# Patient Record
Sex: FEMALE | Race: BLACK OR AFRICAN AMERICAN | Employment: UNEMPLOYED | URBAN - METROPOLITAN AREA
[De-identification: names, ages, dates, MRNs, and addresses within clinical notes are randomized per-mention and may not be internally consistent; named-entity substitution may affect disease eponyms.]

---

## 2017-11-18 ENCOUNTER — APPOINTMENT (OUTPATIENT)
Dept: GENERAL RADIOLOGY | Age: 67
DRG: 064 | End: 2017-11-18
Payer: MEDICARE

## 2017-11-18 ENCOUNTER — HOSPITAL ENCOUNTER (INPATIENT)
Age: 67
LOS: 26 days | Discharge: OTHER INSTITUTION | DRG: 064 | End: 2017-12-14
Attending: EMERGENCY MEDICINE | Admitting: INTERNAL MEDICINE
Payer: MEDICARE

## 2017-11-18 ENCOUNTER — APPOINTMENT (OUTPATIENT)
Dept: CT IMAGING | Age: 67
DRG: 064 | End: 2017-11-18
Payer: MEDICARE

## 2017-11-18 DIAGNOSIS — G93.41 ACUTE METABOLIC ENCEPHALOPATHY: ICD-10-CM

## 2017-11-18 DIAGNOSIS — N17.9 ACUTE RENAL FAILURE, UNSPECIFIED ACUTE RENAL FAILURE TYPE (HCC): Primary | ICD-10-CM

## 2017-11-18 DIAGNOSIS — J96.00 ACUTE RESPIRATORY FAILURE, UNSPECIFIED WHETHER WITH HYPOXIA OR HYPERCAPNIA (HCC): ICD-10-CM

## 2017-11-18 PROBLEM — R41.82 ALTERED MENTAL STATUS: Status: ACTIVE | Noted: 2017-11-18

## 2017-11-18 PROBLEM — G93.40 ENCEPHALOPATHY: Status: ACTIVE | Noted: 2017-11-18

## 2017-11-18 PROBLEM — E87.5 HYPERKALEMIA: Status: ACTIVE | Noted: 2017-11-18

## 2017-11-18 PROBLEM — M62.82 RHABDOMYOLYSIS: Status: ACTIVE | Noted: 2017-11-18

## 2017-11-18 LAB
-: NORMAL
ABSOLUTE EOS #: 0 K/UL (ref 0–0.4)
ABSOLUTE IMMATURE GRANULOCYTE: 0.49 K/UL (ref 0–0.3)
ABSOLUTE LYMPH #: 1.48 K/UL (ref 1–4.8)
ABSOLUTE MONO #: 0.74 K/UL (ref 0.1–0.8)
ACETAMINOPHEN LEVEL: <10 UG/ML (ref 10–30)
ALBUMIN SERPL-MCNC: 2.8 G/DL (ref 3.5–5.2)
ALBUMIN/GLOBULIN RATIO: 0.5 (ref 1–2.5)
ALLEN TEST: ABNORMAL
ALLEN TEST: ABNORMAL
ALP BLD-CCNC: 158 U/L (ref 35–104)
ALT SERPL-CCNC: 28 U/L (ref 5–33)
AMORPHOUS: NORMAL
AMPHETAMINE SCREEN URINE: NEGATIVE
ANION GAP SERPL CALCULATED.3IONS-SCNC: 21 MMOL/L (ref 9–17)
ANION GAP SERPL CALCULATED.3IONS-SCNC: 23 MMOL/L (ref 9–17)
ANION GAP SERPL CALCULATED.3IONS-SCNC: 28 MMOL/L (ref 9–17)
ANION GAP: 12 MMOL/L (ref 7–16)
AST SERPL-CCNC: 21 U/L
BACTERIA: NORMAL
BARBITURATE SCREEN URINE: NEGATIVE
BASOPHILS # BLD: 0 %
BASOPHILS ABSOLUTE: 0 K/UL (ref 0–0.2)
BENZODIAZEPINE SCREEN, URINE: NEGATIVE
BILIRUB SERPL-MCNC: 0.5 MG/DL (ref 0.3–1.2)
BILIRUBIN DIRECT: 0.24 MG/DL
BILIRUBIN URINE: NEGATIVE
BILIRUBIN, INDIRECT: 0.26 MG/DL (ref 0–1)
BUN BLDV-MCNC: 138 MG/DL (ref 8–23)
BUN BLDV-MCNC: 148 MG/DL (ref 8–23)
BUN BLDV-MCNC: 158 MG/DL (ref 8–23)
BUN BLDV-MCNC: 158 MG/DL (ref 8–23)
BUN/CREAT BLD: ABNORMAL (ref 9–20)
BUPRENORPHINE URINE: NORMAL
CALCIUM SERPL-MCNC: 10 MG/DL (ref 8.6–10.4)
CALCIUM SERPL-MCNC: 8.2 MG/DL (ref 8.6–10.4)
CALCIUM SERPL-MCNC: 8.7 MG/DL (ref 8.6–10.4)
CANNABINOID SCREEN URINE: NEGATIVE
CASTS UA: NORMAL /LPF (ref 0–2)
CHLORIDE BLD-SCNC: 103 MMOL/L (ref 98–107)
CHLORIDE BLD-SCNC: 115 MMOL/L (ref 98–107)
CHLORIDE BLD-SCNC: 115 MMOL/L (ref 98–107)
CHOLESTEROL/HDL RATIO: 13.2
CHOLESTEROL: 119 MG/DL
CO2: 13 MMOL/L (ref 20–31)
CO2: 16 MMOL/L (ref 20–31)
CO2: 16 MMOL/L (ref 20–31)
COCAINE METABOLITE, URINE: NEGATIVE
COLOR: YELLOW
COMMENT UA: ABNORMAL
CREAT SERPL-MCNC: 2.77 MG/DL (ref 0.5–0.9)
CREAT SERPL-MCNC: 3.18 MG/DL (ref 0.5–0.9)
CREAT SERPL-MCNC: 3.59 MG/DL (ref 0.5–0.9)
CREAT SERPL-MCNC: 3.63 MG/DL (ref 0.5–0.9)
CRYSTALS, UA: NORMAL /HPF
DIFFERENTIAL TYPE: ABNORMAL
EKG ATRIAL RATE: 116 BPM
EKG P AXIS: 68 DEGREES
EKG P-R INTERVAL: 166 MS
EKG Q-T INTERVAL: 342 MS
EKG QRS DURATION: 94 MS
EKG QTC CALCULATION (BAZETT): 475 MS
EKG R AXIS: 64 DEGREES
EKG T AXIS: 57 DEGREES
EKG VENTRICULAR RATE: 116 BPM
EOSINOPHILS RELATIVE PERCENT: 0 %
EPITHELIAL CELLS UA: NORMAL /HPF (ref 0–5)
ETHANOL PERCENT: <0.01 %
ETHANOL: <10 MG/DL
FIO2: 30
FIO2: ABNORMAL
GFR AFRICAN AMERICAN: 15 ML/MIN
GFR AFRICAN AMERICAN: 15 ML/MIN
GFR AFRICAN AMERICAN: 18 ML/MIN
GFR AFRICAN AMERICAN: 21 ML/MIN
GFR NON-AFRICAN AMERICAN: 12 ML/MIN
GFR NON-AFRICAN AMERICAN: 13 ML/MIN
GFR NON-AFRICAN AMERICAN: 14 ML/MIN
GFR NON-AFRICAN AMERICAN: 17 ML/MIN
GFR SERPL CREATININE-BSD FRML MDRD: ABNORMAL ML/MIN/{1.73_M2}
GLOBULIN: ABNORMAL G/DL (ref 1.5–3.8)
GLUCOSE BLD-MCNC: 228 MG/DL (ref 65–105)
GLUCOSE BLD-MCNC: 245 MG/DL (ref 70–99)
GLUCOSE BLD-MCNC: 271 MG/DL (ref 70–99)
GLUCOSE BLD-MCNC: 320 MG/DL (ref 74–100)
GLUCOSE BLD-MCNC: 326 MG/DL (ref 70–99)
GLUCOSE URINE: NEGATIVE
HCO3 VENOUS: 20 MMOL/L (ref 22–29)
HCT VFR BLD CALC: 54.9 % (ref 36.3–47.1)
HDLC SERPL-MCNC: 9 MG/DL
HEMOGLOBIN: 17.3 G/DL (ref 11.9–15.1)
IMMATURE GRANULOCYTES: 2 %
KETONES, URINE: NEGATIVE
LACTIC ACID, WHOLE BLOOD: 3.8 MMOL/L (ref 0.7–2.1)
LACTIC ACID: ABNORMAL MMOL/L
LDL CHOLESTEROL: 69 MG/DL (ref 0–130)
LEUKOCYTE ESTERASE, URINE: NEGATIVE
LYMPHOCYTES # BLD: 6 %
MCH RBC QN AUTO: 29 PG (ref 25.2–33.5)
MCHC RBC AUTO-ENTMCNC: 31.5 G/DL (ref 28.4–34.8)
MCV RBC AUTO: 92.1 FL (ref 82.6–102.9)
MDMA URINE: NORMAL
METHADONE SCREEN, URINE: NEGATIVE
METHAMPHETAMINE, URINE: NORMAL
MODE: ABNORMAL
MODE: ABNORMAL
MONOCYTES # BLD: 3 %
MORPHOLOGY: ABNORMAL
MUCUS: NORMAL
MYOGLOBIN: 1317 NG/ML (ref 25–58)
NEGATIVE BASE EXCESS, ART: 10 (ref 0–2)
NEGATIVE BASE EXCESS, VEN: 7 (ref 0–2)
NITRITE, URINE: NEGATIVE
O2 DEVICE/FLOW/%: ABNORMAL
O2 DEVICE/FLOW/%: ABNORMAL
O2 SAT, VEN: 82 % (ref 60–85)
OPIATES, URINE: NEGATIVE
OTHER OBSERVATIONS UA: NORMAL
OXYCODONE SCREEN URINE: NEGATIVE
PATIENT TEMP: 32.5
PATIENT TEMP: ABNORMAL
PCO2, VEN: 42.8 MM HG (ref 41–51)
PDW BLD-RTO: 14.5 % (ref 11.8–14.4)
PH UA: 5.5 (ref 5–8)
PH VENOUS: 7.28 (ref 7.32–7.43)
PHENCYCLIDINE, URINE: NEGATIVE
PLATELET # BLD: 351 K/UL (ref 138–453)
PLATELET ESTIMATE: ABNORMAL
PMV BLD AUTO: 12.5 FL (ref 8.1–13.5)
PO2, VEN: 52.3 MM HG (ref 30–50)
POC CHLORIDE: 120 MMOL/L (ref 98–107)
POC HCO3: 16.7 MMOL/L (ref 21–28)
POC HEMATOCRIT: 59 % (ref 36–46)
POC HEMOGLOBIN: 19.9 G/DL (ref 12–16)
POC IONIZED CALCIUM: 1.17 MMOL/L (ref 1.15–1.33)
POC LACTIC ACID: 6.3 MMOL/L (ref 0.56–1.39)
POC O2 SATURATION: 99 % (ref 94–98)
POC PCO2 TEMP: 33 MM HG
POC PCO2 TEMP: ABNORMAL MM HG
POC PCO2: 39.9 MM HG (ref 35–48)
POC PH TEMP: 7.29
POC PH TEMP: ABNORMAL
POC PH: 7.23 (ref 7.35–7.45)
POC PO2 TEMP: 143 MM HG
POC PO2 TEMP: ABNORMAL MM HG
POC PO2: 166.8 MM HG (ref 83–108)
POC SODIUM: 152 MMOL/L (ref 138–146)
POSITIVE BASE EXCESS, ART: ABNORMAL (ref 0–3)
POSITIVE BASE EXCESS, VEN: ABNORMAL (ref 0–3)
POTASSIUM SERPL-SCNC: 4 MMOL/L (ref 3.7–5.3)
POTASSIUM SERPL-SCNC: 4.4 MMOL/L (ref 3.7–5.3)
POTASSIUM SERPL-SCNC: 4.7 MMOL/L (ref 3.7–5.3)
POTASSIUM SERPL-SCNC: 8.8 MMOL/L (ref 3.7–5.3)
PROLACTIN: 110.8 UG/L (ref 4.79–23.3)
PROPOXYPHENE, URINE: NORMAL
PROTEIN UA: ABNORMAL
RBC # BLD: 5.96 M/UL (ref 3.95–5.11)
RBC # BLD: ABNORMAL 10*6/UL
RBC UA: NORMAL /HPF (ref 0–2)
RENAL EPITHELIAL, UA: NORMAL /HPF
SALICYLATE LEVEL: <1 MG/DL (ref 3–10)
SAMPLE SITE: ABNORMAL
SAMPLE SITE: ABNORMAL
SEG NEUTROPHILS: 89 %
SEGMENTED NEUTROPHILS ABSOLUTE COUNT: 21.89 K/UL (ref 1.8–7.7)
SODIUM BLD-SCNC: 147 MMOL/L (ref 135–144)
SODIUM BLD-SCNC: 151 MMOL/L (ref 135–144)
SODIUM BLD-SCNC: 152 MMOL/L (ref 135–144)
SPECIFIC GRAVITY UA: 1.02 (ref 1–1.03)
TCO2 (CALC), ART: 18 MMOL/L (ref 22–29)
TEST INFORMATION: NORMAL
TOTAL CK: 179 U/L (ref 26–192)
TOTAL CO2, VENOUS: 21 MMOL/L (ref 23–30)
TOTAL PROTEIN: 8.7 G/DL (ref 6.4–8.3)
TOXIC TRICYCLIC SC,BLOOD: NEGATIVE
TRICHOMONAS: NORMAL
TRICYCLIC ANTIDEPRESSANTS, UR: NORMAL
TRIGL SERPL-MCNC: 206 MG/DL
TROPONIN INTERP: NORMAL
TROPONIN INTERP: NORMAL
TROPONIN T: <0.03 NG/ML
TROPONIN T: <0.03 NG/ML
TSH SERPL DL<=0.05 MIU/L-ACNC: 2.2 MIU/L (ref 0.3–5)
TURBIDITY: CLEAR
URINE HGB: NEGATIVE
UROBILINOGEN, URINE: NORMAL
VLDLC SERPL CALC-MCNC: ABNORMAL MG/DL (ref 1–30)
WBC # BLD: 24.6 K/UL (ref 3.5–11.3)
WBC # BLD: ABNORMAL 10*3/UL
WBC UA: NORMAL /HPF (ref 0–5)
YEAST: NORMAL

## 2017-11-18 PROCEDURE — 84520 ASSAY OF UREA NITROGEN: CPT

## 2017-11-18 PROCEDURE — 94002 VENT MGMT INPAT INIT DAY: CPT

## 2017-11-18 PROCEDURE — 82330 ASSAY OF CALCIUM: CPT

## 2017-11-18 PROCEDURE — 0DH68UZ INSERTION OF FEEDING DEVICE INTO STOMACH, VIA NATURAL OR ARTIFICIAL OPENING ENDOSCOPIC: ICD-10-PCS | Performed by: INTERNAL MEDICINE

## 2017-11-18 PROCEDURE — 80076 HEPATIC FUNCTION PANEL: CPT

## 2017-11-18 PROCEDURE — 84484 ASSAY OF TROPONIN QUANT: CPT

## 2017-11-18 PROCEDURE — 83874 ASSAY OF MYOGLOBIN: CPT

## 2017-11-18 PROCEDURE — 82803 BLOOD GASES ANY COMBINATION: CPT

## 2017-11-18 PROCEDURE — S0028 INJECTION, FAMOTIDINE, 20 MG: HCPCS | Performed by: HOSPITALIST

## 2017-11-18 PROCEDURE — 87641 MR-STAPH DNA AMP PROBE: CPT

## 2017-11-18 PROCEDURE — 80048 BASIC METABOLIC PNL TOTAL CA: CPT

## 2017-11-18 PROCEDURE — 2580000003 HC RX 258: Performed by: HOSPITALIST

## 2017-11-18 PROCEDURE — 71010 XR CHEST PORTABLE: CPT

## 2017-11-18 PROCEDURE — 96374 THER/PROPH/DIAG INJ IV PUSH: CPT

## 2017-11-18 PROCEDURE — 2580000003 HC RX 258: Performed by: EMERGENCY MEDICINE

## 2017-11-18 PROCEDURE — 84443 ASSAY THYROID STIM HORMONE: CPT

## 2017-11-18 PROCEDURE — 2500000003 HC RX 250 WO HCPCS

## 2017-11-18 PROCEDURE — 99291 CRITICAL CARE FIRST HOUR: CPT | Performed by: INTERNAL MEDICINE

## 2017-11-18 PROCEDURE — 99285 EMERGENCY DEPT VISIT HI MDM: CPT

## 2017-11-18 PROCEDURE — 87040 BLOOD CULTURE FOR BACTERIA: CPT

## 2017-11-18 PROCEDURE — G0480 DRUG TEST DEF 1-7 CLASSES: HCPCS

## 2017-11-18 PROCEDURE — 6360000002 HC RX W HCPCS: Performed by: STUDENT IN AN ORGANIZED HEALTH CARE EDUCATION/TRAINING PROGRAM

## 2017-11-18 PROCEDURE — 84132 ASSAY OF SERUM POTASSIUM: CPT

## 2017-11-18 PROCEDURE — 85014 HEMATOCRIT: CPT

## 2017-11-18 PROCEDURE — 51702 INSERT TEMP BLADDER CATH: CPT | Performed by: NURSE PRACTITIONER

## 2017-11-18 PROCEDURE — 84295 ASSAY OF SERUM SODIUM: CPT

## 2017-11-18 PROCEDURE — 6360000002 HC RX W HCPCS: Performed by: EMERGENCY MEDICINE

## 2017-11-18 PROCEDURE — 94762 N-INVAS EAR/PLS OXIMTRY CONT: CPT

## 2017-11-18 PROCEDURE — 36415 COLL VENOUS BLD VENIPUNCTURE: CPT

## 2017-11-18 PROCEDURE — 80061 LIPID PANEL: CPT

## 2017-11-18 PROCEDURE — 84146 ASSAY OF PROLACTIN: CPT

## 2017-11-18 PROCEDURE — 74176 CT ABD & PELVIS W/O CONTRAST: CPT

## 2017-11-18 PROCEDURE — 82947 ASSAY GLUCOSE BLOOD QUANT: CPT

## 2017-11-18 PROCEDURE — 70450 CT HEAD/BRAIN W/O DYE: CPT

## 2017-11-18 PROCEDURE — 82550 ASSAY OF CK (CPK): CPT

## 2017-11-18 PROCEDURE — 93005 ELECTROCARDIOGRAM TRACING: CPT

## 2017-11-18 PROCEDURE — 2580000003 HC RX 258: Performed by: STUDENT IN AN ORGANIZED HEALTH CARE EDUCATION/TRAINING PROGRAM

## 2017-11-18 PROCEDURE — 94770 HC ETCO2 MONITOR DAILY: CPT

## 2017-11-18 PROCEDURE — 2500000003 HC RX 250 WO HCPCS: Performed by: HOSPITALIST

## 2017-11-18 PROCEDURE — 36556 INSERT NON-TUNNEL CV CATH: CPT | Performed by: NURSE PRACTITIONER

## 2017-11-18 PROCEDURE — 6370000000 HC RX 637 (ALT 250 FOR IP): Performed by: STUDENT IN AN ORGANIZED HEALTH CARE EDUCATION/TRAINING PROGRAM

## 2017-11-18 PROCEDURE — 6360000002 HC RX W HCPCS

## 2017-11-18 PROCEDURE — 82565 ASSAY OF CREATININE: CPT

## 2017-11-18 PROCEDURE — 5A1955Z RESPIRATORY VENTILATION, GREATER THAN 96 CONSECUTIVE HOURS: ICD-10-PCS | Performed by: INTERNAL MEDICINE

## 2017-11-18 PROCEDURE — 83605 ASSAY OF LACTIC ACID: CPT

## 2017-11-18 PROCEDURE — 83036 HEMOGLOBIN GLYCOSYLATED A1C: CPT

## 2017-11-18 PROCEDURE — 81001 URINALYSIS AUTO W/SCOPE: CPT

## 2017-11-18 PROCEDURE — 85025 COMPLETE CBC W/AUTO DIFF WBC: CPT

## 2017-11-18 PROCEDURE — 80307 DRUG TEST PRSMV CHEM ANLYZR: CPT

## 2017-11-18 PROCEDURE — 71250 CT THORAX DX C-: CPT

## 2017-11-18 PROCEDURE — 31500 INSERT EMERGENCY AIRWAY: CPT

## 2017-11-18 PROCEDURE — 82435 ASSAY OF BLOOD CHLORIDE: CPT

## 2017-11-18 PROCEDURE — 96375 TX/PRO/DX INJ NEW DRUG ADDON: CPT

## 2017-11-18 PROCEDURE — 0BH18EZ INSERTION OF ENDOTRACHEAL AIRWAY INTO TRACHEA, VIA NATURAL OR ARTIFICIAL OPENING ENDOSCOPIC: ICD-10-PCS | Performed by: INTERNAL MEDICINE

## 2017-11-18 PROCEDURE — 72125 CT NECK SPINE W/O DYE: CPT

## 2017-11-18 PROCEDURE — 2000000000 HC ICU R&B

## 2017-11-18 PROCEDURE — 6360000002 HC RX W HCPCS: Performed by: HOSPITALIST

## 2017-11-18 RX ORDER — ACETAMINOPHEN 325 MG/1
650 TABLET ORAL EVERY 4 HOURS PRN
Status: DISCONTINUED | OUTPATIENT
Start: 2017-11-18 | End: 2017-12-14 | Stop reason: HOSPADM

## 2017-11-18 RX ORDER — FENTANYL CITRATE 50 UG/ML
50 INJECTION, SOLUTION INTRAMUSCULAR; INTRAVENOUS ONCE
Status: COMPLETED | OUTPATIENT
Start: 2017-11-18 | End: 2017-11-18

## 2017-11-18 RX ORDER — SODIUM CHLORIDE 9 MG/ML
10 INJECTION, SOLUTION INTRAVENOUS CONTINUOUS
Status: DISCONTINUED | OUTPATIENT
Start: 2017-11-18 | End: 2017-11-19

## 2017-11-18 RX ORDER — 0.9 % SODIUM CHLORIDE 0.9 %
1000 INTRAVENOUS SOLUTION INTRAVENOUS ONCE
Status: COMPLETED | OUTPATIENT
Start: 2017-11-19 | End: 2017-11-19

## 2017-11-18 RX ORDER — DEXTROSE MONOHYDRATE 25 G/50ML
25 INJECTION, SOLUTION INTRAVENOUS ONCE
Status: COMPLETED | OUTPATIENT
Start: 2017-11-18 | End: 2017-11-18

## 2017-11-18 RX ORDER — HEPARIN SODIUM 5000 [USP'U]/ML
5000 INJECTION, SOLUTION INTRAVENOUS; SUBCUTANEOUS EVERY 8 HOURS SCHEDULED
Status: DISCONTINUED | OUTPATIENT
Start: 2017-11-18 | End: 2017-12-05

## 2017-11-18 RX ORDER — NICOTINE POLACRILEX 4 MG
15 LOZENGE BUCCAL PRN
Status: DISCONTINUED | OUTPATIENT
Start: 2017-11-18 | End: 2017-12-04

## 2017-11-18 RX ORDER — SODIUM CHLORIDE 0.9 % (FLUSH) 0.9 %
10 SYRINGE (ML) INJECTION PRN
Status: DISCONTINUED | OUTPATIENT
Start: 2017-11-18 | End: 2017-12-14 | Stop reason: HOSPADM

## 2017-11-18 RX ORDER — FENTANYL CITRATE 50 UG/ML
50 INJECTION, SOLUTION INTRAMUSCULAR; INTRAVENOUS
Status: DISCONTINUED | OUTPATIENT
Start: 2017-11-18 | End: 2017-11-22

## 2017-11-18 RX ORDER — PROPOFOL 10 MG/ML
INJECTION, EMULSION INTRAVENOUS
Status: COMPLETED
Start: 2017-11-18 | End: 2017-11-18

## 2017-11-18 RX ORDER — SODIUM CHLORIDE 0.9 % (FLUSH) 0.9 %
10 SYRINGE (ML) INJECTION EVERY 12 HOURS SCHEDULED
Status: DISCONTINUED | OUTPATIENT
Start: 2017-11-18 | End: 2017-12-14 | Stop reason: HOSPADM

## 2017-11-18 RX ORDER — HYDRALAZINE HYDROCHLORIDE 20 MG/ML
10 INJECTION INTRAMUSCULAR; INTRAVENOUS EVERY 8 HOURS PRN
Status: DISCONTINUED | OUTPATIENT
Start: 2017-11-18 | End: 2017-11-18

## 2017-11-18 RX ORDER — 0.9 % SODIUM CHLORIDE 0.9 %
1000 INTRAVENOUS SOLUTION INTRAVENOUS ONCE
Status: COMPLETED | OUTPATIENT
Start: 2017-11-18 | End: 2017-11-18

## 2017-11-18 RX ORDER — PROPOFOL 10 MG/ML
10 INJECTION, EMULSION INTRAVENOUS
Status: DISCONTINUED | OUTPATIENT
Start: 2017-11-18 | End: 2017-11-22

## 2017-11-18 RX ORDER — ALBUTEROL SULFATE 90 UG/1
4 AEROSOL, METERED RESPIRATORY (INHALATION) EVERY 6 HOURS PRN
Status: DISCONTINUED | OUTPATIENT
Start: 2017-11-18 | End: 2017-12-08

## 2017-11-18 RX ORDER — SODIUM CHLORIDE 9 MG/ML
INJECTION, SOLUTION INTRAVENOUS CONTINUOUS
Status: DISCONTINUED | OUTPATIENT
Start: 2017-11-18 | End: 2017-11-19

## 2017-11-18 RX ORDER — DEXTROSE MONOHYDRATE 25 G/50ML
12.5 INJECTION, SOLUTION INTRAVENOUS PRN
Status: DISCONTINUED | OUTPATIENT
Start: 2017-11-18 | End: 2017-12-04

## 2017-11-18 RX ORDER — PROPOFOL 10 MG/ML
10 INJECTION, EMULSION INTRAVENOUS
Status: DISCONTINUED | OUTPATIENT
Start: 2017-11-18 | End: 2017-11-18

## 2017-11-18 RX ORDER — ONDANSETRON 2 MG/ML
4 INJECTION INTRAMUSCULAR; INTRAVENOUS EVERY 6 HOURS PRN
Status: DISCONTINUED | OUTPATIENT
Start: 2017-11-18 | End: 2017-12-14 | Stop reason: HOSPADM

## 2017-11-18 RX ORDER — HYDRALAZINE HYDROCHLORIDE 20 MG/ML
10 INJECTION INTRAMUSCULAR; INTRAVENOUS ONCE
Status: COMPLETED | OUTPATIENT
Start: 2017-11-18 | End: 2017-11-18

## 2017-11-18 RX ORDER — CHLORHEXIDINE GLUCONATE 0.12 MG/ML
15 RINSE ORAL 2 TIMES DAILY
Status: DISCONTINUED | OUTPATIENT
Start: 2017-11-18 | End: 2017-11-18

## 2017-11-18 RX ORDER — LABETALOL HYDROCHLORIDE 5 MG/ML
10 INJECTION, SOLUTION INTRAVENOUS EVERY 4 HOURS PRN
Status: DISCONTINUED | OUTPATIENT
Start: 2017-11-18 | End: 2017-11-18

## 2017-11-18 RX ORDER — CALCIUM GLUCONATE 94 MG/ML
1 INJECTION, SOLUTION INTRAVENOUS ONCE
Status: DISCONTINUED | OUTPATIENT
Start: 2017-11-18 | End: 2017-11-18

## 2017-11-18 RX ORDER — DEXTROSE MONOHYDRATE 50 MG/ML
100 INJECTION, SOLUTION INTRAVENOUS PRN
Status: DISCONTINUED | OUTPATIENT
Start: 2017-11-18 | End: 2017-12-04

## 2017-11-18 RX ORDER — 0.9 % SODIUM CHLORIDE 0.9 %
30 INTRAVENOUS SOLUTION INTRAVENOUS ONCE
Status: COMPLETED | OUTPATIENT
Start: 2017-11-18 | End: 2017-11-18

## 2017-11-18 RX ADMIN — SODIUM CHLORIDE: 9 INJECTION, SOLUTION INTRAVENOUS at 17:07

## 2017-11-18 RX ADMIN — FENTANYL CITRATE 50 MCG: 50 INJECTION INTRAMUSCULAR; INTRAVENOUS at 22:39

## 2017-11-18 RX ADMIN — INSULIN LISPRO 6 UNITS: 100 INJECTION, SOLUTION INTRAVENOUS; SUBCUTANEOUS at 22:29

## 2017-11-18 RX ADMIN — Medication 10 ML: at 20:10

## 2017-11-18 RX ADMIN — SODIUM CHLORIDE 1000 ML: 9 INJECTION, SOLUTION INTRAVENOUS at 17:08

## 2017-11-18 RX ADMIN — VANCOMYCIN HYDROCHLORIDE 1250 MG: 1 INJECTION, POWDER, LYOPHILIZED, FOR SOLUTION INTRAVENOUS at 14:15

## 2017-11-18 RX ADMIN — SODIUM CHLORIDE 1000 ML: 9 INJECTION, SOLUTION INTRAVENOUS at 22:09

## 2017-11-18 RX ADMIN — FENTANYL CITRATE 50 MCG: 50 INJECTION INTRAMUSCULAR; INTRAVENOUS at 18:20

## 2017-11-18 RX ADMIN — DEXTROSE MONOHYDRATE 25 G: 25 INJECTION, SOLUTION INTRAVENOUS at 12:08

## 2017-11-18 RX ADMIN — WATER 2 G: 1 INJECTION INTRAMUSCULAR; INTRAVENOUS; SUBCUTANEOUS at 12:56

## 2017-11-18 RX ADMIN — HEPARIN SODIUM 5000 UNITS: 5000 INJECTION, SOLUTION INTRAVENOUS; SUBCUTANEOUS at 20:53

## 2017-11-18 RX ADMIN — PROPOFOL 10 MCG/KG/MIN: 10 INJECTION, EMULSION INTRAVENOUS at 11:57

## 2017-11-18 RX ADMIN — HYDRALAZINE HYDROCHLORIDE 10 MG: 20 INJECTION, SOLUTION INTRAMUSCULAR; INTRAVENOUS at 19:32

## 2017-11-18 RX ADMIN — INSULIN HUMAN 10 UNITS: 100 INJECTION, SOLUTION PARENTERAL at 12:14

## 2017-11-18 RX ADMIN — PROPOFOL 15 MCG/KG/MIN: 10 INJECTION, EMULSION INTRAVENOUS at 21:55

## 2017-11-18 RX ADMIN — FAMOTIDINE 20 MG: 10 INJECTION, SOLUTION INTRAVENOUS at 20:52

## 2017-11-18 RX ADMIN — SODIUM CHLORIDE: 9 INJECTION, SOLUTION INTRAVENOUS at 19:03

## 2017-11-18 RX ADMIN — SODIUM CHLORIDE 1000 ML: 9 INJECTION, SOLUTION INTRAVENOUS at 23:39

## 2017-11-18 RX ADMIN — SODIUM CHLORIDE 4764 ML: 9 INJECTION, SOLUTION INTRAVENOUS at 12:10

## 2017-11-18 RX ADMIN — CALCIUM GLUCONATE 1 G: 94 INJECTION, SOLUTION INTRAVENOUS at 12:47

## 2017-11-18 ASSESSMENT — PULMONARY FUNCTION TESTS
PIF_VALUE: 19
PIF_VALUE: 17
PIF_VALUE: 13
PIF_VALUE: 28
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
## 2017-11-18 NOTE — ED PROVIDER NOTES
extensive pressure ulcers seen across the chest with left breast ulcer appearing purulent drainage. Abdomen is soft, obese, nontender. Radial pulse 2+ bilaterally, fingers are cool. Impression: Altered mental status, acute renal failure, likely rhabdo myolysis, sepsis, unclear etiology of initial incident. Plan: Patient was immediately intubated after IV was established due to low level of consciousness and inability to protect airway, lack of gag reflex. We'll proceed with aggressive fluid resuscitation starting with 30 mL/kg fluid bolus, start low-dose sedation with propofol 10 mics, brought imaging including CT head cervical chest abdomen pelvis, broad laboratory workup including septic and cardiac. We'll require admission to ICU. EKG Interpretation  EKG Interpretation    Interpreted by emergency department physician    Rhythm: sinus tachycardia  Rate: 110-120  Axis: normal  Ectopy: none  Conduction: Left ventricular hypertrophy  ST Segments: normal  T Waves: normal  Q Waves: none    EKG  Impression: Sinus tachycardia, abnormal EKG    Ana Maria Reilly MD      Interpreted by me      CRITICAL CARE: There was a high probability of clinically significant/life threatening deterioration in this patient's condition which required my urgent intervention. Total critical care time was 34 minutes. This excludes any time for separately reportable procedures.      Dalila Liz MD  Attending Emergency Physician        Ana Maria Reilly MD  11/18/17 [phone]

## 2017-11-18 NOTE — PLAN OF CARE
Problem: OXYGENATION/RESPIRATORY FUNCTION  Goal: Patient will maintain patent airway  Outcome: Ongoing    Goal: Patient will achieve/maintain normal respiratory rate/effort  Respiratory rate and effort will be within normal limits for the patient  Outcome: Ongoing      Problem: MECHANICAL VENTILATION  Goal: Patient will maintain patent airway  Outcome: Ongoing    Goal: Oral health is maintained or improved  Outcome: Ongoing    Goal: ET tube will be managed safely  Outcome: Ongoing    Goal: Ability to express needs and understand communication  Outcome: Ongoing    Goal: Mobility/activity is maintained at optimum level for patient  Outcome: Ongoing      Problem: ASPIRATION PRECAUTIONS  Goal: Patient's risk of aspiration is minimized  Outcome: Ongoing      Problem: SKIN INTEGRITY  Goal: Skin integrity is maintained or improved  Outcome: Ongoing      Problem: NUTRITION  Goal: Nutritional status is improving  Outcome: Ongoing

## 2017-11-18 NOTE — ED PROVIDER NOTES
100 mL IVPB    insulin regular (HUMULIN R;NOVOLIN R) injection 10 Units    DISCONTD: vancomycin (VANCOCIN) 1,500 mg in dextrose 5 % 250 mL IVPB    vancomycin (VANCOCIN) 1,250 mg in dextrose 5 % 250 mL IVPB    vancomycin (VANCOCIN) intermittent dosing (placeholder)    sodium chloride flush 0.9 % injection 10 mL    sodium chloride flush 0.9 % injection 10 mL    acetaminophen (TYLENOL) tablet 650 mg    magnesium hydroxide (MILK OF MAGNESIA) 400 MG/5ML suspension 30 mL    ondansetron (ZOFRAN) injection 4 mg    0.9 % sodium chloride infusion    0.9 % sodium chloride bolus    famotidine (PEPCID) injection 20 mg    DISCONTD: chlorhexidine (PERIDEX) 0.12 % solution 15 mL    heparin flush (100 units/mL) 100 UNIT/ML injection     KISHOR NUNEZ: cabinet override    heparin (porcine) injection 5,000 Units    albuterol sulfate  (90 Base) MCG/ACT inhaler 4 puff    fentaNYL (SUBLIMAZE) injection 50 mcg       DDX: Sepsis due to unknown origin, rhabdomyolysis, CVA, ACS, PE, electrolyte disturbance    DIAGNOSTIC RESULTS / EMERGENCY DEPARTMENT COURSE / MDM     LABS:  Results for orders placed or performed during the hospital encounter of 11/18/17   CBC Auto Differential   Result Value Ref Range    WBC 24.6 (H) 3.5 - 11.3 k/uL    RBC 5.96 (H) 3.95 - 5.11 m/uL    Hemoglobin 17.3 (H) 11.9 - 15.1 g/dL    Hematocrit 54.9 (H) 36.3 - 47.1 %    MCV 92.1 82.6 - 102.9 fL    MCH 29.0 25.2 - 33.5 pg    MCHC 31.5 28.4 - 34.8 g/dL    RDW 14.5 (H) 11.8 - 14.4 %    Platelets 842 716 - 656 k/uL    MPV 12.5 8.1 - 13.5 fL    Differential Type NOT REPORTED     WBC Morphology NOT REPORTED     RBC Morphology NOT REPORTED     Platelet Estimate NOT REPORTED     Immature Granulocytes 2 (H) 0 %    Seg Neutrophils 89 %    Lymphocytes 6 %    Monocytes 3 %    Eosinophils % 0 %    Basophils 0 %    Absolute Immature Granulocyte 0.49 (H) 0.00 - 0.30 k/uL    Segs Absolute 21.89 (H) 1.8 - 7.7 k/uL    Absolute Lymph # 1.48 1.0 - 4.8 k/uL well.    Complications: None      Central Line Placement Procedure Note    Indication: dialysis    Consent: Unable to be obtained due to patient's condition. Procedure: The patient was positioned appropriately and the skin over the right femoral vein was prepped with betadine and draped in a sterile fashion. Local anesthesia was obtained by infiltration using 1% Lidocaine without epinephrine. Femoral vein was identified using ultrasound. A large bore needle was used to identify the vein. A guide wire was then inserted into the vein through the needle. A double lumen sarah catheter was then inserted into the vessel over the guide wire using the Seldinger technique. All ports showed good, free flowing blood return and were flushed with saline solution. Additionally, both ports were flushed with heparin. The catheter was then securely fastened to the skin with sutures and covered with a sterile dressing. A post procedure X-ray was not indicated. The patient tolerated the procedure well. Complications: None        CONSULTS:  PHARMACY TO DOSE VANCOMYCIN  IP CONSULT TO CRITICAL CARE  IP CONSULT TO NEPHROLOGY  IP CONSULT TO NEPHROLOGY  IP CONSULT TO TRAUMA SURGERY    CRITICAL CARE:  34 minutes, excluding procedural time    FINAL IMPRESSION      1. Acute renal failure, unspecified acute renal failure type ([address]    2. Acute respiratory failure, unspecified whether with hypoxia or hypercapnia ([address]    3. Acute metabolic encephalopathy          DISPOSITION / PLAN     DISPOSITION   Admission to ICU  PATIENT REFERRED TO:  No follow-up provider specified. DISCHARGE MEDICATIONS:  There are no discharge medications for this patient. Asmita Waggoner D.O.   Emergency Medicine Resident    (Please note that portions of this note were completed with a voice recognition program.  Efforts were made to edit the dictations but occasionally words are mis-transcribed.)     Asmita Waggoner MD  11/18/17

## 2017-11-18 NOTE — FLOWSHEET NOTE
provided presence and support to pt's son, Donald Lynch, and pt's sister. Both are concerned that pt has been lethargic and son said he has been bathing her a few times a week, and says he is concerned that she hasn't been moving off of her futon and lying in her own urine and feces. Son and sister are very concerned about the marks on her body and the infection.  listened as they vented their concerns. Chaplains will remain available to offer spiritual and emotional support as needed.     Electronically signed by Yoly Schmitz on 11/18/2017 at 2:02 PM
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
## 2017-11-18 NOTE — PLAN OF CARE
Problem: Restraint Use - Nonviolent/Non-Self-Destructive Behavior:  Goal: Absence of restraint indications  Absence of restraint indications   Outcome: Ongoing

## 2017-11-19 ENCOUNTER — APPOINTMENT (OUTPATIENT)
Dept: ULTRASOUND IMAGING | Age: 67
DRG: 064 | End: 2017-11-19
Payer: MEDICARE

## 2017-11-19 PROBLEM — E66.01 MORBID OBESITY (HCC): Status: ACTIVE | Noted: 2017-11-19

## 2017-11-19 LAB
ABSOLUTE EOS #: 0 K/UL (ref 0–0.4)
ABSOLUTE IMMATURE GRANULOCYTE: 0.22 K/UL (ref 0–0.3)
ABSOLUTE LYMPH #: 4.03 K/UL (ref 1–4.8)
ABSOLUTE MONO #: 0.67 K/UL (ref 0.1–0.8)
ALLEN TEST: POSITIVE
ANION GAP SERPL CALCULATED.3IONS-SCNC: 16 MMOL/L (ref 9–17)
ANION GAP SERPL CALCULATED.3IONS-SCNC: 19 MMOL/L (ref 9–17)
BASOPHILS # BLD: 0 %
BASOPHILS ABSOLUTE: 0 K/UL (ref 0–0.2)
BUN BLDV-MCNC: 124 MG/DL (ref 8–23)
BUN BLDV-MCNC: 143 MG/DL (ref 8–23)
BUN/CREAT BLD: ABNORMAL (ref 9–20)
BUN/CREAT BLD: ABNORMAL (ref 9–20)
CALCIUM IONIZED: 1.1 MMOL/L (ref 1.13–1.33)
CALCIUM IONIZED: 1.2 MMOL/L (ref 1.13–1.33)
CALCIUM SERPL-MCNC: 7 MG/DL (ref 8.6–10.4)
CALCIUM SERPL-MCNC: 7.7 MG/DL (ref 8.6–10.4)
CHLORIDE BLD-SCNC: 119 MMOL/L (ref 98–107)
CHLORIDE BLD-SCNC: 123 MMOL/L (ref 98–107)
CO2: 13 MMOL/L (ref 20–31)
CO2: 13 MMOL/L (ref 20–31)
COMPLEMENT C3: 103 MG/DL (ref 90–180)
COMPLEMENT C4: 26 MG/DL (ref 10–40)
CREAT SERPL-MCNC: 2.63 MG/DL (ref 0.5–0.9)
CREAT SERPL-MCNC: 2.77 MG/DL (ref 0.5–0.9)
CREATININE URINE: 50.5 MG/DL (ref 28–217)
DIFFERENTIAL TYPE: ABNORMAL
DIRECT EXAM: NORMAL
EOSINOPHILS RELATIVE PERCENT: 0 %
ESTIMATED AVERAGE GLUCOSE: 166 MG/DL
ESTIMATED AVERAGE GLUCOSE: 169 MG/DL
FIO2: 30
FREE KAPPA/LAMBDA RATIO: 1.9 (ref 0.26–1.65)
GFR AFRICAN AMERICAN: 21 ML/MIN
GFR AFRICAN AMERICAN: 22 ML/MIN
GFR NON-AFRICAN AMERICAN: 17 ML/MIN
GFR NON-AFRICAN AMERICAN: 18 ML/MIN
GFR SERPL CREATININE-BSD FRML MDRD: ABNORMAL ML/MIN/{1.73_M2}
GLUCOSE BLD-MCNC: 108 MG/DL (ref 65–105)
GLUCOSE BLD-MCNC: 116 MG/DL (ref 70–99)
GLUCOSE BLD-MCNC: 125 MG/DL (ref 65–105)
GLUCOSE BLD-MCNC: 146 MG/DL (ref 65–105)
GLUCOSE BLD-MCNC: 150 MG/DL (ref 65–105)
GLUCOSE BLD-MCNC: 154 MG/DL (ref 65–105)
GLUCOSE BLD-MCNC: 169 MG/DL (ref 70–99)
GLUCOSE BLD-MCNC: 95 MG/DL (ref 65–105)
HBA1C MFR BLD: 7.4 % (ref 4–6)
HBA1C MFR BLD: 7.5 % (ref 4–6)
HCT VFR BLD CALC: 36.2 % (ref 36.3–47.1)
HEMOGLOBIN: 11.2 G/DL (ref 11.9–15.1)
IMMATURE GRANULOCYTES: 1 %
KAPPA FREE LIGHT CHAINS QNT: 7.51 MG/DL (ref 0.37–1.94)
LACTIC ACID, WHOLE BLOOD: 2.1 MMOL/L (ref 0.7–2.1)
LACTIC ACID, WHOLE BLOOD: 4 MMOL/L (ref 0.7–2.1)
LACTIC ACID: ABNORMAL MMOL/L
LAMBDA FREE LIGHT CHAINS QNT: 3.96 MG/DL (ref 0.57–2.63)
LYMPHOCYTES # BLD: 18 %
Lab: NORMAL
MCH RBC QN AUTO: 29.3 PG (ref 25.2–33.5)
MCHC RBC AUTO-ENTMCNC: 30.9 G/DL (ref 28.4–34.8)
MCV RBC AUTO: 94.8 FL (ref 82.6–102.9)
MODE: ABNORMAL
MONOCYTES # BLD: 3 %
MORPHOLOGY: ABNORMAL
MRSA, DNA, NASAL: NORMAL
MYOGLOBIN: 2059 NG/ML (ref 25–58)
NEGATIVE BASE EXCESS, ART: 11 (ref 0–2)
O2 DEVICE/FLOW/%: ABNORMAL
PATIENT TEMP: ABNORMAL
PDW BLD-RTO: 14.7 % (ref 11.8–14.4)
PLATELET # BLD: 215 K/UL (ref 138–453)
PLATELET ESTIMATE: ABNORMAL
PMV BLD AUTO: 12.2 FL (ref 8.1–13.5)
POC HCO3: 13.5 MMOL/L (ref 21–28)
POC O2 SATURATION: 99 % (ref 94–98)
POC PCO2 TEMP: ABNORMAL MM HG
POC PCO2: 26.2 MM HG (ref 35–48)
POC PH TEMP: ABNORMAL
POC PH: 7.32 (ref 7.35–7.45)
POC PO2 TEMP: ABNORMAL MM HG
POC PO2: 157.8 MM HG (ref 83–108)
POSITIVE BASE EXCESS, ART: ABNORMAL (ref 0–3)
POTASSIUM SERPL-SCNC: 4 MMOL/L (ref 3.7–5.3)
POTASSIUM SERPL-SCNC: 4.2 MMOL/L (ref 3.7–5.3)
PROLACTIN: 70.75 UG/L (ref 4.79–23.3)
RBC # BLD: 3.82 M/UL (ref 3.95–5.11)
RBC # BLD: ABNORMAL 10*6/UL
SAMPLE SITE: ABNORMAL
SEG NEUTROPHILS: 78 %
SEGMENTED NEUTROPHILS ABSOLUTE COUNT: 17.48 K/UL (ref 1.8–7.7)
SODIUM BLD-SCNC: 151 MMOL/L (ref 135–144)
SODIUM BLD-SCNC: 152 MMOL/L (ref 135–144)
SODIUM BLD-SCNC: 152 MMOL/L (ref 135–144)
SODIUM BLD-SCNC: 154 MMOL/L (ref 135–144)
SODIUM BLD-SCNC: 155 MMOL/L (ref 135–144)
SODIUM,UR: 46 MMOL/L
SPECIMEN DESCRIPTION: NORMAL
SPECIMEN DESCRIPTION: NORMAL
STATUS: NORMAL
TCO2 (CALC), ART: 14 MMOL/L (ref 22–29)
TOTAL CK: 326 U/L (ref 26–192)
TOTAL PROTEIN, URINE: 24 MG/DL
VANCOMYCIN TROUGH DATE LAST DOSE: NORMAL
VANCOMYCIN TROUGH DOSE AMOUNT: NORMAL
VANCOMYCIN TROUGH TIME LAST DOSE: NORMAL
VANCOMYCIN TROUGH: 13.3 UG/ML (ref 10–20)
WBC # BLD: 22.4 K/UL (ref 3.5–11.3)
WBC # BLD: ABNORMAL 10*3/UL

## 2017-11-19 PROCEDURE — 76770 US EXAM ABDO BACK WALL COMP: CPT

## 2017-11-19 PROCEDURE — 2500000003 HC RX 250 WO HCPCS: Performed by: INTERNAL MEDICINE

## 2017-11-19 PROCEDURE — 82330 ASSAY OF CALCIUM: CPT

## 2017-11-19 PROCEDURE — 85025 COMPLETE CBC W/AUTO DIFF WBC: CPT

## 2017-11-19 PROCEDURE — 94770 HC ETCO2 MONITOR DAILY: CPT

## 2017-11-19 PROCEDURE — 36415 COLL VENOUS BLD VENIPUNCTURE: CPT

## 2017-11-19 PROCEDURE — 99291 CRITICAL CARE FIRST HOUR: CPT | Performed by: INTERNAL MEDICINE

## 2017-11-19 PROCEDURE — 82803 BLOOD GASES ANY COMBINATION: CPT

## 2017-11-19 PROCEDURE — 6360000002 HC RX W HCPCS: Performed by: EMERGENCY MEDICINE

## 2017-11-19 PROCEDURE — 84300 ASSAY OF URINE SODIUM: CPT

## 2017-11-19 PROCEDURE — 84156 ASSAY OF PROTEIN URINE: CPT

## 2017-11-19 PROCEDURE — 84295 ASSAY OF SERUM SODIUM: CPT

## 2017-11-19 PROCEDURE — 84146 ASSAY OF PROLACTIN: CPT

## 2017-11-19 PROCEDURE — 6360000002 HC RX W HCPCS

## 2017-11-19 PROCEDURE — 86334 IMMUNOFIX E-PHORESIS SERUM: CPT

## 2017-11-19 PROCEDURE — 94003 VENT MGMT INPAT SUBQ DAY: CPT

## 2017-11-19 PROCEDURE — 80048 BASIC METABOLIC PNL TOTAL CA: CPT

## 2017-11-19 PROCEDURE — 36600 WITHDRAWAL OF ARTERIAL BLOOD: CPT

## 2017-11-19 PROCEDURE — 83874 ASSAY OF MYOGLOBIN: CPT

## 2017-11-19 PROCEDURE — 2500000003 HC RX 250 WO HCPCS: Performed by: HOSPITALIST

## 2017-11-19 PROCEDURE — 87660 TRICHOMONAS VAGIN DIR PROBE: CPT

## 2017-11-19 PROCEDURE — 6370000000 HC RX 637 (ALT 250 FOR IP): Performed by: STUDENT IN AN ORGANIZED HEALTH CARE EDUCATION/TRAINING PROGRAM

## 2017-11-19 PROCEDURE — 82570 ASSAY OF URINE CREATININE: CPT

## 2017-11-19 PROCEDURE — 2580000003 HC RX 258: Performed by: HOSPITALIST

## 2017-11-19 PROCEDURE — 2580000003 HC RX 258: Performed by: EMERGENCY MEDICINE

## 2017-11-19 PROCEDURE — 86038 ANTINUCLEAR ANTIBODIES: CPT

## 2017-11-19 PROCEDURE — 82947 ASSAY GLUCOSE BLOOD QUANT: CPT

## 2017-11-19 PROCEDURE — 2580000003 HC RX 258: Performed by: INTERNAL MEDICINE

## 2017-11-19 PROCEDURE — 87510 GARDNER VAG DNA DIR PROBE: CPT

## 2017-11-19 PROCEDURE — 87480 CANDIDA DNA DIR PROBE: CPT

## 2017-11-19 PROCEDURE — 80202 ASSAY OF VANCOMYCIN: CPT

## 2017-11-19 PROCEDURE — 94681 O2 UPTK CO2 OUTP % O2 XTRC: CPT

## 2017-11-19 PROCEDURE — 83883 ASSAY NEPHELOMETRY NOT SPEC: CPT

## 2017-11-19 PROCEDURE — 6360000002 HC RX W HCPCS: Performed by: HOSPITALIST

## 2017-11-19 PROCEDURE — 2000000000 HC ICU R&B

## 2017-11-19 PROCEDURE — 6360000002 HC RX W HCPCS: Performed by: NURSE PRACTITIONER

## 2017-11-19 PROCEDURE — S0028 INJECTION, FAMOTIDINE, 20 MG: HCPCS | Performed by: HOSPITALIST

## 2017-11-19 PROCEDURE — 83036 HEMOGLOBIN GLYCOSYLATED A1C: CPT

## 2017-11-19 PROCEDURE — 84155 ASSAY OF PROTEIN SERUM: CPT

## 2017-11-19 PROCEDURE — 83835 ASSAY OF METANEPHRINES: CPT

## 2017-11-19 PROCEDURE — 94762 N-INVAS EAR/PLS OXIMTRY CONT: CPT

## 2017-11-19 PROCEDURE — 82550 ASSAY OF CK (CPK): CPT

## 2017-11-19 PROCEDURE — 86160 COMPLEMENT ANTIGEN: CPT

## 2017-11-19 PROCEDURE — 84165 PROTEIN E-PHORESIS SERUM: CPT

## 2017-11-19 PROCEDURE — 2580000003 HC RX 258: Performed by: NURSE PRACTITIONER

## 2017-11-19 PROCEDURE — 83605 ASSAY OF LACTIC ACID: CPT

## 2017-11-19 RX ORDER — HYDRALAZINE HYDROCHLORIDE 20 MG/ML
10 INJECTION INTRAMUSCULAR; INTRAVENOUS ONCE
Status: COMPLETED | OUTPATIENT
Start: 2017-11-19 | End: 2017-11-19

## 2017-11-19 RX ORDER — 0.9 % SODIUM CHLORIDE 0.9 %
1000 INTRAVENOUS SOLUTION INTRAVENOUS ONCE
Status: COMPLETED | OUTPATIENT
Start: 2017-11-19 | End: 2017-11-19

## 2017-11-19 RX ORDER — FUROSEMIDE 10 MG/ML
40 INJECTION INTRAMUSCULAR; INTRAVENOUS ONCE
Status: COMPLETED | OUTPATIENT
Start: 2017-11-19 | End: 2017-11-19

## 2017-11-19 RX ORDER — LABETALOL HYDROCHLORIDE 5 MG/ML
10 INJECTION, SOLUTION INTRAVENOUS EVERY 6 HOURS PRN
Status: DISCONTINUED | OUTPATIENT
Start: 2017-11-19 | End: 2017-11-27

## 2017-11-19 RX ORDER — CLINDAMYCIN PHOSPHATE 300 MG/50ML
300 INJECTION INTRAVENOUS EVERY 8 HOURS
Status: DISCONTINUED | OUTPATIENT
Start: 2017-11-19 | End: 2017-11-19

## 2017-11-19 RX ORDER — SODIUM CHLORIDE 450 MG/100ML
INJECTION, SOLUTION INTRAVENOUS CONTINUOUS
Status: DISCONTINUED | OUTPATIENT
Start: 2017-11-19 | End: 2017-11-20

## 2017-11-19 RX ORDER — HYDRALAZINE HYDROCHLORIDE 20 MG/ML
INJECTION INTRAMUSCULAR; INTRAVENOUS
Status: COMPLETED
Start: 2017-11-19 | End: 2017-11-19

## 2017-11-19 RX ORDER — HYDRALAZINE HYDROCHLORIDE 20 MG/ML
5 INJECTION INTRAMUSCULAR; INTRAVENOUS EVERY 6 HOURS PRN
Status: DISCONTINUED | OUTPATIENT
Start: 2017-11-19 | End: 2017-11-22

## 2017-11-19 RX ORDER — CALCIUM GLUCONATE 94 MG/ML
2 INJECTION, SOLUTION INTRAVENOUS ONCE
Status: DISCONTINUED | OUTPATIENT
Start: 2017-11-19 | End: 2017-11-19

## 2017-11-19 RX ORDER — 0.9 % SODIUM CHLORIDE 0.9 %
2000 INTRAVENOUS SOLUTION INTRAVENOUS ONCE
Status: COMPLETED | OUTPATIENT
Start: 2017-11-19 | End: 2017-11-19

## 2017-11-19 RX ADMIN — INSULIN LISPRO 3 UNITS: 100 INJECTION, SOLUTION INTRAVENOUS; SUBCUTANEOUS at 16:22

## 2017-11-19 RX ADMIN — LABETALOL HYDROCHLORIDE 10 MG: 5 INJECTION, SOLUTION INTRAVENOUS at 16:04

## 2017-11-19 RX ADMIN — SODIUM CHLORIDE: 4.5 INJECTION, SOLUTION INTRAVENOUS at 09:06

## 2017-11-19 RX ADMIN — HYDRALAZINE HYDROCHLORIDE 10 MG: 20 INJECTION INTRAMUSCULAR; INTRAVENOUS at 03:22

## 2017-11-19 RX ADMIN — Medication 10 ML: at 20:05

## 2017-11-19 RX ADMIN — HYDRALAZINE HYDROCHLORIDE 10 MG: 20 INJECTION, SOLUTION INTRAMUSCULAR; INTRAVENOUS at 03:22

## 2017-11-19 RX ADMIN — HEPARIN SODIUM 5000 UNITS: 5000 INJECTION, SOLUTION INTRAVENOUS; SUBCUTANEOUS at 13:44

## 2017-11-19 RX ADMIN — FENTANYL CITRATE 50 MCG: 50 INJECTION INTRAMUSCULAR; INTRAVENOUS at 21:33

## 2017-11-19 RX ADMIN — SODIUM CHLORIDE 2000 ML: 9 INJECTION, SOLUTION INTRAVENOUS at 03:44

## 2017-11-19 RX ADMIN — SODIUM CHLORIDE 1000 ML: 9 INJECTION, SOLUTION INTRAVENOUS at 02:27

## 2017-11-19 RX ADMIN — FENTANYL CITRATE 50 MCG: 50 INJECTION INTRAMUSCULAR; INTRAVENOUS at 23:34

## 2017-11-19 RX ADMIN — LABETALOL HYDROCHLORIDE 10 MG: 5 INJECTION, SOLUTION INTRAVENOUS at 22:04

## 2017-11-19 RX ADMIN — FENTANYL CITRATE 50 MCG: 50 INJECTION INTRAMUSCULAR; INTRAVENOUS at 00:51

## 2017-11-19 RX ADMIN — FAMOTIDINE 20 MG: 10 INJECTION, SOLUTION INTRAVENOUS at 08:21

## 2017-11-19 RX ADMIN — FENTANYL CITRATE 50 MCG: 50 INJECTION INTRAMUSCULAR; INTRAVENOUS at 08:07

## 2017-11-19 RX ADMIN — FENTANYL CITRATE 50 MCG: 50 INJECTION INTRAMUSCULAR; INTRAVENOUS at 06:07

## 2017-11-19 RX ADMIN — SODIUM CHLORIDE: 4.5 INJECTION, SOLUTION INTRAVENOUS at 09:05

## 2017-11-19 RX ADMIN — FENTANYL CITRATE 50 MCG: 50 INJECTION INTRAMUSCULAR; INTRAVENOUS at 16:10

## 2017-11-19 RX ADMIN — HEPARIN SODIUM 5000 UNITS: 5000 INJECTION, SOLUTION INTRAVENOUS; SUBCUTANEOUS at 06:07

## 2017-11-19 RX ADMIN — SODIUM CHLORIDE 1000 ML: 9 INJECTION, SOLUTION INTRAVENOUS at 01:34

## 2017-11-19 RX ADMIN — PROPOFOL 30 MCG/KG/MIN: 10 INJECTION, EMULSION INTRAVENOUS at 02:44

## 2017-11-19 RX ADMIN — INSULIN LISPRO 3 UNITS: 100 INJECTION, SOLUTION INTRAVENOUS; SUBCUTANEOUS at 22:08

## 2017-11-19 RX ADMIN — CALCIUM GLUCONATE 2 G: 94 INJECTION, SOLUTION INTRAVENOUS at 06:53

## 2017-11-19 RX ADMIN — FUROSEMIDE 40 MG: 10 INJECTION, SOLUTION INTRAMUSCULAR; INTRAVENOUS at 09:08

## 2017-11-19 RX ADMIN — FENTANYL CITRATE 50 MCG: 50 INJECTION INTRAMUSCULAR; INTRAVENOUS at 19:33

## 2017-11-19 RX ADMIN — FENTANYL CITRATE 50 MCG: 50 INJECTION INTRAMUSCULAR; INTRAVENOUS at 02:51

## 2017-11-19 RX ADMIN — SODIUM CHLORIDE 1000 ML: 4.5 INJECTION, SOLUTION INTRAVENOUS at 13:40

## 2017-11-19 RX ADMIN — SILVER SULFADIAZINE: 10 CREAM TOPICAL at 21:32

## 2017-11-19 RX ADMIN — AZTREONAM 500 MG: 1 INJECTION, POWDER, LYOPHILIZED, FOR SOLUTION INTRAMUSCULAR; INTRAVENOUS at 16:13

## 2017-11-19 RX ADMIN — LABETALOL HYDROCHLORIDE 10 MG: 5 INJECTION, SOLUTION INTRAVENOUS at 10:03

## 2017-11-19 RX ADMIN — HEPARIN SODIUM 5000 UNITS: 5000 INJECTION, SOLUTION INTRAVENOUS; SUBCUTANEOUS at 21:34

## 2017-11-19 RX ADMIN — CLINDAMYCIN PHOSPHATE 300 MG: 6 INJECTION, SOLUTION INTRAVENOUS at 15:50

## 2017-11-19 RX ADMIN — VANCOMYCIN HYDROCHLORIDE 1500 MG: 10 INJECTION, POWDER, LYOPHILIZED, FOR SOLUTION INTRAVENOUS at 13:20

## 2017-11-19 RX ADMIN — Medication 10 ML: at 08:21

## 2017-11-19 ASSESSMENT — PULMONARY FUNCTION TESTS
PIF_VALUE: 21
PIF_VALUE: 18
PIF_VALUE: 22
PIF_VALUE: 23
PIF_VALUE: 20
PIF_VALUE: 16

## 2017-11-19 NOTE — PROGRESS NOTES
Myoglobin    Please do not hesitate to call with questions. Electronically signed by Judi Horne CNP on 11/19/2017 at 8:17 AM     Attending Physician Statement  I have discussed the care of Anibal Quevedo, including pertinent history and exam findings with the NP. I have reviewed the key elements of all parts of the encounter with the NP. I have seen and examined the patient with the resident/fellow. I agree with the assessment and plan and status of the problem list as documented. Patient received a dose of Lasix and with a good response. Although her bicarbonate is low but she is compensating fairly well her pH is 7.32 and there is no need for additional bicarb. Patient remains hemodynamically stable. Addiitionally I recommend continue half normal saline for the correction of hyponatremia.   Check sodium every 8 hours    Jorden Osullivan

## 2017-11-19 NOTE — PROGRESS NOTES
Ventilator Bronchodilator assessment    Breath sounds: diminished  Inspiratory Pressure: 17  Plateau Pressure: 14    Patient assessed at level 1          []    Bronchodilator Assessment    BRONCHODILATOR ASSESSMENT SCORE  Score 0 (Home) 1 2 3 4   Breath Sounds   []  Chronic Ventilator: Patient at baseline []  Mild Wheezes/ Clear []  Intermittent wheezes with good air entry []  Bilateral/unilateral wheezing with diminished air entry []  Insp/Exp wheeze and/or poor aeration   Ventilator Pressures   []  Chronic Ventilator [x]  Insp. Pressure less than 25 cm H20 []  Insp. Pressure less than 25 cm H20 []  Insp. Pressure exceeds 25 cm H20 []  Insp.  Pressure exceeds 30 cm H20   Plateau Pressure []  NA   [x]  Plateau Pressure less than 4  []  Plateau Pressure less than or equal to 5 []  Plateau Pressure greater than or equal to 6 []  Plateau Pressure greater than or equal to [address] [facility]  3:59 PM
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
## 2017-11-19 NOTE — PROGRESS NOTES
Critical Care Team - Daily Progress Note      Date and time: 11/19/2017 7:29 AM  Patient's name:  Lon Morin Record Number: [id number]  Patient's account/billing number: [de-identified]  Patient's YOB: 1950  Age: 69 y.o. Date of Admission: 11/18/2017 11:20 AM  Length of stay during current admission: 1      Primary Care Physician: No primary care provider on file. ICU Attending Physician: Dr. Magdalene Barillas Status: Full Code    Reason for ICU admission: Altered mental status      SUBJECTIVE:     OVERNIGHT EVENTS:    No acute events overnight. BP elevated on prn hydralazine. Afebrile, renal functions improving. Hypothermia on bare hugger. Blood sugars well controlled. I/O 10 L / 100 ml, UOP adequate. No bowel movement yet. Blood gases 7.32/26. 9/452.5/91.4 - Metabolic acidosis with respiratory compensation. Rt femoral Ignacio , left central femoral line and gomez's intact. IVC collapsible needs more fluids. Give 1/2 NS 1 L bolus.     AWAKE & FOLLOWING COMMANDS:  [x] No   [] Yes    CURRENT VENTILATION STATUS:     [x] Ventilator  [] BIPAP  [] Nasal Cannula [] Room Air      IF INTUBATED, ET TUBE MARKING AT LOWER LIP:       cms    SECRETIONS Amount:  [] Small [] Moderate  [] Large  [x] None  Color:     [] White [] Colored  [] Bloody    SEDATION:  RAAS Score:  [x] Propofol gtt  [] Versed gtt  [] Ativan gtt   [] No Sedation    PARALYZED:  [] No    [x] Yes    DIARRHEA:                [x] No                [] Yes  (C. Difficile status: [] positive                                                                                                                       [] negative                                                                                                                     [] pending)    VASOPRESSORS:  [x] No    [] Yes    If yes -   [] Levophed       [] Dopamine     [] Vasopressin       [] Dobutamine  [] Phenylephrine         [] Epinephrine    CENTRAL LINES:     [] No   [] Results   Component Value Date    CAION 1.10 11/19/2017        Urinalysis:     Troponin: No results for input(s): TROPONINI in the last 72 hours. Microbiology:    Cultures during this admission:     Blood cultures:                 [] None drawn      [x] Negative             []  Positive (Details:  )  Urine Culture:                   [] None drawn      [] Negative             []  Positive (Details:  )  Sputum Culture:               [] None drawn       [] Negative             []  Positive (Details:  )   Endotracheal aspirate:     [] None drawn       [] Negative             []  Positive (Details:  )     Other pertinent Labs:       Radiology/Imaging:   Ct Abdomen Pelvis Wo Iv Contrast Additional Contrast? None    Result Date: 11/18/2017  EXAMINATION: CT OF THE ABDOMEN AND PELVIS WITHOUT CONTRAST 11/18/2017 12:47 pm TECHNIQUE: CT of the abdomen and pelvis was performed without the administration of intravenous contrast. Multiplanar reformatted images are provided for review. Dose modulation, iterative reconstruction, and/or weight based adjustment of the mA/kV was utilized to reduce the radiation dose to as low as reasonably achievable. COMPARISON: None. HISTORY: ORDERING SYSTEM PROVIDED HISTORY: found down TECHNOLOGIST PROVIDED HISTORY: Additional Contrast?->None FINDINGS: Lower Chest: Mild atelectasis is present at the right lung base. No effusion, localizing consolidation, or worrisome nodules are present. Heart size is normal. Organs: An 8 mm hypodensity with indeterminate but low Hounsfield numbers is noted in the left lobe of the liver. This may represent a hemangioma. An additional 8 mm lesion is present in the posterior right lobe of the liver with similar characteristics. Spleen, pancreas, and kidneys are unremarkable without hydronephrosis or nephrolithiasis. Gallbladder is slightly hyperdense without distinct gallstones.   A 1 cm nodular mass is present in the left adrenal with a 1.5 x 1 cm superior abnormality of the bones. The superficial soft tissues show no significant abnormalities. 1. Minor streaky right posterior basal lower lobes density suggestive of atelectasis or scarring. 2. ETT in place terminating above brian. 3. No acute process evident in the chest.     Ct Cervical Spine Wo Contrast    Result Date: 11/18/2017  EXAMINATION: CT OF THE CERVICAL SPINE WITHOUT CONTRAST 11/18/2017 12:47 pm TECHNIQUE: CT of the cervical spine was performed without the administration of intravenous contrast. Multiplanar reformatted images are provided for review. Dose modulation, iterative reconstruction, and/or weight based adjustment of the mA/kV was utilized to reduce the radiation dose to as low as reasonably achievable. COMPARISON: None. HISTORY: ORDERING SYSTEM PROVIDED HISTORY: found down FINDINGS: BONES/ALIGNMENT: There is no evidence of an acute cervical spine fracture. There is normal alignment of the cervical spine. DEGENERATIVE CHANGES: Exuberant anterior bridging osteophytes are noted from C4 through C7 with disc space narrowing. Small osteophytes elsewhere. Facet arthropathy evident. SOFT TISSUES: There is no prevertebral soft tissue swelling. ETT in place. Visualized lung apices show no pneumothorax. No acute abnormality of the cervical spine. Degenerative changes with exuberant bridging osteophytes. Xr Chest Portable    Result Date: 11/18/2017  EXAMINATION: SINGLE VIEW OF THE CHEST 11/18/2017 11:40 am COMPARISON: None. HISTORY: ORDERING SYSTEM PROVIDED HISTORY: Intubated TECHNOLOGIST PROVIDED HISTORY: Reason for exam:->Intubated FINDINGS: AP portable view of the chest time stamped at 1158 hours is submitted and interpreted [employer]. Endotracheal tube is in situ terminating 5.7 cm above the brian. Overlying cardiac monitoring electrodes are present. Heart size is normal.  Lungs are clear without vascular congestion, focal consolidation, effusion or pneumothorax.      Endotracheal tube terminates 5.7 cm above the brian. Otherwise unremarkable chest appearance. RECOMMENDATION: Suggest advancement of the endotracheal tube by 2 cm. ASSESSMENT:     Patient Active Problem List    Diagnosis Date Noted    Encephalopathy 11/18/2017    Altered mental status 11/18/2017    Rhabdomyolysis 11/18/2017    Acute kidney injury ([facility] [address] 11/18/2017    Hyperkalemia 11/18/2017    Acute renal failure (HCC)     HTN (hypertension)     Heart murmur     Depression        Additional assessment:    Principal Problem:    Altered mental status  Active Problems:    Encephalopathy    Rhabdomyolysis    Acute kidney injury ([facility] [address]    Hyperkalemia    Acute renal failure (HCC)    Morbid obesity (HCC)      PLAN:     WEAN PER PROTOCOL:  [x] No   [] Yes  [] N/A    DISCONTINUE ANY LABS:   [x] No   [] Yes    ICU PROPHYLAXIS:  Stress ulcer:  [] PPI Agent  [x] [id number] [] Sucralfate  [] Other:  VTE:   [] Enoxaparin  [] Unfract. Heparin Subcut  [] EPC Cuffs    NUTRITION:  [x] NPO [] Tube Feeding (Specify: ) [] TPN  [] PO (Diet: Diet NPO Effective Now)    HOME MEDICATIONS RECONCILED: [x] No  [] Yes    INSULIN DRIP:   [x] No   [] Yes    CONSULTATION NEEDED:  [] No   [x] Yes    FAMILY UPDATED:    [] No   [x] Yes    TRANSFER OUT OF ICU:   [x] No   [] Yes    ADDITIONAL PLAN:    1. NPO, continue vent management. 2. Hypernatremia, switch IV fluids to 1/2 NS at 100 ml/hr, trend lactic acid and hydrate adequately. 3. IVC collapsible, 1 litre bolus of half normal saline. 4. Nephrology consult for hyperkalemia and acute kidney injury, possible need for dialysis. Renal function improving , patient has good urine output. 5. ID consulted for wounds, started on IV Vancomycin , aztreonam and clindamycin. 6. Trauma consulted to clear C spine. 7. Normal TSH, Prolactin elevated, check one more time coordinate with EEG. 8. Adults protective services reported. 9. IV hydralazine prn for blood pressure control.   10. Blood sugars well controlled on sliding scale. 11. Start tube feeds, renal and diabetic. 12. F/u vaginitis results. 15. Pepcid for GI prophylaxis. 14. Wound care for abrasions. 15. Foleys in  16. DVT prophylaxis Heparin 5000 U TID.           Wenceslao Shone, M.D.              Department of Internal Medicine/ Critical care  [facility] ([place])             11/19/2017, 7:29 AM

## 2017-11-20 LAB
ABSOLUTE EOS #: 0 K/UL (ref 0–0.4)
ABSOLUTE IMMATURE GRANULOCYTE: 0 K/UL (ref 0–0.3)
ABSOLUTE LYMPH #: 1.86 K/UL (ref 1–4.8)
ABSOLUTE MONO #: 2.55 K/UL (ref 0.1–0.8)
ALBUMIN (CALCULATED): 1.8 G/DL (ref 3.2–5.2)
ALBUMIN PERCENT: 36 % (ref 45–65)
ALLEN TEST: POSITIVE
ALPHA 1 PERCENT: 7 % (ref 3–6)
ALPHA 2 PERCENT: 19 % (ref 6–13)
ALPHA-1-GLOBULIN: 0.4 G/DL (ref 0.1–0.4)
ALPHA-2-GLOBULIN: 1 G/DL (ref 0.5–0.9)
ANION GAP SERPL CALCULATED.3IONS-SCNC: 17 MMOL/L (ref 9–17)
ANTI-NUCLEAR ANTIBODY (ANA): NEGATIVE
BASOPHILS # BLD: 0 %
BASOPHILS ABSOLUTE: 0 K/UL (ref 0–0.2)
BETA GLOBULIN: 0.9 G/DL (ref 0.5–1.1)
BETA PERCENT: 17 % (ref 11–19)
BUN BLDV-MCNC: 111 MG/DL (ref 8–23)
BUN/CREAT BLD: ABNORMAL (ref 9–20)
CALCIUM SERPL-MCNC: 8.5 MG/DL (ref 8.6–10.4)
CHLORIDE BLD-SCNC: 125 MMOL/L (ref 98–107)
CO2: 13 MMOL/L (ref 20–31)
CREAT SERPL-MCNC: 2.37 MG/DL (ref 0.5–0.9)
DIFFERENTIAL TYPE: ABNORMAL
EOSINOPHILS RELATIVE PERCENT: 0 %
FIO2: 30
GAMMA GLOBULIN %: 20 % (ref 9–20)
GAMMA GLOBULIN: 1 G/DL (ref 0.5–1.5)
GFR AFRICAN AMERICAN: 25 ML/MIN
GFR NON-AFRICAN AMERICAN: 20 ML/MIN
GFR NON-AFRICAN AMERICAN: 8 ML/MIN
GFR SERPL CREATININE-BSD FRML MDRD: 9 ML/MIN
GFR SERPL CREATININE-BSD FRML MDRD: ABNORMAL ML/MIN/{1.73_M2}
GLUCOSE BLD-MCNC: 154 MG/DL (ref 65–105)
GLUCOSE BLD-MCNC: 179 MG/DL (ref 65–105)
GLUCOSE BLD-MCNC: 179 MG/DL (ref 70–99)
GLUCOSE BLD-MCNC: 194 MG/DL (ref 65–105)
GLUCOSE BLD-MCNC: 195 MG/DL (ref 65–105)
HCT VFR BLD CALC: 34.8 % (ref 36.3–47.1)
HEMOGLOBIN: 10.6 G/DL (ref 11.9–15.1)
IMMATURE GRANULOCYTES: 0 %
LACTIC ACID, WHOLE BLOOD: 1.8 MMOL/L (ref 0.7–2.1)
LACTIC ACID: NORMAL MMOL/L
LV EF: 55 %
LVEF MODALITY: NORMAL
LYMPHOCYTES # BLD: 8 %
MCH RBC QN AUTO: 29.2 PG (ref 25.2–33.5)
MCHC RBC AUTO-ENTMCNC: 30.5 G/DL (ref 28.4–34.8)
MCV RBC AUTO: 95.9 FL (ref 82.6–102.9)
MODE: ABNORMAL
MONOCYTES # BLD: 11 %
MORPHOLOGY: ABNORMAL
NEGATIVE BASE EXCESS, ART: 9 (ref 0–2)
O2 DEVICE/FLOW/%: ABNORMAL
PATHOLOGIST: ABNORMAL
PATHOLOGIST: NORMAL
PATIENT TEMP: ABNORMAL
PDW BLD-RTO: 15.2 % (ref 11.8–14.4)
PLATELET # BLD: 208 K/UL (ref 138–453)
PLATELET ESTIMATE: ABNORMAL
PMV BLD AUTO: 12.1 FL (ref 8.1–13.5)
POC CREATININE: 5.53 MG/DL (ref 0.51–1.19)
POC HCO3: 15.8 MMOL/L (ref 21–28)
POC O2 SATURATION: 99 % (ref 94–98)
POC PCO2 TEMP: ABNORMAL MM HG
POC PCO2: 30.8 MM HG (ref 35–48)
POC PH TEMP: ABNORMAL
POC PH: 7.32 (ref 7.35–7.45)
POC PO2 TEMP: ABNORMAL MM HG
POC PO2: 149.4 MM HG (ref 83–108)
POC POTASSIUM: 7.4 MMOL/L (ref 3.5–4.5)
POSITIVE BASE EXCESS, ART: ABNORMAL (ref 0–3)
POTASSIUM SERPL-SCNC: 4 MMOL/L (ref 3.7–5.3)
PROTEIN ELECTROPHORESIS, SERUM: ABNORMAL
RBC # BLD: 3.63 M/UL (ref 3.95–5.11)
RBC # BLD: ABNORMAL 10*6/UL
SAMPLE SITE: ABNORMAL
SEG NEUTROPHILS: 81 %
SEGMENTED NEUTROPHILS ABSOLUTE COUNT: 18.79 K/UL (ref 1.8–7.7)
SERUM IFX INTERP: NORMAL
SODIUM BLD-SCNC: 149 MMOL/L (ref 135–144)
SODIUM BLD-SCNC: 152 MMOL/L (ref 135–144)
SODIUM BLD-SCNC: 155 MMOL/L (ref 135–144)
TCO2 (CALC), ART: 17 MMOL/L (ref 22–29)
TOTAL PROT. SUM,%: 99 % (ref 98–102)
TOTAL PROT. SUM: 5.1 G/DL (ref 6.3–8.2)
TOTAL PROTEIN: 5 G/DL (ref 6.4–8.3)
WBC # BLD: 23.2 K/UL (ref 3.5–11.3)
WBC # BLD: ABNORMAL 10*3/UL

## 2017-11-20 PROCEDURE — 84295 ASSAY OF SERUM SODIUM: CPT

## 2017-11-20 PROCEDURE — 6360000002 HC RX W HCPCS: Performed by: EMERGENCY MEDICINE

## 2017-11-20 PROCEDURE — 99291 CRITICAL CARE FIRST HOUR: CPT | Performed by: INTERNAL MEDICINE

## 2017-11-20 PROCEDURE — 94762 N-INVAS EAR/PLS OXIMTRY CONT: CPT

## 2017-11-20 PROCEDURE — 2500000003 HC RX 250 WO HCPCS: Performed by: INTERNAL MEDICINE

## 2017-11-20 PROCEDURE — 6360000002 HC RX W HCPCS: Performed by: INTERNAL MEDICINE

## 2017-11-20 PROCEDURE — 80048 BASIC METABOLIC PNL TOTAL CA: CPT

## 2017-11-20 PROCEDURE — 85025 COMPLETE CBC W/AUTO DIFF WBC: CPT

## 2017-11-20 PROCEDURE — 36415 COLL VENOUS BLD VENIPUNCTURE: CPT

## 2017-11-20 PROCEDURE — 2580000003 HC RX 258: Performed by: HOSPITALIST

## 2017-11-20 PROCEDURE — 36600 WITHDRAWAL OF ARTERIAL BLOOD: CPT

## 2017-11-20 PROCEDURE — 94003 VENT MGMT INPAT SUBQ DAY: CPT

## 2017-11-20 PROCEDURE — 2000000000 HC ICU R&B

## 2017-11-20 PROCEDURE — S0028 INJECTION, FAMOTIDINE, 20 MG: HCPCS | Performed by: HOSPITALIST

## 2017-11-20 PROCEDURE — 6370000000 HC RX 637 (ALT 250 FOR IP): Performed by: INTERNAL MEDICINE

## 2017-11-20 PROCEDURE — 2580000003 HC RX 258: Performed by: INTERNAL MEDICINE

## 2017-11-20 PROCEDURE — 2580000003 HC RX 258

## 2017-11-20 PROCEDURE — 83605 ASSAY OF LACTIC ACID: CPT

## 2017-11-20 PROCEDURE — 99211 OFF/OP EST MAY X REQ PHY/QHP: CPT

## 2017-11-20 PROCEDURE — 94770 HC ETCO2 MONITOR DAILY: CPT

## 2017-11-20 PROCEDURE — 93306 TTE W/DOPPLER COMPLETE: CPT

## 2017-11-20 PROCEDURE — 6360000002 HC RX W HCPCS: Performed by: HOSPITALIST

## 2017-11-20 PROCEDURE — 2500000003 HC RX 250 WO HCPCS: Performed by: HOSPITALIST

## 2017-11-20 PROCEDURE — 82947 ASSAY GLUCOSE BLOOD QUANT: CPT

## 2017-11-20 PROCEDURE — 82803 BLOOD GASES ANY COMBINATION: CPT

## 2017-11-20 PROCEDURE — 6370000000 HC RX 637 (ALT 250 FOR IP): Performed by: STUDENT IN AN ORGANIZED HEALTH CARE EDUCATION/TRAINING PROGRAM

## 2017-11-20 PROCEDURE — 2580000003 HC RX 258: Performed by: NURSE PRACTITIONER

## 2017-11-20 RX ORDER — CHLOROTHIAZIDE SODIUM 500 MG/1
500 INJECTION INTRAVENOUS ONCE
Status: COMPLETED | OUTPATIENT
Start: 2017-11-20 | End: 2017-11-20

## 2017-11-20 RX ORDER — AMLODIPINE BESYLATE 5 MG/1
5 TABLET ORAL ONCE
Status: COMPLETED | OUTPATIENT
Start: 2017-11-20 | End: 2017-11-20

## 2017-11-20 RX ORDER — HYDRALAZINE HYDROCHLORIDE 20 MG/ML
5 INJECTION INTRAMUSCULAR; INTRAVENOUS ONCE
Status: COMPLETED | OUTPATIENT
Start: 2017-11-20 | End: 2017-11-20

## 2017-11-20 RX ORDER — AMLODIPINE BESYLATE 5 MG/1
5 TABLET ORAL DAILY
Status: DISCONTINUED | OUTPATIENT
Start: 2017-11-20 | End: 2017-11-20

## 2017-11-20 RX ORDER — AMLODIPINE BESYLATE 10 MG/1
10 TABLET ORAL DAILY
Status: DISCONTINUED | OUTPATIENT
Start: 2017-11-21 | End: 2017-12-14 | Stop reason: HOSPADM

## 2017-11-20 RX ADMIN — Medication 10 ML: at 20:09

## 2017-11-20 RX ADMIN — LABETALOL HYDROCHLORIDE 10 MG: 5 INJECTION, SOLUTION INTRAVENOUS at 18:48

## 2017-11-20 RX ADMIN — FENTANYL CITRATE 50 MCG: 50 INJECTION INTRAMUSCULAR; INTRAVENOUS at 23:03

## 2017-11-20 RX ADMIN — AMLODIPINE BESYLATE 5 MG: 5 TABLET ORAL at 18:18

## 2017-11-20 RX ADMIN — FAMOTIDINE 20 MG: 10 INJECTION, SOLUTION INTRAVENOUS at 08:22

## 2017-11-20 RX ADMIN — SILVER SULFADIAZINE: 10 CREAM TOPICAL at 14:06

## 2017-11-20 RX ADMIN — FENTANYL CITRATE 50 MCG: 50 INJECTION INTRAMUSCULAR; INTRAVENOUS at 04:32

## 2017-11-20 RX ADMIN — Medication 10 ML: at 07:54

## 2017-11-20 RX ADMIN — INSULIN LISPRO 3 UNITS: 100 INJECTION, SOLUTION INTRAVENOUS; SUBCUTANEOUS at 17:15

## 2017-11-20 RX ADMIN — LABETALOL HYDROCHLORIDE 10 MG: 5 INJECTION, SOLUTION INTRAVENOUS at 11:38

## 2017-11-20 RX ADMIN — WATER 18 ML: 1 INJECTION INTRAMUSCULAR; INTRAVENOUS; SUBCUTANEOUS at 11:45

## 2017-11-20 RX ADMIN — FENTANYL CITRATE 50 MCG: 50 INJECTION INTRAMUSCULAR; INTRAVENOUS at 13:32

## 2017-11-20 RX ADMIN — HYDRALAZINE HYDROCHLORIDE 5 MG: 20 INJECTION INTRAMUSCULAR; INTRAVENOUS at 08:34

## 2017-11-20 RX ADMIN — HEPARIN SODIUM 5000 UNITS: 5000 INJECTION, SOLUTION INTRAVENOUS; SUBCUTANEOUS at 22:02

## 2017-11-20 RX ADMIN — FENTANYL CITRATE 50 MCG: 50 INJECTION INTRAMUSCULAR; INTRAVENOUS at 11:02

## 2017-11-20 RX ADMIN — AMLODIPINE BESYLATE 5 MG: 5 TABLET ORAL at 10:00

## 2017-11-20 RX ADMIN — INSULIN LISPRO 3 UNITS: 100 INJECTION, SOLUTION INTRAVENOUS; SUBCUTANEOUS at 22:02

## 2017-11-20 RX ADMIN — LABETALOL HYDROCHLORIDE 10 MG: 5 INJECTION, SOLUTION INTRAVENOUS at 04:04

## 2017-11-20 RX ADMIN — HYDRALAZINE HYDROCHLORIDE 5 MG: 20 INJECTION INTRAMUSCULAR; INTRAVENOUS at 01:02

## 2017-11-20 RX ADMIN — HEPARIN SODIUM 5000 UNITS: 5000 INJECTION, SOLUTION INTRAVENOUS; SUBCUTANEOUS at 14:07

## 2017-11-20 RX ADMIN — FENTANYL CITRATE 50 MCG: 50 INJECTION INTRAMUSCULAR; INTRAVENOUS at 06:32

## 2017-11-20 RX ADMIN — SODIUM CHLORIDE: 4.5 INJECTION, SOLUTION INTRAVENOUS at 03:00

## 2017-11-20 RX ADMIN — FENTANYL CITRATE 50 MCG: 50 INJECTION INTRAMUSCULAR; INTRAVENOUS at 02:13

## 2017-11-20 RX ADMIN — FENTANYL CITRATE 50 MCG: 50 INJECTION INTRAMUSCULAR; INTRAVENOUS at 08:30

## 2017-11-20 RX ADMIN — HEPARIN SODIUM 5000 UNITS: 5000 INJECTION, SOLUTION INTRAVENOUS; SUBCUTANEOUS at 05:49

## 2017-11-20 RX ADMIN — FENTANYL CITRATE 50 MCG: 50 INJECTION INTRAMUSCULAR; INTRAVENOUS at 17:07

## 2017-11-20 RX ADMIN — HYDRALAZINE HYDROCHLORIDE 5 MG: 20 INJECTION, SOLUTION INTRAMUSCULAR; INTRAVENOUS at 05:49

## 2017-11-20 RX ADMIN — HYDRALAZINE HYDROCHLORIDE 5 MG: 20 INJECTION INTRAMUSCULAR; INTRAVENOUS at 21:42

## 2017-11-20 RX ADMIN — INSULIN LISPRO 3 UNITS: 100 INJECTION, SOLUTION INTRAVENOUS; SUBCUTANEOUS at 05:28

## 2017-11-20 RX ADMIN — FENTANYL CITRATE 50 MCG: 50 INJECTION INTRAMUSCULAR; INTRAVENOUS at 20:08

## 2017-11-20 RX ADMIN — INSULIN LISPRO 3 UNITS: 100 INJECTION, SOLUTION INTRAVENOUS; SUBCUTANEOUS at 09:58

## 2017-11-20 RX ADMIN — CHLOROTHIAZIDE SODIUM 500 MG: 500 INJECTION, POWDER, LYOPHILIZED, FOR SOLUTION INTRAVENOUS at 11:40

## 2017-11-20 RX ADMIN — HYDRALAZINE HYDROCHLORIDE 5 MG: 20 INJECTION INTRAMUSCULAR; INTRAVENOUS at 14:34

## 2017-11-20 ASSESSMENT — PULMONARY FUNCTION TESTS
PIF_VALUE: 14
PIF_VALUE: 12
PIF_VALUE: 15
PIF_VALUE: 7
PIF_VALUE: 22
PIF_VALUE: 18
PIF_VALUE: 12
PIF_VALUE: 23
PIF_VALUE: 12
PIF_VALUE: 23
PIF_VALUE: 37
PIF_VALUE: 22
PIF_VALUE: 24

## 2017-11-20 NOTE — PROGRESS NOTES
Tissues: Degenerative changes are present in the SI joints and lower lumbar facets. Spondylosis is present without suspicious lytic or blastic lesion. Estimated biologic radiation dose for this procedure:  1233.56 mGy/cm2.     1.  Hypodense lesions in the liver, not clearly cystic. These may represent hemangiomas, but further assessment with performance of ultrasound is advised which can be performed on a nonemergent basis. 2.   Gallbladder is slightly hyperdense without distinct gallstones. Sludge is not excluded. Right upper quadrant ultrasound may prove helpful. 3.   Bilateral adrenal masses, largest on the right of 2.6 cm, likely adenomas. 4.  No bowel obstruction or evidence of appendicitis. No adenopathy or other evidence of acute abdominopelvic process. RECOMMENDATIONS: Ultrasound of the liver and right upper quadrant to assess liver lesions and gallbladder. Ct Head Wo Contrast    Result Date: 11/18/2017  EXAMINATION: CT OF THE HEAD WITHOUT CONTRAST  11/18/2017 12:47 pm TECHNIQUE: CT of the head was performed without the administration of intravenous contrast. Dose modulation, iterative reconstruction, and/or weight based adjustment of the mA/kV was utilized to reduce the radiation dose to as low as reasonably achievable. COMPARISON: None. HISTORY: ORDERING SYSTEM PROVIDED HISTORY: found down TECHNOLOGIST PROVIDED HISTORY: Has a \"code stroke\" or \"stroke alert\" been called? ->No FINDINGS: BRAIN/VENTRICLES: There is no acute intracranial hemorrhage, mass effect or midline shift. No abnormal extra-axial fluid collection. The gray-white differentiation is maintained without evidence of an acute infarct. Bilateral basal ganglia lacunar infarcts  There are nonspecific areas of hypoattenuation within the periventricular and subcortical white matter, which likely represent chronic microvascular ischemic change. ORBITS: The visualized portion of the orbits demonstrate no acute abnormality.  SINUSES: The visualized paranasal sinuses and mastoid air cells demonstrate no acute abnormality. SOFT TISSUES/SKULL: No acute abnormality of the visualized skull or soft tissues. No acute intracranial abnormality with chronic ischemic changes as described. Ct Chest Wo Contrast    Result Date: 11/18/2017  EXAMINATION: CT OF THE CHEST WITHOUT CONTRAST 11/18/2017 12:47 pm TECHNIQUE: CT of the chest was performed without the administration of intravenous contrast. Multiplanar reformatted images are provided for review. Dose modulation, iterative reconstruction, and/or weight based adjustment of the mA/kV was utilized to reduce the radiation dose to as low as reasonably achievable. COMPARISON: No priors HISTORY: ORDERING SYSTEM PROVIDED HISTORY: found down FINDINGS: Mediastinum: The cardiac size is normal. There is no significant mediastinal, hilar or axillary lymphadenopathy. The thyroid gland and esophagus are grossly normal.  Absence of IV contrast limits evaluation of the major vessels. Lungs/pleura: Minor streaky pleural-parenchymal densities in the posterior left lung base suggestive of residual of pneumonitis or atelectasis. No significant nodules or masses. No pleural effusion or pneumothorax. ETT terminates above brian. Upper Abdomen: No significant abnormalities. Soft Tissues/Bones: No acute abnormality of the bones. The superficial soft tissues show no significant abnormalities. 1. Minor streaky right posterior basal lower lobes density suggestive of atelectasis or scarring. 2. ETT in place terminating above brian. 3. No acute process evident in the chest.     Ct Cervical Spine Wo Contrast    Result Date: 11/18/2017  EXAMINATION: CT OF THE CERVICAL SPINE WITHOUT CONTRAST 11/18/2017 12:47 pm TECHNIQUE: CT of the cervical spine was performed without the administration of intravenous contrast. Multiplanar reformatted images are provided for review.  Dose modulation, iterative reconstruction, and/or weight Rhabdomyolysis    Acute kidney injury ([facility] [address]    Hyperkalemia    Acute renal failure (HCC)    Morbid obesity ([facility] [address]      PLAN:     WEAN PER PROTOCOL:  [x] No   [] Yes  [] N/A    DISCONTINUE ANY LABS:   [x] No   [] Yes    ICU PROPHYLAXIS:  Stress ulcer:  [] PPI Agent  [x] [id number] [] Sucralfate  [] Other:  VTE:   [] Enoxaparin  [x] Unfract. Heparin Subcut  [] EPC Cuffs    NUTRITION:  [] NPO [x] Tube Feeding (Specify: ) [] TPN  [] PO (Diet: DIET TUBE FEED CONTINUOUS/CYCLIC NPO; Renal Formula; 10; 45; 24)    HOME MEDICATIONS RECONCILED: [x] No  [] Yes    INSULIN DRIP:   [x] No   [] Yes    CONSULTATION NEEDED:  [] No   [x] Yes    FAMILY UPDATED:    [] No   [x] Yes    TRANSFER OUT OF ICU:   [x] No   [] Yes    ADDITIONAL PLAN:    1. NPO, continue vent management. 2. Hypernatremia, 155 today, continue 1/2 NS at 75 ml/hr. Continue Free water flushes per nephrology  3. Nephrology consult for hyperkalemia and acute kidney injury,         Renal function improving , patient has good urine output, continue hydration  4. ID consulted for wounds, Discontinued antibiotics, silvadene to wounds  5. Trauma consulted to clear C spine. 6. Normal TSH, Prolactin elevated, check one more time coordinate with EEG. 7. Adults protective services reported. 8. Begin Norvasc 5 mg QD for blood pressure control, PRN Hydralazine  9. Blood sugars well controlled on sliding scale. 10. Start tube feeds, renal and diabetic. 11. F/u vaginitis results. - Probe negative  12. Continue Pepcid for GI prophylaxis. 13. Wound care for abrasions. 14. Foleys in, continue monitoring I/Os  15. DVT prophylaxis Heparin 5000 U TID.           Serafin Severin, D.O., PGY-2             Department of Internal Medicine/ Critical care  [address] [place] [address] ([place])             11/20/2017, 7:19 AM    Attending Physician Statement  I have discussed the care of Scotty Reid, including pertinent history and exam findings with the resident.  I have

## 2017-11-20 NOTE — PROGRESS NOTES
[address] [id number] Continence Nurse  Consult Note       NAME:  Randee Barnett  MEDICAL RECORD NUMBER:  [id number]  AGE: 69 y.o. GENDER: female  : 1950  TODAY'S DATE:  2017    Subjective   Reason for WOCN Evaluation and Assessment:pressure injury care and prevention. Was found down at home by son for estimated duration of greater than 24 hours. Apparently was positioned  face down on futon. Multiple denuded areas to bilateral breasts extending to axilla; abdomen extending to bilateral groins; right hip extending to back/flank. Wound edges are pink with central  areas of tan fibrin. Scant serosanguinous drainage was present on foam dressings removed. Discussed with Dr. James Hurst who recommends Silvadene cream daily. Dark purple areas noted to bilateral pretibials, possibly deep tissue injuries. Right pretibial with large intact blister. Will observe for wound progression to these areas as well.       Randee Barnett is a 69 y.o. female referred by:   [x] Physician  [] Nursing  [] Other:     Wound Identification:  Wound Type: pressure and traumatic  Contributing Factors: chronic pressure, decreased mobility, shear force, obesity and incontinence of urine    Past Surgical History:   Procedure Laterality Date    OTHER SURGICAL HISTORY      bump under skin on buttocks    TUBAL LIGATION         FAMILY HISTORY    Family History   Problem Relation Age of Onset    Heart Disease Paternal Grandmother     Diabetes Father     Hypertension Father     Stroke Father     Diabetes Mother     Hypertension Mother     Breast Cancer Mother     Asthma Brother     Cancer Sister      lung    Cancer Maternal Uncle      bone    Cancer Maternal Aunt        SOCIAL HISTORY    Social History   Substance Use Topics    Smoking status: Not on file    Smokeless tobacco: Not on file    Alcohol use Not on file       ALLERGIES    Allergies   Allergen Reactions    Pcn [Penicillins]        MEDICATIONS    No Pressure Redistribution  [] Fluid Immersion  [] Bariatric  [] Total Pressure Relief  [] Other:     Current Diet: DIET TUBE FEED CONTINUOUS/CYCLIC NPO; Renal Formula; 10; 45; 24    Patient/Caregiver Teaching:  Level of patient/caregiver understanding able to:   [] Indicates understanding       [] Needs reinforcement  [] Unsuccessful      [] Verbal Understanding  [] Demonstrated understanding       [x] No evidence of learning  [] Refused teaching         [] N/A       Electronically signed by Yvonne Ng RN,  on 11/20/2017 at 4:47 PM

## 2017-11-20 NOTE — PLAN OF CARE
Problem: Restraint Use - Nonviolent/Non-Self-Destructive Behavior:  Goal: Absence of restraint indications  Absence of restraint indications   Outcome: Not Met This Shift    Goal: Absence of restraint-related injury  Absence of restraint-related injury   Outcome: Met This Shift      Problem: Pain:  Goal: Pain level will decrease  Pain level will decrease   Outcome: Ongoing    Goal: Control of acute pain  Control of acute pain   Outcome: Ongoing      Problem: Skin Integrity:  Goal: Will show no infection signs and symptoms  Will show no infection signs and symptoms   Outcome: Ongoing    Goal: Absence of new skin breakdown  Absence of new skin breakdown   Outcome: Ongoing      Problem: Falls - Risk of  Goal: Absence of falls  Outcome: Met This Shift      Problem: Risk for Impaired Skin Integrity  Goal: Tissue integrity - skin and mucous membranes  Structural intactness and normal physiological function of skin and  mucous membranes.    Outcome: Ongoing      Problem: Nutrition  Goal: Optimal nutrition therapy  Outcome: Ongoing

## 2017-11-20 NOTE — PROGRESS NOTES
sodium chloride infusion Continuous   0.45 % sodium chloride infusion Continuous   silver sulfADIAZINE (SILVADENE) 1 % cream Daily   hydrALAZINE (APRESOLINE) injection 5 mg Q6H PRN   sodium chloride flush 0.9 % injection 10 mL 2 times per day   sodium chloride flush 0.9 % injection 10 mL PRN   acetaminophen (TYLENOL) tablet 650 mg Q4H PRN   magnesium hydroxide (MILK OF MAGNESIA) 400 MG/5ML suspension 30 mL Daily PRN   ondansetron (ZOFRAN) injection 4 mg Q6H PRN   famotidine (PEPCID) injection 20 mg Daily   heparin (porcine) injection 5,000 Units 3 times per day   albuterol sulfate  (90 Base) MCG/ACT inhaler 4 puff Q6H PRN   propofol 1000 MG/100ML injection Titrated   glucose (GLUTOSE) 40 % oral gel 15 g PRN   dextrose 50 % solution 12.5 g PRN   glucagon (rDNA) injection 1 mg PRN   dextrose 5 % solution PRN   insulin lispro (HUMALOG) injection vial 0-18 Units Q6H   fentaNYL (SUBLIMAZE) injection 50 mcg Q2H PRN         LABS    CBC:   Recent Labs      11/18/17   1220  11/19/17   0437  11/20/17   0450   WBC  24.6*  22.4*  23.2*   RBC  5.96*  3.82*  3.63*   HGB  17.3*  11.2*  10.6*   HCT  54.9*  36.2*  34.8*   MCV  92.1  94.8  95.9   MCH  29.0  29.3  29.2   MCHC  31.5  30.9  30.5   RDW  14.5*  14.7*  15.2*   PLT  351  215  208   MPV  12.5  12.2  12.1      BMP:   Recent Labs      11/19/17   0053  11/19/17   0437   11/19/17   1431  11/20/17   0039  11/20/17   0450   NA  151*  152*   < >  155*  149*  155*   K  4.0  4.2   --    --    --   4.0   CL  119*  123*   --    --    --   125*   CO2  13*  13*   --    --    --   13*   BUN  143*  124*   --    --    --   111*   CREATININE  2.77*  2.63*   --    --    --   2.37*   GLUCOSE  169*  116*   --    --    --   179*   CALCIUM  7.7*  7.0*   --    --    --   8.5*    < > = values in this interval not displayed.       Fitzpatrick Free Light Chains QNT 7.51   0.37 - 1.94 mg/dL Final 11/19/2017  4:37  [facility]   Lambda Free Light Chains QNT 3.96   0.57 - 2.63 mg/dL with the resident/fellow. I have seen and examined the patient with the resident/fellow. I agree with the assessment and plan and status of the problem list as documented.       .  Electronically signed by Sheila Bradley MD on 11/20/2017 at 10:58 AM

## 2017-11-21 LAB
ABSOLUTE EOS #: 0 K/UL (ref 0–0.4)
ABSOLUTE IMMATURE GRANULOCYTE: 0.17 K/UL (ref 0–0.3)
ABSOLUTE LYMPH #: 1.49 K/UL (ref 1–4.8)
ABSOLUTE MONO #: 1.33 K/UL (ref 0.1–0.8)
ALLEN TEST: POSITIVE
ANION GAP SERPL CALCULATED.3IONS-SCNC: 14 MMOL/L (ref 9–17)
BASOPHILS # BLD: 0 %
BASOPHILS ABSOLUTE: 0 K/UL (ref 0–0.2)
BUN BLDV-MCNC: 95 MG/DL (ref 8–23)
BUN/CREAT BLD: ABNORMAL (ref 9–20)
CALCIUM SERPL-MCNC: 8.5 MG/DL (ref 8.6–10.4)
CHLORIDE BLD-SCNC: 121 MMOL/L (ref 98–107)
CO2: 15 MMOL/L (ref 20–31)
CREAT SERPL-MCNC: 1.94 MG/DL (ref 0.5–0.9)
DIFFERENTIAL TYPE: ABNORMAL
EOSINOPHILS RELATIVE PERCENT: 0 %
FIO2: 30
GFR AFRICAN AMERICAN: 31 ML/MIN
GFR NON-AFRICAN AMERICAN: 26 ML/MIN
GFR SERPL CREATININE-BSD FRML MDRD: ABNORMAL ML/MIN/{1.73_M2}
GFR SERPL CREATININE-BSD FRML MDRD: ABNORMAL ML/MIN/{1.73_M2}
GLUCOSE BLD-MCNC: 116 MG/DL (ref 65–105)
GLUCOSE BLD-MCNC: 151 MG/DL (ref 65–105)
GLUCOSE BLD-MCNC: 196 MG/DL (ref 65–105)
GLUCOSE BLD-MCNC: 200 MG/DL (ref 65–105)
GLUCOSE BLD-MCNC: 242 MG/DL (ref 70–99)
GLUCOSE BLD-MCNC: 247 MG/DL (ref 74–100)
HCT VFR BLD CALC: 35 % (ref 36.3–47.1)
HEMOGLOBIN: 10.3 G/DL (ref 11.9–15.1)
IMMATURE GRANULOCYTES: 1 %
LYMPHOCYTES # BLD: 9 %
MCH RBC QN AUTO: 29.2 PG (ref 25.2–33.5)
MCHC RBC AUTO-ENTMCNC: 29.4 G/DL (ref 28.4–34.8)
MCV RBC AUTO: 99.2 FL (ref 82.6–102.9)
MODE: ABNORMAL
MONOCYTES # BLD: 8 %
MORPHOLOGY: ABNORMAL
NEGATIVE BASE EXCESS, ART: 7 (ref 0–2)
O2 DEVICE/FLOW/%: ABNORMAL
PATIENT TEMP: ABNORMAL
PDW BLD-RTO: 15.9 % (ref 11.8–14.4)
PLATELET # BLD: 198 K/UL (ref 138–453)
PLATELET ESTIMATE: ABNORMAL
PMV BLD AUTO: 11.8 FL (ref 8.1–13.5)
POC HCO3: 17.9 MMOL/L (ref 21–28)
POC O2 SATURATION: 99 % (ref 94–98)
POC PCO2 TEMP: ABNORMAL MM HG
POC PCO2: 34.9 MM HG (ref 35–48)
POC PH TEMP: ABNORMAL
POC PH: 7.32 (ref 7.35–7.45)
POC PO2 TEMP: ABNORMAL MM HG
POC PO2: 148.8 MM HG (ref 83–108)
POSITIVE BASE EXCESS, ART: ABNORMAL (ref 0–3)
POTASSIUM SERPL-SCNC: 3.8 MMOL/L (ref 3.7–5.3)
RBC # BLD: 3.53 M/UL (ref 3.95–5.11)
RBC # BLD: ABNORMAL 10*6/UL
SAMPLE SITE: ABNORMAL
SEG NEUTROPHILS: 82 %
SEGMENTED NEUTROPHILS ABSOLUTE COUNT: 13.61 K/UL (ref 1.8–7.7)
SODIUM BLD-SCNC: 150 MMOL/L (ref 135–144)
TCO2 (CALC), ART: 19 MMOL/L (ref 22–29)
WBC # BLD: 16.6 K/UL (ref 3.5–11.3)
WBC # BLD: ABNORMAL 10*3/UL

## 2017-11-21 PROCEDURE — 94762 N-INVAS EAR/PLS OXIMTRY CONT: CPT

## 2017-11-21 PROCEDURE — 6360000002 HC RX W HCPCS: Performed by: HOSPITALIST

## 2017-11-21 PROCEDURE — 6360000002 HC RX W HCPCS: Performed by: EMERGENCY MEDICINE

## 2017-11-21 PROCEDURE — 99211 OFF/OP EST MAY X REQ PHY/QHP: CPT

## 2017-11-21 PROCEDURE — 80048 BASIC METABOLIC PNL TOTAL CA: CPT

## 2017-11-21 PROCEDURE — 2500000003 HC RX 250 WO HCPCS: Performed by: INTERNAL MEDICINE

## 2017-11-21 PROCEDURE — 2580000003 HC RX 258: Performed by: HOSPITALIST

## 2017-11-21 PROCEDURE — 36415 COLL VENOUS BLD VENIPUNCTURE: CPT

## 2017-11-21 PROCEDURE — 85025 COMPLETE CBC W/AUTO DIFF WBC: CPT

## 2017-11-21 PROCEDURE — 82803 BLOOD GASES ANY COMBINATION: CPT

## 2017-11-21 PROCEDURE — 6360000002 HC RX W HCPCS: Performed by: INTERNAL MEDICINE

## 2017-11-21 PROCEDURE — 36600 WITHDRAWAL OF ARTERIAL BLOOD: CPT

## 2017-11-21 PROCEDURE — 2500000003 HC RX 250 WO HCPCS: Performed by: HOSPITALIST

## 2017-11-21 PROCEDURE — 99291 CRITICAL CARE FIRST HOUR: CPT | Performed by: INTERNAL MEDICINE

## 2017-11-21 PROCEDURE — 2000000000 HC ICU R&B

## 2017-11-21 PROCEDURE — 6370000000 HC RX 637 (ALT 250 FOR IP): Performed by: INTERNAL MEDICINE

## 2017-11-21 PROCEDURE — 94003 VENT MGMT INPAT SUBQ DAY: CPT

## 2017-11-21 PROCEDURE — 82947 ASSAY GLUCOSE BLOOD QUANT: CPT

## 2017-11-21 PROCEDURE — 94770 HC ETCO2 MONITOR DAILY: CPT

## 2017-11-21 PROCEDURE — 94760 N-INVAS EAR/PLS OXIMETRY 1: CPT

## 2017-11-21 PROCEDURE — 6370000000 HC RX 637 (ALT 250 FOR IP): Performed by: STUDENT IN AN ORGANIZED HEALTH CARE EDUCATION/TRAINING PROGRAM

## 2017-11-21 PROCEDURE — 95819 EEG AWAKE AND ASLEEP: CPT

## 2017-11-21 PROCEDURE — 95822 EEG COMA OR SLEEP ONLY: CPT | Performed by: PSYCHIATRY & NEUROLOGY

## 2017-11-21 PROCEDURE — 6370000000 HC RX 637 (ALT 250 FOR IP): Performed by: HOSPITALIST

## 2017-11-21 PROCEDURE — S0028 INJECTION, FAMOTIDINE, 20 MG: HCPCS | Performed by: HOSPITALIST

## 2017-11-21 RX ORDER — CARVEDILOL 6.25 MG/1
6.25 TABLET ORAL 2 TIMES DAILY WITH MEALS
Status: DISCONTINUED | OUTPATIENT
Start: 2017-11-21 | End: 2017-11-22

## 2017-11-21 RX ORDER — INSULIN GLARGINE 100 [IU]/ML
10 INJECTION, SOLUTION SUBCUTANEOUS NIGHTLY
Status: DISCONTINUED | OUTPATIENT
Start: 2017-11-21 | End: 2017-11-22

## 2017-11-21 RX ORDER — CHLOROTHIAZIDE SODIUM 500 MG/1
500 INJECTION INTRAVENOUS ONCE
Status: COMPLETED | OUTPATIENT
Start: 2017-11-21 | End: 2017-11-21

## 2017-11-21 RX ORDER — HYDRALAZINE HYDROCHLORIDE 20 MG/ML
5 INJECTION INTRAMUSCULAR; INTRAVENOUS ONCE
Status: COMPLETED | OUTPATIENT
Start: 2017-11-22 | End: 2017-11-21

## 2017-11-21 RX ORDER — LABETALOL HYDROCHLORIDE 5 MG/ML
10 INJECTION, SOLUTION INTRAVENOUS ONCE
Status: COMPLETED | OUTPATIENT
Start: 2017-11-21 | End: 2017-11-21

## 2017-11-21 RX ADMIN — FENTANYL CITRATE 50 MCG: 50 INJECTION INTRAMUSCULAR; INTRAVENOUS at 05:12

## 2017-11-21 RX ADMIN — AMLODIPINE BESYLATE 10 MG: 10 TABLET ORAL at 08:18

## 2017-11-21 RX ADMIN — FENTANYL CITRATE 50 MCG: 50 INJECTION INTRAMUSCULAR; INTRAVENOUS at 19:52

## 2017-11-21 RX ADMIN — FENTANYL CITRATE 50 MCG: 50 INJECTION INTRAMUSCULAR; INTRAVENOUS at 09:30

## 2017-11-21 RX ADMIN — FAMOTIDINE 20 MG: 10 INJECTION, SOLUTION INTRAVENOUS at 08:19

## 2017-11-21 RX ADMIN — LABETALOL HYDROCHLORIDE 10 MG: 5 INJECTION, SOLUTION INTRAVENOUS at 01:32

## 2017-11-21 RX ADMIN — HEPARIN SODIUM 5000 UNITS: 5000 INJECTION, SOLUTION INTRAVENOUS; SUBCUTANEOUS at 06:04

## 2017-11-21 RX ADMIN — INSULIN LISPRO 6 UNITS: 100 INJECTION, SOLUTION INTRAVENOUS; SUBCUTANEOUS at 04:18

## 2017-11-21 RX ADMIN — INSULIN LISPRO 3 UNITS: 100 INJECTION, SOLUTION INTRAVENOUS; SUBCUTANEOUS at 10:12

## 2017-11-21 RX ADMIN — MAGNESIUM HYDROXIDE 30 ML: 400 SUSPENSION ORAL at 16:25

## 2017-11-21 RX ADMIN — INSULIN GLARGINE 10 UNITS: 100 INJECTION, SOLUTION SUBCUTANEOUS at 20:02

## 2017-11-21 RX ADMIN — FENTANYL CITRATE 50 MCG: 50 INJECTION INTRAMUSCULAR; INTRAVENOUS at 03:04

## 2017-11-21 RX ADMIN — INSULIN LISPRO 3 UNITS: 100 INJECTION, SOLUTION INTRAVENOUS; SUBCUTANEOUS at 21:59

## 2017-11-21 RX ADMIN — LABETALOL HYDROCHLORIDE 10 MG: 5 INJECTION, SOLUTION INTRAVENOUS at 06:40

## 2017-11-21 RX ADMIN — SILVER SULFADIAZINE: 10 CREAM TOPICAL at 09:15

## 2017-11-21 RX ADMIN — LABETALOL HYDROCHLORIDE 10 MG: 5 INJECTION, SOLUTION INTRAVENOUS at 18:28

## 2017-11-21 RX ADMIN — INSULIN LISPRO 6 UNITS: 100 INJECTION, SOLUTION INTRAVENOUS; SUBCUTANEOUS at 16:40

## 2017-11-21 RX ADMIN — HEPARIN SODIUM 5000 UNITS: 5000 INJECTION, SOLUTION INTRAVENOUS; SUBCUTANEOUS at 21:58

## 2017-11-21 RX ADMIN — Medication 10 ML: at 20:02

## 2017-11-21 RX ADMIN — HYDRALAZINE HYDROCHLORIDE 5 MG: 20 INJECTION INTRAMUSCULAR; INTRAVENOUS at 23:36

## 2017-11-21 RX ADMIN — FENTANYL CITRATE 50 MCG: 50 INJECTION INTRAMUSCULAR; INTRAVENOUS at 17:11

## 2017-11-21 RX ADMIN — FENTANYL CITRATE 50 MCG: 50 INJECTION INTRAMUSCULAR; INTRAVENOUS at 22:34

## 2017-11-21 RX ADMIN — CHLOROTHIAZIDE SODIUM 500 MG: 500 INJECTION, POWDER, LYOPHILIZED, FOR SOLUTION INTRAVENOUS at 11:15

## 2017-11-21 RX ADMIN — HYDRALAZINE HYDROCHLORIDE 5 MG: 20 INJECTION INTRAMUSCULAR; INTRAVENOUS at 04:41

## 2017-11-21 RX ADMIN — CARVEDILOL 6.25 MG: 6.25 TABLET, FILM COATED ORAL at 16:25

## 2017-11-21 RX ADMIN — Medication 10 ML: at 08:18

## 2017-11-21 RX ADMIN — HYDRALAZINE HYDROCHLORIDE 5 MG: 20 INJECTION INTRAMUSCULAR; INTRAVENOUS at 21:58

## 2017-11-21 RX ADMIN — HYDRALAZINE HYDROCHLORIDE 5 MG: 20 INJECTION INTRAMUSCULAR; INTRAVENOUS at 14:30

## 2017-11-21 RX ADMIN — CARVEDILOL 6.25 MG: 6.25 TABLET, FILM COATED ORAL at 10:02

## 2017-11-21 RX ADMIN — LABETALOL HYDROCHLORIDE 10 MG: 5 INJECTION, SOLUTION INTRAVENOUS at 09:03

## 2017-11-21 RX ADMIN — HEPARIN SODIUM 5000 UNITS: 5000 INJECTION, SOLUTION INTRAVENOUS; SUBCUTANEOUS at 14:28

## 2017-11-21 RX ADMIN — FENTANYL CITRATE 50 MCG: 50 INJECTION INTRAMUSCULAR; INTRAVENOUS at 07:25

## 2017-11-21 ASSESSMENT — PULMONARY FUNCTION TESTS
PIF_VALUE: 25
PIF_VALUE: 24
PIF_VALUE: 23
PIF_VALUE: 25
PIF_VALUE: 12
PIF_VALUE: 30
PIF_VALUE: 12
PIF_VALUE: 15
PIF_VALUE: 12
PIF_VALUE: 12
PIF_VALUE: 11
PIF_VALUE: 20

## 2017-11-21 NOTE — PROGRESS NOTES
Wound Event: Trauma  Location: Abdomen  Wound Location Orientation: Right; Lower;Quadrant  Wound Description (Comments): linear in shape   Pre-existing: Yes  Phot. .. Wound Type Wound   Dressing Changed Changed/New   Dressing/Treatment Silver Sulfadiazine  (gauze infused with silvadene applied to skin)   Wound Assessment Fragile;Red;Pink;Painful;Tan;Fibrin   Drainage Description Serosanguinous   Non-staged Wound Description Full thickness   Wound 11/18/17 Other (Comment) Thigh Mid;Medial;Right linear in shape    Date First Assessed/Time First Assessed: 11/18/17 1700   Wound Type: (c) Other (Comment)  Wound Event: Trauma  Location: Thigh  Wound Location Orientation: Mid;Medial;Right  Wound Description (Comments): linear in shape   Pre-existing: Yes  Photo Take. ..    Wound Type Wound   Dressing Changed Changed/New   Wound Assessment Fragile;Painful;Pink;Red;Tan;Fibrin   Drainage Description Serosanguinous   Non-staged Wound Description Full thickness   Wound 11/18/17 Other (Comment) Thigh Lateral;Right   Date First Assessed/Time First Assessed: 11/18/17 1700   Wound Type: (c) Other (Comment)  Wound Event: Trauma  Location: Thigh  Wound Location Orientation: Lateral;Right  Pre-existing: Yes  Photo Taken: Yes   Dressing Changed Changed/New   Dressing/Treatment Silver Sulfadiazine  (gauze infused with silvadene applied to skin)   Wound Assessment Pink;Red;Painful;Fragile;Tan;Fibrin   Drainage Description Serosanguinous   Non-staged Wound Description Full thickness   Wound 11/18/17 Other (Comment) Buttocks Right   Date First Assessed/Time First Assessed: 11/18/17 1700   Wound Type: (c) Other (Comment)  Wound Event: Trauma  Location: Buttocks  Wound Location Orientation: Right  Pre-existing: Yes  Photo Taken: Yes   Wound Type Wound   Dressing Changed Changed/New   Wound Assessment Fragile;Painful;Pink;Red;Tan;Fibrin   Drainage Description Serosanguinous   Non-staged Wound Description Full thickness   Wound 11/18/17 Other Juan barrier cloths and zinc oxide cream. Apply zinc oxide cream BID and prn incontinence.    Covidien moisture wicking under pads     Specialty Bed Required : Yes   [x] Low Air Loss   [x] Pressure Redistribution  [] Fluid Immersion  [] Bariatric  [] Total Pressure Relief  [] Other:     Current Diet: DIET TUBE FEED CONTINUOUS/CYCLIC NPO; Renal Formula; 10; 45; 24       Electronically signed by Carmen Pepper RN,  on 11/21/2017 at 12:10 PM
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
## 2017-11-21 NOTE — PLAN OF CARE
physiological function of skin and  mucous membranes.    Outcome: Ongoing      Problem: Nutrition  Goal: Optimal nutrition therapy  Outcome: Ongoing
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
## 2017-11-21 NOTE — PLAN OF CARE
Problem: Nutrition  Goal: Optimal nutrition therapy  Outcome: Ongoing  Nutrition Problem: Inadequate oral intake  Intervention: Food and/or Nutrient Delivery: Continue current Tube Feeding  Nutritional Goals: meet % of estimated nutrition needs

## 2017-11-21 NOTE — PLAN OF CARE
Problem: Restraint Use - Nonviolent/Non-Self-Destructive Behavior:  Goal: Absence of restraint indications  Absence of restraint indications   Outcome: Not Met This Shift    Goal: Absence of restraint-related injury  Absence of restraint-related injury   Outcome: Met This Shift      Problem: Pain:  Goal: Pain level will decrease  Pain level will decrease   Outcome: Ongoing    Goal: Control of acute pain  Control of acute pain   Outcome: Ongoing      Problem: Skin Integrity:  Goal: Will show no infection signs and symptoms  Will show no infection signs and symptoms   Outcome: Ongoing    Goal: Absence of new skin breakdown  Absence of new skin breakdown   Outcome: Ongoing      Problem: Falls - Risk of  Goal: Absence of falls  Outcome: Met This Shift      Problem: Risk for Impaired Skin Integrity  Goal: Tissue integrity - skin and mucous membranes  Structural intactness and normal physiological function of skin and  mucous membranes.    Outcome: Ongoing      Problem: Nutrition  Goal: Optimal nutrition therapy  Outcome: Met This Shift

## 2017-11-22 ENCOUNTER — APPOINTMENT (OUTPATIENT)
Dept: GENERAL RADIOLOGY | Age: 67
DRG: 064 | End: 2017-11-22
Payer: MEDICARE

## 2017-11-22 LAB
ABSOLUTE EOS #: 0 K/UL (ref 0–0.4)
ABSOLUTE IMMATURE GRANULOCYTE: 0.4 K/UL (ref 0–0.3)
ABSOLUTE LYMPH #: 2.14 K/UL (ref 1–4.8)
ABSOLUTE MONO #: 1.07 K/UL (ref 0.1–0.8)
ALLEN TEST: POSITIVE
ANION GAP SERPL CALCULATED.3IONS-SCNC: 13 MMOL/L (ref 9–17)
BASOPHILS # BLD: 0 % (ref 0–2)
BASOPHILS ABSOLUTE: 0 K/UL (ref 0–0.2)
BUN BLDV-MCNC: 80 MG/DL (ref 8–23)
BUN/CREAT BLD: ABNORMAL (ref 9–20)
CALCIUM SERPL-MCNC: 8.6 MG/DL (ref 8.6–10.4)
CHLORIDE BLD-SCNC: 120 MMOL/L (ref 98–107)
CO2: 20 MMOL/L (ref 20–31)
CREAT SERPL-MCNC: 1.57 MG/DL (ref 0.5–0.9)
CREATININE URINE /24 HR: ABNORMAL MG/D (ref 500–1400)
CREATININE URINE /VOLUME: 51 MG/DL
DIFFERENTIAL TYPE: ABNORMAL
EOSINOPHILS RELATIVE PERCENT: 0 % (ref 1–4)
FIO2: 30
GFR AFRICAN AMERICAN: 40 ML/MIN
GFR NON-AFRICAN AMERICAN: 33 ML/MIN
GFR SERPL CREATININE-BSD FRML MDRD: ABNORMAL ML/MIN/{1.73_M2}
GFR SERPL CREATININE-BSD FRML MDRD: ABNORMAL ML/MIN/{1.73_M2}
GLUCOSE BLD-MCNC: 194 MG/DL (ref 65–105)
GLUCOSE BLD-MCNC: 202 MG/DL (ref 65–105)
GLUCOSE BLD-MCNC: 208 MG/DL (ref 65–105)
GLUCOSE BLD-MCNC: 219 MG/DL (ref 70–99)
GLUCOSE BLD-MCNC: 232 MG/DL (ref 74–100)
HCT VFR BLD CALC: 33.7 % (ref 36.3–47.1)
HEMOGLOBIN: 10 G/DL (ref 11.9–15.1)
HOURS COLLECTED: ABNORMAL
IMMATURE GRANULOCYTES: 3 %
LYMPHOCYTES # BLD: 16 % (ref 24–44)
MCH RBC QN AUTO: 28.4 PG (ref 25.2–33.5)
MCHC RBC AUTO-ENTMCNC: 29.7 G/DL (ref 28.4–34.8)
MCV RBC AUTO: 95.7 FL (ref 82.6–102.9)
METANEPHRINE UF INTERPRETATION: ABNORMAL
METANEPHRINE UG/G CRE: 212 UG/G CRT (ref 0–300)
METANEPHRINES 24 HOUR URINE: ABNORMAL UG/D (ref 39–143)
METANEPHRINES, URINE (UMOL/L): 108 UG/L
MODE: ABNORMAL
MONOCYTES # BLD: 8 % (ref 1–7)
MORPHOLOGY: ABNORMAL
NEGATIVE BASE EXCESS, ART: 3 (ref 0–2)
NORMETANEPHRINE, (G/CRT): 804 UG/G CRT (ref 0–400)
NORMETANEPHRINE, (NMOL/DAY): ABNORMAL UG/D (ref 109–393)
NORMETANEPHRINES, NMOL/L: 410 UG/L
O2 DEVICE/FLOW/%: ABNORMAL
PATIENT TEMP: ABNORMAL
PDW BLD-RTO: 16.1 % (ref 11.8–14.4)
PLATELET # BLD: 210 K/UL (ref 138–453)
PLATELET ESTIMATE: ABNORMAL
PMV BLD AUTO: 11.6 FL (ref 8.1–13.5)
POC HCO3: 22.2 MMOL/L (ref 21–28)
POC O2 SATURATION: 99 % (ref 94–98)
POC PCO2 TEMP: ABNORMAL MM HG
POC PCO2: 37.8 MM HG (ref 35–48)
POC PH TEMP: ABNORMAL
POC PH: 7.38 (ref 7.35–7.45)
POC PO2 TEMP: ABNORMAL MM HG
POC PO2: 127.5 MM HG (ref 83–108)
POSITIVE BASE EXCESS, ART: ABNORMAL (ref 0–3)
POTASSIUM SERPL-SCNC: 4.1 MMOL/L (ref 3.7–5.3)
RBC # BLD: 3.52 M/UL (ref 3.95–5.11)
RBC # BLD: ABNORMAL 10*6/UL
SAMPLE SITE: ABNORMAL
SEG NEUTROPHILS: 73 % (ref 36–66)
SEGMENTED NEUTROPHILS ABSOLUTE COUNT: 9.79 K/UL (ref 1.8–7.7)
SODIUM BLD-SCNC: 153 MMOL/L (ref 135–144)
TCO2 (CALC), ART: 23 MMOL/L (ref 22–29)
URINE VOLUME: ABNORMAL
WBC # BLD: 13.4 K/UL (ref 3.5–11.3)
WBC # BLD: ABNORMAL 10*3/UL

## 2017-11-22 PROCEDURE — 82803 BLOOD GASES ANY COMBINATION: CPT

## 2017-11-22 PROCEDURE — 36415 COLL VENOUS BLD VENIPUNCTURE: CPT

## 2017-11-22 PROCEDURE — 94762 N-INVAS EAR/PLS OXIMTRY CONT: CPT

## 2017-11-22 PROCEDURE — 2580000003 HC RX 258: Performed by: INTERNAL MEDICINE

## 2017-11-22 PROCEDURE — 2000000000 HC ICU R&B

## 2017-11-22 PROCEDURE — 85025 COMPLETE CBC W/AUTO DIFF WBC: CPT

## 2017-11-22 PROCEDURE — 80048 BASIC METABOLIC PNL TOTAL CA: CPT

## 2017-11-22 PROCEDURE — 94770 HC ETCO2 MONITOR DAILY: CPT

## 2017-11-22 PROCEDURE — 6360000002 HC RX W HCPCS: Performed by: EMERGENCY MEDICINE

## 2017-11-22 PROCEDURE — 2500000003 HC RX 250 WO HCPCS: Performed by: INTERNAL MEDICINE

## 2017-11-22 PROCEDURE — 2500000003 HC RX 250 WO HCPCS: Performed by: HOSPITALIST

## 2017-11-22 PROCEDURE — 99291 CRITICAL CARE FIRST HOUR: CPT | Performed by: INTERNAL MEDICINE

## 2017-11-22 PROCEDURE — 6360000002 HC RX W HCPCS: Performed by: HOSPITALIST

## 2017-11-22 PROCEDURE — 6370000000 HC RX 637 (ALT 250 FOR IP): Performed by: INTERNAL MEDICINE

## 2017-11-22 PROCEDURE — 2580000003 HC RX 258: Performed by: HOSPITALIST

## 2017-11-22 PROCEDURE — 94003 VENT MGMT INPAT SUBQ DAY: CPT

## 2017-11-22 PROCEDURE — 6370000000 HC RX 637 (ALT 250 FOR IP): Performed by: STUDENT IN AN ORGANIZED HEALTH CARE EDUCATION/TRAINING PROGRAM

## 2017-11-22 PROCEDURE — 6370000000 HC RX 637 (ALT 250 FOR IP): Performed by: HOSPITALIST

## 2017-11-22 PROCEDURE — 6360000002 HC RX W HCPCS: Performed by: INTERNAL MEDICINE

## 2017-11-22 PROCEDURE — 82947 ASSAY GLUCOSE BLOOD QUANT: CPT

## 2017-11-22 PROCEDURE — 71010 XR CHEST PORTABLE: CPT

## 2017-11-22 PROCEDURE — S0028 INJECTION, FAMOTIDINE, 20 MG: HCPCS | Performed by: HOSPITALIST

## 2017-11-22 PROCEDURE — 36600 WITHDRAWAL OF ARTERIAL BLOOD: CPT

## 2017-11-22 RX ORDER — DEXTROSE MONOHYDRATE 50 MG/ML
INJECTION, SOLUTION INTRAVENOUS CONTINUOUS
Status: DISCONTINUED | OUTPATIENT
Start: 2017-11-22 | End: 2017-11-22

## 2017-11-22 RX ORDER — CARVEDILOL 12.5 MG/1
12.5 TABLET ORAL 2 TIMES DAILY WITH MEALS
Status: DISCONTINUED | OUTPATIENT
Start: 2017-11-22 | End: 2017-11-24

## 2017-11-22 RX ORDER — INSULIN GLARGINE 100 [IU]/ML
15 INJECTION, SOLUTION SUBCUTANEOUS NIGHTLY
Status: DISCONTINUED | OUTPATIENT
Start: 2017-11-22 | End: 2017-11-23

## 2017-11-22 RX ORDER — FENTANYL CITRATE 50 UG/ML
100 INJECTION, SOLUTION INTRAMUSCULAR; INTRAVENOUS
Status: DISCONTINUED | OUTPATIENT
Start: 2017-11-22 | End: 2017-12-14 | Stop reason: HOSPADM

## 2017-11-22 RX ORDER — FAMOTIDINE 20 MG/1
20 TABLET, FILM COATED ORAL DAILY
Status: DISCONTINUED | OUTPATIENT
Start: 2017-11-23 | End: 2017-11-27

## 2017-11-22 RX ORDER — HYDRALAZINE HYDROCHLORIDE 20 MG/ML
10 INJECTION INTRAMUSCULAR; INTRAVENOUS EVERY 4 HOURS PRN
Status: DISCONTINUED | OUTPATIENT
Start: 2017-11-22 | End: 2017-11-27

## 2017-11-22 RX ORDER — DEXTROSE MONOHYDRATE 50 MG/ML
INJECTION, SOLUTION INTRAVENOUS CONTINUOUS
Status: DISCONTINUED | OUTPATIENT
Start: 2017-11-22 | End: 2017-11-23

## 2017-11-22 RX ADMIN — LABETALOL HYDROCHLORIDE 10 MG: 5 INJECTION, SOLUTION INTRAVENOUS at 09:09

## 2017-11-22 RX ADMIN — INSULIN GLARGINE 15 UNITS: 100 INJECTION, SOLUTION SUBCUTANEOUS at 21:27

## 2017-11-22 RX ADMIN — INSULIN LISPRO 3 UNITS: 100 INJECTION, SOLUTION INTRAVENOUS; SUBCUTANEOUS at 11:02

## 2017-11-22 RX ADMIN — FENTANYL CITRATE 50 MCG: 50 INJECTION INTRAMUSCULAR; INTRAVENOUS at 15:43

## 2017-11-22 RX ADMIN — INSULIN LISPRO 6 UNITS: 100 INJECTION, SOLUTION INTRAVENOUS; SUBCUTANEOUS at 22:21

## 2017-11-22 RX ADMIN — DEXTROSE MONOHYDRATE: 50 INJECTION, SOLUTION INTRAVENOUS at 11:06

## 2017-11-22 RX ADMIN — FENTANYL CITRATE 100 MCG: 50 INJECTION INTRAMUSCULAR; INTRAVENOUS at 22:20

## 2017-11-22 RX ADMIN — FENTANYL CITRATE 50 MCG: 50 INJECTION INTRAMUSCULAR; INTRAVENOUS at 09:07

## 2017-11-22 RX ADMIN — INSULIN LISPRO 6 UNITS: 100 INJECTION, SOLUTION INTRAVENOUS; SUBCUTANEOUS at 17:07

## 2017-11-22 RX ADMIN — LABETALOL HYDROCHLORIDE 10 MG: 5 INJECTION, SOLUTION INTRAVENOUS at 02:03

## 2017-11-22 RX ADMIN — SILVER SULFADIAZINE: 10 CREAM TOPICAL at 08:37

## 2017-11-22 RX ADMIN — Medication 10 ML: at 08:39

## 2017-11-22 RX ADMIN — HYDRALAZINE HYDROCHLORIDE 5 MG: 20 INJECTION INTRAMUSCULAR; INTRAVENOUS at 06:13

## 2017-11-22 RX ADMIN — HEPARIN SODIUM 5000 UNITS: 5000 INJECTION, SOLUTION INTRAVENOUS; SUBCUTANEOUS at 06:13

## 2017-11-22 RX ADMIN — AMLODIPINE BESYLATE 10 MG: 10 TABLET ORAL at 08:36

## 2017-11-22 RX ADMIN — FENTANYL CITRATE: 50 INJECTION INTRAMUSCULAR; INTRAVENOUS at 02:03

## 2017-11-22 RX ADMIN — LABETALOL HYDROCHLORIDE 10 MG: 5 INJECTION, SOLUTION INTRAVENOUS at 18:04

## 2017-11-22 RX ADMIN — HEPARIN SODIUM 5000 UNITS: 5000 INJECTION, SOLUTION INTRAVENOUS; SUBCUTANEOUS at 21:30

## 2017-11-22 RX ADMIN — CARVEDILOL 12.5 MG: 12.5 TABLET, FILM COATED ORAL at 08:36

## 2017-11-22 RX ADMIN — FENTANYL CITRATE 50 MCG: 50 INJECTION INTRAMUSCULAR; INTRAVENOUS at 17:52

## 2017-11-22 RX ADMIN — HYDRALAZINE HYDROCHLORIDE 10 MG: 20 INJECTION INTRAMUSCULAR; INTRAVENOUS at 22:21

## 2017-11-22 RX ADMIN — FAMOTIDINE 20 MG: 10 INJECTION, SOLUTION INTRAVENOUS at 08:36

## 2017-11-22 RX ADMIN — INSULIN LISPRO 3 UNITS: 100 INJECTION, SOLUTION INTRAVENOUS; SUBCUTANEOUS at 05:18

## 2017-11-22 RX ADMIN — CARVEDILOL 12.5 MG: 12.5 TABLET, FILM COATED ORAL at 17:07

## 2017-11-22 RX ADMIN — HEPARIN SODIUM 5000 UNITS: 5000 INJECTION, SOLUTION INTRAVENOUS; SUBCUTANEOUS at 13:45

## 2017-11-22 ASSESSMENT — PULMONARY FUNCTION TESTS
PIF_VALUE: 12
PIF_VALUE: 12
PIF_VALUE: 23
PIF_VALUE: 12
PIF_VALUE: 12
PIF_VALUE: 20
PIF_VALUE: 17
PIF_VALUE: 17

## 2017-11-22 NOTE — PLAN OF CARE
Problem: OXYGENATION/RESPIRATORY FUNCTION  Goal: Patient will maintain patent airway  Outcome: Ongoing    Goal: Patient will achieve/maintain normal respiratory rate/effort  Respiratory rate and effort will be within normal limits for the patient   Outcome: Ongoing      Problem: MECHANICAL VENTILATION  Goal: Patient will maintain patent airway  Outcome: Ongoing    Goal: Oral health is maintained or improved  Outcome: Ongoing    Goal: ET tube will be managed safely  Outcome: Ongoing    Goal: Ability to express needs and understand communication  Outcome: Ongoing    Goal: Mobility/activity is maintained at optimum level for patient  Outcome: Ongoing      Problem: ASPIRATION PRECAUTIONS  Goal: Patient's risk of aspiration is minimized  Outcome: Ongoing      Problem: SKIN INTEGRITY  Goal: Skin integrity is maintained or improved  Outcome: Ongoing      Problem: NUTRITION  Goal: Nutritional status is improving  Outcome: Ongoing      Problem: Restraint Use - Nonviolent/Non-Self-Destructive Behavior:  Goal: Absence of restraint indications  Absence of restraint indications   Outcome: Not Met This Shift    Goal: Absence of restraint-related injury  Absence of restraint-related injury   Outcome: Met This Shift      Problem: Pain:  Goal: Pain level will decrease  Pain level will decrease   Outcome: Ongoing    Goal: Control of acute pain  Control of acute pain   Outcome: Ongoing    Goal: Control of chronic pain  Control of chronic pain   Outcome: Ongoing      Problem: Skin Integrity:  Goal: Will show no infection signs and symptoms  Will show no infection signs and symptoms   Outcome: Ongoing    Goal: Absence of new skin breakdown  Absence of new skin breakdown   Outcome: Ongoing      Problem: Falls - Risk of  Goal: Absence of falls  Outcome: Ongoing      Problem: Risk for Impaired Skin Integrity  Goal: Tissue integrity - skin and mucous membranes  Structural intactness and normal physiological function of skin and  mucous

## 2017-11-22 NOTE — PROGRESS NOTES
Critical Care Team - Daily Progress Note      Date and time: 11/22/2017 8:24 AM  Patient's name:  Aleksey Garcia Record Number: [id number]  Patient's account/billing number: [de-identified]  Patient's YOB: 1950  Age: 69 y.o. Date of Admission: 11/18/2017 11:20 AM  Length of stay during current admission: 4      Primary Care Physician: No primary care provider on file. ICU Attending Physician: Dr. Harini Small Status: Full Code    Reason for ICU admission: Altered mental status      SUBJECTIVE:     OVERNIGHT EVENTS:    No acute events overnight. Continues to be hypertensive overnight , we will increase Coreg  Tucker snot follow any commands   Opens eyes   Na : 153    bun and cr getting better   Weaned all day yesterday   Tolerating tube feeding well   UO : 2744   EEG :   INTERPRETATION:  This EEG, during which the patient is described as poorly  responsive, is moderately abnormal due to diffuse background  disorganization and slowing.  This indicates the presence of moderate  diffuse cerebral dysfunction of a nonspecific nature.     AWAKE & FOLLOWING COMMANDS:  [x] No   [] Yes    CURRENT VENTILATION STATUS:     [x] Ventilator  [] BIPAP  [] Nasal Cannula [] Room Air      IF INTUBATED, ET TUBE MARKING AT LOWER LIP:       cms    SECRETIONS Amount:  [] Small [] Moderate  [] Large  [x] None  Color:     [] White [] Colored  [] Bloody    SEDATION:  RAAS Score:  [x] Propofol gtt  [] Versed gtt  [] Ativan gtt   [] No Sedation    PARALYZED:  [] No    [x] Yes    DIARRHEA:                [x] No                [] Yes  (C. Difficile status: [] positive                                                                                                                       [] negative                                                                                                                     [] pending)    VASOPRESSORS:  [x] No    [] Yes    If yes -   [] Levophed       [] Dopamine     [] Vasopressin present in the SI joints and lower lumbar facets. Spondylosis is present without suspicious lytic or blastic lesion. Estimated biologic radiation dose for this procedure:  1233.56 mGy/cm2.     1.  Hypodense lesions in the liver, not clearly cystic. These may represent hemangiomas, but further assessment with performance of ultrasound is advised which can be performed on a nonemergent basis. 2.   Gallbladder is slightly hyperdense without distinct gallstones. Sludge is not excluded. Right upper quadrant ultrasound may prove helpful. 3.   Bilateral adrenal masses, largest on the right of 2.6 cm, likely adenomas. 4.  No bowel obstruction or evidence of appendicitis. No adenopathy or other evidence of acute abdominopelvic process. RECOMMENDATIONS: Ultrasound of the liver and right upper quadrant to assess liver lesions and gallbladder. Ct Head Wo Contrast    Result Date: 11/18/2017  EXAMINATION: CT OF THE HEAD WITHOUT CONTRAST  11/18/2017 12:47 pm TECHNIQUE: CT of the head was performed without the administration of intravenous contrast. Dose modulation, iterative reconstruction, and/or weight based adjustment of the mA/kV was utilized to reduce the radiation dose to as low as reasonably achievable. COMPARISON: None. HISTORY: ORDERING SYSTEM PROVIDED HISTORY: found down TECHNOLOGIST PROVIDED HISTORY: Has a \"code stroke\" or \"stroke alert\" been called? ->No FINDINGS: BRAIN/VENTRICLES: There is no acute intracranial hemorrhage, mass effect or midline shift. No abnormal extra-axial fluid collection. The gray-white differentiation is maintained without evidence of an acute infarct. Bilateral basal ganglia lacunar infarcts  There are nonspecific areas of hypoattenuation within the periventricular and subcortical white matter, which likely represent chronic microvascular ischemic change. ORBITS: The visualized portion of the orbits demonstrate no acute abnormality.  SINUSES: The visualized paranasal sinuses and mastoid

## 2017-11-22 NOTE — PROGRESS NOTES
11/22/17 0814   Vent Information   Vent Mode PS/CPAP   Vt Exhaled 524 mL   Rate Set 16 bmp   Pressure Support 6 cmH20   FiO2  30 %   PEEP/CPAP 5     Patient wean trial initiated. Patient tolerating wean well.      Problem: OXYGENATION/RESPIRATORY FUNCTION  Goal: Patient will maintain patent airway  Outcome: Ongoing  Goal: Patient will achieve/maintain normal respiratory rate/effort  Respiratory rate and effort will be within normal limits for the patient  Outcome: Ongoing    Problem: MECHANICAL VENTILATION  Goal: Patient will maintain patent airway  Outcome: Ongoing  Goal: Oral health is maintained or improved  Outcome: Ongoing  Goal: ET tube will be managed safely  Outcome: Ongoing  Goal: Ability to express needs and understand communication  Outcome: Ongoing  Goal: Mobility/activity is maintained at optimum level for patient  Outcome: Ongoing    Problem: ASPIRATION PRECAUTIONS  Goal: Patients risk of aspiration is minimized  Outcome: Ongoing    Problem: SKIN INTEGRITY  Goal: Skin integrity is maintained or improved  Outcome: Ongoing

## 2017-11-22 NOTE — PROGRESS NOTES
cloNIDine (CATAPRES) 0.1 MG/24HR 1 patch Weekly   amLODIPine (NORVASC) tablet 10 mg Daily   labetalol (NORMODYNE;TRANDATE) injection 10 mg Q6H PRN   silver sulfADIAZINE (SILVADENE) 1 % cream Daily   hydrALAZINE (APRESOLINE) injection 5 mg Q6H PRN   sodium chloride flush 0.9 % injection 10 mL 2 times per day   sodium chloride flush 0.9 % injection 10 mL PRN   acetaminophen (TYLENOL) tablet 650 mg Q4H PRN   magnesium hydroxide (MILK OF MAGNESIA) 400 MG/5ML suspension 30 mL Daily PRN   ondansetron (ZOFRAN) injection 4 mg Q6H PRN   famotidine (PEPCID) injection 20 mg Daily   heparin (porcine) injection 5,000 Units 3 times per day   albuterol sulfate  (90 Base) MCG/ACT inhaler 4 puff Q6H PRN   propofol 1000 MG/100ML injection Titrated   glucose (GLUTOSE) 40 % oral gel 15 g PRN   dextrose 50 % solution 12.5 g PRN   glucagon (rDNA) injection 1 mg PRN   dextrose 5 % solution PRN   insulin lispro (HUMALOG) injection vial 0-18 Units Q6H   fentaNYL (SUBLIMAZE) injection 50 mcg Q2H PRN         LABS    CBC:   Recent Labs      11/20/17   0450 11/21/17   0409 11/22/17   0510   WBC  23.2*  16.6*  13.4*   RBC  3.63*  3.53*  3.52*   HGB  10.6*  10.3*  10.0*   HCT  34.8*  35.0*  33.7*   MCV  95.9  99.2  95.7   MCH  29.2  29.2  28.4   MCHC  30.5  29.4  29.7   RDW  15.2*  15.9*  16.1*   PLT  208  198  210   MPV  12.1  11.8  11.6      BMP:   Recent Labs      11/20/17   0450  11/20/17   0820  11/21/17   0409  11/22/17   0510   NA  155*  152*  150*  153*   K  4.0   --   3.8  4.1   CL  125*   --   121*  120*   CO2  13*   --   15*  20   BUN  111*   --   95*  80*   CREATININE  2.37*   --   1.94*  1.57*   GLUCOSE  179*   --   242*  219*   CALCIUM  8.5*   --   8.5*  8.6      [place] Free Light Chains QNT 7.51   0.37 - 1.94 mg/dL Final 11/19/2017  4:37  [address]   [place] Free Light Chains QNT 3.96   0.57 - 2.63 mg/dL Final 11/19/2017  4:37  [address]   Free [place]/Lambda Ratio 1.90 Albumin:   No results for input(s): LABALBU in the last 72 hours. DILAN:   Lab Results   Component Value Date    DILAN NEGATIVE 11/19/2017     SPEP:   Lab Results   Component Value Date    PROT 5.0 11/19/2017    ALBCAL 1.8 11/19/2017    ALBPCT 36 11/19/2017    LABALPH 0.4 11/19/2017    LABALPH 1.0 11/19/2017    A1PCT 7 11/19/2017    A2PCT 19 11/19/2017    LABBETA 0.9 11/19/2017    BETAPCT 17 11/19/2017    GAMGLOB 1.0 11/19/2017    GGPCT 20 11/19/2017    PATH ELECTRONICALLY SIGNED. Martha Liu M.D. 11/19/2017    PATH ELECTRONICALLY SIGNED. Martha Liu M.D. 11/19/2017     UPEP: No results found for: TPU     C3:   Lab Results   Component Value Date    C3 103 11/19/2017     C4:   Lab Results   Component Value Date    C4 26 11/19/2017       Urinalysis:  U/A:   Lab Results   Component Value Date    NITRU NEGATIVE 11/18/2017    COLORU YELLOW 11/18/2017    PHUR 5.5 11/18/2017    WBCUA None 11/18/2017    RBCUA None 11/18/2017    MUCUS NOT REPORTED 11/18/2017    TRICHOMONAS NOT REPORTED 11/18/2017    YEAST NOT REPORTED 11/18/2017    BACTERIA NOT REPORTED 11/18/2017    SPECGRAV 1.025 11/18/2017    LEUKOCYTESUR NEGATIVE 11/18/2017    UROBILINOGEN Normal 11/18/2017    BILIRUBINUR NEGATIVE 11/18/2017    GLUCOSEU NEGATIVE 11/18/2017    KETUA NEGATIVE 11/18/2017    AMORPHOUS NOT REPORTED 11/18/2017         Radiology:  [facility]  FINDINGS:   Kidneys:       The right kidney measures 9.84 x 5.16 x 4.25 cm.       The left kidney measures 10.78 x 5.26 x 529 cm.       Visualization is suboptimal due to bowel gas and body habitus.       Kidneys demonstrate normal cortical echogenicity.  No evidence of   hydronephrosis or intrarenal stones.  No cortical thinning is noted.       Bladder:       Indwelling Noel catheter is noted precluding accurate assessment of the   bladder.           Impression   Limited visualization of the kidneys due to bowel gas and body habitus. Kidneys to the extent imaged are unremarkable.  Bladder could not be   evaluated due to indwelling Noel catheter. CT of abdomen  1.  Hypodense lesions in the liver, not clearly cystic.  These may represent   hemangiomas, but further assessment with performance of ultrasound is advised   which can be performed on a nonemergent basis.       2.   Gallbladder is slightly hyperdense without distinct gallstones.  Sludge   is not excluded.  Right upper quadrant ultrasound may prove helpful.       3.   Bilateral adrenal masses, largest on the right of 2.6 cm, likely   adenomas.       4.  No bowel obstruction or evidence of appendicitis.  No adenopathy or other   evidence of acute abdominopelvic process. Assessment:  1. Acute kidney injury most likely secondary to ischemic ATN due to dehydration . Her renal functions are improving slowly, urine sediment benign  2. Severe hyperkalemia: Resolved  3. Severe metabolic acidosis, anion gap and non-gap secondary to acute kidney injury -improving  4. Hypernatremia secondary to depleted effective arterial volume from insensible losses and decreased intake  5. Skin discoloration possible rhabdo  6.  leukocytosis improving  7. Morbid obesity     Plan:  1. We will hold diuretics today  2. Continue free water 300 MS every 4 hours  3. Add D5 water at 75 mls an hour  4. Monitor  input and output      Please do not hesitate to call with questions.     Domenica Zaldivar MD  [facility]

## 2017-11-22 NOTE — PROGRESS NOTES
cellulitis. Discussed with wound care   Silvadene topically to be continued    Current evaluation: No apparent active infection but patient is at high risk. WBC 23-->13.4  Creat 2.37--> 1.57  Na 155--> 153    Discussed with RN, CC. Wound care. ICU care 40 min. I have personally reviewed the past medical history, past surgical history, medications, social history, and family history, and I have updated the database accordingly. Past Medical History:     Past Medical History:   Diagnosis Date    Depression     Heart murmur     HTN (hypertension)        Past Surgical  History:     Past Surgical History:   Procedure Laterality Date    OTHER SURGICAL HISTORY      bump under skin on buttocks    TUBAL LIGATION         Medications:      carvedilol  12.5 mg Oral BID WC    insulin glargine  15 Units Subcutaneous Nightly    cloNIDine  1 patch Transdermal Weekly    amLODIPine  10 mg Oral Daily    silver sulfADIAZINE   Topical Daily    sodium chloride flush  10 mL Intravenous 2 times per day    famotidine (PEPCID) injection  20 mg Intravenous Daily    heparin (porcine)  5,000 Units Subcutaneous 3 times per day    insulin lispro  0-18 Units Subcutaneous Q6H       Social History:     Social History     Social History    Marital status:      Spouse name: N/A    Number of children: N/A    Years of education: N/A     Occupational History    Not on file.      Social History Main Topics    Smoking status: Not on file    Smokeless tobacco: Not on file    Alcohol use Not on file    Drug use: Unknown    Sexual activity: Not on file     Other Topics Concern    Not on file     Social History Narrative    No narrative on file       Family History:     Family History   Problem Relation Age of Onset    Heart Disease Paternal Grandmother     Diabetes Father     Hypertension Father     Stroke Father     Diabetes Mother     Hypertension Mother     Breast Cancer Mother     Asthma Brother     Cancer

## 2017-11-22 NOTE — PLAN OF CARE
Problem: OXYGENATION/RESPIRATORY FUNCTION  Goal: Patient will maintain patent airway  Outcome: Ongoing  Goal: Patient will achieve/maintain normal respiratory rate/effort  Respiratory rate and effort will be within normal limits for the patient    Outcome: Ongoing     Problem: MECHANICAL VENTILATION  Goal: Oral health is maintained or improved  Outcome: Ongoing  Goal: ET tube will be managed safely  Outcome: Ongoing  Goal: Ability to express needs and understand communication  Outcome: Ongoing  Goal: Mobility/activity is maintained at optimum level for patient  Outcome: Ongoing  Goal: Patient will maintain patent airway  Outcome: Ongoing     Problem: ASPIRATION PRECAUTIONS  Goal: Patients risk of aspiration is minimized  Outcome: Ongoing     Problem: SKIN INTEGRITY  Goal: Skin integrity is maintained or improved  Outcome: Ongoing

## 2017-11-22 NOTE — PLAN OF CARE
Problem: Restraint Use - Nonviolent/Non-Self-Destructive Behavior:  Goal: Absence of restraint indications  Absence of restraint indications   Outcome: Ongoing      Problem: Pain:  Goal: Pain level will decrease  Pain level will decrease   Outcome: Ongoing      Problem: Skin Integrity:  Goal: Will show no infection signs and symptoms  Will show no infection signs and symptoms   Outcome: Ongoing

## 2017-11-23 LAB
ABSOLUTE EOS #: 0.26 K/UL (ref 0–0.44)
ABSOLUTE EOS #: 0.52 K/UL (ref 0–0.4)
ABSOLUTE IMMATURE GRANULOCYTE: 0.52 K/UL (ref 0–0.3)
ABSOLUTE IMMATURE GRANULOCYTE: 0.9 K/UL (ref 0–0.3)
ABSOLUTE LYMPH #: 2.19 K/UL (ref 1.1–3.7)
ABSOLUTE LYMPH #: 2.23 K/UL (ref 1–4.8)
ABSOLUTE MONO #: 0.52 K/UL (ref 0.1–0.8)
ABSOLUTE MONO #: 1.16 K/UL (ref 0.1–1.2)
ABSOLUTE RETIC #: 0.02 M/UL (ref 0.03–0.08)
ALLEN TEST: POSITIVE
ANION GAP SERPL CALCULATED.3IONS-SCNC: 10 MMOL/L (ref 9–17)
BASOPHILS # BLD: 0 % (ref 0–2)
BASOPHILS # BLD: 0 % (ref 0–2)
BASOPHILS ABSOLUTE: 0 K/UL (ref 0–0.2)
BASOPHILS ABSOLUTE: 0 K/UL (ref 0–0.2)
BUN BLDV-MCNC: 64 MG/DL (ref 8–23)
BUN/CREAT BLD: ABNORMAL (ref 9–20)
CALCIUM SERPL-MCNC: 8.2 MG/DL (ref 8.6–10.4)
CHLORIDE BLD-SCNC: 115 MMOL/L (ref 98–107)
CO2: 22 MMOL/L (ref 20–31)
CREAT SERPL-MCNC: 1.2 MG/DL (ref 0.5–0.9)
DIFFERENTIAL TYPE: ABNORMAL
DIFFERENTIAL TYPE: ABNORMAL
EOSINOPHILS RELATIVE PERCENT: 2 % (ref 1–4)
EOSINOPHILS RELATIVE PERCENT: 4 % (ref 1–4)
FERRITIN: 659 UG/L (ref 13–150)
FIO2: 30
FOLATE: 10.2 NG/ML
GFR AFRICAN AMERICAN: 54 ML/MIN
GFR NON-AFRICAN AMERICAN: 45 ML/MIN
GFR SERPL CREATININE-BSD FRML MDRD: ABNORMAL ML/MIN/{1.73_M2}
GFR SERPL CREATININE-BSD FRML MDRD: ABNORMAL ML/MIN/{1.73_M2}
GLUCOSE BLD-MCNC: 159 MG/DL (ref 65–105)
GLUCOSE BLD-MCNC: 162 MG/DL (ref 65–105)
GLUCOSE BLD-MCNC: 174 MG/DL (ref 65–105)
GLUCOSE BLD-MCNC: 200 MG/DL (ref 70–99)
GLUCOSE BLD-MCNC: 206 MG/DL (ref 65–105)
HCT VFR BLD CALC: 31.1 % (ref 36.3–47.1)
HCT VFR BLD CALC: 32.1 % (ref 36.3–47.1)
HEMOGLOBIN: 9.7 G/DL (ref 11.9–15.1)
HEMOGLOBIN: 9.8 G/DL (ref 11.9–15.1)
IMMATURE GRANULOCYTES: 4 %
IMMATURE GRANULOCYTES: 7 %
IMMATURE RETIC FRACT: 19.9 % (ref 2.7–18.3)
IRON SATURATION: 38 % (ref 20–55)
IRON: 50 UG/DL (ref 37–145)
LYMPHOCYTES # BLD: 17 % (ref 24–43)
LYMPHOCYTES # BLD: 17 % (ref 24–44)
MCH RBC QN AUTO: 29 PG (ref 25.2–33.5)
MCH RBC QN AUTO: 29.1 PG (ref 25.2–33.5)
MCHC RBC AUTO-ENTMCNC: 30.5 G/DL (ref 28.4–34.8)
MCHC RBC AUTO-ENTMCNC: 31.2 G/DL (ref 28.4–34.8)
MCV RBC AUTO: 92.8 FL (ref 82.6–102.9)
MCV RBC AUTO: 95.3 FL (ref 82.6–102.9)
MODE: ABNORMAL
MONOCYTES # BLD: 4 % (ref 1–7)
MONOCYTES # BLD: 9 % (ref 3–12)
MORPHOLOGY: ABNORMAL
MORPHOLOGY: ABNORMAL
NEGATIVE BASE EXCESS, ART: ABNORMAL (ref 0–2)
O2 DEVICE/FLOW/%: ABNORMAL
PATIENT TEMP: ABNORMAL
PDW BLD-RTO: 15.8 % (ref 11.8–14.4)
PDW BLD-RTO: 15.9 % (ref 11.8–14.4)
PLATELET # BLD: 187 K/UL (ref 138–453)
PLATELET # BLD: 190 K/UL (ref 138–453)
PLATELET ESTIMATE: ABNORMAL
PLATELET ESTIMATE: ABNORMAL
PMV BLD AUTO: 11.5 FL (ref 8.1–13.5)
PMV BLD AUTO: 11.7 FL (ref 8.1–13.5)
POC HCO3: 23.6 MMOL/L (ref 21–28)
POC O2 SATURATION: 99 % (ref 94–98)
POC PCO2 TEMP: ABNORMAL MM HG
POC PCO2: 34.3 MM HG (ref 35–48)
POC PH TEMP: ABNORMAL
POC PH: 7.45 (ref 7.35–7.45)
POC PO2 TEMP: ABNORMAL MM HG
POC PO2: 141.9 MM HG (ref 83–108)
POSITIVE BASE EXCESS, ART: 0 (ref 0–3)
POTASSIUM SERPL-SCNC: 3.9 MMOL/L (ref 3.7–5.3)
RBC # BLD: 3.35 M/UL (ref 3.95–5.11)
RBC # BLD: 3.37 M/UL (ref 3.95–5.11)
RBC # BLD: ABNORMAL 10*6/UL
RBC # BLD: ABNORMAL 10*6/UL
RETIC %: 0.7 % (ref 0.5–1.9)
RETIC HEMOGLOBIN: 33.4 PG (ref 28.2–35.7)
SAMPLE SITE: ABNORMAL
SEG NEUTROPHILS: 65 % (ref 36–65)
SEG NEUTROPHILS: 71 % (ref 36–66)
SEGMENTED NEUTROPHILS ABSOLUTE COUNT: 8.39 K/UL (ref 1.5–8.1)
SEGMENTED NEUTROPHILS ABSOLUTE COUNT: 9.31 K/UL (ref 1.8–7.7)
SODIUM BLD-SCNC: 147 MMOL/L (ref 135–144)
TCO2 (CALC), ART: 25 MMOL/L (ref 22–29)
TOTAL IRON BINDING CAPACITY: 132 UG/DL (ref 250–450)
UNSATURATED IRON BINDING CAPACITY: 82 UG/DL (ref 112–347)
VITAMIN B-12: 1507 PG/ML (ref 211–946)
WBC # BLD: 12.9 K/UL (ref 3.5–11.3)
WBC # BLD: 13.1 K/UL (ref 3.5–11.3)
WBC # BLD: ABNORMAL 10*3/UL
WBC # BLD: ABNORMAL 10*3/UL

## 2017-11-23 PROCEDURE — 85025 COMPLETE CBC W/AUTO DIFF WBC: CPT

## 2017-11-23 PROCEDURE — 85045 AUTOMATED RETICULOCYTE COUNT: CPT

## 2017-11-23 PROCEDURE — 36415 COLL VENOUS BLD VENIPUNCTURE: CPT

## 2017-11-23 PROCEDURE — 2580000003 HC RX 258: Performed by: HOSPITALIST

## 2017-11-23 PROCEDURE — 82607 VITAMIN B-12: CPT

## 2017-11-23 PROCEDURE — 2580000003 HC RX 258: Performed by: INTERNAL MEDICINE

## 2017-11-23 PROCEDURE — 36600 WITHDRAWAL OF ARTERIAL BLOOD: CPT

## 2017-11-23 PROCEDURE — 2000000000 HC ICU R&B

## 2017-11-23 PROCEDURE — 94770 HC ETCO2 MONITOR DAILY: CPT

## 2017-11-23 PROCEDURE — 80048 BASIC METABOLIC PNL TOTAL CA: CPT

## 2017-11-23 PROCEDURE — 83550 IRON BINDING TEST: CPT

## 2017-11-23 PROCEDURE — 94762 N-INVAS EAR/PLS OXIMTRY CONT: CPT

## 2017-11-23 PROCEDURE — 82947 ASSAY GLUCOSE BLOOD QUANT: CPT

## 2017-11-23 PROCEDURE — 6370000000 HC RX 637 (ALT 250 FOR IP): Performed by: STUDENT IN AN ORGANIZED HEALTH CARE EDUCATION/TRAINING PROGRAM

## 2017-11-23 PROCEDURE — 2500000003 HC RX 250 WO HCPCS: Performed by: INTERNAL MEDICINE

## 2017-11-23 PROCEDURE — 94003 VENT MGMT INPAT SUBQ DAY: CPT

## 2017-11-23 PROCEDURE — 83540 ASSAY OF IRON: CPT

## 2017-11-23 PROCEDURE — 6370000000 HC RX 637 (ALT 250 FOR IP): Performed by: INTERNAL MEDICINE

## 2017-11-23 PROCEDURE — 82803 BLOOD GASES ANY COMBINATION: CPT

## 2017-11-23 PROCEDURE — 82728 ASSAY OF FERRITIN: CPT

## 2017-11-23 PROCEDURE — 6360000002 HC RX W HCPCS: Performed by: STUDENT IN AN ORGANIZED HEALTH CARE EDUCATION/TRAINING PROGRAM

## 2017-11-23 PROCEDURE — 99291 CRITICAL CARE FIRST HOUR: CPT | Performed by: INTERNAL MEDICINE

## 2017-11-23 PROCEDURE — 6360000002 HC RX W HCPCS: Performed by: INTERNAL MEDICINE

## 2017-11-23 PROCEDURE — 6360000002 HC RX W HCPCS: Performed by: HOSPITALIST

## 2017-11-23 PROCEDURE — 82746 ASSAY OF FOLIC ACID SERUM: CPT

## 2017-11-23 PROCEDURE — 6370000000 HC RX 637 (ALT 250 FOR IP): Performed by: HOSPITALIST

## 2017-11-23 PROCEDURE — 99222 1ST HOSP IP/OBS MODERATE 55: CPT | Performed by: OBSTETRICS & GYNECOLOGY

## 2017-11-23 RX ORDER — FENTANYL CITRATE 50 UG/ML
100 INJECTION, SOLUTION INTRAMUSCULAR; INTRAVENOUS PRN
Status: DISCONTINUED | OUTPATIENT
Start: 2017-11-23 | End: 2017-12-14 | Stop reason: HOSPADM

## 2017-11-23 RX ORDER — MEDROXYPROGESTERONE ACETATE 150 MG/ML
150 INJECTION, SUSPENSION INTRAMUSCULAR ONCE
Status: COMPLETED | OUTPATIENT
Start: 2017-11-23 | End: 2017-11-23

## 2017-11-23 RX ORDER — INSULIN GLARGINE 100 [IU]/ML
20 INJECTION, SOLUTION SUBCUTANEOUS NIGHTLY
Status: DISCONTINUED | OUTPATIENT
Start: 2017-11-23 | End: 2017-12-02

## 2017-11-23 RX ADMIN — FENTANYL CITRATE 100 MCG: 50 INJECTION INTRAMUSCULAR; INTRAVENOUS at 10:09

## 2017-11-23 RX ADMIN — CARVEDILOL 12.5 MG: 12.5 TABLET, FILM COATED ORAL at 17:02

## 2017-11-23 RX ADMIN — FENTANYL CITRATE 100 MCG: 50 INJECTION INTRAMUSCULAR; INTRAVENOUS at 23:33

## 2017-11-23 RX ADMIN — FENTANYL CITRATE 100 MCG: 50 INJECTION INTRAMUSCULAR; INTRAVENOUS at 07:38

## 2017-11-23 RX ADMIN — FENTANYL CITRATE 100 MCG: 50 INJECTION INTRAMUSCULAR; INTRAVENOUS at 14:31

## 2017-11-23 RX ADMIN — HEPARIN SODIUM 5000 UNITS: 5000 INJECTION, SOLUTION INTRAVENOUS; SUBCUTANEOUS at 14:27

## 2017-11-23 RX ADMIN — FENTANYL CITRATE 100 MCG: 50 INJECTION INTRAMUSCULAR; INTRAVENOUS at 19:47

## 2017-11-23 RX ADMIN — FENTANYL CITRATE 100 MCG: 50 INJECTION INTRAMUSCULAR; INTRAVENOUS at 21:23

## 2017-11-23 RX ADMIN — INSULIN LISPRO 3 UNITS: 100 INJECTION, SOLUTION INTRAVENOUS; SUBCUTANEOUS at 11:01

## 2017-11-23 RX ADMIN — Medication 10 ML: at 20:11

## 2017-11-23 RX ADMIN — INSULIN LISPRO 3 UNITS: 100 INJECTION, SOLUTION INTRAVENOUS; SUBCUTANEOUS at 23:03

## 2017-11-23 RX ADMIN — FAMOTIDINE 20 MG: 20 TABLET, FILM COATED ORAL at 08:50

## 2017-11-23 RX ADMIN — SILVER SULFADIAZINE: 10 CREAM TOPICAL at 08:51

## 2017-11-23 RX ADMIN — DEXTROSE MONOHYDRATE: 50 INJECTION, SOLUTION INTRAVENOUS at 00:33

## 2017-11-23 RX ADMIN — MEDROXYPROGESTERONE ACETATE 150 MG: 150 INJECTION, SUSPENSION INTRAMUSCULAR at 14:23

## 2017-11-23 RX ADMIN — CARVEDILOL 12.5 MG: 12.5 TABLET, FILM COATED ORAL at 08:50

## 2017-11-23 RX ADMIN — INSULIN GLARGINE 20 UNITS: 100 INJECTION, SOLUTION SUBCUTANEOUS at 22:50

## 2017-11-23 RX ADMIN — AMLODIPINE BESYLATE 10 MG: 10 TABLET ORAL at 08:50

## 2017-11-23 RX ADMIN — INSULIN LISPRO 3 UNITS: 100 INJECTION, SOLUTION INTRAVENOUS; SUBCUTANEOUS at 15:53

## 2017-11-23 RX ADMIN — INSULIN LISPRO 6 UNITS: 100 INJECTION, SOLUTION INTRAVENOUS; SUBCUTANEOUS at 05:46

## 2017-11-23 RX ADMIN — HEPARIN SODIUM 5000 UNITS: 5000 INJECTION, SOLUTION INTRAVENOUS; SUBCUTANEOUS at 21:30

## 2017-11-23 RX ADMIN — HEPARIN SODIUM 5000 UNITS: 5000 INJECTION, SOLUTION INTRAVENOUS; SUBCUTANEOUS at 05:46

## 2017-11-23 ASSESSMENT — PULMONARY FUNCTION TESTS
PIF_VALUE: 21
PIF_VALUE: 19
PIF_VALUE: 12
PIF_VALUE: 11
PIF_VALUE: 23
PIF_VALUE: 21
PIF_VALUE: 20

## 2017-11-23 NOTE — PROGRESS NOTES
Critical Care Team - Daily Progress Note      Date and time: 11/23/2017 11:18 AM  Patient's name:  Ladan Bullock  Medical Record Number: [id number]  Patient's account/billing number: [de-identified]  Patient's YOB: 1950  Age: 69 y.o. Date of Admission: 11/18/2017 11:20 AM  Length of stay during current admission: 5      Primary Care Physician: No primary care provider on file. ICU Attending Physician: Dr. Jarred Cordero Status: Full Code    Reason for ICU admission: Altered mental status      SUBJECTIVE:     OVERNIGHT EVENTS:    No acute events overnight. Intubated. Blood pressure well controlled on Norvasc , coreg and clonidine patch. opens eyes, responds to painful stimuli. Does not follow any commands or track eyes. Has purposeful movements of upper and lower extremities. Na 147 better with free water of 300 ml every 6 hrs.  renal function improving. Weaned well, but mentation not good. Tolerating tube feeding well at 45 ml/hr  UOP 75 ml/hr, Fluids discontinued. Hb dropped from 11 to 9, has vaginal bleeding.       AWAKE & FOLLOWING COMMANDS:  [x] No   [] Yes    CURRENT VENTILATION STATUS:     [x] Ventilator  [] BIPAP  [] Nasal Cannula [] Room Air      IF INTUBATED, ET TUBE MARKING AT LOWER LIP:       cms    SECRETIONS Amount:  [] Small [x] Moderate  [] Large  [] None  Color:     [x] White [] Colored  [] Bloody    SEDATION:  RAAS Score:  [] Propofol gtt  [] Versed gtt  [] Ativan gtt   [] No Sedation    PARALYZED:  [] No    [x] Yes    DIARRHEA:                [x] No                [] Yes  (C. Difficile status: [] positive                                                                                                                       [] negative                                                                                                                     [] pending)    VASOPRESSORS:  [x] No    [] Yes    If yes -   [] Levophed       [] Dopamine     [] Vasopressin       [] Dobutamine  [] tube Daily    cloNIDine  1 patch Transdermal Weekly    amLODIPine  10 mg Oral Daily    silver sulfADIAZINE   Topical Daily    sodium chloride flush  10 mL Intravenous 2 times per day    heparin (porcine)  5,000 Units Subcutaneous 3 times per day    insulin lispro  0-18 Units Subcutaneous Q6H     Continuous Infusions:   dextrose       PRN Meds:     fentanNYL 100 mcg PRN   hydrALAZINE 10 mg Q4H PRN   fentanNYL 100 mcg Q2H PRN   labetalol 10 mg Q6H PRN   sodium chloride flush 10 mL PRN   acetaminophen 650 mg Q4H PRN   magnesium hydroxide 30 mL Daily PRN   ondansetron 4 mg Q6H PRN   albuterol sulfate HFA 4 puff Q6H PRN   glucose 15 g PRN   dextrose 12.5 g PRN   glucagon (rDNA) 1 mg PRN   dextrose 100 mL/hr PRN         VENT SETTINGS (Comprehensive) (if applicable):  Vent Information  Ventilator Started: Yes  Vent Type: Servo i  Vent Mode: (S) PS/CPAP  Vt Ordered: 530 mL  Vt Exhaled: 517 mL  Rate Set: 16 bmp  Rate Measured: 19 br/min  Minute Volume: 9.2 Liters  Pressure Support: 6 cmH20  FiO2 : 30 %  Peak Inspiratory Pressure: 20 cmH2O  I:E Ratio: 1:3.33  Sensitivity: 5  PEEP/CPAP: 5  I Time/ I Time %: 0.8 s  High Pressure Alarm: 60 cmH2O  Low Minute Volume Alarm: 3 L/min  High Respiratory Rate: 40 br/min  Mean Airway Pressure: 8.7 cmH20  Plateau Pressure: 15 cmH20  Static Compliance: 41 mL/cmH2O  Dynamic Compliance: 38 mL/cmH2O  PAV Support: 37 %  Low PEEP: 2 cmH2O  Total PEEP: 7 cmH20  Auto PEEP: 0 cmH20  HFOV In Use?: No  Nitric Oxide/Epoprostenol In Use?: No  Additional Respiratory  Assessments  Pulse: 57  Resp: 14  SpO2: 99 %  End Tidal CO2: 33 (%)  Position: Semi-Jung's  HME (Heat moisture exchanger):  Yes  Circuit Condensation: Drained  Oral Care Completed?: Yes  Oral Care: Mouthwash, Lip moisturizer applied, Mouth swabbed, Mouth moisturizer, Mouth suctioned, Suction toothette  Subglottic Suction Done?: Yes  Skin barrier applied: Yes    ABGs:     Laboratory findings:    Complete Blood Count:   Recent Labs 11/21/17 0409 11/22/17   0510  11/23/17   0513   WBC  16.6*  13.4*  12.9*   HGB  10.3*  10.0*  9.7*   HCT  35.0*  33.7*  31.1*   PLT  198  210  190        Last 3 Blood Glucose:   Recent Labs      11/21/17 0409 11/22/17   0510  11/23/17   0513   GLUCOSE  242*  219*  200*        PT/INR:  No results found for: PROTIME, INR  PTT:  No results found for: APTT, PTT    Comprehensive Metabolic Profile:   Recent Labs      11/21/17 0409 11/22/17   0510  11/23/17   0513   NA  150*  153*  147*   K  3.8  4.1  3.9   CL  121*  120*  115*   CO2  15*  20  22   BUN  95*  80*  64*   CREATININE  1.94*  1.57*  1.20*   GLUCOSE  242*  219*  200*   CALCIUM  8.5*  8.6  8.2*      Magnesium: No results found for: MG  Phosphorus: No results found for: PHOS  Ionized Calcium:   Lab Results   Component Value Date    CAION 1.20 11/19/2017        Urinalysis:     Troponin: No results for input(s): TROPONINI in the last 72 hours. Microbiology:    Cultures during this admission:     Blood cultures:                 [] None drawn      [x] Negative             []  Positive (Details:  )  Urine Culture:                   [] None drawn      [] Negative             []  Positive (Details:  )  Sputum Culture:               [] None drawn       [] Negative             []  Positive (Details:  )   Endotracheal aspirate:     [] None drawn       [] Negative             []  Positive (Details:  )     Other pertinent Labs:       Radiology/Imaging:   Ct Abdomen Pelvis Wo Iv Contrast Additional Contrast? None    Result Date: 11/18/2017  EXAMINATION: CT OF THE ABDOMEN AND PELVIS WITHOUT CONTRAST 11/18/2017 12:47 pm TECHNIQUE: CT of the abdomen and pelvis was performed without the administration of intravenous contrast. Multiplanar reformatted images are provided for review. Dose modulation, iterative reconstruction, and/or weight based adjustment of the mA/kV was utilized to reduce the radiation dose to as low as reasonably achievable. COMPARISON: None. HISTORY: ORDERING SYSTEM PROVIDED HISTORY: found down TECHNOLOGIST PROVIDED HISTORY: Additional Contrast?->None FINDINGS: Lower Chest: Mild atelectasis is present at the right lung base. No effusion, localizing consolidation, or worrisome nodules are present. Heart size is normal. Organs: An 8 mm hypodensity with indeterminate but low Hounsfield numbers is noted in the left lobe of the liver. This may represent a hemangioma. An additional 8 mm lesion is present in the posterior right lobe of the liver with similar characteristics. Spleen, pancreas, and kidneys are unremarkable without hydronephrosis or nephrolithiasis. Gallbladder is slightly hyperdense without distinct gallstones. A 1 cm nodular mass is present in the left adrenal with a 1.5 x 1 cm superior hypodense right adrenal mass and an inferior right adrenal mass of 2.6 cm, likely adenomas. GI/Bowel: No free fluid, free air, or bowel obstruction is noted. Small nonstrangulated fat containing umbilical hernia is noted. Pelvis: Appendix is visualized and is unremarkable. No abnormal mass lesions, pelvic or inguinal adenopathy is noted. Indwelling Noel catheter in the bladder is noted. Air in the bladder is noted likely due to catheterization. Peritoneum/Retroperitoneum: Aorta is normal in caliber without evidence of aneurysm. No retroperitoneal mass or adenopathy is noted. No mesenteric adenopathy is seen. Bones/Soft Tissues: Degenerative changes are present in the SI joints and lower lumbar facets. Spondylosis is present without suspicious lytic or blastic lesion. Estimated biologic radiation dose for this procedure:  1233.56 mGy/cm2.     1.  Hypodense lesions in the liver, not clearly cystic. These may represent hemangiomas, but further assessment with performance of ultrasound is advised which can be performed on a nonemergent basis. 2.   Gallbladder is slightly hyperdense without distinct gallstones. Sludge is not excluded.   Right upper quadrant ultrasound may prove helpful. 3.   Bilateral adrenal masses, largest on the right of 2.6 cm, likely adenomas. 4.  No bowel obstruction or evidence of appendicitis. No adenopathy or other evidence of acute abdominopelvic process. RECOMMENDATIONS: Ultrasound of the liver and right upper quadrant to assess liver lesions and gallbladder. Ct Head Wo Contrast    Result Date: 11/18/2017  EXAMINATION: CT OF THE HEAD WITHOUT CONTRAST  11/18/2017 12:47 pm TECHNIQUE: CT of the head was performed without the administration of intravenous contrast. Dose modulation, iterative reconstruction, and/or weight based adjustment of the mA/kV was utilized to reduce the radiation dose to as low as reasonably achievable. COMPARISON: None. HISTORY: ORDERING SYSTEM PROVIDED HISTORY: found down TECHNOLOGIST PROVIDED HISTORY: Has a \"code stroke\" or \"stroke alert\" been called? ->No FINDINGS: BRAIN/VENTRICLES: There is no acute intracranial hemorrhage, mass effect or midline shift. No abnormal extra-axial fluid collection. The gray-white differentiation is maintained without evidence of an acute infarct. Bilateral basal ganglia lacunar infarcts  There are nonspecific areas of hypoattenuation within the periventricular and subcortical white matter, which likely represent chronic microvascular ischemic change. ORBITS: The visualized portion of the orbits demonstrate no acute abnormality. SINUSES: The visualized paranasal sinuses and mastoid air cells demonstrate no acute abnormality. SOFT TISSUES/SKULL: No acute abnormality of the visualized skull or soft tissues. No acute intracranial abnormality with chronic ischemic changes as described. Ct Chest Wo Contrast    Result Date: 11/18/2017  EXAMINATION: CT OF THE CHEST WITHOUT CONTRAST 11/18/2017 12:47 pm TECHNIQUE: CT of the chest was performed without the administration of intravenous contrast. Multiplanar reformatted images are provided for review.  Dose modulation, iterative swelling. ETT in place. Visualized lung apices show no pneumothorax. No acute abnormality of the cervical spine. Degenerative changes with exuberant bridging osteophytes. Xr Chest Portable    Result Date: 11/18/2017  EXAMINATION: SINGLE VIEW OF THE CHEST 11/18/2017 11:40 am COMPARISON: None. HISTORY: ORDERING SYSTEM PROVIDED HISTORY: Intubated TECHNOLOGIST PROVIDED HISTORY: Reason for exam:->Intubated FINDINGS: AP portable view of the chest time stamped at 1158 hours is submitted and interpreted M.RANDY Marcano. Endotracheal tube is in situ terminating 5.7 cm above the brian. Overlying cardiac monitoring electrodes are present. Heart size is normal.  Lungs are clear without vascular congestion, focal consolidation, effusion or pneumothorax. Endotracheal tube terminates 5.7 cm above the brian. Otherwise unremarkable chest appearance. RECOMMENDATION: Suggest advancement of the endotracheal tube by 2 cm. ASSESSMENT:     Patient Active Problem List    Diagnosis Date Noted    Acute metabolic encephalopathy     Acute respiratory failure ([facility] [address]     Morbid obesity ([facility] [address] 11/19/2017    Encephalopathy 11/18/2017    Altered mental status 11/18/2017    Rhabdomyolysis 11/18/2017    Acute kidney injury ([facility] [address] 11/18/2017    Hyperkalemia 11/18/2017    Acute renal failure (HCC)     HTN (hypertension)     Heart murmur     Depression        Additional assessment:    Principal Problem:    Altered mental status  Active Problems:    Encephalopathy    Rhabdomyolysis    Acute kidney injury (HCC)    Hyperkalemia    Acute renal failure (HCC)    Morbid obesity (HCC)    Acute metabolic encephalopathy    Acute respiratory failure (HCC)      PLAN:     WEAN PER PROTOCOL:  [x] No   [] Yes  [] N/A    DISCONTINUE ANY LABS:   [x] No   [] Yes    ICU PROPHYLAXIS:  Stress ulcer:  [] PPI Agent  [x] A6Ymlaf [] Sucralfate  [] Other:  VTE:   [] Enoxaparin  [] Unfract.  Heparin Subcut  [] EPC Cuffs    NUTRITION:  [] NPO [x] Tube
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
## 2017-11-23 NOTE — PLAN OF CARE
Problem: OXYGENATION/RESPIRATORY FUNCTION  Goal: Patient will maintain patent airway  Outcome: Ongoing    Goal: Patient will achieve/maintain normal respiratory rate/effort  Respiratory rate and effort will be within normal limits for the patient   Outcome: Ongoing      Problem: MECHANICAL VENTILATION  Goal: Patient will maintain patent airway  Outcome: Ongoing    Goal: Oral health is maintained or improved  Outcome: Ongoing    Goal: ET tube will be managed safely  Outcome: Ongoing    Goal: Ability to express needs and understand communication  Outcome: Ongoing    Goal: Mobility/activity is maintained at optimum level for patient  Outcome: Ongoing      Problem: ASPIRATION PRECAUTIONS  Goal: Patient's risk of aspiration is minimized  Outcome: Ongoing      Problem: SKIN INTEGRITY  Goal: Skin integrity is maintained or improved  Outcome: Ongoing      Problem: NUTRITION  Goal: Nutritional status is improving  Outcome: Ongoing      Problem: Restraint Use - Nonviolent/Non-Self-Destructive Behavior:  Goal: Absence of restraint indications  Absence of restraint indications   Outcome: Met This Shift    Goal: Absence of restraint-related injury  Absence of restraint-related injury   Outcome: Met This Shift      Problem: Pain:  Goal: Pain level will decrease  Pain level will decrease   Outcome: Ongoing    Goal: Control of acute pain  Control of acute pain   Outcome: Ongoing    Goal: Control of chronic pain  Control of chronic pain   Outcome: Ongoing      Problem: Skin Integrity:  Goal: Will show no infection signs and symptoms  Will show no infection signs and symptoms   Outcome: Ongoing    Goal: Absence of new skin breakdown  Absence of new skin breakdown   Outcome: Ongoing      Problem: Falls - Risk of  Goal: Absence of falls  Outcome: Ongoing      Problem: Risk for Impaired Skin Integrity  Goal: Tissue integrity - skin and mucous membranes  Structural intactness and normal physiological function of skin and  mucous

## 2017-11-23 NOTE — PROGRESS NOTES
11/23/17 0727   Vent Information   Vent Mode PS/CPAP   Vt Exhaled 517 mL   Rate Measured 19 br/min   Minute Volume 9.2 Liters   Pressure Support 6 cmH20   FiO2  30 %   Peak Inspiratory Pressure 20 cmH2O   I:E Ratio 1:3.33   Sensitivity 5   PEEP/CPAP 5   High Pressure Alarm 60 cmH2O   Pt placed on SBT, pt tolerating at this time.
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
## 2017-11-24 LAB
ANION GAP SERPL CALCULATED.3IONS-SCNC: 8 MMOL/L (ref 9–17)
BUN BLDV-MCNC: 51 MG/DL (ref 8–23)
BUN/CREAT BLD: ABNORMAL (ref 9–20)
CALCIUM SERPL-MCNC: 8 MG/DL (ref 8.6–10.4)
CHLORIDE BLD-SCNC: 112 MMOL/L (ref 98–107)
CO2: 23 MMOL/L (ref 20–31)
CREAT SERPL-MCNC: 0.93 MG/DL (ref 0.5–0.9)
CULTURE: NORMAL
GFR AFRICAN AMERICAN: >60 ML/MIN
GFR NON-AFRICAN AMERICAN: 60 ML/MIN
GFR SERPL CREATININE-BSD FRML MDRD: ABNORMAL ML/MIN/{1.73_M2}
GFR SERPL CREATININE-BSD FRML MDRD: ABNORMAL ML/MIN/{1.73_M2}
GLUCOSE BLD-MCNC: 134 MG/DL (ref 65–105)
GLUCOSE BLD-MCNC: 149 MG/DL (ref 65–105)
GLUCOSE BLD-MCNC: 156 MG/DL (ref 65–105)
GLUCOSE BLD-MCNC: 162 MG/DL (ref 65–105)
GLUCOSE BLD-MCNC: 188 MG/DL (ref 70–99)
Lab: NORMAL
Lab: NORMAL
POTASSIUM SERPL-SCNC: 3.9 MMOL/L (ref 3.7–5.3)
SODIUM BLD-SCNC: 143 MMOL/L (ref 135–144)
SPECIMEN DESCRIPTION: NORMAL
SPECIMEN DESCRIPTION: NORMAL
STATUS: NORMAL
STATUS: NORMAL
SURGICAL PATHOLOGY REPORT: NORMAL

## 2017-11-24 PROCEDURE — 6370000000 HC RX 637 (ALT 250 FOR IP): Performed by: STUDENT IN AN ORGANIZED HEALTH CARE EDUCATION/TRAINING PROGRAM

## 2017-11-24 PROCEDURE — 94762 N-INVAS EAR/PLS OXIMTRY CONT: CPT

## 2017-11-24 PROCEDURE — 99211 OFF/OP EST MAY X REQ PHY/QHP: CPT

## 2017-11-24 PROCEDURE — 99232 SBSQ HOSP IP/OBS MODERATE 35: CPT | Performed by: INTERNAL MEDICINE

## 2017-11-24 PROCEDURE — 82947 ASSAY GLUCOSE BLOOD QUANT: CPT

## 2017-11-24 PROCEDURE — 6360000002 HC RX W HCPCS: Performed by: INTERNAL MEDICINE

## 2017-11-24 PROCEDURE — 99291 CRITICAL CARE FIRST HOUR: CPT | Performed by: INTERNAL MEDICINE

## 2017-11-24 PROCEDURE — 2500000003 HC RX 250 WO HCPCS: Performed by: INTERNAL MEDICINE

## 2017-11-24 PROCEDURE — 6360000002 HC RX W HCPCS: Performed by: HOSPITALIST

## 2017-11-24 PROCEDURE — 2000000000 HC ICU R&B

## 2017-11-24 PROCEDURE — 6370000000 HC RX 637 (ALT 250 FOR IP): Performed by: INTERNAL MEDICINE

## 2017-11-24 PROCEDURE — 2580000003 HC RX 258: Performed by: HOSPITALIST

## 2017-11-24 PROCEDURE — 36415 COLL VENOUS BLD VENIPUNCTURE: CPT

## 2017-11-24 PROCEDURE — 94003 VENT MGMT INPAT SUBQ DAY: CPT

## 2017-11-24 PROCEDURE — 94770 HC ETCO2 MONITOR DAILY: CPT

## 2017-11-24 PROCEDURE — 80048 BASIC METABOLIC PNL TOTAL CA: CPT

## 2017-11-24 PROCEDURE — 6370000000 HC RX 637 (ALT 250 FOR IP): Performed by: HOSPITALIST

## 2017-11-24 RX ORDER — CARVEDILOL 25 MG/1
25 TABLET ORAL 2 TIMES DAILY WITH MEALS
Status: DISCONTINUED | OUTPATIENT
Start: 2017-11-25 | End: 2017-11-24

## 2017-11-24 RX ORDER — OXYCODONE HYDROCHLORIDE AND ACETAMINOPHEN 5; 325 MG/1; MG/1
1 TABLET ORAL EVERY 6 HOURS PRN
Status: DISCONTINUED | OUTPATIENT
Start: 2017-11-24 | End: 2017-12-14 | Stop reason: HOSPADM

## 2017-11-24 RX ORDER — CARVEDILOL 25 MG/1
25 TABLET ORAL 2 TIMES DAILY WITH MEALS
Status: DISCONTINUED | OUTPATIENT
Start: 2017-11-24 | End: 2017-12-14 | Stop reason: HOSPADM

## 2017-11-24 RX ADMIN — IRON SUCROSE 200 MG: 20 INJECTION, SOLUTION INTRAVENOUS at 11:21

## 2017-11-24 RX ADMIN — Medication 10 ML: at 20:17

## 2017-11-24 RX ADMIN — FENTANYL CITRATE 100 MCG: 50 INJECTION INTRAMUSCULAR; INTRAVENOUS at 17:50

## 2017-11-24 RX ADMIN — HYDRALAZINE HYDROCHLORIDE 10 MG: 20 INJECTION INTRAMUSCULAR; INTRAVENOUS at 17:16

## 2017-11-24 RX ADMIN — HEPARIN SODIUM 5000 UNITS: 5000 INJECTION, SOLUTION INTRAVENOUS; SUBCUTANEOUS at 15:34

## 2017-11-24 RX ADMIN — CARVEDILOL 12.5 MG: 12.5 TABLET, FILM COATED ORAL at 09:04

## 2017-11-24 RX ADMIN — AMLODIPINE BESYLATE 10 MG: 10 TABLET ORAL at 09:04

## 2017-11-24 RX ADMIN — CARVEDILOL 25 MG: 25 TABLET, FILM COATED ORAL at 18:09

## 2017-11-24 RX ADMIN — FENTANYL CITRATE 100 MCG: 50 INJECTION INTRAMUSCULAR; INTRAVENOUS at 12:33

## 2017-11-24 RX ADMIN — LABETALOL HYDROCHLORIDE 10 MG: 5 INJECTION, SOLUTION INTRAVENOUS at 16:26

## 2017-11-24 RX ADMIN — HEPARIN SODIUM 5000 UNITS: 5000 INJECTION, SOLUTION INTRAVENOUS; SUBCUTANEOUS at 09:04

## 2017-11-24 RX ADMIN — HEPARIN SODIUM 5000 UNITS: 5000 INJECTION, SOLUTION INTRAVENOUS; SUBCUTANEOUS at 22:05

## 2017-11-24 RX ADMIN — FENTANYL CITRATE 100 MCG: 50 INJECTION INTRAMUSCULAR; INTRAVENOUS at 01:45

## 2017-11-24 RX ADMIN — CARVEDILOL 12.5 MG: 12.5 TABLET, FILM COATED ORAL at 16:00

## 2017-11-24 RX ADMIN — INSULIN LISPRO 3 UNITS: 100 INJECTION, SOLUTION INTRAVENOUS; SUBCUTANEOUS at 11:34

## 2017-11-24 RX ADMIN — FAMOTIDINE 20 MG: 20 TABLET, FILM COATED ORAL at 09:04

## 2017-11-24 RX ADMIN — Medication 10 ML: at 09:13

## 2017-11-24 RX ADMIN — FENTANYL CITRATE 100 MCG: 50 INJECTION INTRAMUSCULAR; INTRAVENOUS at 22:23

## 2017-11-24 RX ADMIN — INSULIN LISPRO 3 UNITS: 100 INJECTION, SOLUTION INTRAVENOUS; SUBCUTANEOUS at 09:07

## 2017-11-24 RX ADMIN — OXYCODONE HYDROCHLORIDE AND ACETAMINOPHEN 1 TABLET: 5; 325 TABLET ORAL at 09:04

## 2017-11-24 RX ADMIN — INSULIN GLARGINE 20 UNITS: 100 INJECTION, SOLUTION SUBCUTANEOUS at 20:16

## 2017-11-24 RX ADMIN — FENTANYL CITRATE 100 MCG: 50 INJECTION INTRAMUSCULAR; INTRAVENOUS at 04:07

## 2017-11-24 RX ADMIN — SILVER SULFADIAZINE: 10 CREAM TOPICAL at 12:27

## 2017-11-24 ASSESSMENT — PULMONARY FUNCTION TESTS
PIF_VALUE: 25
PIF_VALUE: 14
PIF_VALUE: 22
PIF_VALUE: 13
PIF_VALUE: 25
PIF_VALUE: 29

## 2017-11-24 NOTE — PROGRESS NOTES
Physical Therapy  DATE: 2017    NAME: Keith Olmedo  MRN: [id number]   : 1950    Patient not seen this date for Physical Therapy due to:  [] Blood transfusion in progress  [] Hemodialysis  []  Patient Declined  [x] Spine Precautions- CTLS per RN, not in orders   [x] Strict Bedrest  [] Surgery/ Procedure  [] Testing      [] Other        [] PT being discontinued at this time. Patient independent. No further needs. [] PT being discontinued at this time as the patient has been transferred to palliative care. No further needs.     Nena Dorsey, PT
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
## 2017-11-24 NOTE — PROGRESS NOTES
9.8*   HCT  33.7*  31.1*  32.1*   PLT  210  190  187        Last 3 Blood Glucose:   Recent Labs      11/22/17   0510  11/23/17 0513   GLUCOSE  219*  200*        PT/INR:  No results found for: PROTIME, INR  PTT:  No results found for: APTT, PTT    Comprehensive Metabolic Profile:   Recent Labs      11/22/17   0510  11/23/17   0513   NA  153*  147*   K  4.1  3.9   CL  120*  115*   CO2  20  22   BUN  80*  64*   CREATININE  1.57*  1.20*   GLUCOSE  219*  200*   CALCIUM  8.6  8.2*      Magnesium: No results found for: MG  Phosphorus: No results found for: PHOS  Ionized Calcium:   Lab Results   Component Value Date    CAION 1.20 11/19/2017        Urinalysis:     Troponin: No results for input(s): TROPONINI in the last 72 hours. Microbiology:    Cultures during this admission:     Blood cultures:                 [] None drawn      [x] Negative             []  Positive (Details:  )  Urine Culture:                   [] None drawn      [] Negative             []  Positive (Details:  )  Sputum Culture:               [] None drawn       [] Negative             []  Positive (Details:  )   Endotracheal aspirate:     [] None drawn       [] Negative             []  Positive (Details:  )     Other pertinent Labs:       Radiology/Imaging:   Ct Abdomen Pelvis Wo Iv Contrast Additional Contrast? None    Result Date: 11/18/2017  EXAMINATION: CT OF THE ABDOMEN AND PELVIS WITHOUT CONTRAST 11/18/2017 12:47 pm TECHNIQUE: CT of the abdomen and pelvis was performed without the administration of intravenous contrast. Multiplanar reformatted images are provided for review. Dose modulation, iterative reconstruction, and/or weight based adjustment of the mA/kV was utilized to reduce the radiation dose to as low as reasonably achievable. COMPARISON: None. HISTORY: ORDERING SYSTEM PROVIDED HISTORY: found down TECHNOLOGIST PROVIDED HISTORY: Additional Contrast?->None FINDINGS: Lower Chest: Mild atelectasis is present at the right lung base.   No other evidence of acute abdominopelvic process. RECOMMENDATIONS: Ultrasound of the liver and right upper quadrant to assess liver lesions and gallbladder. Ct Head Wo Contrast    Result Date: 11/18/2017  EXAMINATION: CT OF THE HEAD WITHOUT CONTRAST  11/18/2017 12:47 pm TECHNIQUE: CT of the head was performed without the administration of intravenous contrast. Dose modulation, iterative reconstruction, and/or weight based adjustment of the mA/kV was utilized to reduce the radiation dose to as low as reasonably achievable. COMPARISON: None. HISTORY: ORDERING SYSTEM PROVIDED HISTORY: found down TECHNOLOGIST PROVIDED HISTORY: Has a \"code stroke\" or \"stroke alert\" been called? ->No FINDINGS: BRAIN/VENTRICLES: There is no acute intracranial hemorrhage, mass effect or midline shift. No abnormal extra-axial fluid collection. The gray-white differentiation is maintained without evidence of an acute infarct. Bilateral basal ganglia lacunar infarcts  There are nonspecific areas of hypoattenuation within the periventricular and subcortical white matter, which likely represent chronic microvascular ischemic change. ORBITS: The visualized portion of the orbits demonstrate no acute abnormality. SINUSES: The visualized paranasal sinuses and mastoid air cells demonstrate no acute abnormality. SOFT TISSUES/SKULL: No acute abnormality of the visualized skull or soft tissues. No acute intracranial abnormality with chronic ischemic changes as described. Ct Chest Wo Contrast    Result Date: 11/18/2017  EXAMINATION: CT OF THE CHEST WITHOUT CONTRAST 11/18/2017 12:47 pm TECHNIQUE: CT of the chest was performed without the administration of intravenous contrast. Multiplanar reformatted images are provided for review. Dose modulation, iterative reconstruction, and/or weight based adjustment of the mA/kV was utilized to reduce the radiation dose to as low as reasonably achievable.  COMPARISON: No priors HISTORY: ORDERING SYSTEM Portable    Result Date: 11/18/2017  EXAMINATION: SINGLE VIEW OF THE CHEST 11/18/2017 11:40 am COMPARISON: None. HISTORY: ORDERING SYSTEM PROVIDED HISTORY: Intubated TECHNOLOGIST PROVIDED HISTORY: Reason for exam:->Intubated FINDINGS: AP portable view of the chest time stamped at 1158 hours is submitted and interpreted EVARISTO Marcano. Endotracheal tube is in situ terminating 5.7 cm above the brian. Overlying cardiac monitoring electrodes are present. Heart size is normal.  Lungs are clear without vascular congestion, focal consolidation, effusion or pneumothorax. Endotracheal tube terminates 5.7 cm above the brian. Otherwise unremarkable chest appearance. RECOMMENDATION: Suggest advancement of the endotracheal tube by 2 cm. ASSESSMENT:     Patient Active Problem List    Diagnosis Date Noted    Abrasions of multiple sites     Neutrophilic leukocytosis     Acute metabolic encephalopathy     Acute respiratory failure with hypoxia (HCC)     Morbid obesity ([facility] [address] [facility] 11/19/2017    Encephalopathy 11/18/2017    Altered mental status 11/18/2017    Rhabdomyolysis 11/18/2017    Acute kidney injury ([facility] [address] 11/18/2017    Hyperkalemia 11/18/2017    Acute renal failure (HCC)     HTN (hypertension)     Heart murmur     Depression        Additional assessment:    Principal Problem:    Altered mental status  Active Problems:    Encephalopathy    Rhabdomyolysis    Acute kidney injury ([facility] [address]    Hyperkalemia    Acute renal failure (HCC)    Morbid obesity (HCC)    Acute metabolic encephalopathy    Acute respiratory failure with hypoxia (HCC)    Abrasions of multiple sites    Neutrophilic leukocytosis      PLAN:     WEAN PER PROTOCOL:  [x] No   [] Yes  [] N/A    DISCONTINUE ANY LABS:   [x] No   [] Yes    ICU PROPHYLAXIS:  Stress ulcer:  [] PPI Agent  [x] [id number] [] Sucralfate  [] Other:  VTE:   [] Enoxaparin  [] Unfract.  Heparin Subcut  [] EPC Cuffs    NUTRITION:  [] NPO [x] Tube Feeding (Specify: diabetic and renal ) [] TPN  [] PO (Diet: DIET TUBE FEED CONTINUOUS/CYCLIC NPO; Renal Formula; 10; 45; 24)    HOME MEDICATIONS RECONCILED: [x] No  [] Yes    INSULIN DRIP:   [x] No   [] Yes    CONSULTATION NEEDED:  [] No   [x] Yes    FAMILY UPDATED:    [] No   [x] Yes    TRANSFER OUT OF ICU:   [x] No   [] Yes    ADDITIONAL PLAN:    1. Metabolic encephalopathy with SUSAN like sec to uremia, getting better, EEG shows Moderate cerebral dysfunction. Monitor mentation  2. Continue vent management. SBT daily. 3. Monitor Hb daily, hb at 9.8, obg Gyn medical, no acute intervention, depo provera for now, OP follow up. On IV Venofer. 4. Hypernatremia resolved, na 143 on free water boluses 300 ml every 6 hrs. 5. Nephrology on board : Renal function improving, patient has good urine output. Renal and diabetic tube feeds. 6. ID consulted for wounds, off abx   7. Increased fentanyl to 100 mcg every 2 hrs prn.  8. Trauma consulted to clear CTLS spine. 9. Adults protective services reported. 10. Coreg dose increased and Norvasc and Clonidine patch, IV hydralazine prn for blood pressure control. 11. Lantus 20 U, titrate accordingly. 12. Pepcid for GI prophylaxis. 13. Foleys in  14. DVT prophylaxis Heparin 5000 U TID.       Priya Duran M.D.              Department of Internal Medicine/ Critical care  [facility])             11/24/2017, 8:01 AM

## 2017-11-24 NOTE — PLAN OF CARE
Problem: Nutrition  Goal: Optimal nutrition therapy  Outcome: Ongoing  Nutrition Problem: Inadequate oral intake  Intervention: Food and/or Nutrient Delivery: Continue Tube Feeding as tolerated. Suggest switch to less concentrated formula, Glucerna goal 65 ml/hr.   Nutritional Goals: meet % of estimated nutrition needs

## 2017-11-25 LAB
ABSOLUTE EOS #: 0.23 K/UL (ref 0–0.44)
ABSOLUTE IMMATURE GRANULOCYTE: 0.57 K/UL (ref 0–0.3)
ABSOLUTE LYMPH #: 1.81 K/UL (ref 1.1–3.7)
ABSOLUTE MONO #: 0.68 K/UL (ref 0.1–1.2)
ALBUMIN SERPL-MCNC: 1.7 G/DL (ref 3.5–5.2)
ALBUMIN/GLOBULIN RATIO: 0.4 (ref 1–2.5)
ALLEN TEST: POSITIVE
ALP BLD-CCNC: 95 U/L (ref 35–104)
ALT SERPL-CCNC: 78 U/L (ref 5–33)
ANION GAP SERPL CALCULATED.3IONS-SCNC: 11 MMOL/L (ref 9–17)
ANION GAP SERPL CALCULATED.3IONS-SCNC: 9 MMOL/L (ref 9–17)
AST SERPL-CCNC: 71 U/L
BASOPHILS # BLD: 0 % (ref 0–2)
BASOPHILS ABSOLUTE: 0 K/UL (ref 0–0.2)
BILIRUB SERPL-MCNC: 0.2 MG/DL (ref 0.3–1.2)
BUN BLDV-MCNC: 38 MG/DL (ref 8–23)
BUN BLDV-MCNC: 40 MG/DL (ref 8–23)
BUN/CREAT BLD: ABNORMAL (ref 9–20)
BUN/CREAT BLD: ABNORMAL (ref 9–20)
CALCIUM SERPL-MCNC: 7.9 MG/DL (ref 8.6–10.4)
CALCIUM SERPL-MCNC: 8.1 MG/DL (ref 8.6–10.4)
CHLORIDE BLD-SCNC: 110 MMOL/L (ref 98–107)
CHLORIDE BLD-SCNC: 110 MMOL/L (ref 98–107)
CO2: 22 MMOL/L (ref 20–31)
CO2: 24 MMOL/L (ref 20–31)
CREAT SERPL-MCNC: 0.77 MG/DL (ref 0.5–0.9)
CREAT SERPL-MCNC: 0.8 MG/DL (ref 0.5–0.9)
DIFFERENTIAL TYPE: ABNORMAL
EKG ATRIAL RATE: 66 BPM
EKG P AXIS: 77 DEGREES
EKG P-R INTERVAL: 144 MS
EKG Q-T INTERVAL: 414 MS
EKG QRS DURATION: 92 MS
EKG QTC CALCULATION (BAZETT): 434 MS
EKG R AXIS: 62 DEGREES
EKG T AXIS: 69 DEGREES
EKG VENTRICULAR RATE: 66 BPM
EOSINOPHILS RELATIVE PERCENT: 2 % (ref 1–4)
FIO2: 30
GFR AFRICAN AMERICAN: >60 ML/MIN
GFR AFRICAN AMERICAN: >60 ML/MIN
GFR NON-AFRICAN AMERICAN: >60 ML/MIN
GFR NON-AFRICAN AMERICAN: >60 ML/MIN
GFR SERPL CREATININE-BSD FRML MDRD: ABNORMAL ML/MIN/{1.73_M2}
GLUCOSE BLD-MCNC: 125 MG/DL (ref 74–100)
GLUCOSE BLD-MCNC: 127 MG/DL (ref 70–99)
GLUCOSE BLD-MCNC: 130 MG/DL (ref 65–105)
GLUCOSE BLD-MCNC: 143 MG/DL (ref 65–105)
GLUCOSE BLD-MCNC: 152 MG/DL (ref 65–105)
GLUCOSE BLD-MCNC: 152 MG/DL (ref 65–105)
GLUCOSE BLD-MCNC: 162 MG/DL (ref 70–99)
HCT VFR BLD CALC: 31.5 % (ref 36.3–47.1)
HEMOGLOBIN: 9.2 G/DL (ref 11.9–15.1)
IMMATURE GRANULOCYTES: 5 %
LYMPHOCYTES # BLD: 16 % (ref 24–43)
MCH RBC QN AUTO: 28 PG (ref 25.2–33.5)
MCHC RBC AUTO-ENTMCNC: 29.2 G/DL (ref 28.4–34.8)
MCV RBC AUTO: 96 FL (ref 82.6–102.9)
MODE: ABNORMAL
MONOCYTES # BLD: 6 % (ref 3–12)
MORPHOLOGY: ABNORMAL
NEGATIVE BASE EXCESS, ART: ABNORMAL (ref 0–2)
O2 DEVICE/FLOW/%: ABNORMAL
PATHOLOGIST REVIEW: NORMAL
PATIENT TEMP: ABNORMAL
PDW BLD-RTO: 15.4 % (ref 11.8–14.4)
PLATELET # BLD: 208 K/UL (ref 138–453)
PLATELET ESTIMATE: ABNORMAL
PMV BLD AUTO: 11.1 FL (ref 8.1–13.5)
POC HCO3: 27.8 MMOL/L (ref 21–28)
POC O2 SATURATION: 99 % (ref 94–98)
POC PCO2 TEMP: ABNORMAL MM HG
POC PCO2: 42 MM HG (ref 35–48)
POC PH TEMP: ABNORMAL
POC PH: 7.43 (ref 7.35–7.45)
POC PO2 TEMP: ABNORMAL MM HG
POC PO2: 125 MM HG (ref 83–108)
POSITIVE BASE EXCESS, ART: 3 (ref 0–3)
POTASSIUM SERPL-SCNC: 3.9 MMOL/L (ref 3.7–5.3)
POTASSIUM SERPL-SCNC: 4 MMOL/L (ref 3.7–5.3)
RBC # BLD: 3.28 M/UL (ref 3.95–5.11)
RBC # BLD: ABNORMAL 10*6/UL
SAMPLE SITE: ABNORMAL
SEG NEUTROPHILS: 71 % (ref 36–65)
SEGMENTED NEUTROPHILS ABSOLUTE COUNT: 8.01 K/UL (ref 1.5–8.1)
SODIUM BLD-SCNC: 143 MMOL/L (ref 135–144)
SODIUM BLD-SCNC: 143 MMOL/L (ref 135–144)
TCO2 (CALC), ART: 29 MMOL/L (ref 22–29)
TOTAL PROTEIN: 5.7 G/DL (ref 6.4–8.3)
WBC # BLD: 11.3 K/UL (ref 3.5–11.3)
WBC # BLD: ABNORMAL 10*3/UL

## 2017-11-25 PROCEDURE — 6370000000 HC RX 637 (ALT 250 FOR IP): Performed by: HOSPITALIST

## 2017-11-25 PROCEDURE — 85025 COMPLETE CBC W/AUTO DIFF WBC: CPT

## 2017-11-25 PROCEDURE — 36415 COLL VENOUS BLD VENIPUNCTURE: CPT

## 2017-11-25 PROCEDURE — 94770 HC ETCO2 MONITOR DAILY: CPT

## 2017-11-25 PROCEDURE — 82803 BLOOD GASES ANY COMBINATION: CPT

## 2017-11-25 PROCEDURE — 80048 BASIC METABOLIC PNL TOTAL CA: CPT

## 2017-11-25 PROCEDURE — 99232 SBSQ HOSP IP/OBS MODERATE 35: CPT | Performed by: INTERNAL MEDICINE

## 2017-11-25 PROCEDURE — 6360000002 HC RX W HCPCS: Performed by: HOSPITALIST

## 2017-11-25 PROCEDURE — 36600 WITHDRAWAL OF ARTERIAL BLOOD: CPT

## 2017-11-25 PROCEDURE — 6370000000 HC RX 637 (ALT 250 FOR IP): Performed by: INTERNAL MEDICINE

## 2017-11-25 PROCEDURE — 2000000000 HC ICU R&B

## 2017-11-25 PROCEDURE — 6370000000 HC RX 637 (ALT 250 FOR IP): Performed by: STUDENT IN AN ORGANIZED HEALTH CARE EDUCATION/TRAINING PROGRAM

## 2017-11-25 PROCEDURE — 94762 N-INVAS EAR/PLS OXIMTRY CONT: CPT

## 2017-11-25 PROCEDURE — 2500000003 HC RX 250 WO HCPCS: Performed by: INTERNAL MEDICINE

## 2017-11-25 PROCEDURE — 93005 ELECTROCARDIOGRAM TRACING: CPT

## 2017-11-25 PROCEDURE — 6360000002 HC RX W HCPCS: Performed by: INTERNAL MEDICINE

## 2017-11-25 PROCEDURE — 99291 CRITICAL CARE FIRST HOUR: CPT | Performed by: INTERNAL MEDICINE

## 2017-11-25 PROCEDURE — 80053 COMPREHEN METABOLIC PANEL: CPT

## 2017-11-25 PROCEDURE — 82947 ASSAY GLUCOSE BLOOD QUANT: CPT

## 2017-11-25 PROCEDURE — 2580000003 HC RX 258: Performed by: HOSPITALIST

## 2017-11-25 PROCEDURE — 94003 VENT MGMT INPAT SUBQ DAY: CPT

## 2017-11-25 RX ADMIN — Medication 10 ML: at 10:01

## 2017-11-25 RX ADMIN — CARVEDILOL 25 MG: 25 TABLET, FILM COATED ORAL at 17:10

## 2017-11-25 RX ADMIN — SILVER SULFADIAZINE: 10 CREAM TOPICAL at 10:00

## 2017-11-25 RX ADMIN — INSULIN GLARGINE 20 UNITS: 100 INJECTION, SOLUTION SUBCUTANEOUS at 21:10

## 2017-11-25 RX ADMIN — AMLODIPINE BESYLATE 10 MG: 10 TABLET ORAL at 09:01

## 2017-11-25 RX ADMIN — INSULIN LISPRO 3 UNITS: 100 INJECTION, SOLUTION INTRAVENOUS; SUBCUTANEOUS at 00:02

## 2017-11-25 RX ADMIN — HEPARIN SODIUM 5000 UNITS: 5000 INJECTION, SOLUTION INTRAVENOUS; SUBCUTANEOUS at 14:18

## 2017-11-25 RX ADMIN — IRON SUCROSE 200 MG: 20 INJECTION, SOLUTION INTRAVENOUS at 10:02

## 2017-11-25 RX ADMIN — FAMOTIDINE 20 MG: 20 TABLET, FILM COATED ORAL at 09:01

## 2017-11-25 RX ADMIN — Medication 10 ML: at 21:09

## 2017-11-25 RX ADMIN — FENTANYL CITRATE 100 MCG: 50 INJECTION INTRAMUSCULAR; INTRAVENOUS at 09:57

## 2017-11-25 RX ADMIN — INSULIN LISPRO 3 UNITS: 100 INJECTION, SOLUTION INTRAVENOUS; SUBCUTANEOUS at 17:07

## 2017-11-25 RX ADMIN — HEPARIN SODIUM 5000 UNITS: 5000 INJECTION, SOLUTION INTRAVENOUS; SUBCUTANEOUS at 21:30

## 2017-11-25 RX ADMIN — INSULIN LISPRO 3 UNITS: 100 INJECTION, SOLUTION INTRAVENOUS; SUBCUTANEOUS at 11:30

## 2017-11-25 RX ADMIN — HEPARIN SODIUM 5000 UNITS: 5000 INJECTION, SOLUTION INTRAVENOUS; SUBCUTANEOUS at 06:00

## 2017-11-25 RX ADMIN — CARVEDILOL 25 MG: 25 TABLET, FILM COATED ORAL at 09:01

## 2017-11-25 ASSESSMENT — PULMONARY FUNCTION TESTS
PIF_VALUE: 17
PIF_VALUE: 14
PIF_VALUE: 19
PIF_VALUE: 20
PIF_VALUE: 15
PIF_VALUE: 13
PIF_VALUE: 14

## 2017-11-25 NOTE — PROGRESS NOTES
Attending Physician Statement  I have discussed the care of Claudell Buys, including pertinent history and exam findings with the resident/fellow. I have reviewed the key elements of all parts of the encounter with the resident/fellow. I have seen and examined the patient with the resident/fellow. I agree with the assessment and plan and status of the problem list as documented.       .  Electronically signed by Dee Caldwell MD on 11/25/2017 at 3:58 PM
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
## 2017-11-25 NOTE — PLAN OF CARE
Problem: RESPIRATORY  Intervention: Respiratory assessment  BRONCHOSPASM/BRONCHOCONSTRICTION     [x]         IMPROVE AERATION/BREATH SOUNDS  [x]   ADMINISTER BRONCHODILATOR THERAPY AS APPROPRIATE  [x]   ASSESS BREATH SOUNDS  [x]   IMPLEMENT AEROSOL/MDI PROTOCOL  [x]   PATIENT EDUCATION AS NEEDED    Ventilator Bronchodilator assessment    Breath sounds: clear diminished in bases  Inspiratory Pressure: 18  Plateau Pressure: 14    Patient assessed at level 1          [x]    Bronchodilator Assessment    BRONCHODILATOR ASSESSMENT SCORE  Score 0 (Home) 1 2 3 4   Breath Sounds   []  Chronic Ventilator: Patient at baseline [x]  Mild Wheezes/ Clear []  Intermittent wheezes with good air entry []  Bilateral/unilateral wheezing with diminished air entry []  Insp/Exp wheeze and/or poor aeration   Ventilator Pressures   []  Chronic Ventilator [x]  Insp. Pressure less than 25 cm H20 [x]  Insp. Pressure less than 25 cm H20 []  Insp. Pressure exceeds 25 cm H20 []  Insp.  Pressure exceeds 30 cm H20   Plateau Pressure []  NA   [x]  Plateau Pressure less than 4  []  Plateau Pressure less than or equal to 5 []  Plateau Pressure greater than or equal to 6 []  Plateau Pressure greater than or equal to [address] Quant  8:49 AM
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
## 2017-11-25 NOTE — PROGRESS NOTES
[facility]  Occupational Therapy Not Seen Note    Patient not available for Occupational Therapy due to:    [] Testing:    [] Hemodialysis    [] Blood Transfusion in Progress    []Refusal by Patient:    [] Surgery/Procedure:    [] Strict Bedrest    [] Sedation    [x] Spine Precautions: CTLS    [] Pt being transferred to palliative care at this time. Spoke with pt/family and OT services to be defered. [] Pt independent with functional mobility and functional tasks.  Pt with no OT acute care needs at this time, will defer OT eval.    [] Other    Next Scheduled Treatment: 11/26    Signature: Pedrito Vela OTR/KEVIN

## 2017-11-25 NOTE — PLAN OF CARE
Problem: OXYGENATION/RESPIRATORY FUNCTION  Goal: Patient will maintain patent airway  Outcome: Ongoing    Goal: Patient will achieve/maintain normal respiratory rate/effort  Respiratory rate and effort will be within normal limits for the patient   Outcome: Ongoing      Problem: MECHANICAL VENTILATION  Goal: Patient will maintain patent airway  Outcome: Ongoing    Goal: Oral health is maintained or improved  Outcome: Ongoing    Goal: ET tube will be managed safely  Outcome: Ongoing    Goal: Mobility/activity is maintained at optimum level for patient  Outcome: Ongoing      Problem: ASPIRATION PRECAUTIONS  Goal: Patient's risk of aspiration is minimized  Outcome: Ongoing      Problem: SKIN INTEGRITY  Goal: Skin integrity is maintained or improved  Outcome: Ongoing

## 2017-11-26 LAB
ABSOLUTE EOS #: 0.18 K/UL (ref 0–0.44)
ABSOLUTE IMMATURE GRANULOCYTE: 0.5 K/UL (ref 0–0.3)
ABSOLUTE LYMPH #: 1.89 K/UL (ref 1.1–3.7)
ABSOLUTE MONO #: 0.64 K/UL (ref 0.1–1.2)
ALBUMIN SERPL-MCNC: 2 G/DL (ref 3.5–5.2)
ALBUMIN/GLOBULIN RATIO: 0.5 (ref 1–2.5)
ALP BLD-CCNC: 107 U/L (ref 35–104)
ALT SERPL-CCNC: 80 U/L (ref 5–33)
AMMONIA: 42 UMOL/L (ref 11–51)
ANION GAP SERPL CALCULATED.3IONS-SCNC: 10 MMOL/L (ref 9–17)
AST SERPL-CCNC: 55 U/L
BASOPHILS # BLD: 0 % (ref 0–2)
BASOPHILS ABSOLUTE: 0.04 K/UL (ref 0–0.2)
BILIRUB SERPL-MCNC: 0.2 MG/DL (ref 0.3–1.2)
BUN BLDV-MCNC: 36 MG/DL (ref 8–23)
BUN/CREAT BLD: ABNORMAL (ref 9–20)
CALCIUM SERPL-MCNC: 8 MG/DL (ref 8.6–10.4)
CHLORIDE BLD-SCNC: 108 MMOL/L (ref 98–107)
CO2: 24 MMOL/L (ref 20–31)
CREAT SERPL-MCNC: 0.79 MG/DL (ref 0.5–0.9)
DIFFERENTIAL TYPE: ABNORMAL
EOSINOPHILS RELATIVE PERCENT: 2 % (ref 1–4)
GFR AFRICAN AMERICAN: >60 ML/MIN
GFR NON-AFRICAN AMERICAN: >60 ML/MIN
GFR SERPL CREATININE-BSD FRML MDRD: ABNORMAL ML/MIN/{1.73_M2}
GFR SERPL CREATININE-BSD FRML MDRD: ABNORMAL ML/MIN/{1.73_M2}
GLUCOSE BLD-MCNC: 121 MG/DL (ref 65–105)
GLUCOSE BLD-MCNC: 132 MG/DL (ref 65–105)
GLUCOSE BLD-MCNC: 133 MG/DL (ref 70–99)
GLUCOSE BLD-MCNC: 140 MG/DL (ref 65–105)
HCT VFR BLD CALC: 29.8 % (ref 36.3–47.1)
HEMOGLOBIN: 8.9 G/DL (ref 11.9–15.1)
IMMATURE GRANULOCYTES: 4 %
LYMPHOCYTES # BLD: 16 % (ref 24–43)
MCH RBC QN AUTO: 29.4 PG (ref 25.2–33.5)
MCHC RBC AUTO-ENTMCNC: 29.9 G/DL (ref 28.4–34.8)
MCV RBC AUTO: 98.3 FL (ref 82.6–102.9)
MONOCYTES # BLD: 6 % (ref 3–12)
PDW BLD-RTO: 15.4 % (ref 11.8–14.4)
PLATELET # BLD: ABNORMAL K/UL (ref 138–453)
PLATELET ESTIMATE: ABNORMAL
PLATELET, FLUORESCENCE: 195 K/UL (ref 138–453)
PLATELET, IMMATURE FRACTION: 3.2 % (ref 1.1–10.3)
PMV BLD AUTO: ABNORMAL FL (ref 8.1–13.5)
POTASSIUM SERPL-SCNC: 4.2 MMOL/L (ref 3.7–5.3)
RBC # BLD: 3.03 M/UL (ref 3.95–5.11)
RBC # BLD: ABNORMAL 10*6/UL
SEG NEUTROPHILS: 72 % (ref 36–65)
SEGMENTED NEUTROPHILS ABSOLUTE COUNT: 8.34 K/UL (ref 1.5–8.1)
SODIUM BLD-SCNC: 142 MMOL/L (ref 135–144)
TOTAL PROTEIN: 6 G/DL (ref 6.4–8.3)
WBC # BLD: 11.6 K/UL (ref 3.5–11.3)
WBC # BLD: ABNORMAL 10*3/UL

## 2017-11-26 PROCEDURE — 99232 SBSQ HOSP IP/OBS MODERATE 35: CPT | Performed by: INTERNAL MEDICINE

## 2017-11-26 PROCEDURE — 6360000002 HC RX W HCPCS: Performed by: HOSPITALIST

## 2017-11-26 PROCEDURE — 82947 ASSAY GLUCOSE BLOOD QUANT: CPT

## 2017-11-26 PROCEDURE — 99291 CRITICAL CARE FIRST HOUR: CPT | Performed by: INTERNAL MEDICINE

## 2017-11-26 PROCEDURE — 80053 COMPREHEN METABOLIC PANEL: CPT

## 2017-11-26 PROCEDURE — 94003 VENT MGMT INPAT SUBQ DAY: CPT

## 2017-11-26 PROCEDURE — 2580000003 HC RX 258: Performed by: HOSPITALIST

## 2017-11-26 PROCEDURE — 6370000000 HC RX 637 (ALT 250 FOR IP): Performed by: HOSPITALIST

## 2017-11-26 PROCEDURE — 94762 N-INVAS EAR/PLS OXIMTRY CONT: CPT

## 2017-11-26 PROCEDURE — 85025 COMPLETE CBC W/AUTO DIFF WBC: CPT

## 2017-11-26 PROCEDURE — 6370000000 HC RX 637 (ALT 250 FOR IP): Performed by: INTERNAL MEDICINE

## 2017-11-26 PROCEDURE — 36415 COLL VENOUS BLD VENIPUNCTURE: CPT

## 2017-11-26 PROCEDURE — 82140 ASSAY OF AMMONIA: CPT

## 2017-11-26 PROCEDURE — 94770 HC ETCO2 MONITOR DAILY: CPT

## 2017-11-26 PROCEDURE — 6360000002 HC RX W HCPCS: Performed by: INTERNAL MEDICINE

## 2017-11-26 PROCEDURE — 2500000003 HC RX 250 WO HCPCS: Performed by: INTERNAL MEDICINE

## 2017-11-26 PROCEDURE — 2000000000 HC ICU R&B

## 2017-11-26 RX ORDER — BACITRACIN, NEOMYCIN, POLYMYXIN B 400; 3.5; 5 [USP'U]/G; MG/G; [USP'U]/G
OINTMENT TOPICAL DAILY PRN
Status: DISCONTINUED | OUTPATIENT
Start: 2017-11-26 | End: 2017-12-14 | Stop reason: HOSPADM

## 2017-11-26 RX ADMIN — INSULIN GLARGINE 20 UNITS: 100 INJECTION, SOLUTION SUBCUTANEOUS at 20:48

## 2017-11-26 RX ADMIN — Medication 10 ML: at 09:33

## 2017-11-26 RX ADMIN — SILVER SULFADIAZINE: 10 CREAM TOPICAL at 04:40

## 2017-11-26 RX ADMIN — AMLODIPINE BESYLATE 10 MG: 10 TABLET ORAL at 07:59

## 2017-11-26 RX ADMIN — HEPARIN SODIUM 5000 UNITS: 5000 INJECTION, SOLUTION INTRAVENOUS; SUBCUTANEOUS at 05:33

## 2017-11-26 RX ADMIN — SILVER SULFADIAZINE: 10 CREAM TOPICAL at 08:00

## 2017-11-26 RX ADMIN — HEPARIN SODIUM 5000 UNITS: 5000 INJECTION, SOLUTION INTRAVENOUS; SUBCUTANEOUS at 15:06

## 2017-11-26 RX ADMIN — Medication 10 ML: at 20:48

## 2017-11-26 RX ADMIN — FAMOTIDINE 20 MG: 20 TABLET, FILM COATED ORAL at 07:59

## 2017-11-26 RX ADMIN — CARVEDILOL 25 MG: 25 TABLET, FILM COATED ORAL at 07:59

## 2017-11-26 RX ADMIN — MAGNESIUM HYDROXIDE 30 ML: 400 SUSPENSION ORAL at 07:59

## 2017-11-26 RX ADMIN — FENTANYL CITRATE 100 MCG: 50 INJECTION INTRAMUSCULAR; INTRAVENOUS at 02:26

## 2017-11-26 RX ADMIN — OXYCODONE HYDROCHLORIDE AND ACETAMINOPHEN 1 TABLET: 5; 325 TABLET ORAL at 23:28

## 2017-11-26 RX ADMIN — CARVEDILOL 25 MG: 25 TABLET, FILM COATED ORAL at 16:44

## 2017-11-26 RX ADMIN — IRON SUCROSE 200 MG: 20 INJECTION, SOLUTION INTRAVENOUS at 09:33

## 2017-11-26 RX ADMIN — OXYCODONE HYDROCHLORIDE AND ACETAMINOPHEN 1 TABLET: 5; 325 TABLET ORAL at 07:59

## 2017-11-26 RX ADMIN — OXYCODONE HYDROCHLORIDE AND ACETAMINOPHEN 1 TABLET: 5; 325 TABLET ORAL at 16:43

## 2017-11-26 RX ADMIN — HEPARIN SODIUM 5000 UNITS: 5000 INJECTION, SOLUTION INTRAVENOUS; SUBCUTANEOUS at 22:09

## 2017-11-26 ASSESSMENT — PULMONARY FUNCTION TESTS
PIF_VALUE: 24
PIF_VALUE: 24
PIF_VALUE: 25
PIF_VALUE: 12
PIF_VALUE: 16
PIF_VALUE: 19
PIF_VALUE: 11

## 2017-11-26 NOTE — PROGRESS NOTES
[facility]  Occupational Therapy Not Seen Note    Patient not available for Occupational Therapy due to:    [] Testing:    [] Hemodialysis    [] Blood Transfusion in Progress    []Refusal by Patient:    [] Surgery/Procedure:    [] Strict Bedrest    [] Sedation:    [] Spine Precautions     [] Pt being transferred to palliative care at this time. Spoke with pt/family and OT services to be defered. [] Pt independent with functional mobility and functional tasks. Pt with no OT acute care needs at this time, will defer OT eval.    [x] Other: Pt not actively following commands, non-purposeful movement noted, vented, and wrist restraints on per RN. Next Scheduled Treatment: Attempt on 11/28 as appropriate. Signature:  David Reyes OTSIVA/KEVIN
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
## 2017-11-26 NOTE — PLAN OF CARE
Problem: OXYGENATION/RESPIRATORY FUNCTION  Goal: Patient will maintain patent airway  Outcome: Ongoing    Goal: Patient will achieve/maintain normal respiratory rate/effort  Respiratory rate and effort will be within normal limits for the patient   Outcome: Ongoing      Problem: MECHANICAL VENTILATION  Goal: Patient will maintain patent airway  Outcome: Ongoing    Goal: Oral health is maintained or improved  Outcome: Ongoing    Goal: ET tube will be managed safely  Outcome: Ongoing    Goal: Ability to express needs and understand communication  Outcome: Ongoing    Goal: Mobility/activity is maintained at optimum level for patient  Outcome: Ongoing      Problem: ASPIRATION PRECAUTIONS  Goal: Patient's risk of aspiration is minimized  Outcome: Ongoing      Problem: SKIN INTEGRITY  Goal: Skin integrity is maintained or improved  Outcome: Ongoing      Problem: NUTRITION  Goal: Nutritional status is improving  Outcome: Met This Shift      Problem: Restraint Use - Nonviolent/Non-Self-Destructive Behavior:  Goal: Absence of restraint indications  Absence of restraint indications   Outcome: Not Met This Shift    Goal: Absence of restraint-related injury  Absence of restraint-related injury   Outcome: Ongoing      Problem: Pain:  Goal: Pain level will decrease  Pain level will decrease   Outcome: Ongoing    Goal: Control of acute pain  Control of acute pain   Outcome: Ongoing    Goal: Control of chronic pain  Control of chronic pain   Outcome: Ongoing      Problem: Skin Integrity:  Goal: Will show no infection signs and symptoms  Will show no infection signs and symptoms   Outcome: Ongoing    Goal: Absence of new skin breakdown  Absence of new skin breakdown   Outcome: Ongoing      Problem: Falls - Risk of  Goal: Absence of falls  Outcome: Ongoing      Problem: Risk for Impaired Skin Integrity  Goal: Tissue integrity - skin and mucous membranes  Structural intactness and normal physiological function of skin and  mucous membranes.    Outcome: Ongoing      Problem: Nutrition  Goal: Optimal nutrition therapy  Outcome: Ongoing

## 2017-11-27 LAB
ABSOLUTE EOS #: 0.13 K/UL (ref 0–0.44)
ABSOLUTE IMMATURE GRANULOCYTE: 0.35 K/UL (ref 0–0.3)
ABSOLUTE LYMPH #: 1.99 K/UL (ref 1.1–3.7)
ABSOLUTE MONO #: 0.61 K/UL (ref 0.1–1.2)
ALBUMIN SERPL-MCNC: 2.2 G/DL (ref 3.5–5.2)
ALBUMIN/GLOBULIN RATIO: 0.6 (ref 1–2.5)
ALLEN TEST: POSITIVE
ALP BLD-CCNC: 97 U/L (ref 35–104)
ALT SERPL-CCNC: 68 U/L (ref 5–33)
ANION GAP SERPL CALCULATED.3IONS-SCNC: 8 MMOL/L (ref 9–17)
AST SERPL-CCNC: 48 U/L
BASOPHILS # BLD: 0 % (ref 0–2)
BASOPHILS ABSOLUTE: <0.03 K/UL (ref 0–0.2)
BILIRUB SERPL-MCNC: <0.1 MG/DL (ref 0.3–1.2)
BUN BLDV-MCNC: 31 MG/DL (ref 8–23)
BUN/CREAT BLD: ABNORMAL (ref 9–20)
CALCIUM SERPL-MCNC: 7.9 MG/DL (ref 8.6–10.4)
CHLORIDE BLD-SCNC: 110 MMOL/L (ref 98–107)
CO2: 27 MMOL/L (ref 20–31)
CREAT SERPL-MCNC: 0.82 MG/DL (ref 0.5–0.9)
DIFFERENTIAL TYPE: ABNORMAL
EOSINOPHILS RELATIVE PERCENT: 1 % (ref 1–4)
FIO2: ABNORMAL
GFR AFRICAN AMERICAN: >60 ML/MIN
GFR NON-AFRICAN AMERICAN: >60 ML/MIN
GFR SERPL CREATININE-BSD FRML MDRD: ABNORMAL ML/MIN/{1.73_M2}
GFR SERPL CREATININE-BSD FRML MDRD: ABNORMAL ML/MIN/{1.73_M2}
GLUCOSE BLD-MCNC: 105 MG/DL (ref 70–99)
GLUCOSE BLD-MCNC: 108 MG/DL (ref 65–105)
GLUCOSE BLD-MCNC: 120 MG/DL (ref 65–105)
GLUCOSE BLD-MCNC: 124 MG/DL (ref 65–105)
GLUCOSE BLD-MCNC: 143 MG/DL (ref 65–105)
GLUCOSE BLD-MCNC: 83 MG/DL (ref 65–105)
GLUCOSE BLD-MCNC: 97 MG/DL (ref 65–105)
HCT VFR BLD CALC: 26.7 % (ref 36.3–47.1)
HEMOGLOBIN: 8.1 G/DL (ref 11.9–15.1)
IMMATURE GRANULOCYTES: 3 %
LYMPHOCYTES # BLD: 20 % (ref 24–43)
MCH RBC QN AUTO: 29.2 PG (ref 25.2–33.5)
MCHC RBC AUTO-ENTMCNC: 30.3 G/DL (ref 28.4–34.8)
MCV RBC AUTO: 96.4 FL (ref 82.6–102.9)
MODE: ABNORMAL
MONOCYTES # BLD: 6 % (ref 3–12)
NEGATIVE BASE EXCESS, ART: ABNORMAL (ref 0–2)
O2 DEVICE/FLOW/%: ABNORMAL
PATIENT TEMP: ABNORMAL
PDW BLD-RTO: 15 % (ref 11.8–14.4)
PLATELET # BLD: 209 K/UL (ref 138–453)
PLATELET ESTIMATE: ABNORMAL
PMV BLD AUTO: 11.2 FL (ref 8.1–13.5)
POC HCO3: 29.6 MMOL/L (ref 21–28)
POC O2 SATURATION: 99 % (ref 94–98)
POC PCO2 TEMP: ABNORMAL MM HG
POC PCO2: 43.1 MM HG (ref 35–48)
POC PH TEMP: ABNORMAL
POC PH: 7.44 (ref 7.35–7.45)
POC PO2 TEMP: ABNORMAL MM HG
POC PO2: 118.6 MM HG (ref 83–108)
POSITIVE BASE EXCESS, ART: 5 (ref 0–3)
POTASSIUM SERPL-SCNC: 4.9 MMOL/L (ref 3.7–5.3)
RBC # BLD: 2.77 M/UL (ref 3.95–5.11)
RBC # BLD: ABNORMAL 10*6/UL
SAMPLE SITE: ABNORMAL
SEG NEUTROPHILS: 70 % (ref 36–65)
SEGMENTED NEUTROPHILS ABSOLUTE COUNT: 7.05 K/UL (ref 1.5–8.1)
SODIUM BLD-SCNC: 145 MMOL/L (ref 135–144)
TCO2 (CALC), ART: 31 MMOL/L (ref 22–29)
TOTAL PROTEIN: 6 G/DL (ref 6.4–8.3)
WBC # BLD: 10.2 K/UL (ref 3.5–11.3)
WBC # BLD: ABNORMAL 10*3/UL

## 2017-11-27 PROCEDURE — 82947 ASSAY GLUCOSE BLOOD QUANT: CPT

## 2017-11-27 PROCEDURE — 85025 COMPLETE CBC W/AUTO DIFF WBC: CPT

## 2017-11-27 PROCEDURE — 36600 WITHDRAWAL OF ARTERIAL BLOOD: CPT

## 2017-11-27 PROCEDURE — 2000000000 HC ICU R&B

## 2017-11-27 PROCEDURE — 99291 CRITICAL CARE FIRST HOUR: CPT | Performed by: INTERNAL MEDICINE

## 2017-11-27 PROCEDURE — 36415 COLL VENOUS BLD VENIPUNCTURE: CPT

## 2017-11-27 PROCEDURE — 6370000000 HC RX 637 (ALT 250 FOR IP): Performed by: EMERGENCY MEDICINE

## 2017-11-27 PROCEDURE — 6370000000 HC RX 637 (ALT 250 FOR IP): Performed by: INTERNAL MEDICINE

## 2017-11-27 PROCEDURE — 80053 COMPREHEN METABOLIC PANEL: CPT

## 2017-11-27 PROCEDURE — 6370000000 HC RX 637 (ALT 250 FOR IP): Performed by: STUDENT IN AN ORGANIZED HEALTH CARE EDUCATION/TRAINING PROGRAM

## 2017-11-27 PROCEDURE — 6370000000 HC RX 637 (ALT 250 FOR IP): Performed by: HOSPITALIST

## 2017-11-27 PROCEDURE — 94762 N-INVAS EAR/PLS OXIMTRY CONT: CPT

## 2017-11-27 PROCEDURE — 82803 BLOOD GASES ANY COMBINATION: CPT

## 2017-11-27 PROCEDURE — 94770 HC ETCO2 MONITOR DAILY: CPT

## 2017-11-27 PROCEDURE — 2500000003 HC RX 250 WO HCPCS: Performed by: INTERNAL MEDICINE

## 2017-11-27 PROCEDURE — 2580000003 HC RX 258: Performed by: HOSPITALIST

## 2017-11-27 PROCEDURE — 6360000002 HC RX W HCPCS: Performed by: HOSPITALIST

## 2017-11-27 PROCEDURE — 6360000002 HC RX W HCPCS: Performed by: INTERNAL MEDICINE

## 2017-11-27 PROCEDURE — 94003 VENT MGMT INPAT SUBQ DAY: CPT

## 2017-11-27 RX ORDER — HYDRALAZINE HYDROCHLORIDE 20 MG/ML
10 INJECTION INTRAMUSCULAR; INTRAVENOUS EVERY 4 HOURS PRN
Status: DISCONTINUED | OUTPATIENT
Start: 2017-11-27 | End: 2017-11-27

## 2017-11-27 RX ORDER — FAMOTIDINE 20 MG/1
20 TABLET, FILM COATED ORAL 2 TIMES DAILY
Status: DISCONTINUED | OUTPATIENT
Start: 2017-11-27 | End: 2017-12-02

## 2017-11-27 RX ORDER — HYDRALAZINE HYDROCHLORIDE 20 MG/ML
10 INJECTION INTRAMUSCULAR; INTRAVENOUS EVERY 4 HOURS PRN
Status: DISCONTINUED | OUTPATIENT
Start: 2017-11-27 | End: 2017-12-02

## 2017-11-27 RX ORDER — LABETALOL HYDROCHLORIDE 5 MG/ML
10 INJECTION, SOLUTION INTRAVENOUS EVERY 6 HOURS PRN
Status: DISCONTINUED | OUTPATIENT
Start: 2017-11-27 | End: 2017-12-04

## 2017-11-27 RX ADMIN — FAMOTIDINE 20 MG: 20 TABLET, FILM COATED ORAL at 20:20

## 2017-11-27 RX ADMIN — INSULIN LISPRO 2 UNITS: 100 INJECTION, SOLUTION INTRAVENOUS; SUBCUTANEOUS at 16:32

## 2017-11-27 RX ADMIN — FAMOTIDINE 20 MG: 20 TABLET, FILM COATED ORAL at 07:57

## 2017-11-27 RX ADMIN — Medication 10 ML: at 20:20

## 2017-11-27 RX ADMIN — HEPARIN SODIUM 5000 UNITS: 5000 INJECTION, SOLUTION INTRAVENOUS; SUBCUTANEOUS at 21:30

## 2017-11-27 RX ADMIN — FENTANYL CITRATE 100 MCG: 50 INJECTION INTRAMUSCULAR; INTRAVENOUS at 09:05

## 2017-11-27 RX ADMIN — SILVER SULFADIAZINE: 10 CREAM TOPICAL at 09:00

## 2017-11-27 RX ADMIN — FENTANYL CITRATE 100 MCG: 50 INJECTION INTRAMUSCULAR; INTRAVENOUS at 02:22

## 2017-11-27 RX ADMIN — FENTANYL CITRATE 100 MCG: 50 INJECTION INTRAMUSCULAR; INTRAVENOUS at 17:31

## 2017-11-27 RX ADMIN — Medication 10 ML: at 17:31

## 2017-11-27 RX ADMIN — Medication 10 ML: at 11:51

## 2017-11-27 RX ADMIN — HEPARIN SODIUM 5000 UNITS: 5000 INJECTION, SOLUTION INTRAVENOUS; SUBCUTANEOUS at 14:08

## 2017-11-27 RX ADMIN — LABETALOL HYDROCHLORIDE 10 MG: 5 INJECTION, SOLUTION INTRAVENOUS at 11:48

## 2017-11-27 RX ADMIN — AMLODIPINE BESYLATE 10 MG: 10 TABLET ORAL at 07:57

## 2017-11-27 RX ADMIN — CARVEDILOL 25 MG: 25 TABLET, FILM COATED ORAL at 07:57

## 2017-11-27 RX ADMIN — HEPARIN SODIUM 5000 UNITS: 5000 INJECTION, SOLUTION INTRAVENOUS; SUBCUTANEOUS at 05:35

## 2017-11-27 RX ADMIN — CARVEDILOL 25 MG: 25 TABLET, FILM COATED ORAL at 16:32

## 2017-11-27 RX ADMIN — Medication 10 ML: at 09:07

## 2017-11-27 ASSESSMENT — PULMONARY FUNCTION TESTS
PIF_VALUE: 16
PIF_VALUE: 13
PIF_VALUE: 28
PIF_VALUE: 13
PIF_VALUE: 24
PIF_VALUE: 13
PIF_VALUE: 27

## 2017-11-27 ASSESSMENT — PAIN SCALES - GENERAL
PAINLEVEL_OUTOF10: 4
PAINLEVEL_OUTOF10: 3
PAINLEVEL_OUTOF10: 3
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
## 2017-11-27 NOTE — PROGRESS NOTES
Critical Care Team - Daily Progress Note      Date and time: 11/27/2017 11:18 AM  Patient's name:  Claudell Buys  Medical Record Number: [id number]  Patient's account/billing number: [de-identified]  Patient's YOB: 1950  Age: 69 y.o. Date of Admission: 11/18/2017 11:20 AM  Length of stay during current admission: 9      Primary Care Physician: No primary care provider on file. ICU Attending Physician: Dr. Sagrario Lucero    Code Status: Full Code    Reason for ICU admission: Altered mental status      SUBJECTIVE:     OVERNIGHT EVENTS:  Patient seen and examined at bedside. Charts reviewed. Had 99 once at midnight last night and since then around 98. He is doing much better in terms of mentation, she was able to follow commands, squeezes with her hands, and wiggle her toes. Wounds are healing well. Hemodynamically stable. Hypernatremia corrected and creatinine at baseline.       AWAKE & FOLLOWING COMMANDS:  [x] No   [] Yes    CURRENT VENTILATION STATUS:     [x] Ventilator  [] BIPAP  [] Nasal Cannula [] Room Air      IF INTUBATED, ET TUBE MARKING AT LOWER LIP:       cms    SECRETIONS Amount:  [] Small [x] Moderate  [] Large  [] None  Color:     [x] White [] Colored  [] Bloody    SEDATION:  RAAS Score:  [] Propofol gtt  [] Versed gtt  [] Ativan gtt   [] No Sedation    PARALYZED:  [] No    [x] Yes    DIARRHEA:                [x] No                [] Yes  (C. Difficile status: [] positive                                                                                                                       [] negative                                                                                                                     [] pending)    VASOPRESSORS:  [x] No    [] Yes    If yes -   [] Levophed       [] Dopamine     [] Vasopressin       [] Dobutamine  [] Phenylephrine         [] Epinephrine    CENTRAL LINES:     [x] No   [] Yes   (Date of Insertion:   )           If yes -     [] Right IJ     [] Left

## 2017-11-28 LAB
GLUCOSE BLD-MCNC: 124 MG/DL (ref 65–105)
GLUCOSE BLD-MCNC: 130 MG/DL (ref 65–105)
GLUCOSE BLD-MCNC: 149 MG/DL (ref 65–105)
GLUCOSE BLD-MCNC: 156 MG/DL (ref 65–105)

## 2017-11-28 PROCEDURE — 6370000000 HC RX 637 (ALT 250 FOR IP): Performed by: HOSPITALIST

## 2017-11-28 PROCEDURE — 2500000003 HC RX 250 WO HCPCS: Performed by: INTERNAL MEDICINE

## 2017-11-28 PROCEDURE — 6360000002 HC RX W HCPCS: Performed by: HOSPITALIST

## 2017-11-28 PROCEDURE — 6370000000 HC RX 637 (ALT 250 FOR IP): Performed by: INTERNAL MEDICINE

## 2017-11-28 PROCEDURE — 99233 SBSQ HOSP IP/OBS HIGH 50: CPT | Performed by: INTERNAL MEDICINE

## 2017-11-28 PROCEDURE — 82947 ASSAY GLUCOSE BLOOD QUANT: CPT

## 2017-11-28 PROCEDURE — 94003 VENT MGMT INPAT SUBQ DAY: CPT

## 2017-11-28 PROCEDURE — 94770 HC ETCO2 MONITOR DAILY: CPT

## 2017-11-28 PROCEDURE — 94762 N-INVAS EAR/PLS OXIMTRY CONT: CPT

## 2017-11-28 PROCEDURE — 99291 CRITICAL CARE FIRST HOUR: CPT | Performed by: INTERNAL MEDICINE

## 2017-11-28 PROCEDURE — 2000000000 HC ICU R&B

## 2017-11-28 PROCEDURE — 6370000000 HC RX 637 (ALT 250 FOR IP): Performed by: EMERGENCY MEDICINE

## 2017-11-28 PROCEDURE — 6370000000 HC RX 637 (ALT 250 FOR IP): Performed by: STUDENT IN AN ORGANIZED HEALTH CARE EDUCATION/TRAINING PROGRAM

## 2017-11-28 PROCEDURE — 2580000003 HC RX 258: Performed by: HOSPITALIST

## 2017-11-28 PROCEDURE — 6360000002 HC RX W HCPCS: Performed by: INTERNAL MEDICINE

## 2017-11-28 RX ADMIN — CARVEDILOL 25 MG: 25 TABLET, FILM COATED ORAL at 08:13

## 2017-11-28 RX ADMIN — HEPARIN SODIUM 5000 UNITS: 5000 INJECTION, SOLUTION INTRAVENOUS; SUBCUTANEOUS at 05:34

## 2017-11-28 RX ADMIN — Medication 10 ML: at 21:15

## 2017-11-28 RX ADMIN — HEPARIN SODIUM 5000 UNITS: 5000 INJECTION, SOLUTION INTRAVENOUS; SUBCUTANEOUS at 21:16

## 2017-11-28 RX ADMIN — CARVEDILOL 25 MG: 25 TABLET, FILM COATED ORAL at 16:27

## 2017-11-28 RX ADMIN — FAMOTIDINE 20 MG: 20 TABLET, FILM COATED ORAL at 21:15

## 2017-11-28 RX ADMIN — Medication 10 ML: at 08:13

## 2017-11-28 RX ADMIN — INSULIN LISPRO 3 UNITS: 100 INJECTION, SOLUTION INTRAVENOUS; SUBCUTANEOUS at 10:31

## 2017-11-28 RX ADMIN — LABETALOL HYDROCHLORIDE 10 MG: 5 INJECTION, SOLUTION INTRAVENOUS at 10:04

## 2017-11-28 RX ADMIN — INSULIN GLARGINE 20 UNITS: 100 INJECTION, SOLUTION SUBCUTANEOUS at 21:17

## 2017-11-28 RX ADMIN — POLYMYXIN B SULFATE, BACITRACIN ZINC, NEOMYCIN SULFATE: 5000; 3.5; 4 OINTMENT TOPICAL at 21:15

## 2017-11-28 RX ADMIN — POLYMYXIN B SULFATE, BACITRACIN ZINC, NEOMYCIN SULFATE: 5000; 3.5; 4 OINTMENT TOPICAL at 03:50

## 2017-11-28 RX ADMIN — HEPARIN SODIUM 5000 UNITS: 5000 INJECTION, SOLUTION INTRAVENOUS; SUBCUTANEOUS at 13:30

## 2017-11-28 RX ADMIN — INSULIN LISPRO 3 UNITS: 100 INJECTION, SOLUTION INTRAVENOUS; SUBCUTANEOUS at 16:28

## 2017-11-28 RX ADMIN — FAMOTIDINE 20 MG: 20 TABLET, FILM COATED ORAL at 08:13

## 2017-11-28 RX ADMIN — SILVER SULFADIAZINE: 10 CREAM TOPICAL at 03:50

## 2017-11-28 RX ADMIN — FENTANYL CITRATE 100 MCG: 50 INJECTION INTRAMUSCULAR; INTRAVENOUS at 02:22

## 2017-11-28 RX ADMIN — AMLODIPINE BESYLATE 10 MG: 10 TABLET ORAL at 08:13

## 2017-11-28 ASSESSMENT — PULMONARY FUNCTION TESTS
PIF_VALUE: 22
PIF_VALUE: 19
PIF_VALUE: 26
PIF_VALUE: 13
PIF_VALUE: 21
PIF_VALUE: 13
PIF_VALUE: 13
PIF_VALUE: 14
PIF_VALUE: 13

## 2017-11-28 NOTE — PLAN OF CARE
Problem: MECHANICAL VENTILATION  Goal: Patient will maintain patent airway  Outcome: Ongoing    Goal: Oral health is maintained or improved  Outcome: Ongoing    Goal: ET tube will be managed safely  Outcome: Ongoing    Goal: Mobility/activity is maintained at optimum level for patient  Outcome: Ongoing      Problem: ASPIRATION PRECAUTIONS  Goal: Patient's risk of aspiration is minimized  Outcome: Ongoing      Problem: SKIN INTEGRITY  Goal: Skin integrity is maintained or improved  Outcome: Ongoing      Problem: NUTRITION  Goal: Nutritional status is improving  Outcome: Ongoing      Problem: Restraint Use - Nonviolent/Non-Self-Destructive Behavior:  Goal: Absence of restraint-related injury  Absence of restraint-related injury   Outcome: Ongoing      Problem: Pain:  Goal: Pain level will decrease  Pain level will decrease   Outcome: Ongoing    Goal: Control of acute pain  Control of acute pain   Outcome: Ongoing    Goal: Control of chronic pain  Control of chronic pain   Outcome: Ongoing      Problem: Skin Integrity:  Goal: Will show no infection signs and symptoms  Will show no infection signs and symptoms   Outcome: Ongoing    Goal: Absence of new skin breakdown  Absence of new skin breakdown   Outcome: Ongoing      Problem: Falls - Risk of  Goal: Absence of falls  Outcome: Ongoing      Problem: Risk for Impaired Skin Integrity  Goal: Tissue integrity - skin and mucous membranes  Structural intactness and normal physiological function of skin and  mucous membranes.    Outcome: Ongoing

## 2017-11-28 NOTE — PROGRESS NOTES
Physical Therapy  DATE: 2017    NAME: Cassy Javier  MRN: [id number]   : 1950    Patient not seen this date for Physical Therapy due to:  [] Blood transfusion in progress  [] Hemodialysis  []  Patient Declined  [x] Spine Precautions- Maintain CTLS precautions per orders   [x] Strict Bedrest  [] Surgery/ Procedure  [] Testing      [] Other        [] PT being discontinued at this time. Patient independent. No further needs. [] PT being discontinued at this time as the patient has been transferred to palliative care. No further needs.     Severiano Bari, PT

## 2017-11-28 NOTE — PLAN OF CARE

## 2017-11-28 NOTE — PROGRESS NOTES
Temp 99 °F (37.2 °C) (Core)   Resp 17   Ht 5' 9\" (1.753 m)   Wt 261 lb 11 oz (118.7 kg)   SpO2 100%   BMI 38.64 kg/m²   Tmax over 24 hours:  Temp (24hrs), Av.6 °F (37 °C), Min:97.9 °F (36.6 °C), Max:99.3 °F (37.4 °C)      Patient Vitals for the past 6 hrs:   BP Temp Temp src Pulse Resp SpO2 Height Weight   17 0600 (!) 154/64 - - 70 17 100 % - -   17 0500 (!) 155/90 - - 64 16 100 % 5' 9\" (1.753 m) 261 lb 11 oz (118.7 kg)   17 0400 (!) 143/67 99 °F (37.2 °C) CORE 60 14 100 % - -   17 0335 - - - 64 13 100 % - -   17 0300 (!) 159/73 - - 74 17 99 % - -   17 0200 (!) 95/41 - - 73 17 100 % - -         Intake/Output Summary (Last 24 hours) at 17 0703  Last data filed at 17 0600   Gross per 24 hour   Intake             2407 ml   Output             1711 ml   Net              696 ml     Wt Readings from Last 2 Encounters:   17 261 lb 11 oz (118.7 kg)     Body mass index is 38.64 kg/m². PHYSICAL EXAMINATION:  Constitutional: Intubated, off sedation. Morbidly obese. Awake, and following commands  EENT: PERRLA, EOMI, sclera clear, anicteric, trachea in midline. Neck: Supple, symmetrical, trachea midline, no adenopathy,  no jvd, skin normal  Respiratory: clear to auscultation, no wheezes or rales and unlabored breathing. Cardiovascular: normal S1, S2,  and 2+ pulses throughout  Abdomen: soft, nontender, nondistended, no masses or organomegaly  Extremities:  peripheral pulses normal,  pedal edema 2 + b/l  Wounds: healing well.  No cellulitis changes or discharge     Any additional physical findings:    MEDICATIONS:    Scheduled Meds:   famotidine  20 mg Per NG tube BID    carvedilol  25 mg Oral BID WC    insulin glargine  20 Units Subcutaneous Nightly    cloNIDine  1 patch Transdermal Weekly    amLODIPine  10 mg Oral Daily    silver sulfADIAZINE   Topical Daily    sodium chloride flush  10 mL Intravenous 2 times per day    heparin (porcine)  5,000 Units

## 2017-11-28 NOTE — PROGRESS NOTES
[facility]  Occupational Therapy Not Seen Note    Patient not available for Occupational Therapy due to:    [] Testing:    [] Hemodialysis    [] Blood Transfusion in Progress    []Refusal by Patient:    [] Surgery/Procedure:    [] Strict Bedrest    [] Sedation    [x] Spine Precautions: CTLS     [] Pt being transferred to palliative care at this time. Spoke with pt/family and OT services to be defered. [] Pt independent with functional mobility and functional tasks. Pt with no OT acute care needs at this time, will defer OT eval.    [] Other    Next Scheduled Treatment: Attempt on 11/29 as appropriate. Signature:  Clair Faith OTR/KEVIN

## 2017-11-28 NOTE — PLAN OF CARE
Problem: MECHANICAL VENTILATION  Goal: Patient will maintain patent airway  Outcome: Met This Shift      Problem: Restraint Use - Nonviolent/Non-Self-Destructive Behavior:  Goal: Absence of restraint indications  Absence of restraint indications   Outcome: Not Met This Shift    Goal: Absence of restraint-related injury  Absence of restraint-related injury   Outcome: Ongoing      Problem: Pain:  Goal: Control of acute pain  Control of acute pain   Outcome: Ongoing      Problem: Skin Integrity:  Goal: Will show no infection signs and symptoms  Will show no infection signs and symptoms   Outcome: Ongoing      Problem: Falls - Risk of  Goal: Absence of falls  Outcome: Met This Shift

## 2017-11-28 NOTE — PROGRESS NOTES
pn Incoming Lab Results From [facility]     Component Results     Component Collected Lab   Specimen Description 11/18/2017  5:06  [address]   . BLOOD    Special Requests 11/18/2017  5:06  [address]   right hand 7ml    Culture 11/18/2017  5:06  [address]   NO GROWTH 2 DAYS        CBC with Differential:   Recent Labs      11/26/17   0442  11/27/17   0356   WBC  11.6*  10.2   HGB  8.9*  8.1*   HCT  29.8*  26.7*   PLT  See Reflexed IPF Result  209   LYMPHOPCT  16*  20*   MONOPCT  6  6     BMP:   Recent Labs      11/26/17 0442  11/27/17   0356   NA  142  145*   K  4.2  4.9   CL  108*  110*   CO2  24  27   BUN  36*  31*   CREATININE  0.79  0.82     Hepatic Function Panel:   Recent Labs      11/26/17 0442  11/27/17   0356   PROT  6.0*  6.0*   LABALBU  2.0*  2.2*   BILITOT  0.20*  <0.10*   ALKPHOS  107*  97   ALT  80*  68*   AST  55*  48*     No results for input(s): RPR in the last 72 hours. No results for input(s): HIV in the last 72 hours. No results for input(s): BC in the last 72 hours. Lab Results   Component Value Date    MUCUS NOT REPORTED 11/18/2017    RBC 2.77 11/27/2017    TRICHOMONAS NOT REPORTED 11/18/2017    WBC 10.2 11/27/2017    YEAST NOT REPORTED 11/18/2017    TURBIDITY CLEAR 11/18/2017     Lab Results   Component Value Date    CREATININE 0.82 11/27/2017    GLUCOSE 105 11/27/2017     Thank you for allowing us to participate in the care of this patient. Please call with questions.     Juliann Ceron MD  Pager: [phone] - Office: [phone]

## 2017-11-29 LAB
ABSOLUTE EOS #: 0.13 K/UL (ref 0–0.44)
ABSOLUTE IMMATURE GRANULOCYTE: 0.05 K/UL (ref 0–0.3)
ABSOLUTE LYMPH #: 1.99 K/UL (ref 1.1–3.7)
ABSOLUTE MONO #: 0.66 K/UL (ref 0.1–1.2)
ALBUMIN SERPL-MCNC: 2.3 G/DL (ref 3.5–5.2)
ALBUMIN/GLOBULIN RATIO: 0.6 (ref 1–2.5)
ALLEN TEST: POSITIVE
ALP BLD-CCNC: 91 U/L (ref 35–104)
ALT SERPL-CCNC: 43 U/L (ref 5–33)
ANION GAP SERPL CALCULATED.3IONS-SCNC: 9 MMOL/L (ref 9–17)
AST SERPL-CCNC: 25 U/L
BASOPHILS # BLD: 0 % (ref 0–2)
BASOPHILS ABSOLUTE: <0.03 K/UL (ref 0–0.2)
BILIRUB SERPL-MCNC: 0.22 MG/DL (ref 0.3–1.2)
BUN BLDV-MCNC: 24 MG/DL (ref 8–23)
BUN/CREAT BLD: ABNORMAL (ref 9–20)
CALCIUM SERPL-MCNC: 7.9 MG/DL (ref 8.6–10.4)
CHLORIDE BLD-SCNC: 107 MMOL/L (ref 98–107)
CO2: 27 MMOL/L (ref 20–31)
CREAT SERPL-MCNC: 0.85 MG/DL (ref 0.5–0.9)
DIFFERENTIAL TYPE: ABNORMAL
EOSINOPHILS RELATIVE PERCENT: 2 % (ref 1–4)
FIO2: 30
GFR AFRICAN AMERICAN: >60 ML/MIN
GFR NON-AFRICAN AMERICAN: >60 ML/MIN
GFR SERPL CREATININE-BSD FRML MDRD: ABNORMAL ML/MIN/{1.73_M2}
GFR SERPL CREATININE-BSD FRML MDRD: ABNORMAL ML/MIN/{1.73_M2}
GLUCOSE BLD-MCNC: 125 MG/DL (ref 65–105)
GLUCOSE BLD-MCNC: 131 MG/DL (ref 65–105)
GLUCOSE BLD-MCNC: 131 MG/DL (ref 70–99)
GLUCOSE BLD-MCNC: 138 MG/DL (ref 74–100)
GLUCOSE BLD-MCNC: 145 MG/DL (ref 65–105)
HCT VFR BLD CALC: 27.8 % (ref 36.3–47.1)
HEMOGLOBIN: 8.4 G/DL (ref 11.9–15.1)
IMMATURE GRANULOCYTES: 1 %
LYMPHOCYTES # BLD: 23 % (ref 24–43)
MCH RBC QN AUTO: 29.4 PG (ref 25.2–33.5)
MCHC RBC AUTO-ENTMCNC: 30.2 G/DL (ref 28.4–34.8)
MCV RBC AUTO: 97.2 FL (ref 82.6–102.9)
MODE: ABNORMAL
MONOCYTES # BLD: 8 % (ref 3–12)
NEGATIVE BASE EXCESS, ART: ABNORMAL (ref 0–2)
O2 DEVICE/FLOW/%: ABNORMAL
PATIENT TEMP: ABNORMAL
PDW BLD-RTO: 15.3 % (ref 11.8–14.4)
PLATELET # BLD: 212 K/UL (ref 138–453)
PLATELET ESTIMATE: ABNORMAL
PMV BLD AUTO: 11.1 FL (ref 8.1–13.5)
POC HCO3: 28.7 MMOL/L (ref 21–28)
POC O2 SATURATION: 99 % (ref 94–98)
POC PCO2 TEMP: ABNORMAL MM HG
POC PCO2: 37.9 MM HG (ref 35–48)
POC PH TEMP: ABNORMAL
POC PH: 7.49 (ref 7.35–7.45)
POC PO2 TEMP: ABNORMAL MM HG
POC PO2: 114.1 MM HG (ref 83–108)
POSITIVE BASE EXCESS, ART: 5 (ref 0–3)
POTASSIUM SERPL-SCNC: 4.5 MMOL/L (ref 3.7–5.3)
RBC # BLD: 2.86 M/UL (ref 3.95–5.11)
RBC # BLD: ABNORMAL 10*6/UL
SAMPLE SITE: ABNORMAL
SEG NEUTROPHILS: 66 % (ref 36–65)
SEGMENTED NEUTROPHILS ABSOLUTE COUNT: 5.71 K/UL (ref 1.5–8.1)
SODIUM BLD-SCNC: 143 MMOL/L (ref 135–144)
TCO2 (CALC), ART: 30 MMOL/L (ref 22–29)
TOTAL PROTEIN: 6.1 G/DL (ref 6.4–8.3)
WBC # BLD: 8.6 K/UL (ref 3.5–11.3)
WBC # BLD: ABNORMAL 10*3/UL

## 2017-11-29 PROCEDURE — 99291 CRITICAL CARE FIRST HOUR: CPT | Performed by: INTERNAL MEDICINE

## 2017-11-29 PROCEDURE — 94003 VENT MGMT INPAT SUBQ DAY: CPT

## 2017-11-29 PROCEDURE — 6370000000 HC RX 637 (ALT 250 FOR IP): Performed by: HOSPITALIST

## 2017-11-29 PROCEDURE — 82947 ASSAY GLUCOSE BLOOD QUANT: CPT

## 2017-11-29 PROCEDURE — 6360000002 HC RX W HCPCS: Performed by: HOSPITALIST

## 2017-11-29 PROCEDURE — 82803 BLOOD GASES ANY COMBINATION: CPT

## 2017-11-29 PROCEDURE — 94762 N-INVAS EAR/PLS OXIMTRY CONT: CPT

## 2017-11-29 PROCEDURE — 6370000000 HC RX 637 (ALT 250 FOR IP): Performed by: EMERGENCY MEDICINE

## 2017-11-29 PROCEDURE — 2500000003 HC RX 250 WO HCPCS: Performed by: INTERNAL MEDICINE

## 2017-11-29 PROCEDURE — 94770 HC ETCO2 MONITOR DAILY: CPT

## 2017-11-29 PROCEDURE — 6370000000 HC RX 637 (ALT 250 FOR IP): Performed by: STUDENT IN AN ORGANIZED HEALTH CARE EDUCATION/TRAINING PROGRAM

## 2017-11-29 PROCEDURE — 99211 OFF/OP EST MAY X REQ PHY/QHP: CPT

## 2017-11-29 PROCEDURE — 99233 SBSQ HOSP IP/OBS HIGH 50: CPT | Performed by: INTERNAL MEDICINE

## 2017-11-29 PROCEDURE — 6370000000 HC RX 637 (ALT 250 FOR IP): Performed by: INTERNAL MEDICINE

## 2017-11-29 PROCEDURE — 36600 WITHDRAWAL OF ARTERIAL BLOOD: CPT

## 2017-11-29 PROCEDURE — 36415 COLL VENOUS BLD VENIPUNCTURE: CPT

## 2017-11-29 PROCEDURE — 2580000003 HC RX 258: Performed by: HOSPITALIST

## 2017-11-29 PROCEDURE — 2000000000 HC ICU R&B

## 2017-11-29 PROCEDURE — 80053 COMPREHEN METABOLIC PANEL: CPT

## 2017-11-29 PROCEDURE — 85025 COMPLETE CBC W/AUTO DIFF WBC: CPT

## 2017-11-29 RX ORDER — HYDRALAZINE HYDROCHLORIDE 25 MG/1
25 TABLET, FILM COATED ORAL EVERY 8 HOURS SCHEDULED
Status: DISCONTINUED | OUTPATIENT
Start: 2017-11-29 | End: 2017-12-14 | Stop reason: HOSPADM

## 2017-11-29 RX ADMIN — Medication 10 ML: at 22:01

## 2017-11-29 RX ADMIN — HEPARIN SODIUM 5000 UNITS: 5000 INJECTION, SOLUTION INTRAVENOUS; SUBCUTANEOUS at 22:14

## 2017-11-29 RX ADMIN — HEPARIN SODIUM 5000 UNITS: 5000 INJECTION, SOLUTION INTRAVENOUS; SUBCUTANEOUS at 05:36

## 2017-11-29 RX ADMIN — INSULIN GLARGINE 20 UNITS: 100 INJECTION, SOLUTION SUBCUTANEOUS at 22:14

## 2017-11-29 RX ADMIN — CARVEDILOL 25 MG: 25 TABLET, FILM COATED ORAL at 16:27

## 2017-11-29 RX ADMIN — HYDRALAZINE HYDROCHLORIDE 25 MG: 25 TABLET, FILM COATED ORAL at 22:01

## 2017-11-29 RX ADMIN — FENTANYL CITRATE 100 MCG: 50 INJECTION INTRAMUSCULAR; INTRAVENOUS at 21:59

## 2017-11-29 RX ADMIN — AMLODIPINE BESYLATE 10 MG: 10 TABLET ORAL at 08:12

## 2017-11-29 RX ADMIN — FAMOTIDINE 20 MG: 20 TABLET, FILM COATED ORAL at 08:12

## 2017-11-29 RX ADMIN — HYDRALAZINE HYDROCHLORIDE 25 MG: 25 TABLET, FILM COATED ORAL at 13:32

## 2017-11-29 RX ADMIN — FAMOTIDINE 20 MG: 20 TABLET, FILM COATED ORAL at 22:01

## 2017-11-29 RX ADMIN — Medication 10 ML: at 08:12

## 2017-11-29 RX ADMIN — INSULIN LISPRO 3 UNITS: 100 INJECTION, SOLUTION INTRAVENOUS; SUBCUTANEOUS at 16:27

## 2017-11-29 RX ADMIN — SILVER SULFADIAZINE: 10 CREAM TOPICAL at 10:26

## 2017-11-29 RX ADMIN — HEPARIN SODIUM 5000 UNITS: 5000 INJECTION, SOLUTION INTRAVENOUS; SUBCUTANEOUS at 13:32

## 2017-11-29 RX ADMIN — CARVEDILOL 25 MG: 25 TABLET, FILM COATED ORAL at 08:12

## 2017-11-29 ASSESSMENT — PULMONARY FUNCTION TESTS
PIF_VALUE: 28
PIF_VALUE: 18
PIF_VALUE: 18
PIF_VALUE: 12
PIF_VALUE: 11
PIF_VALUE: 23
PIF_VALUE: 12
PIF_VALUE: 25
PIF_VALUE: 12
PIF_VALUE: 20
PIF_VALUE: 12
PIF_VALUE: 11

## 2017-11-29 NOTE — PROGRESS NOTES
[address] [id number] Contin[facility] Nurse  Consult Note       NAME:  Jeni Ruiz  MEDICAL RECORD NUMBER:  [id number]  AGE: 69 y.o.    GENDER: female  : 1950  TODAY'S DATE:  2017    Subjective:     Reason for WOCN Evaluation and Assessment: healing pressure and friction injuries      Jeni Ruiz is a 69 y.o. female referred by:   [] Physician  [] Nursing  [] Other:     Wound Identification:  Wound Type: pressure and traumatic  Contributing Factors: diabetes, chronic pressure, decreased mobility, shear force, obesity, incontinence of stool, incontinence of urine and low albumin, excessive moisture        PAST MEDICAL HISTORY        Diagnosis Date    Depression     Heart murmur     HTN (hypertension)        PAST SURGICAL HISTORY    Past Surgical History:   Procedure Laterality Date    OTHER SURGICAL HISTORY      bump under skin on buttocks    TUBAL LIGATION         FAMILY HISTORY    Family History   Problem Relation Age of Onset    Heart Disease Paternal Grandmother    [facility] Diabetes Father     Hypertension Father     Stroke Father     Diabetes Mother     Hypertension Mother     Breast Cancer Mother     Asthma Brother     Cancer Sister      lung    Cancer Maternal Uncle      bone    Cancer Maternal Aunt        SOCIAL HISTORY    Social History   Substance Use Topics    Smoking status: Not on file    Smokeless tobacco: Not on file    Alcohol use Not on file       ALLERGIES    Allergies   Allergen Reactions    Pcn [Penicillins]            Objective:      BP (!) 165/74   Pulse 81   Temp 99.1 °F (37.3 °C) (Core)   Resp 23   Ht 5' 9\" (1.753 m)   Wt 266 lb 5.1 oz (120.8 kg)   SpO2 99%   BMI 39.33 kg/m²   Davion Risk Score: Davion Scale Score: 13    LABS    CBC:   Lab Results   Component Value Date    WBC 8.6 2017    RBC 2.86 2017    HGB 8.4 2017     CMP:  Albumin:    Lab Results   Component Value Date    LABALBU 2.3 2017     PT/INR:  No results found for: PROTIME, INR  HgBA1c:    Lab Results   Component Value Date    LABA1C 7.5 11/19/2017     PTT: No components found for: LABPTT      Assessment:     Patient Active Problem List   Diagnosis    HTN (hypertension)    Heart murmur    Depression    Encephalopathy    Altered mental status    Rhabdomyolysis    Acute kidney injury ([facility] [address]    Hyperkalemia    Acute renal failure (HCC)    Morbid obesity (HCC)    Acute metabolic encephalopathy    Acute respiratory failure with hypoxia (HCC)    Abrasions of multiple sites    Neutrophilic leukocytosis    Infected open wound       Measurements:   11/29/17 1145   Wound 11/18/17 Other (Comment) Axilla Inner;Right   Date First Assessed/Time First Assessed: 11/18/17 1700   Wound Type: (c) Other (Comment)  Wound Event: Trauma  Location: Axilla  Wound Location Orientation: Inner;Right  Pre-existing: Yes  Photo Taken: (c) Yes   Dressing Status Changed   Dressing Changed Changed/New   Dressing/Treatment Dry dressing;Silver Sulfadiazine   Wound Cleansed Rinsed/Irrigated with saline; Wound cleanser   Dressing Change Due 11/30/17   Wound Assessment Epithelialization;Pink;Red   Drainage Amount None   Odor None   Wound 11/18/17 Other (Comment) Axilla Inner;Left   Date First Assessed/Time First Assessed: 11/18/17 1700   Wound Type: (c) Other (Comment)  Wound Event: Trauma  Location: Axilla  Wound Location Orientation: Inner;Left  Pre-existing: Yes  Photo Taken: (c) Yes   Dressing Status Changed   Dressing Changed Changed/New   Dressing/Treatment Dry dressing   Wound Cleansed Rinsed/Irrigated with saline; Wound cleanser   Wound Assessment Clean;Dry;Epithelialization   Drainage Amount None   Odor None   Wound 11/18/17 Other (Comment) Breast Right   Date First Assessed/Time First Assessed: 11/18/17 1700   Wound Type: (c) Other (Comment)  Wound Event: Trauma  Location: Breast  Wound Location Orientation: Right  Pre-existing: Yes  Photo Taken: (c) Yes   Dressing Status Changed   Dressing Changed Changed/New   Dressing/Treatment Dry dressing;Silver Sulfadiazine   Wound Cleansed Rinsed/Irrigated with saline; Wound cleanser   Dressing Change Due 11/30/17   Wound Assessment Clean;Bleeding;Pink   Drainage Amount Small   Drainage Description Sanguinous   Odor None   Echelon%Wound Bed 100   Wound 11/18/17 Other (Comment) Breast Left   Date First Assessed/Time First Assessed: 11/18/17 1700   Wound Type: (c) Other (Comment)  Wound Event: Trauma  Location: Breast  Wound Location Orientation: Left  Pre-existing: Yes  Photo Taken: (c) Yes   Wound Type Wound   Dressing Status Changed   Dressing Changed Changed/New   Dressing/Treatment Silver Sulfadiazine   Wound Cleansed Rinsed/Irrigated with saline; Wound cleanser   Dressing Change Due 11/30/17   Wound Assessment Clean;Pink   Drainage Amount Small   Drainage Description Sanguinous   Odor None   Echelon%Wound Bed 100   Wound 11/18/17 Other (Comment) Abdomen Right; Lower;Quadrant linear in shape    Date First Assessed/Time First Assessed: 11/18/17 1700   Wound Type: (c) Other (Comment)  Wound Event: Trauma  Location: Abdomen  Wound Location Orientation: Right; Lower;Quadrant  Wound Description (Comments): linear in shape   Pre-existing: Yes  Phot. .. Wound Type Wound   Dressing Status Clean;Dry   Dressing/Treatment Silver dressing  (Interdry cloth to pannus fold)   Wound Cleansed Wound cleanser   Wound Assessment Dry;Brown;Pink   Drainage Amount None   Odor None   Wound 11/18/17 Other (Comment) Thigh Mid;Medial;Right linear in shape    Date First Assessed/Time First Assessed: 11/18/17 1700   Wound Type: (c) Other (Comment)  Wound Event: Trauma  Location: Thigh  Wound Location Orientation: Mid;Medial;Right  Wound Description (Comments): linear in shape   Pre-existing: Yes  Photo Take. ..    Wound Type Wound   Dressing/Treatment Silver dressing  (interdry silver impregnated cloth over area)   Wound Assessment Dry;Pink   Drainage Amount None   Odor None   Wound 11/18/17 Other 80   Red%Wound Bed 20     Sacrococcygeal and bilateral heels intact. Purple discolorations mirroring pattern across fold of right AC noted, intact. Right neck fold under collar macerated. Chin intact. Response to treatment:  Poorly tolerated by patient. Patient increasingly agitated as care provided. RN gave medications for pain. Plan:     Plan of Care: Turn every 2 hours  Float heels of of bed with pillows under calves    Use lift sling to reposition patient to minimize potential for shear injury. Routine incontinence care with Juan barrier cloths and zinc oxide cream. Apply zinc oxide cream BID and prn incontinence. Covidien moisture wicking under pads vs cloth backed briefs  Interdry silver impregnated cloths to the abdominal fold and the right lateral neck fold. Mepilex foam to the right buttock wounds. Silvadene under dry gauze to the bilateral chest, right axilla, and right lateral thigh wounds. Change daily. Will plan to add Mepilex Ag Transfer dressings to the clean, pink wounds and stop the silvadene if ok with physician.      Specialty Bed Required : Yes   [] Low Air Loss   [x] Pressure Redistribution  [] Fluid Immersion  [] Bariatric  [] Total Pressure Relief  [] Other:     Discharge Plan:  TBD    Patient/Caregiver Teaching:    [] Indicates understanding       [] Needs reinforcement  [] Unsuccessful      [] Verbal Understanding  [] Demonstrated understanding       [x] No evidence of learning  [] Refused teaching         [] N/A       Electronically signed by Debra Cota RN, CWON on 11/29/2017 at 11:56 AM

## 2017-11-29 NOTE — PROGRESS NOTES
[x] Yes   (Date of Insertion:   )           If yes -     [] Right IJ     [] Left IJ [x] Right Femoral [address] 17       [] Left Femoral 17 central                   [] Right Subclavian   [] Left Subclavian        MUÑOZ'S CATHETER:   [] No              [x] Yes  (Date of Insertion:   )      URINE OUTPUT:            [x] Good                     [] Low              [] Anuric        OBJECTIVE:      VITAL SIGNS:  BP (!) 154/64   Pulse 70   Temp 99 °F (37.2 °C) (Core)   Resp 17   Ht 5' 9\" (1.753 m)   Wt 261 lb 11 oz (118.7 kg)   SpO2 100%   BMI 38.64 kg/m²   Tmax over 24 hours:  Temp (24hrs), Av.6 °F (37 °C), Min:97.9 °F (36.6 °C), Max:99.3 °F (37.4 °C)        Patient Vitals for the past 6 hrs:    BP Temp Temp src Pulse Resp SpO2 Height Weight   17 0600 (!) 154/64 - - 70 17 100 % - -   17 0500 (!) 155/90 - - 64 16 100 % 5' 9\" (1.753 m) 261 lb 11 oz (118.7 kg)   17 0400 (!) 143/67 99 °F (37.2 °C) CORE 60 14 100 % - -   17 0335 - - - 64 13 100 % - -   17 0300 (!) 159/73 - - 74 17 99 % - -   17 0200 (!) 95/41 - - 73 17 100 % - -            Intake/Output Summary (Last 24 hours) at 17 0703  Last data filed at 17 0600    Gross per 24 hour   Intake             2407 ml   Output             1711 ml   Net              696 ml          Wt Readings from Last 2 Encounters:   17 261 lb 11 oz (118.7 kg)      Body mass index is 38.64 kg/m².          PHYSICAL EXAMINATION:  Constitutional: Intubated, off sedation. Morbidly obese. Awake, and following commands  EENT: PERRLA, EOMI, sclera clear, anicteric, trachea in midline. Neck: Supple, symmetrical, trachea midline, no adenopathy,  no jvd, skin normal  Respiratory: clear to auscultation, no wheezes or rales and unlabored breathing.    Cardiovascular: normal S1, S2,  and 2+ pulses throughout  Abdomen: soft, nontender, nondistended, no masses or organomegaly  Extremities:  peripheral pulses normal,  pedal Positive (Details:  )      Other pertinent Labs:         Radiology/Imaging:      ASSESSMENT:           Patient Active Problem List     Diagnosis Date Noted    Infected open wound      Abrasions of multiple sites      Neutrophilic leukocytosis      Acute metabolic encephalopathy      Acute respiratory failure with hypoxia (HCC)      Morbid obesity ([address] 11/19/2017    Encephalopathy 11/18/2017    Altered mental status 11/18/2017    Rhabdomyolysis 11/18/2017    Acute kidney injury ([address] 11/18/2017    Hyperkalemia 11/18/2017    Acute renal failure (HCC)      HTN (hypertension)      Heart murmur      Depression           Additional assessment:     Principal Problem:    Altered mental status  Active Problems:    Encephalopathy    Rhabdomyolysis    Acute kidney injury ([address]    Hyperkalemia    Acute renal failure (HCC)    Morbid obesity (HCC)    Acute metabolic encephalopathy    Acute respiratory failure with hypoxia (HCC)    Abrasions of multiple sites    Neutrophilic leukocytosis    Infected open wound        PLAN:      WEAN PER PROTOCOL:                  [] No                   [x] Yes                 [] N/A     DISCONTINUE ANY LABS:              [x] No                   [] Yes     ICU PROPHYLAXIS:  Stress ulcer:  [] PPI Agent                   [x] [id number]          [] Sucralfate                   [] Other:  VTE:                [] Enoxaparin                 [x] Unfract.  Heparin Subcut                    [] EPC Cuffs     NUTRITION:  [] NPO    [x] Tube Feeding (Specify: diabetic and renal ) [] TPN                [] PO (Diet: DIET TUBE FEED CONTINUOUS/CYCLIC NPO; Diabetic; 72; 24)     HOME MEDICATIONS RECONCILED:        [x] No                   [] Yes     INSULIN DRIP:                                   [x] No                               [] Yes     CONSULTATION NEEDED:  [x] No                               [] Yes     FAMILY UPDATED:                           [] No                               [x]

## 2017-11-29 NOTE — PROGRESS NOTES
[facility] - Progress Note    Today's Date and Time: 11/29/2017, 7:54 AM    Impression :   1. Encephalopathy  2. Acute respiratory failure. Mechanical ventilation  3. SUSAN  4. Multiple skin abrasions  5. Reactive leukocytosis  6. Hypernatremia    Recommendations     · Monitor off antibiotics  · Wound care  · Air mattress with pressure redistribution    Infection Control Recommendations   [place] precautions    Antimicrobial Stewardship Recommendations     · Avoid penicillins  Chief complaint/reason for consultation:     · Infected wounds  History of Present Illness:     INITIAL HISTORY:    Ashley Redman is a 69y.o.-year-old  female who was initially admitted on 11/18/2017. Patient seen at the request of Alec Mireles. Patient with past Hx of HPTN, depression. Patient presented through ER after being found down by son after a 24 hr period. Patient reportedly had not been feeling well for about a month. She has slowed down her activities although she had remained leaving independently. Reportedly had a fall a month PTA, for which she had not sought help, and was afraid of falling down again; hence the reduction of her activities. In the ER she was found to have altered mentation, rhabdomyolysis, SUSAN, hyperkalemia, hyperglycemia , multiple skin abrasions, hypotension, acute respiratory failure, metabolic acidosis with increased anion gap secondary to lactic acidosis and acute kidney injury. Patient required mechanical ventilation, management of SUSAN and of hypotension. More recently patient has manifested underlying HPTN. ID asked to evaluate because of skin injuries and concern with risk of secondary infection. CURRENT EVALUATION :11/29/2017     The patient seen and evaluated at bedside. He is doing well with no acute issues or new concerns today. Efforts continue to try to wean him off the ventilator.     Afebrile  VS stable  Weaning during daytime. Ventilator support at night. Patient follows intermittent commands. Remains encephalopathic for the most part, although improvement has occurred. Pulmonary does not anticipate extubation before more improvement in mentation. Secretions whitish, mucoid. Wounds are improving. Wounds have healed for the most part although there are small areas with residual openings. No overt infections. WBC 23-->13.4 -13.1-->10.2-->8.6  Creat 2.37--> 1.57 --->0.85  Na 155--> 153-->143    Patient overall improved. Discussed with CC, RN. I have personally reviewed the past medical history, past surgical history, medications, social history, and family history, and I have updated the database accordingly. Past Medical History:     Past Medical History:   Diagnosis Date    Depression     Heart murmur     HTN (hypertension)        Past Surgical  History:     Past Surgical History:   Procedure Laterality Date    OTHER SURGICAL HISTORY      bump under skin on buttocks    TUBAL LIGATION         Medications:      famotidine  20 mg Per NG tube BID    carvedilol  25 mg Oral BID WC    insulin glargine  20 Units Subcutaneous Nightly    cloNIDine  1 patch Transdermal Weekly    amLODIPine  10 mg Oral Daily    silver sulfADIAZINE   Topical Daily    sodium chloride flush  10 mL Intravenous 2 times per day    heparin (porcine)  5,000 Units Subcutaneous 3 times per day    insulin lispro  0-18 Units Subcutaneous Q6H       Social History:     Social History     Social History    Marital status:      Spouse name: N/A    Number of children: N/A    Years of education: N/A     Occupational History    Not on file.      Social History Main Topics    Smoking status: Not on file    Smokeless tobacco: Not on file    Alcohol use Not on file    Drug use: Unknown    Sexual activity: Not on file     Other Topics Concern    Not on file     Social History Narrative    No narrative on file       Family History: Fortunately and not other skin lesions have developed cellulitis. Wounds healing well. Medical Decision Making:   I have independently reviewed/ordered the following labs:  11/20/2017  7:29 AM - Rodríguez Shelton Incoming Lab Results From [facility]     Component Results     Component Collected Lab   Specimen Description 11/18/2017  5:06  [address]   . BLOOD    Special Requests 11/18/2017  5:06  [address]   right hand 7ml    Culture 11/18/2017  5:06  [address]   NO GROWTH 2 DAYS        CBC with Differential:   Recent Labs      11/27/17   0356  11/29/17   0505   WBC  10.2  8.6   HGB  8.1*  8.4*   HCT  26.7*  27.8*   PLT  209  212   LYMPHOPCT  20*  23*   MONOPCT  6  8     BMP:   Recent Labs      11/27/17   0356  11/29/17   0505   NA  145*  143   K  4.9  4.5   CL  110*  107   CO2  27  27   BUN  31*  24*   CREATININE  0.82  0.85     Hepatic Function Panel:   Recent Labs      11/27/17   0356  11/29/17   0505   PROT  6.0*  6.1*   LABALBU  2.2*  2.3*   BILITOT  <0.10*  0.22*   ALKPHOS  97  91   ALT  68*  43*   AST  48*  25     No results for input(s): RPR in the last 72 hours. No results for input(s): HIV in the last 72 hours. No results for input(s): BC in the last 72 hours. Lab Results   Component Value Date    MUCUS NOT REPORTED 11/18/2017    RBC 2.86 11/29/2017    TRICHOMONAS NOT REPORTED 11/18/2017    WBC 8.6 11/29/2017    YEAST NOT REPORTED 11/18/2017    TURBIDITY CLEAR 11/18/2017     Lab Results   Component Value Date    CREATININE 0.85 11/29/2017    GLUCOSE 131 11/29/2017     Thank you for allowing us to participate in the care of this patient. Please call with questions.     Vinita Ma MD  Pager: [phone] - Office: [phone]

## 2017-11-29 NOTE — PROGRESS NOTES
[facility]  Occupational Therapy Not Seen Note    Patient not available for Occupational Therapy due to:    [] Testing:    [] Hemodialysis    [] Blood Transfusion in Progress    []Refusal by Patient:    [] Surgery/Procedure:    [x] Strict Bedrest    [] Sedation    [x] Spine Precautions: CTLS     [] Pt being transferred to palliative care at this time. Spoke with pt/family and OT services to be defered. [] Pt independent with functional mobility and functional tasks.  Pt with no OT acute care needs at this time, will defer OT eval.    [] Other     Next Scheduled Treatment: [facility] 11/30    Signature: Electronically signed by RAY Arana on 11/29/2017 at 9:22 AM

## 2017-11-30 LAB
GLUCOSE BLD-MCNC: 117 MG/DL (ref 65–105)
GLUCOSE BLD-MCNC: 120 MG/DL (ref 65–105)
GLUCOSE BLD-MCNC: 128 MG/DL (ref 65–105)
GLUCOSE BLD-MCNC: 129 MG/DL (ref 65–105)
GLUCOSE BLD-MCNC: 95 MG/DL (ref 65–105)

## 2017-11-30 PROCEDURE — 6370000000 HC RX 637 (ALT 250 FOR IP): Performed by: EMERGENCY MEDICINE

## 2017-11-30 PROCEDURE — G8978 MOBILITY CURRENT STATUS: HCPCS

## 2017-11-30 PROCEDURE — 6360000002 HC RX W HCPCS: Performed by: INTERNAL MEDICINE

## 2017-11-30 PROCEDURE — 2000000000 HC ICU R&B

## 2017-11-30 PROCEDURE — 94003 VENT MGMT INPAT SUBQ DAY: CPT

## 2017-11-30 PROCEDURE — 99233 SBSQ HOSP IP/OBS HIGH 50: CPT | Performed by: INTERNAL MEDICINE

## 2017-11-30 PROCEDURE — 76937 US GUIDE VASCULAR ACCESS: CPT

## 2017-11-30 PROCEDURE — 97161 PT EVAL LOW COMPLEX 20 MIN: CPT

## 2017-11-30 PROCEDURE — 97110 THERAPEUTIC EXERCISES: CPT

## 2017-11-30 PROCEDURE — 99211 OFF/OP EST MAY X REQ PHY/QHP: CPT

## 2017-11-30 PROCEDURE — 94770 HC ETCO2 MONITOR DAILY: CPT

## 2017-11-30 PROCEDURE — 2580000003 HC RX 258: Performed by: HOSPITALIST

## 2017-11-30 PROCEDURE — 6370000000 HC RX 637 (ALT 250 FOR IP): Performed by: INTERNAL MEDICINE

## 2017-11-30 PROCEDURE — G8979 MOBILITY GOAL STATUS: HCPCS

## 2017-11-30 PROCEDURE — 99291 CRITICAL CARE FIRST HOUR: CPT | Performed by: INTERNAL MEDICINE

## 2017-11-30 PROCEDURE — 6370000000 HC RX 637 (ALT 250 FOR IP): Performed by: HOSPITALIST

## 2017-11-30 PROCEDURE — 6360000002 HC RX W HCPCS: Performed by: HOSPITALIST

## 2017-11-30 PROCEDURE — 94762 N-INVAS EAR/PLS OXIMTRY CONT: CPT

## 2017-11-30 PROCEDURE — 82947 ASSAY GLUCOSE BLOOD QUANT: CPT

## 2017-11-30 RX ADMIN — FENTANYL CITRATE 100 MCG: 50 INJECTION INTRAMUSCULAR; INTRAVENOUS at 11:56

## 2017-11-30 RX ADMIN — HEPARIN SODIUM 5000 UNITS: 5000 INJECTION, SOLUTION INTRAVENOUS; SUBCUTANEOUS at 14:04

## 2017-11-30 RX ADMIN — CARVEDILOL 25 MG: 25 TABLET, FILM COATED ORAL at 09:15

## 2017-11-30 RX ADMIN — HYDRALAZINE HYDROCHLORIDE 25 MG: 25 TABLET, FILM COATED ORAL at 06:47

## 2017-11-30 RX ADMIN — FENTANYL CITRATE 100 MCG: 50 INJECTION INTRAMUSCULAR; INTRAVENOUS at 21:42

## 2017-11-30 RX ADMIN — FAMOTIDINE 20 MG: 20 TABLET, FILM COATED ORAL at 09:16

## 2017-11-30 RX ADMIN — HYDRALAZINE HYDROCHLORIDE 25 MG: 25 TABLET, FILM COATED ORAL at 14:03

## 2017-11-30 RX ADMIN — Medication 10 ML: at 20:09

## 2017-11-30 RX ADMIN — FAMOTIDINE 20 MG: 20 TABLET, FILM COATED ORAL at 20:09

## 2017-11-30 RX ADMIN — HYDRALAZINE HYDROCHLORIDE 25 MG: 25 TABLET, FILM COATED ORAL at 22:20

## 2017-11-30 RX ADMIN — HEPARIN SODIUM 5000 UNITS: 5000 INJECTION, SOLUTION INTRAVENOUS; SUBCUTANEOUS at 06:47

## 2017-11-30 RX ADMIN — Medication 10 ML: at 09:17

## 2017-11-30 RX ADMIN — OXYCODONE HYDROCHLORIDE AND ACETAMINOPHEN 1 TABLET: 5; 325 TABLET ORAL at 20:10

## 2017-11-30 RX ADMIN — CARVEDILOL 25 MG: 25 TABLET, FILM COATED ORAL at 17:12

## 2017-11-30 RX ADMIN — FENTANYL CITRATE 100 MCG: 50 INJECTION INTRAMUSCULAR; INTRAVENOUS at 17:13

## 2017-11-30 RX ADMIN — AMLODIPINE BESYLATE 10 MG: 10 TABLET ORAL at 09:13

## 2017-11-30 RX ADMIN — INSULIN GLARGINE 20 UNITS: 100 INJECTION, SOLUTION SUBCUTANEOUS at 20:11

## 2017-11-30 RX ADMIN — HEPARIN SODIUM 5000 UNITS: 5000 INJECTION, SOLUTION INTRAVENOUS; SUBCUTANEOUS at 22:20

## 2017-11-30 ASSESSMENT — PULMONARY FUNCTION TESTS
PIF_VALUE: 30
PIF_VALUE: 26
PIF_VALUE: 12
PIF_VALUE: 23
PIF_VALUE: 12
PIF_VALUE: 29
PIF_VALUE: 11
PIF_VALUE: 23
PIF_VALUE: 12

## 2017-11-30 ASSESSMENT — PAIN SCALES - GENERAL
PAINLEVEL_OUTOF10: 4
PAINLEVEL_OUTOF10: 2
PAINLEVEL_OUTOF10: 7

## 2017-11-30 ASSESSMENT — PAIN SCALES - WONG BAKER: WONGBAKER_NUMERICALRESPONSE: 0

## 2017-11-30 NOTE — CONSULTS
Palliative Care Inpatient Consult    NAME:  Anibal Quevedo  MEDICAL RECORD NUMBER:  [id number]  AGE: 69 y.o. GENDER: female  : 1950  TODAY'S DATE:  2017    Reason for Consult:  goals of care and family support  History of Present Illness     The patient is a 69 y.o. Non-/non  female who presents with encephalopathy. Patient was found down at home by son Julissa Seals on . Patient was intubated in ED for altered mental status. Patient follows commands intermittently, currently on no sedation. SBT in place currently, however, improvement in mentation needed before attempted extubation. Trach and PEG to be discussed with family. Referred to Palliative Care by  [] Physician   [x] Nursing  [] Family Request   [] Other:      She was admitted to the critical care service for Altered mental status [R41.82]. Her hospital course has been associated with acute respiratory failure and mechanical ventilation. The patient has a complicated medical history and has been hospitalized since 2017 11:20 AM.    Active Hospital Problems    Diagnosis Date Noted    Infected open wound [T14. 8XXA, L08.9]     Abrasions of multiple sites [T07. XXXA]     Neutrophilic leukocytosis [Z30.7]     Acute metabolic encephalopathy [J81.12]     Acute respiratory failure with hypoxia (HCC) [J96.01]     Morbid obesity ([address] [E66.01] 2017    Encephalopathy [G93.40] 2017    Altered mental status [R41.82] 2017    Rhabdomyolysis [M62.82] 2017    Acute kidney injury ([address] [N17.9] 2017    Hyperkalemia [E87.5] 2017    Acute renal failure ([address] [N17.9]        PAST MEDICAL HISTORY      Diagnosis Date    Depression     Heart murmur     HTN (hypertension)        PAST SURGICAL HISTORY  Past Surgical History:   Procedure Laterality Date    OTHER SURGICAL HISTORY      bump under skin on buttocks    TUBAL LIGATION         SOCIAL HISTORY  Social History   Substance Use Topics    Smoking status: Not on file    Smokeless tobacco: Not on file    Alcohol use Not on file       ALLERGIES  Allergies   Allergen Reactions    Pcn [Penicillins]          MEDICATIONS  Current Medications    hydrALAZINE  25 mg Oral 3 times per day    famotidine  20 mg Per NG tube BID    carvedilol  25 mg Oral BID WC    insulin glargine  20 Units Subcutaneous Nightly    cloNIDine  1 patch Transdermal Weekly    amLODIPine  10 mg Oral Daily    silver sulfADIAZINE   Topical Daily    sodium chloride flush  10 mL Intravenous 2 times per day    heparin (porcine)  5,000 Units Subcutaneous 3 times per day    insulin lispro  0-18 Units Subcutaneous Q6H     hydrALAZINE, labetalol, neomycin-bacitracin-polymyxin, oxyCODONE-acetaminophen, fentanNYL, fentanNYL, sodium chloride flush, acetaminophen, magnesium hydroxide, ondansetron, albuterol sulfate HFA, glucose, dextrose, glucagon (rDNA), dextrose  IV Drips/Infusions   dextrose       Home Medications  No current facility-administered medications on file prior to encounter. No current outpatient prescriptions on file prior to encounter. Data         /68   Pulse 61   Temp 99 °F (37.2 °C) (Oral)   Resp 14   Ht 5' 9\" (1.753 m)   Wt 263 lb 10.7 oz (119.6 kg)   SpO2 99%   BMI 38.94 kg/m²     Wt Readings from Last 3 Encounters:   11/30/17 263 lb 10.7 oz (119.6 kg)        Code Status: Full Code     ADVANCED CARE PLANNING:  Patient has capacity for medical decisions: no  Health Care Power of : no  Living Will: no     Personal, Social, and Family History  Marital Status:   Living situation:alone  Importance of genevieve/Pentecostal/spiritual beliefs: ? Very ? Somewhat ?  Not   Psychological Distress: unable to assess due to medical condition  Does patient understand diagnosis/treatment? not asked  Does caregiver understand diagnosis/treatment? writer spoke with daughter Caridad Farrell who expressed understanding      Assessment        REVIEW OF SYSTEMS    [x]   UNABLE TO OBTAIN due to medical condition      Palliative Performance Scale:  ___60%  Ambulation reduced; Significant disease; Can't do hobbies/housework; intake normal or reduced; occasional assist; LOC full/confusion  ___50%  Mainly sit/lie; Extensive disease; Can't do any work; Considerable assist; intake normal or reduced; LOC full/confusion  ___40%  Mainly in bed; Extensive disease; Mainly assist; intake normal or reduced; LOC full/confusion   _X_30%  Bed Bound; Extensive disease; Total care; intake reduced; LOCfull/confusion  ___20%  Bed Bound; Extensive disease; Total care; intake minimal; Drowsy/coma  ___10%  Bed Bound; Extensive disease; Total care; Mouth care only; Drowsy/coma  ___0       Death      Plan      Palliative Interaction:  Chart reviewed and update received from bedside RN. Patient's son, Donald Lynch,  was contacted yesterday by critical care team to discuss possible trach and peg placement. Per chart son Donald Lynch refused any surgical procedures and requested some more time before proceeding with any surgical intervention. Bedside RN stated that family conference is planned for tomorrow at [address] [place] [address],[address] spoke with patient's daughter Corrina Banks in ICU waiting area. Corrina Letremaine tearful as she talks about patient's medical condition. Per Corrina Banks patient has 4 biological children; she and brother Donald Lynch are the youngest.  Corrina Banks has been attempting to contact the other children but has not received a response. Corrina Banks expressed that she feels/thinks that \"it is up to her to fight and wake up\" and she is not sure if patient \"has thrown in the towel\". Corrina Banks stated she is unsure about trach and peg placement; on one hand she would like to give her mom the opportunity to wake up more and on the other hand she does not want to make her suffer. She stated her mom has \"been like a shut in for awhile\".   Corrina Banks expressed feelings of guilt related to this current admission, stated she has

## 2017-11-30 NOTE — PROGRESS NOTES
Screening  Patient Currently in Pain: No  Pain Assessment  Pain Assessment: Faces  Correia-Waterman Pain Rating: No hurt  Vital Signs  Patient Currently in Pain: No       Orientation  Orientation  Overall Orientation Status: Impaired    Social/Functional History  Social/Functional History  Lives With: Alone  Type of Home: House  Home Layout: One level  ADL Assistance: Independent  Homemaking Assistance: Independent  Ambulation Assistance: Independent  Transfer Assistance: Independent  Additional Comments: Above information from the  note. Objective    PROM RLE (degrees)  RLE PROM: WFL  RLE General PROM: Within spine precautions  PROM LLE (degrees)  LLE PROM: WFL  LLE General PROM: Within spine precautions  PROM RUE (degrees)  RUE PROM: WFL  RUE General PROM: Within spine precautions  PROM LUE (degrees)  LUE PROM: WFL  LUE General PROM: Within spine precautions  Strength Other  Other: Observed patient moving BUE R>L     Sensation  Overall Sensation Status:  (SARAH)   PROM to all extremities x 10 reps  Bilateral gastroc stretch    Assessment   Body structures, Functions, Activity limitations: Decreased functional mobility ; Decreased cognition  Activity Tolerance  Activity Tolerance: Patient Tolerated treatment well  Activity Tolerance: No change in Vitals.      Plan   Plan  Times per week: 2-3x/week  Safety Devices  Type of devices: Nurse notified, Left in bed    G-Code  PT G-Codes  Functional Assessment Tool Used: [place] Tool  Score: 0  Functional Limitation: Mobility: Walking and moving around  Mobility: Walking and Moving Around Current Status (): 100 percent impaired, limited or restricted  Mobility: Walking and Moving Around Goal Status (): 100 percent impaired, limited or restricted    Goals  Short term goals  Time Frame for Short term goals: 14 visits  Short term goal 1: Prevent contractures through ROM and stretching  Short term goal 2: Pt to follow some caommands for AROM  Short term goal 3:

## 2017-11-30 NOTE — FLOWSHEET NOTE
VISIT: Patient was intubated and unconscious. Family was not present. INTERVENTION: Spiritual presence. Spoke words of comfort and hope. Gave a blessing. PLAN: Chaplains available for on-going emotional and/or spiritual support.        11/30/17 0874   Encounter Summary   Services provided to: Patient   Referral/Consult From: Rd   Complexity of Encounter Low   Length of Encounter 15 minutes   Routine   Type Follow up   Assessment Unable to respond   Intervention [place]   Spiritual/Worship   Type Spiritual support

## 2017-11-30 NOTE — PROGRESS NOTES
Critical Care Team - Daily Progress Note        Date and time: 11/28/2017 7:03 AM  Patient's name:  Coty Gaviria Record Number: [id number]  Patient's account/billing number: [de-identified]  Patient's YOB: 1950  Age: 69 y.o. Date of Admission: 11/18/2017 11:20 AM  Length of stay during current admission: 10        Primary Care Physician: No primary care provider on file. ICU Attending Physician: Dr. Edgar Cole     Code Status: Full Code     Reason for ICU admission: encephalopathy        SUBJECTIVE:      OVERNIGHT EVENTS:  Patient seen and examined at bedside. Discharge reviewed. No acute events overnight. No new issues in the morning. Patient has been weaning well this am.  She follows commands intermittently. She was slightly weaker in the left upper extremity for the writer. Tmax 99.9, off antibiotics. Hemodynamically stable.    Will continue current management.        AWAKE & FOLLOWING COMMANDS:                   [] No                   [x] Yes (intermittently)     CURRENT VENTILATION STATUS:      [x] Ventilator       [] BIPAP            [] Nasal Cannula           [] Room Air       IF INTUBATED, ET TUBE MARKING AT LOWER LIP:       cms     SECRETIONS Amount:         [x] Small  [] Moderate                    [] Large  [] None               Color:              [x] White  [] Colored                      [] Bloody     SEDATION:    RAAS Score:  [] Propofol gtt                 [] Versed gtt                   [] Ativan gtt                    [] No Sedation     PARALYZED:             [x] No                               [x] Yes     DIARRHEA:                [x] No                              [] Yes  (C. Difficile status: [] positive                                                                                                                       [] negative                                                                                                                     [] pending)     VASOPRESSORS:    [x] No                               [] Yes    If yes -   [] Levophed            [] Dopamine             [] Vasopressin       [] Dobutamine  [] Phenylephrine         [] Epinephrine     CENTRAL LINES:     [] No                   [x] Yes   (Date of Insertion:   )           If yes -     [] Right IJ     [] Left IJ [x] Right Femoral [address]       [] Left Femoral 17 central                   [] Right Subclavian   [] Left Subclavian        MUÑOZ'S CATHETER:   [] No              [x] Yes  (Date of Insertion:   )      URINE OUTPUT:            [x] Good                     [] Low              [] Anuric        OBJECTIVE:      VITAL SIGNS:  BP (!) 154/64   Pulse 70   Temp 99 °F (37.2 °C) (Core)   Resp 17   Ht 5' 9\" (1.753 m)   Wt 261 lb 11 oz (118.7 kg)   SpO2 100%   BMI 38.64 kg/m²   Tmax over 24 hours:  Temp (24hrs), Av.6 °F (37 °C), Min:97.9 °F (36.6 °C), Max:99.3 °F (37.4 °C)        Patient Vitals for the past 6 hrs:    BP Temp Temp src Pulse Resp SpO2 Height Weight   17 0600 (!) 154/64 - - 70 17 100 % - -   17 0500 (!) 155/90 - - 64 16 100 % 5' 9\" (1.753 m) 261 lb 11 oz (118.7 kg)   17 0400 (!) 143/67 99 °F (37.2 °C) CORE 60 14 100 % - -   17 0335 - - - 64 13 100 % - -   17 0300 (!) 159/73 - - 74 17 99 % - -   17 0200 (!) 95/41 - - 73 17 100 % - -            Intake/Output Summary (Last 24 hours) at 17 0703  Last data filed at 17 0600    Gross per 24 hour   Intake             2407 ml   Output             1711 ml   Net              696 ml          Wt Readings from Last 2 Encounters:   17 261 lb 11 oz (118.7 kg)      Body mass index is 38.64 kg/m².          PHYSICAL EXAMINATION:  Constitutional: Intubated, off sedation. Morbidly obese. Awake, and following commands  EENT: PERRLA, EOMI, sclera clear, anicteric, trachea in midline.   Neck: Supple, symmetrical, trachea midline, no adenopathy,  no jvd, skin [] None drawn      [x] Negative             []  Positive (Details:  )  Sputum Culture:               [] None drawn       [] Negative             []  Positive (Details:  )   Endotracheal aspirate:     [] None drawn       [] Negative             []  Positive (Details:  )      Other pertinent Labs:         Radiology/Imaging:      ASSESSMENT:           Patient Active Problem List     Diagnosis Date Noted    Infected open wound      Abrasions of multiple sites      Neutrophilic leukocytosis      Acute metabolic encephalopathy      Acute respiratory failure with hypoxia (HCC)      Morbid obesity ([facility] 11/19/2017    Encephalopathy 11/18/2017    Altered mental status 11/18/2017    Rhabdomyolysis 11/18/2017    Acute kidney injury ([facility] 11/18/2017    Hyperkalemia 11/18/2017    Acute renal failure (HCC)      HTN (hypertension)      Heart murmur      Depression           Additional assessment:     Principal Problem:    Altered mental status  Active Problems:    Encephalopathy    Rhabdomyolysis    Acute kidney injury ([facility]    Hyperkalemia    Acute renal failure (HCC)    Morbid obesity (HCC)    Acute metabolic encephalopathy    Acute respiratory failure with hypoxia (HCC)    Abrasions of multiple sites    Neutrophilic leukocytosis    Infected open wound        PLAN:      WEAN PER PROTOCOL:                  [] No                   [x] Yes                 [] N/A     DISCONTINUE ANY LABS:              [x] No                   [] Yes     ICU PROPHYLAXIS:  Stress ulcer:  [] PPI Agent                   [x] [id number]          [] Sucralfate                   [] Other:  VTE:                [] Enoxaparin                 [x] Unfract.  Heparin Subcut                    [] EPC Cuffs     NUTRITION:  [] NPO    [x] Tube Feeding (Specify: diabetic and renal ) [] TPN                [] PO (Diet: DIET TUBE FEED CONTINUOUS/CYCLIC NPO; Diabetic; 72; 24)     HOME MEDICATIONS RECONCILED:        [x] No                   [] the phone.  She states that she can be here around 11 AM. But she says the other 2 siblings haven't yet responded to her messages and the information that has been provided to them about their mother, but she will try and reach them again.         Aurelia Serrato MD  PGY-1, Internal Medicine  [facility]

## 2017-11-30 NOTE — PLAN OF CARE
Problem: OXYGENATION/RESPIRATORY FUNCTION  Goal: Patient will maintain patent airway  Outcome: Ongoing    Goal: Patient will achieve/maintain normal respiratory rate/effort  Respiratory rate and effort will be within normal limits for the patient   Outcome: Ongoing      Problem: MECHANICAL VENTILATION  Goal: Patient will maintain patent airway  Outcome: Ongoing    Goal: Oral health is maintained or improved  Outcome: Ongoing    Goal: ET tube will be managed safely  Outcome: Ongoing    Goal: Ability to express needs and understand communication  Outcome: Ongoing    Goal: Mobility/activity is maintained at optimum level for patient  Outcome: Ongoing      Problem: ASPIRATION PRECAUTIONS  Goal: Patient's risk of aspiration is minimized  Outcome: Ongoing      Problem: SKIN INTEGRITY  Goal: Skin integrity is maintained or improved  Outcome: Ongoing

## 2017-11-30 NOTE — PLAN OF CARE
Problem: OXYGENATION/RESPIRATORY FUNCTION  Goal: Patient will maintain patent airway  Outcome: Ongoing    Goal: Patient will achieve/maintain normal respiratory rate/effort  Respiratory rate and effort will be within normal limits for the patient   Outcome: Ongoing      Problem: MECHANICAL VENTILATION  Goal: Patient will maintain patent airway  Outcome: Ongoing    Goal: Oral health is maintained or improved  Outcome: Ongoing    Goal: ET tube will be managed safely  Outcome: Ongoing      Problem: RESPIRATORY  Intervention: Respiratory assessment  Ventilator Bronchodilator assessment    Breath sounds: rhonchi  Inspiratory Pressure: 20  Plateau Pressure: 19    Patient assessed at level 1          []    Bronchodilator Assessment    BRONCHODILATOR ASSESSMENT SCORE  Score 0 (Home) 1 2 3 4   Breath Sounds   []  Chronic Ventilator: Patient at baseline [x]  Mild Wheezes/ Clear []  Intermittent wheezes with good air entry []  Bilateral/unilateral wheezing with diminished air entry []  Insp/Exp wheeze and/or poor aeration   Ventilator Pressures   []  Chronic Ventilator [x]  Insp. Pressure less than 25 cm H20 [x]  Insp. Pressure less than 25 cm H20 []  Insp. Pressure exceeds 25 cm H20 []  Insp.  Pressure exceeds 30 cm H20   Plateau Pressure []  NA   [x]  Plateau Pressure less than 4  []  Plateau Pressure less than or equal to 5 []  Plateau Pressure greater than or equal to 6 []  Plateau Pressure greater than or equal to 8       TRUNG HENDRICKS  7:59 AM

## 2017-11-30 NOTE — PROGRESS NOTES
[facility] - Progress Note    Today's Date and Time: 11/30/2017, 12:37 PM    Impression :   1. Encephalopathy  2. Acute respiratory failure. Mechanical ventilation  3. SUSAN  4. Multiple skin abrasions  5. Reactive leukocytosis  6. Hypernatremia    Recommendations     · Monitor off antibiotics  · Wound care  · Air mattress with pressure redistribution    Infection Control Recommendations   [place] precautions    Antimicrobial Stewardship Recommendations     · Avoid penicillins  Chief complaint/reason for consultation:     · Infected wounds  History of Present Illness:     INITIAL HISTORY:    Anibal Quevedo is a 69y.o.-year-old  female who was initially admitted on 11/18/2017. Patient seen at the request of Nahid Lam. Patient with past Hx of HPTN, depression. Patient presented through ER after being found down by son after a 24 hr period. Patient reportedly had not been feeling well for about a month. She has slowed down her activities although she had remained leaving independently. Reportedly had a fall a month PTA, for which she had not sought help, and was afraid of falling down again; hence the reduction of her activities. In the ER she was found to have altered mentation, rhabdomyolysis, SUSAN, hyperkalemia, hyperglycemia , multiple skin abrasions, hypotension, acute respiratory failure, metabolic acidosis with increased anion gap secondary to lactic acidosis and acute kidney injury. Patient required mechanical ventilation, management of SUSAN and of hypotension. More recently patient has manifested underlying HPTN. ID asked to evaluate because of skin injuries and concern with risk of secondary infection. CURRENT EVALUATION :11/30/2017     Patient wasexamined and evaluated at the bedside in the intensive care unit. No acute events of the fellow overnight. Patient seems to be quite comfortable.   Effort to continue to wean off the ventilator and the patient is improving in this regard. Afebrile  VS stable    Weaning during daytime. Ventilator support at night. .    Patient follows intermittent commands. Remains encephalopathic for the most part, although improvement has occurred. Pulmonary does not anticipate extubation before more improvement in mentation. Secretions whitish, mucoid. No hemoptysis noted. Wounds are improving. Wounds have healed for the most part although there are small areas with residual openings. No overt infections. WBC 23-->13.4 -13.1-->10.2-->8.6  Creat 2.37--> 1.57 --->0.85  Na 155--> 153-->143    Patient overall improved. Discussed with CC, RN. I have personally reviewed the past medical history, past surgical history, medications, social history, and family history, and I have updated the database accordingly. Past Medical History:     Past Medical History:   Diagnosis Date    Depression     Heart murmur     HTN (hypertension)        Past Surgical  History:     Past Surgical History:   Procedure Laterality Date    OTHER SURGICAL HISTORY      bump under skin on buttocks    TUBAL LIGATION         Medications:      hydrALAZINE  25 mg Oral 3 times per day    famotidine  20 mg Per NG tube BID    carvedilol  25 mg Oral BID WC    insulin glargine  20 Units Subcutaneous Nightly    cloNIDine  1 patch Transdermal Weekly    amLODIPine  10 mg Oral Daily    silver sulfADIAZINE   Topical Daily    sodium chloride flush  10 mL Intravenous 2 times per day    heparin (porcine)  5,000 Units Subcutaneous 3 times per day    insulin lispro  0-18 Units Subcutaneous Q6H       Social History:     Social History     Social History    Marital status:      Spouse name: N/A    Number of children: N/A    Years of education: N/A     Occupational History    Not on file.      Social History Main Topics    Smoking status: Not on file    Smokeless tobacco: Not on file    Alcohol use Not on file   

## 2017-11-30 NOTE — PROGRESS NOTES
Nutrition Assessment    Type and Reason for Visit: Reassess    Nutrition Recommendations: Continue current tube feeding. Will continue to monitor TF tolerance/adequacy. Nutrition Diagnosis:   · Problem: Inadequate oral intake  · Etiology: related to Impaired respiratory function-inability to consume food     Signs and symptoms:  as evidenced by NPO status due to medical condition, Nutrition support - EN    Nutrition Assessment:  · Subjective Assessment: Pt remains on vent. Diabetic TF at 65 ml/hr, tolerating well per RN. +BM last night and this am.   · Current Nutrition Therapies:  · Oral Diet Orders: NPO   · Tube Feeding (TF) Orders:   · Formula: Diabetic  · Rate (ml/hr):65 ml/hr    · Volume (ml/day): 1560 ml/day  · Duration: Continuous 24hrs  · TF Residuals: Less than or equal to 250ml  · Water Flushes: 250 ml every 6 hr  · Current TF & Flush Orders Provides: 1872 kcal and 94 g pro per day   · Goal TF & Flush Orders Provides: 1872 kcal and 94 g pro per day   · Anthropometric Measures:  · Ht: 5' 9\" (175.3 cm)   · Current Body Wt: 263 lb 10.7 oz (119.6 kg)  · Admission Body Wt: 218 lb 7.6 oz (99.1 kg)  · Ideal Body Wt: 160 lb (72.6 kg)  · % Ideal Body 136% using admission wt   · Adjusted Body Wt: 179 lb (81.2 kg)   · BMI Classification: BMI 30.0 - 34.9 Obese Class I  · Comparative Standards (Estimated Nutrition Needs):  · Estimated Daily Total Kcal: 6330-6595 kcal per day   · Estimated Daily Protein (g):  g pro per day     Estimated Intake vs Estimated Needs: Intake Meets Needs    Nutrition Risk Level: Moderate    Nutrition Interventions:   Continue current Tube Feeding  Continued Inpatient Monitoring, Education Not Indicated    Nutrition Evaluation:   · Evaluation: Goal achieved   · Goals: meet % of estimated nutrition needs    · Monitoring: TF Intake, TF Tolerance, Weight, Comparative Standards, Pertinent Labs    See Adult Nutrition Doc Flowsheet for more detail.      Electronically signed

## 2017-12-01 ENCOUNTER — APPOINTMENT (OUTPATIENT)
Dept: CT IMAGING | Age: 67
DRG: 064 | End: 2017-12-01
Payer: MEDICARE

## 2017-12-01 LAB
ABSOLUTE EOS #: 0.14 K/UL (ref 0–0.44)
ABSOLUTE IMMATURE GRANULOCYTE: 0.04 K/UL (ref 0–0.3)
ABSOLUTE LYMPH #: 2.65 K/UL (ref 1.1–3.7)
ABSOLUTE MONO #: 0.89 K/UL (ref 0.1–1.2)
ALBUMIN SERPL-MCNC: 2.4 G/DL (ref 3.5–5.2)
ALBUMIN/GLOBULIN RATIO: 0.6 (ref 1–2.5)
ALLEN TEST: POSITIVE
ALP BLD-CCNC: 80 U/L (ref 35–104)
ALT SERPL-CCNC: 34 U/L (ref 5–33)
ANION GAP SERPL CALCULATED.3IONS-SCNC: 8 MMOL/L (ref 9–17)
AST SERPL-CCNC: 21 U/L
BASOPHILS # BLD: 0 % (ref 0–2)
BASOPHILS ABSOLUTE: <0.03 K/UL (ref 0–0.2)
BILIRUB SERPL-MCNC: 0.22 MG/DL (ref 0.3–1.2)
BUN BLDV-MCNC: 21 MG/DL (ref 8–23)
BUN/CREAT BLD: ABNORMAL (ref 9–20)
CALCIUM SERPL-MCNC: 7.7 MG/DL (ref 8.6–10.4)
CHLORIDE BLD-SCNC: 104 MMOL/L (ref 98–107)
CO2: 29 MMOL/L (ref 20–31)
CREAT SERPL-MCNC: 0.78 MG/DL (ref 0.5–0.9)
DIFFERENTIAL TYPE: ABNORMAL
EOSINOPHILS RELATIVE PERCENT: 2 % (ref 1–4)
FIO2: 30
GFR AFRICAN AMERICAN: >60 ML/MIN
GFR NON-AFRICAN AMERICAN: >60 ML/MIN
GFR SERPL CREATININE-BSD FRML MDRD: ABNORMAL ML/MIN/{1.73_M2}
GFR SERPL CREATININE-BSD FRML MDRD: ABNORMAL ML/MIN/{1.73_M2}
GLUCOSE BLD-MCNC: 112 MG/DL (ref 65–105)
GLUCOSE BLD-MCNC: 119 MG/DL (ref 74–100)
GLUCOSE BLD-MCNC: 122 MG/DL (ref 70–99)
GLUCOSE BLD-MCNC: 128 MG/DL (ref 65–105)
GLUCOSE BLD-MCNC: 133 MG/DL (ref 65–105)
HCT VFR BLD CALC: 25.2 % (ref 36.3–47.1)
HEMOGLOBIN: 7.7 G/DL (ref 11.9–15.1)
IMMATURE GRANULOCYTES: 0 %
LYMPHOCYTES # BLD: 30 % (ref 24–43)
MCH RBC QN AUTO: 29.5 PG (ref 25.2–33.5)
MCHC RBC AUTO-ENTMCNC: 30.6 G/DL (ref 28.4–34.8)
MCV RBC AUTO: 96.6 FL (ref 82.6–102.9)
MODE: ABNORMAL
MONOCYTES # BLD: 10 % (ref 3–12)
NEGATIVE BASE EXCESS, ART: ABNORMAL (ref 0–2)
O2 DEVICE/FLOW/%: ABNORMAL
PATIENT TEMP: ABNORMAL
PDW BLD-RTO: 15.3 % (ref 11.8–14.4)
PLATELET # BLD: 216 K/UL (ref 138–453)
PLATELET ESTIMATE: ABNORMAL
PMV BLD AUTO: 10.4 FL (ref 8.1–13.5)
POC HCO3: 28.8 MMOL/L (ref 21–28)
POC O2 SATURATION: 98 % (ref 94–98)
POC PCO2 TEMP: ABNORMAL MM HG
POC PCO2: 41.5 MM HG (ref 35–48)
POC PH TEMP: ABNORMAL
POC PH: 7.45 (ref 7.35–7.45)
POC PO2 TEMP: ABNORMAL MM HG
POC PO2: 107.8 MM HG (ref 83–108)
POSITIVE BASE EXCESS, ART: 4 (ref 0–3)
POTASSIUM SERPL-SCNC: 4.5 MMOL/L (ref 3.7–5.3)
RBC # BLD: 2.61 M/UL (ref 3.95–5.11)
RBC # BLD: ABNORMAL 10*6/UL
SAMPLE SITE: ABNORMAL
SEG NEUTROPHILS: 58 % (ref 36–65)
SEGMENTED NEUTROPHILS ABSOLUTE COUNT: 5.17 K/UL (ref 1.5–8.1)
SODIUM BLD-SCNC: 141 MMOL/L (ref 135–144)
TCO2 (CALC), ART: 30 MMOL/L (ref 22–29)
TOTAL PROTEIN: 6.1 G/DL (ref 6.4–8.3)
WBC # BLD: 8.9 K/UL (ref 3.5–11.3)
WBC # BLD: ABNORMAL 10*3/UL

## 2017-12-01 PROCEDURE — 82803 BLOOD GASES ANY COMBINATION: CPT

## 2017-12-01 PROCEDURE — 6360000002 HC RX W HCPCS: Performed by: HOSPITALIST

## 2017-12-01 PROCEDURE — 6370000000 HC RX 637 (ALT 250 FOR IP): Performed by: INTERNAL MEDICINE

## 2017-12-01 PROCEDURE — 80053 COMPREHEN METABOLIC PANEL: CPT

## 2017-12-01 PROCEDURE — 36415 COLL VENOUS BLD VENIPUNCTURE: CPT

## 2017-12-01 PROCEDURE — 2500000003 HC RX 250 WO HCPCS: Performed by: INTERNAL MEDICINE

## 2017-12-01 PROCEDURE — 99291 CRITICAL CARE FIRST HOUR: CPT | Performed by: INTERNAL MEDICINE

## 2017-12-01 PROCEDURE — 36600 WITHDRAWAL OF ARTERIAL BLOOD: CPT

## 2017-12-01 PROCEDURE — 85025 COMPLETE CBC W/AUTO DIFF WBC: CPT

## 2017-12-01 PROCEDURE — 94681 O2 UPTK CO2 OUTP % O2 XTRC: CPT

## 2017-12-01 PROCEDURE — 94762 N-INVAS EAR/PLS OXIMTRY CONT: CPT

## 2017-12-01 PROCEDURE — 6370000000 HC RX 637 (ALT 250 FOR IP): Performed by: EMERGENCY MEDICINE

## 2017-12-01 PROCEDURE — 94003 VENT MGMT INPAT SUBQ DAY: CPT

## 2017-12-01 PROCEDURE — 72131 CT LUMBAR SPINE W/O DYE: CPT

## 2017-12-01 PROCEDURE — 82947 ASSAY GLUCOSE BLOOD QUANT: CPT

## 2017-12-01 PROCEDURE — 6370000000 HC RX 637 (ALT 250 FOR IP): Performed by: HOSPITALIST

## 2017-12-01 PROCEDURE — 2000000000 HC ICU R&B

## 2017-12-01 PROCEDURE — 72128 CT CHEST SPINE W/O DYE: CPT

## 2017-12-01 PROCEDURE — 2580000003 HC RX 258: Performed by: HOSPITALIST

## 2017-12-01 PROCEDURE — 6360000002 HC RX W HCPCS: Performed by: INTERNAL MEDICINE

## 2017-12-01 RX ADMIN — SILVER SULFADIAZINE: 10 CREAM TOPICAL at 08:51

## 2017-12-01 RX ADMIN — INSULIN GLARGINE 20 UNITS: 100 INJECTION, SOLUTION SUBCUTANEOUS at 20:36

## 2017-12-01 RX ADMIN — HEPARIN SODIUM 5000 UNITS: 5000 INJECTION, SOLUTION INTRAVENOUS; SUBCUTANEOUS at 13:07

## 2017-12-01 RX ADMIN — CARVEDILOL 25 MG: 25 TABLET, FILM COATED ORAL at 08:51

## 2017-12-01 RX ADMIN — AMLODIPINE BESYLATE 10 MG: 10 TABLET ORAL at 08:51

## 2017-12-01 RX ADMIN — Medication 10 ML: at 20:38

## 2017-12-01 RX ADMIN — Medication 10 ML: at 08:51

## 2017-12-01 RX ADMIN — HEPARIN SODIUM 5000 UNITS: 5000 INJECTION, SOLUTION INTRAVENOUS; SUBCUTANEOUS at 06:26

## 2017-12-01 RX ADMIN — HEPARIN SODIUM 5000 UNITS: 5000 INJECTION, SOLUTION INTRAVENOUS; SUBCUTANEOUS at 21:21

## 2017-12-01 RX ADMIN — HYDRALAZINE HYDROCHLORIDE 25 MG: 25 TABLET, FILM COATED ORAL at 06:26

## 2017-12-01 RX ADMIN — FENTANYL CITRATE 100 MCG: 50 INJECTION INTRAMUSCULAR; INTRAVENOUS at 01:00

## 2017-12-01 RX ADMIN — FENTANYL CITRATE 100 MCG: 50 INJECTION INTRAMUSCULAR; INTRAVENOUS at 06:31

## 2017-12-01 RX ADMIN — FAMOTIDINE 20 MG: 20 TABLET, FILM COATED ORAL at 20:38

## 2017-12-01 RX ADMIN — HYDRALAZINE HYDROCHLORIDE 25 MG: 25 TABLET, FILM COATED ORAL at 21:55

## 2017-12-01 RX ADMIN — FAMOTIDINE 20 MG: 20 TABLET, FILM COATED ORAL at 08:51

## 2017-12-01 RX ADMIN — HYDRALAZINE HYDROCHLORIDE 25 MG: 25 TABLET, FILM COATED ORAL at 13:07

## 2017-12-01 ASSESSMENT — PULMONARY FUNCTION TESTS
PIF_VALUE: 24
PIF_VALUE: 11
PIF_VALUE: 17
PIF_VALUE: 11
PIF_VALUE: 12
PIF_VALUE: 29
PIF_VALUE: 28
PIF_VALUE: 24
PIF_VALUE: 12
PIF_VALUE: 24
PIF_VALUE: 30
PIF_VALUE: 12
PIF_VALUE: 27
PIF_VALUE: 11
PIF_VALUE: 11
PIF_VALUE: 21
PIF_VALUE: 12
PIF_VALUE: 11

## 2017-12-01 ASSESSMENT — PAIN SCALES - GENERAL
PAINLEVEL_OUTOF10: 7
PAINLEVEL_OUTOF10: 4
PAINLEVEL_OUTOF10: 7

## 2017-12-01 NOTE — PROGRESS NOTES
INR  HgBA1c:    Lab Results   Component Value Date    LABA1C 7.5 11/19/2017     PTT: No components found for: LABPTT      Assessment:     Patient Active Problem List   Diagnosis    HTN (hypertension)    Heart murmur    Depression    Encephalopathy    Altered mental status    Rhabdomyolysis    Acute kidney injury ([facility] [address]    Hyperkalemia    Acute renal failure (HCC)    Morbid obesity (HCC)    Acute metabolic encephalopathy    Acute respiratory failure with hypoxia (HCC)    Abrasions of multiple sites    Neutrophilic leukocytosis    Infected open wound       Measurements:  Bilateral breast and right axillary wounds are clean and pink  Right lateral thigh wound persists with a soft dry eschar covered area  Right buttock scattered pink and red wounds. clean  Right abdomen and anterior thigh linear wounds are dry and pink with scattered dry crust covered wounds  Right lower leg wound clean, pink, superficial  Left leg wound remains purple and intact with a loose layer of devitalized skin covering. Plan:     Plan of Care:   Mepilex Transfer AG applied to the bilateral breasts, right axilla, and right lower abdominal wounds. Mepilex foam to the right buttock and lower leg wounds. Silvadene under dry gauze to the right lateral eschar covered wound.    Specialty Bed Required : Yes   [x] Low Air Loss   [x] Pressure Redistribution  [] Fluid Immersion  [x] Bariatric  [] Total Pressure Relief  [] Other:     Discharge Plan:  Placement for patient upon discharge: skilled nursing   Hospice Care: No   Patient appropriate for Outpatient [facility]: No    Patient/Caregiver Teaching:    [] Indicates understanding       [] Needs reinforcement  [] Unsuccessful      [] Verbal Understanding  [] Demonstrated understanding       [x] No evidence of learning  [] Refused teaching         [] N/A       Electronically signed by Padmini Barraza RN, CWON on 12/1/2017 at 9:45 AM

## 2017-12-01 NOTE — PROGRESS NOTES
Trauma Tertiary Survey    Admit Date: 11/18/2017  Hospital day 13    [facility]       Past Medical History:   Diagnosis Date    Depression     Heart murmur     HTN (hypertension)        Scheduled Meds:   hydrALAZINE  25 mg Oral 3 times per day    famotidine  20 mg Per NG tube BID    carvedilol  25 mg Oral BID WC    insulin glargine  20 Units Subcutaneous Nightly    cloNIDine  1 patch Transdermal Weekly    amLODIPine  10 mg Oral Daily    silver sulfADIAZINE   Topical Daily    sodium chloride flush  10 mL Intravenous 2 times per day    heparin (porcine)  5,000 Units Subcutaneous 3 times per day    insulin lispro  0-18 Units Subcutaneous Q6H     Continuous Infusions:   dextrose       PRN Meds:hydrALAZINE, labetalol, neomycin-bacitracin-polymyxin, oxyCODONE-acetaminophen, fentanNYL, fentanNYL, sodium chloride flush, acetaminophen, magnesium hydroxide, ondansetron, albuterol sulfate HFA, glucose, dextrose, glucagon (rDNA), dextrose    Subjective: Intubated. On CPAP mode. No other acute events. Objective:   Patient Vitals for the past 8 hrs:   BP Pulse Resp SpO2   12/01/17 1604 - 58 18 100 %   12/01/17 1307 133/67 - - -   12/01/17 1300 133/67 62 17 100 %   12/01/17 1200 132/67 63 16 100 %   12/01/17 1144 - 59 20 100 %   12/01/17 1100 136/66 52 15 100 %   12/01/17 1024 - 74 20 100 %   12/01/17 1008 - 58 14 100 %       I/O last 3 completed shifts: In: 1877 [NG/GT:1877]  Out: 160 [Urine:160]  No intake/output data recorded. Radiology:  CXR:  stable chest    CT head:   Impression:       No acute intracranial abnormality with chronic ischemic changes as described. CT c-spine:   Impression:       No acute abnormality of the cervical spine. Degenerative changes with exuberant bridging osteophytes. CT chest:  1. Minor streaky right posterior basal lower lobes density suggestive of  atelectasis or scarring. 2. ETT in place terminating above brian.   3. No acute process evident in the chest.    CT abd/pelvis:   Impression:       1.  Hypodense lesions in the liver, not clearly cystic.  These may represent  hemangiomas, but further assessment with performance of ultrasound is advised  which can be performed on a nonemergent basis. 2.   Gallbladder is slightly hyperdense without distinct gallstones.  Sludge  is not excluded.  Right upper quadrant ultrasound may prove helpful. 3.   Bilateral adrenal masses, largest on the right of 2.6 cm, likely  adenomas. 4.  No bowel obstruction or evidence of appendicitis.  No adenopathy or other  evidence of acute abdominopelvic process.              PHYSICAL EXAM:   GCS:  1 - Does not open eyes   1 - No motor response to pain  1 - Makes no noise  3T    Pupil size:  Left 3 mm Right 3 mm  Pupil reaction: Yes  Wiggles fingers: Left Yes Right Yes  Hand grasp:   Left normal   Right normal  Wiggles toes: Left Yes    Right Yes  Plantar flexion: Left normal  Right normal        Spine:   No obvious deformities/stepoffs    Musculoskeletal    Joint Tenderness Swelling ROM   Right shoulder Intubated absent normal   Left shoulder Intubated absent normal   Right elbow Intubated absent normal   Left elbow Intubated absent Normal   Right wrist Intubated absent Normal   Left wrist Intubated absent Normal   Right hand grasp Intubated absent Normal   Left hand grasp Intubated absent Normal   Right hip Intubated absent Normal   Left hip Intubated absent Normal   Right knee Intubated absent Normal   Left knee Intubated absent Normal   Right ankle Intubated absent Normal   Left ankle Intubated absent Normal   Right foot Intubated absent Normal   Left foot Intubated absent normal           CONSULTS: CC (p), ID    PROCEDURES: none    INJURIES:      Patient Active Problem List   Diagnosis    HTN (hypertension)    Heart murmur    Depression    Encephalopathy    Altered mental status    Rhabdomyolysis    Acute kidney injury ([address]    Hyperkalemia    Acute renal failure ([facility] [address]    Morbid obesity ([facility] [address]    Acute metabolic encephalopathy    Acute respiratory failure with hypoxia (HCC)    Abrasions of multiple sites    Neutrophilic leukocytosis    Infected open wound         Assessment/Plan:     Continue medical management per primary. CT thoracic and lumbar spine. Will clear TLS based on imaging. C-spine to be cleared post extubation.     Kaylee Chan DO, PGY-4  Pager#: [phone]  6:07 PM 12/1/2017

## 2017-12-01 NOTE — PROGRESS NOTES
11/30/17 1947   Vent Information   Vent Mode PRVC/AC     Placed back on full support for rest overnight. SBT started at 0812. Tolerated well.

## 2017-12-01 NOTE — PROGRESS NOTES
off the ventilator more consistently. Currently the critical care team feels that perhaps slightly the end of the day she would be able to come off the ventilator altogether. The respiratory secretions having a small to moderate whitish in coloration, mucoid in nature, no bleeding noted. Afebrile  VS stable    Her physical examination as much change very much. However, the wounds are improving. Wounds have healed for the most part although there are small areas with residual openings. No overt infections. WBC 23-->13.4 -13.1-->10.2-->8.9  Creat 2.37--> 1.57 --->0.78  Na 155--> 153-->141    Patient overall improved. Infectious diseases wise we will continue the respiratory toilet and the wound care. ICU care: 30 min. Discussed with CC, RN. I have personally reviewed the past medical history, past surgical history, medications, social history, and family history, and I have updated the database accordingly. Past Medical History:     Past Medical History:   Diagnosis Date    Depression     Heart murmur     HTN (hypertension)        Past Surgical  History:     Past Surgical History:   Procedure Laterality Date    OTHER SURGICAL HISTORY      bump under skin on buttocks    TUBAL LIGATION         Medications:      hydrALAZINE  25 mg Oral 3 times per day    famotidine  20 mg Per NG tube BID    carvedilol  25 mg Oral BID WC    insulin glargine  20 Units Subcutaneous Nightly    cloNIDine  1 patch Transdermal Weekly    amLODIPine  10 mg Oral Daily    silver sulfADIAZINE   Topical Daily    sodium chloride flush  10 mL Intravenous 2 times per day    heparin (porcine)  5,000 Units Subcutaneous 3 times per day    insulin lispro  0-18 Units Subcutaneous Q6H       Social History:     Social History     Social History    Marital status:      Spouse name: N/A    Number of children: N/A    Years of education: N/A     Occupational History    Not on file.      Social History Main Topics   

## 2017-12-01 NOTE — CONSULTS
due to condition. Injury Date: 11/19/2017      INJURY LOCATION, (e.g., home, farm, industry, street)  Specific Details of Location (e.g., bedroom, kitchen, garage): home      MECHANISM OF INJURY  []Motor Vehicle Collision  Specific vehicle type involved (e.g., sedan, minivan, SUV, pickup truck):   Collision with (e.g., type of vehicle, building, barn, ditch, tree):   []Single Vehicle Collision     []           []Fatality in Same Vehicle            []Passenger:      []Front Seat        []Rear Seat    []Unrestrained   []Lap Belt Only Restrained   [] Shoulder Belt Only Restrained  [] 3 Point Restrained    []Front Air Bag  []Side Air Bag  []Other Air Bag []Air Bag Not Deployed    []Ejected     []Rollover     []Extricated       CHILD:  []Booster Seat  []Infant Car Seat  [] Child Car Seat   []Motorcycle Collision Wearing Helmet     []Yes     []No    []Unknown  []Bicycle Collision Wearing Helmet     []Yes     []No    []Unknown  []Pedestrian Struck      [x]Fall    [x]From Standing     []From Height   Ft     []Down Stairs  []Assault  []Gunshot  Specify caliber / type of gun: ____________________________  []Stabbing  Specify weapon type, size: _____________________________  []Burn     []Flame   []Scald   []Electrical   []Chemical           []Contact   []Inhalation   []House fire  []Other ______________________________________________________  []Other protective devices used / worn ___________________________    HISTORY:   69 y.o. Female who our service is consulted for CTLS clearance. Patient was found down at home by her son on 11/18. Patient was intubated in ED due to AMS. CT head, c-spine, chest/abd/pelvis were negative for any acute injuries. Critical care has been on board since admission and plans to extubate patient soon.       Loss of Consciousness []No   []Yes Duration(min)    Total Fluids Given Prior To Arrival  cc    MEDICATIONS:   []  None     []  Information not available due to exam limitations documented above  Current Facility-Administered Medications   Medication Dose Route Frequency Provider Last Rate Last Dose    hydrALAZINE (APRESOLINE) tablet 25 mg  25 mg Oral 3 times per day Carlos Rice MD   25 mg at 12/01/17 1307    famotidine (PEPCID) tablet 20 mg  20 mg Per NG tube BID Baldemar Blair MD   20 mg at 12/01/17 0851    hydrALAZINE (APRESOLINE) injection 10 mg  10 mg Intravenous Q4H PRN Dominique Leonardo MD        labetalol (NORMODYNE;TRANDATE) injection 10 mg  10 mg Intravenous Q6H PRN Beth Brunson MD   10 mg at 11/28/17 1004    neomycin-bacitracin-polymyxin (NEOSPORIN) ointment   Topical Daily PRN Soila Sarkar MD        oxyCODONE-acetaminophen (PERCOCET) 5-325 MG per tablet 1 tablet  1 tablet Oral Q6H PRN Michel Galvan MD   1 tablet at 11/30/17 2010    carvedilol (COREG) tablet 25 mg  25 mg Oral BID WC Carlos Rice MD   25 mg at 12/01/17 0851    insulin glargine (LANTUS) injection vial 20 Units  20 Units Subcutaneous Nightly Carlos Rice MD   20 Units at 11/30/17 2011    fentaNYL (SUBLIMAZE) injection 100 mcg  100 mcg Intravenous PRN Michel Galvan MD   100 mcg at 12/01/17 0100    fentaNYL (SUBLIMAZE) injection 100 mcg  100 mcg Intravenous Q2H PRN Vickie Helton MD   100 mcg at 12/01/17 0631    cloNIDine (CATAPRES) 0.1 MG/24HR 1 patch  1 patch Transdermal Weekly Christofer Redman MD   1 patch at 11/27/17 0801    amLODIPine (NORVASC) tablet 10 mg  10 mg Oral Daily Michel Galvan MD   10 mg at 12/01/17 0851    silver sulfADIAZINE (SILVADENE) 1 % cream   Topical Daily Raphael Luong MD        sodium chloride flush 0.9 % injection 10 mL  10 mL Intravenous 2 times per day Michel Galvan MD   10 mL at 12/01/17 0851    sodium chloride flush 0.9 % injection 10 mL  10 mL Intravenous PRN Michel Galvan MD   10 mL at 11/27/17 1731    acetaminophen (TYLENOL) tablet 650 mg  650 mg Oral Q4H PRN Michel Galvan MD        magnesium hydroxide (MILK OF MAGNESIA) 400 Impression:        1.  Hypodense lesions in the liver, not clearly cystic.  These may represent  hemangiomas, but further assessment with performance of ultrasound is advised  which can be performed on a nonemergent basis. 2.   Gallbladder is slightly hyperdense without distinct gallstones.  Sludge  is not excluded.  Right upper quadrant ultrasound may prove helpful. 3.   Bilateral adrenal masses, largest on the right of 2.6 cm, likely  adenomas. 4.  No bowel obstruction or evidence of appendicitis.  No adenopathy or other  evidence of acute abdominopelvic process. []FACE []Normal     [x]C-SPINE  []Normal     No acute intracranial abnormality with chronic ischemic changes as described.    []T-SPINE  []Normal   []L-SPINE  []Normal     [facility]  []Normal    LABS  Labs Reviewed   CBC WITH AUTO DIFFERENTIAL - Abnormal; Notable for the following:        Result Value    WBC 24.6 (*)     RBC 5.96 (*)     Hemoglobin 17.3 (*)     Hematocrit 54.9 (*)     RDW 14.5 (*)     Immature Granulocytes 2 (*)     Absolute Immature Granulocyte 0.49 (*)     Segs Absolute 21.89 (*)     All other components within normal limits   BASIC METABOLIC PANEL - Abnormal; Notable for the following:     Glucose 326 (*)      (*)     CREATININE 3.59 (*)     Sodium 147 (*)     Potassium 8.8 (*)     CO2 16 (*)     Anion Gap 28 (*)     GFR Non- 13 (*)     GFR  15 (*)     All other components within normal limits   HEPATIC FUNCTION PANEL - Abnormal; Notable for the following:     Alb 2.8 (*)     Alkaline Phosphatase 158 (*)     Total Protein 8.7 (*)     Albumin/Globulin Ratio 0.5 (*)     All other components within normal limits   MYOGLOBIN, SERUM - Abnormal; Notable for the following:     Myoglobin 1,317 (*)     All other components within normal limits   HGB/HCT - Abnormal; Notable for the following:     POC Hemoglobin 19.9 (*)     POC Hematocrit 59 (*)     All other components within normal GFR  21 (*)     All other components within normal limits   METANEPHRINES URINE - Abnormal; Notable for the following:     Normetanephrine, (g/CRT) 804 (*)     All other components within normal limits   CALCIUM, IONIZED - Abnormal; Notable for the following:     Calcium, Ion 1.10 (*)     All other components within normal limits   SODIUM - Abnormal; Notable for the following:     Sodium 154 (*)     All other components within normal limits   SODIUM - Abnormal; Notable for the following:     Sodium 152 (*)     All other components within normal limits   MYOGLOBIN, SERUM - Abnormal; Notable for the following:     Myoglobin 2,059 (*)     All other components within normal limits   PROLACTIN - Abnormal; Notable for the following:     Prolactin 70.75 (*)     All other components within normal limits   CK - Abnormal; Notable for the following:      Total  (*)     All other components within normal limits   SODIUM - Abnormal; Notable for the following:     Sodium 155 (*)     All other components within normal limits   SODIUM - Abnormal; Notable for the following:     Sodium 149 (*)     All other components within normal limits   BASIC METABOLIC PANEL - Abnormal; Notable for the following:     Glucose 179 (*)      (*)     CREATININE 2.37 (*)     Calcium 8.5 (*)     Sodium 155 (*)     Chloride 125 (*)     CO2 13 (*)     GFR Non- 20 (*)     GFR  25 (*)     All other components within normal limits   CBC WITH AUTO DIFFERENTIAL - Abnormal; Notable for the following:     WBC 23.2 (*)     RBC 3.63 (*)     Hemoglobin 10.6 (*)     Hematocrit 34.8 (*)     RDW 15.2 (*)     Segs Absolute 18.79 (*)     Absolute Mono # 2.55 (*)     All other components within normal limits   SODIUM - Abnormal; Notable for the following:     Sodium 152 (*)     All other components within normal limits   BASIC METABOLIC PANEL - Abnormal; Notable for the following:     Glucose 242 (*)     BUN 95 (*)     CREATININE 1.94 (*)     Calcium 8.5 (*)     Sodium 150 (*)     Chloride 121 (*)     CO2 15 (*)     GFR Non- 26 (*)     GFR  31 (*)     All other components within normal limits   CBC WITH AUTO DIFFERENTIAL - Abnormal; Notable for the following:     WBC 16.6 (*)     RBC 3.53 (*)     Hemoglobin 10.3 (*)     Hematocrit 35.0 (*)     RDW 15.9 (*)     Immature Granulocytes 1 (*)     Segs Absolute 13.61 (*)     Absolute Mono # 1.33 (*)     All other components within normal limits   BASIC METABOLIC PANEL - Abnormal; Notable for the following:     Glucose 219 (*)     BUN 80 (*)     CREATININE 1.57 (*)     Sodium 153 (*)     Chloride 120 (*)     GFR Non- 33 (*)     GFR  40 (*)     All other components within normal limits   CBC WITH AUTO DIFFERENTIAL - Abnormal; Notable for the following:     WBC 13.4 (*)     RBC 3.52 (*)     Hemoglobin 10.0 (*)     Hematocrit 33.7 (*)     RDW 16.1 (*)     Immature Granulocytes 3 (*)     Seg Neutrophils 73 (*)     Lymphocytes 16 (*)     Monocytes 8 (*)     Eosinophils % 0 (*)     Absolute Immature Granulocyte 0.40 (*)     Segs Absolute 9.79 (*)     Absolute Mono # 1.07 (*)     All other components within normal limits   BASIC METABOLIC PANEL - Abnormal; Notable for the following:     Glucose 200 (*)     BUN 64 (*)     CREATININE 1.20 (*)     Calcium 8.2 (*)     Sodium 147 (*)     Chloride 115 (*)     GFR Non- 45 (*)     GFR  54 (*)     All other components within normal limits   CBC WITH AUTO DIFFERENTIAL - Abnormal; Notable for the following:     WBC 12.9 (*)     RBC 3.35 (*)     Hemoglobin 9.7 (*)     Hematocrit 31.1 (*)     RDW 15.8 (*)     Immature Granulocytes 7 (*)     Lymphocytes 17 (*)     Absolute Immature Granulocyte 0.90 (*)     Segs Absolute 8.39 (*)     All other components within normal limits   VITAMIN B12 & FOLATE - Abnormal; Notable for the following:     Vitamin B-12 Abnormal; Notable for the following:     POC Glucose 196 (*)     All other components within normal limits   POC GLUCOSE FINGERSTICK - Abnormal; Notable for the following:     POC Glucose 200 (*)     All other components within normal limits   POC GLUCOSE FINGERSTICK - Abnormal; Notable for the following:     POC Glucose 116 (*)     All other components within normal limits   POC GLUCOSE FINGERSTICK - Abnormal; Notable for the following:     POC Glucose 151 (*)     All other components within normal limits   ARTERIAL BLOOD GAS, POC - Abnormal; Notable for the following:     POC PO2 127.5 (*)     Negative Base Excess, Art 3 (*)     POC O2 SAT 99 (*)     All other components within normal limits   POCT GLUCOSE - Abnormal; Notable for the following:     POC Glucose 232 (*)     All other components within normal limits   POC GLUCOSE FINGERSTICK - Abnormal; Notable for the following:     POC Glucose 194 (*)     All other components within normal limits   POC GLUCOSE FINGERSTICK - Abnormal; Notable for the following:     POC Glucose 208 (*)     All other components within normal limits   POC GLUCOSE FINGERSTICK - Abnormal; Notable for the following:     POC Glucose 202 (*)     All other components within normal limits   ARTERIAL BLOOD GAS, POC - Abnormal; Notable for the following:     POC pCO2 34.3 (*)     POC PO2 141.9 (*)     POC O2 SAT 99 (*)     All other components within normal limits   POC GLUCOSE FINGERSTICK - Abnormal; Notable for the following:     POC Glucose 206 (*)     All other components within normal limits   POC GLUCOSE FINGERSTICK - Abnormal; Notable for the following:     POC Glucose 162 (*)     All other components within normal limits   POC GLUCOSE FINGERSTICK - Abnormal; Notable for the following:     POC Glucose 159 (*)     All other components within normal limits   POC GLUCOSE FINGERSTICK - Abnormal; Notable for the following:     POC Glucose 174 (*)     All other components within normal limits POC GLUCOSE FINGERSTICK - Abnormal; Notable for the following:     POC Glucose 156 (*)     All other components within normal limits   POC GLUCOSE FINGERSTICK - Abnormal; Notable for the following:     POC Glucose 162 (*)     All other components within normal limits   POC GLUCOSE FINGERSTICK - Abnormal; Notable for the following:     POC Glucose 134 (*)     All other components within normal limits   POC GLUCOSE FINGERSTICK - Abnormal; Notable for the following:     POC Glucose 149 (*)     All other components within normal limits   POC GLUCOSE FINGERSTICK - Abnormal; Notable for the following:     POC Glucose 143 (*)     All other components within normal limits   ARTERIAL BLOOD GAS, POC - Abnormal; Notable for the following:     POC PO2 125.0 (*)     POC O2 SAT 99 (*)     All other components within normal limits   POCT GLUCOSE - Abnormal; Notable for the following:     POC Glucose 125 (*)     All other components within normal limits   POC GLUCOSE FINGERSTICK - Abnormal; Notable for the following:     POC Glucose 152 (*)     All other components within normal limits   POC GLUCOSE FINGERSTICK - Abnormal; Notable for the following:     POC Glucose 152 (*)     All other components within normal limits   POC GLUCOSE FINGERSTICK - Abnormal; Notable for the following:     POC Glucose 130 (*)     All other components within normal limits   POC GLUCOSE FINGERSTICK - Abnormal; Notable for the following:     POC Glucose 121 (*)     All other components within normal limits   POC GLUCOSE FINGERSTICK - Abnormal; Notable for the following:     POC Glucose 132 (*)     All other components within normal limits   POC GLUCOSE FINGERSTICK - Abnormal; Notable for the following:     POC Glucose 140 (*)     All other components within normal limits   ARTERIAL BLOOD GAS, POC - Abnormal; Notable for the following:     POC PO2 118.6 (*)     POC HCO3 29.6 (*)     TCO2 (calc), Art 31 (*)     Positive Base Excess, Art 5 (*)     POC O2 SAT 99 (*)     All other components within normal limits   POC GLUCOSE FINGERSTICK - Abnormal; Notable for the following:     POC Glucose 124 (*)     All other components within normal limits   POC GLUCOSE FINGERSTICK - Abnormal; Notable for the following:     POC Glucose 120 (*)     All other components within normal limits   POC GLUCOSE FINGERSTICK - Abnormal; Notable for the following:     POC Glucose 143 (*)     All other components within normal limits   POC GLUCOSE FINGERSTICK - Abnormal; Notable for the following:     POC Glucose 108 (*)     All other components within normal limits   POC GLUCOSE FINGERSTICK - Abnormal; Notable for the following:     POC Glucose 124 (*)     All other components within normal limits   POC GLUCOSE FINGERSTICK - Abnormal; Notable for the following:     POC Glucose 156 (*)     All other components within normal limits   POC GLUCOSE FINGERSTICK - Abnormal; Notable for the following:     POC Glucose 149 (*)     All other components within normal limits   POC GLUCOSE FINGERSTICK - Abnormal; Notable for the following:     POC Glucose 130 (*)     All other components within normal limits   ARTERIAL BLOOD GAS, POC - Abnormal; Notable for the following:     POC pH 7.488 (*)     POC PO2 114.1 (*)     POC HCO3 28.7 (*)     TCO2 (calc), Art 30 (*)     Positive Base Excess, Art 5 (*)     POC O2 SAT 99 (*)     All other components within normal limits   POCT GLUCOSE - Abnormal; Notable for the following:     POC Glucose 138 (*)     All other components within normal limits   POC GLUCOSE FINGERSTICK - Abnormal; Notable for the following:     POC Glucose 125 (*)     All other components within normal limits   POC GLUCOSE FINGERSTICK - Abnormal; Notable for the following:     POC Glucose 145 (*)     All other components within normal limits   POC GLUCOSE FINGERSTICK - Abnormal; Notable for the following:     POC Glucose 131 (*)     All other components within normal limits   POC GLUCOSE FINGERSTICK - Abnormal; Notable for the following:     POC Glucose 120 (*)     All other components within normal limits   POC GLUCOSE FINGERSTICK - Abnormal; Notable for the following:     POC Glucose 129 (*)     All other components within normal limits   POC GLUCOSE FINGERSTICK - Abnormal; Notable for the following:     POC Glucose 117 (*)     All other components within normal limits   POC GLUCOSE FINGERSTICK - Abnormal; Notable for the following:     POC Glucose 128 (*)     All other components within normal limits   ARTERIAL BLOOD GAS, POC - Abnormal; Notable for the following:     POC HCO3 28.8 (*)     TCO2 (calc), Art 30 (*)     Positive Base Excess, Art 4 (*)     All other components within normal limits   POCT GLUCOSE - Abnormal; Notable for the following:     POC Glucose 119 (*)     All other components within normal limits   CULTURE BLOOD #1   CULTURE BLOOD #1   MRSA DNA PROBE, NASAL   VAGINITIS DNA PROBE   CK   CALCIUM, IONIC (POC)   MICROSCOPIC URINALYSIS   URINE DRUG SCREEN   ETHANOL   TOXIC TRICYCLIC SC,B   TROPONIN   TROPONIN   POTASSIUM   TSH WITH REFLEX   DILAN SCREEN WITH REFLEX   C3 COMPLEMENT   C4 COMPLEMENT   PROTEIN, URINE, RANDOM   CALCIUM, IONIZED   CREATININE, RANDOM URINE   SODIUM, URINE, RANDOM   LACTIC ACID, WHOLE BLOOD   IMMUNOFIXATION SERUM PROFILE   VANCOMYCIN, TROUGH   LACTIC ACID, PLASMA   BLOOD GAS, ARTERIAL   BLOOD GAS, ARTERIAL   PERIPHERAL BLOOD SMEAR, PATH REVIEW   SURGICAL PATHOLOGY   BLOOD GAS, ARTERIAL   IMMATURE PLATELET FRACTION   AMMONIA   BLOOD GAS, ARTERIAL   BLOOD GAS, ARTERIAL   BLOOD GAS, ARTERIAL   PREVIOUS SPECIMEN   OCCULT BLOOD SCREEN   POC BLOOD GAS AND CHEMISTRY   ANION GAP (CALC) POC   POCT TROPONIN   POC GLUCOSE FINGERSTICK   POC GLUCOSE FINGERSTICK   POC GLUCOSE FINGERSTICK   POC GLUCOSE FINGERSTICK   POCT TROPONIN   POCT GLUCOSE   POCT GLUCOSE   POCT GLUCOSE   POCT GLUCOSE   POCT GLUCOSE   POCT GLUCOSE   POCT GLUCOSE   POCT GLUCOSE   POCT GLUCOSE   POCT

## 2017-12-01 NOTE — PLAN OF CARE
Problem: OXYGENATION/RESPIRATORY FUNCTION  Goal: Patient will maintain patent airway  Outcome: Ongoing    Goal: Patient will achieve/maintain normal respiratory rate/effort  Respiratory rate and effort will be within normal limits for the patient   Outcome: Ongoing      Problem: Restraint Use - Nonviolent/Non-Self-Destructive Behavior:  Goal: Absence of restraint indications  Absence of restraint indications   Outcome: Not Met This Shift  When restraints are released, pt will attempt to pull at ET with right hand always and sometimes with left hand- restraints still indicated during this shift  Goal: Absence of restraint-related injury  Absence of restraint-related injury   Outcome: Ongoing  Pt has remained free of any restraint related injury during this shift. RN will continue on with plan of care.

## 2017-12-02 ENCOUNTER — APPOINTMENT (OUTPATIENT)
Dept: CT IMAGING | Age: 67
DRG: 064 | End: 2017-12-02
Payer: MEDICARE

## 2017-12-02 LAB
GLUCOSE BLD-MCNC: 101 MG/DL (ref 65–105)
GLUCOSE BLD-MCNC: 108 MG/DL (ref 65–105)
GLUCOSE BLD-MCNC: 79 MG/DL (ref 65–105)

## 2017-12-02 PROCEDURE — 2500000003 HC RX 250 WO HCPCS: Performed by: INTERNAL MEDICINE

## 2017-12-02 PROCEDURE — 2000000000 HC ICU R&B

## 2017-12-02 PROCEDURE — S0028 INJECTION, FAMOTIDINE, 20 MG: HCPCS | Performed by: INTERNAL MEDICINE

## 2017-12-02 PROCEDURE — 82947 ASSAY GLUCOSE BLOOD QUANT: CPT

## 2017-12-02 PROCEDURE — 99291 CRITICAL CARE FIRST HOUR: CPT | Performed by: INTERNAL MEDICINE

## 2017-12-02 PROCEDURE — 6370000000 HC RX 637 (ALT 250 FOR IP): Performed by: EMERGENCY MEDICINE

## 2017-12-02 PROCEDURE — 6370000000 HC RX 637 (ALT 250 FOR IP): Performed by: INTERNAL MEDICINE

## 2017-12-02 PROCEDURE — 94770 HC ETCO2 MONITOR DAILY: CPT

## 2017-12-02 PROCEDURE — 94762 N-INVAS EAR/PLS OXIMTRY CONT: CPT

## 2017-12-02 PROCEDURE — 99233 SBSQ HOSP IP/OBS HIGH 50: CPT | Performed by: INTERNAL MEDICINE

## 2017-12-02 PROCEDURE — 6370000000 HC RX 637 (ALT 250 FOR IP): Performed by: HOSPITALIST

## 2017-12-02 PROCEDURE — 6360000002 HC RX W HCPCS: Performed by: HOSPITALIST

## 2017-12-02 PROCEDURE — 94003 VENT MGMT INPAT SUBQ DAY: CPT

## 2017-12-02 PROCEDURE — 70450 CT HEAD/BRAIN W/O DYE: CPT

## 2017-12-02 PROCEDURE — 2580000003 HC RX 258: Performed by: HOSPITALIST

## 2017-12-02 RX ORDER — DEXTROSE MONOHYDRATE 25 G/50ML
12.5 INJECTION, SOLUTION INTRAVENOUS PRN
Status: DISCONTINUED | OUTPATIENT
Start: 2017-12-02 | End: 2017-12-14 | Stop reason: HOSPADM

## 2017-12-02 RX ORDER — FUROSEMIDE 10 MG/ML
20 INJECTION INTRAMUSCULAR; INTRAVENOUS DAILY
Status: DISCONTINUED | OUTPATIENT
Start: 2017-12-02 | End: 2017-12-14 | Stop reason: HOSPADM

## 2017-12-02 RX ORDER — HYDRALAZINE HYDROCHLORIDE 20 MG/ML
10 INJECTION INTRAMUSCULAR; INTRAVENOUS EVERY 6 HOURS PRN
Status: DISCONTINUED | OUTPATIENT
Start: 2017-12-02 | End: 2017-12-14 | Stop reason: HOSPADM

## 2017-12-02 RX ORDER — HYDRALAZINE HYDROCHLORIDE 20 MG/ML
10 INJECTION INTRAMUSCULAR; INTRAVENOUS EVERY 4 HOURS PRN
Status: DISCONTINUED | OUTPATIENT
Start: 2017-12-02 | End: 2017-12-02

## 2017-12-02 RX ORDER — DEXTROSE MONOHYDRATE 50 MG/ML
100 INJECTION, SOLUTION INTRAVENOUS PRN
Status: DISCONTINUED | OUTPATIENT
Start: 2017-12-02 | End: 2017-12-14 | Stop reason: HOSPADM

## 2017-12-02 RX ORDER — NICOTINE POLACRILEX 4 MG
15 LOZENGE BUCCAL PRN
Status: DISCONTINUED | OUTPATIENT
Start: 2017-12-02 | End: 2017-12-14 | Stop reason: HOSPADM

## 2017-12-02 RX ADMIN — SILVER SULFADIAZINE: 10 CREAM TOPICAL at 09:40

## 2017-12-02 RX ADMIN — AMLODIPINE BESYLATE 10 MG: 10 TABLET ORAL at 09:40

## 2017-12-02 RX ADMIN — HYDRALAZINE HYDROCHLORIDE 25 MG: 25 TABLET, FILM COATED ORAL at 06:35

## 2017-12-02 RX ADMIN — HEPARIN SODIUM 5000 UNITS: 5000 INJECTION, SOLUTION INTRAVENOUS; SUBCUTANEOUS at 21:31

## 2017-12-02 RX ADMIN — FUROSEMIDE 20 MG: 10 INJECTION, SOLUTION INTRAVENOUS at 14:46

## 2017-12-02 RX ADMIN — FAMOTIDINE 20 MG: 10 INJECTION, SOLUTION INTRAVENOUS at 21:46

## 2017-12-02 RX ADMIN — CARVEDILOL 25 MG: 25 TABLET, FILM COATED ORAL at 09:40

## 2017-12-02 RX ADMIN — HEPARIN SODIUM 5000 UNITS: 5000 INJECTION, SOLUTION INTRAVENOUS; SUBCUTANEOUS at 06:35

## 2017-12-02 RX ADMIN — FAMOTIDINE 20 MG: 20 TABLET, FILM COATED ORAL at 09:40

## 2017-12-02 RX ADMIN — DEXTROSE MONOHYDRATE 12.5 G: 500 INJECTION PARENTERAL at 21:26

## 2017-12-02 RX ADMIN — Medication 10 ML: at 09:42

## 2017-12-02 RX ADMIN — HEPARIN SODIUM 5000 UNITS: 5000 INJECTION, SOLUTION INTRAVENOUS; SUBCUTANEOUS at 14:47

## 2017-12-02 RX ADMIN — Medication 10 ML: at 21:45

## 2017-12-02 ASSESSMENT — PULMONARY FUNCTION TESTS
PIF_VALUE: 12
PIF_VALUE: 12

## 2017-12-02 NOTE — PROGRESS NOTES
Critical Care Team - Daily Progress Note      Date and time: 12/2/2017 11:43 AM  Patient's name:  Laure Kayser Record Number: [id number]  Patient's account/billing number: [de-identified]  Patient's YOB: 1950  Age: 69 y.o. Date of Admission: 11/18/2017 11:20 AM  Length of stay during current admission: 14      Primary Care Physician: No primary care provider on file.   ICU Attending Physician: Dr. Maliha Brunson Status: Full Code    Reason for ICU admission: Altered mentation  SUBJECTIVE:     OVERNIGHT EVENTS:      This morning patient has been following commands for me  Her thoracic spine was cleared by trauma  Blood sugars have been good   She has lots of secretions  Blood pressures are stable  Patient has been afebrile       AWAKE & FOLLOWING COMMANDS:  [] No   [x] Yes    CURRENT VENTILATION STATUS:     [x] Ventilator  [] BIPAP  [] Nasal Cannula [] Room Air      IF INTUBATED, ET TUBE MARKING AT LOWER LIP:       cms    SECRETIONS Amount:  [] Small [x] Moderate  [] Large  [] None  Color:     [x] White [] Colored  [] Bloody    SEDATION:  RAAS Score:  [] Propofol gtt  [] Versed gtt  [] Ativan gtt   [x] No Sedation    PARALYZED:  [x] No    [] Yes    DIARRHEA:                [x] No                [] Yes  (C. Difficile status: [] positive                                                                                                                       [] negative                                                                                                                     [] pending)    VASOPRESSORS:  [x] No    [] Yes    If yes -   [] Levophed       [] Dopamine     [] Vasopressin       [] Dobutamine  [] Phenylephrine         [] Epinephrine    CENTRAL LINES:     [] No   [] Yes   (Date of Insertion:   )           If yes -     [] Right IJ     [] Left IJ [] Right Femoral [] Left Femoral                   [] Right Subclavian [] Left Subclavian       MUÑOZ'S CATHETER:   [x] No   [] Yes  (Date of Last 3 Blood Glucose:   Recent Labs      12/01/17   1017   GLUCOSE  122*        PT/INR:  No results found for: PROTIME, INR  PTT:  No results found for: APTT, PTT    Comprehensive Metabolic Profile:   Recent Labs      12/01/17   1017   NA  141   K  4.5   CL  104   CO2  29   BUN  21   CREATININE  0.78   GLUCOSE  122*   CALCIUM  7.7*   PROT  6.1*   LABALBU  2.4*   BILITOT  0.22*   ALKPHOS  80   AST  21   ALT  34*      Magnesium: No results found for: MG  Phosphorus: No results found for: PHOS  Ionized Calcium:   Lab Results   Component Value Date    CAION 1.20 11/19/2017        Urinalysis:     Troponin: No results for input(s): TROPONINI in the last 72 hours.     Microbiology:    Cultures during this admission:     Blood cultures:                 [] None drawn      [] Negative             []  Positive (Details:  )  Urine Culture:                   [] None drawn      [] Negative             []  Positive (Details:  )  Sputum Culture:               [] None drawn       [] Negative             []  Positive (Details:  )   Endotracheal aspirate:     [] None drawn       [] Negative             []  Positive (Details:  )     Other pertinent Labs:       Radiology/Imaging:     Chest Xray (12/2/2017):    ASSESSMENT:     Principal Problem:    Altered mental status  Active Problems:    Encephalopathy    Rhabdomyolysis    Acute kidney injury ([address]    Hyperkalemia    Acute renal failure ([address]    Morbid obesity ([address]    Acute metabolic encephalopathy    Acute respiratory failure with hypoxia ([address]    Abrasions of multiple sites    Neutrophilic leukocytosis    Infected open wound        PLAN:     - Continue Coreg, Norvasc, clonidine patch, hydralazine for blood pressure control  - Continue Lantus 20 units along with insulin sliding scale  - The patient looks edematous and is +22 L, will start on Lasix 20 mg daily, follow-up BMP in a.m.  - Patient was on spontaneous breathing trial, was on tube feeds, discussed with son yesterday about goals of care, the son wanted to continue with full code  - I spoke to another son today who came from [place], he agrees with his brother  -We will extubate the patient over to cook catheter    DVT Laney Stack M.D.     PGY 3  Department of Internal Medicine/ Critical care  [facility])             12/2/2017, 11:43 AM

## 2017-12-02 NOTE — PROGRESS NOTES
Critical care team - Resident sign-out to medicine service      Date and time: 12/4/2017 6:01 PM  Patient's name:  Kel Carrillo Record Number: [id number]  Patient's account/billing number: [de-identified]  Patient's YOB: 1950  Age: 69 y.o. Date of Admission: 11/18/2017 11:20 AM  Length of stay during current admission: 16    Primary Care Physician: No primary care provider on file. Code Status: Full Code    Mode of physician to physician communication:        [] Via telephone   [x] In person     Date and time of sign-out: 12/4/2017 6:01 PM    Accepting Internal Medicine resident: Dr. Kathryn Love    Accepting Medicine team: IM Team 4    Accepting team's attending: Dr. Pierce Morning    Patient's current ICU Bed:  121     Patient's assigned bed on floor:  548        [] Med-Surg Monitored [x] Step-down       [] Psychiatry ICU       [] Psych floor     Reason for ICU admission:     AMS and fall     ICU course summary:     Patient is a 64-year-old female who was brought in by her son found down along with altered mentation. CT head was negative. Secondary to acute respiratory failure and encephalopathy patient was intubated. EEG showed moderate diffuse cerebral dysfunction  Patient also had AK I probably due to dehydration and was given fluids. Nephrology was consulted SUSAN  she received fluids and bicarb and eventually her kidney function improved and nephrology signed off She had lactic acidosis which resolved after hydration. She had leukocytosis along with multiple stones and was started on broad-spectrum antibiotics and ID was consulted. Discontinued and is monitored off antibiotics and wound care on board. GYN was consulted for postmenopausal vaginal bleeding, on evaluation they could not to TVUS and was asked to f/u outpatient TVUS  Patient was also hypertensive and was on Norvasc, clonidine patch ,Coreg, hydralazine 25mg 3 times a day.   Patient is on Lantus 20 units and insulin sliding scale, she

## 2017-12-03 LAB
ABSOLUTE EOS #: 0.15 K/UL (ref 0–0.44)
ABSOLUTE IMMATURE GRANULOCYTE: <0.03 K/UL (ref 0–0.3)
ABSOLUTE LYMPH #: 1.89 K/UL (ref 1.1–3.7)
ABSOLUTE MONO #: 0.76 K/UL (ref 0.1–1.2)
ALBUMIN SERPL-MCNC: 2.3 G/DL (ref 3.5–5.2)
ALBUMIN/GLOBULIN RATIO: 0.6 (ref 1–2.5)
ALP BLD-CCNC: 77 U/L (ref 35–104)
ALT SERPL-CCNC: 30 U/L (ref 5–33)
ANION GAP SERPL CALCULATED.3IONS-SCNC: 12 MMOL/L (ref 9–17)
AST SERPL-CCNC: 27 U/L
BASOPHILS # BLD: 0 % (ref 0–2)
BASOPHILS ABSOLUTE: <0.03 K/UL (ref 0–0.2)
BILIRUB SERPL-MCNC: 0.34 MG/DL (ref 0.3–1.2)
BUN BLDV-MCNC: 16 MG/DL (ref 8–23)
BUN/CREAT BLD: ABNORMAL (ref 9–20)
CALCIUM SERPL-MCNC: 7.9 MG/DL (ref 8.6–10.4)
CHLORIDE BLD-SCNC: 102 MMOL/L (ref 98–107)
CO2: 26 MMOL/L (ref 20–31)
CREAT SERPL-MCNC: 0.77 MG/DL (ref 0.5–0.9)
DIFFERENTIAL TYPE: ABNORMAL
EOSINOPHILS RELATIVE PERCENT: 2 % (ref 1–4)
GFR AFRICAN AMERICAN: >60 ML/MIN
GFR NON-AFRICAN AMERICAN: >60 ML/MIN
GFR SERPL CREATININE-BSD FRML MDRD: ABNORMAL ML/MIN/{1.73_M2}
GFR SERPL CREATININE-BSD FRML MDRD: ABNORMAL ML/MIN/{1.73_M2}
GLUCOSE BLD-MCNC: 139 MG/DL (ref 65–105)
GLUCOSE BLD-MCNC: 64 MG/DL (ref 65–105)
GLUCOSE BLD-MCNC: 68 MG/DL (ref 65–105)
GLUCOSE BLD-MCNC: 70 MG/DL (ref 65–105)
GLUCOSE BLD-MCNC: 72 MG/DL (ref 65–105)
GLUCOSE BLD-MCNC: 73 MG/DL (ref 65–105)
GLUCOSE BLD-MCNC: 74 MG/DL (ref 65–105)
GLUCOSE BLD-MCNC: 75 MG/DL (ref 65–105)
GLUCOSE BLD-MCNC: 75 MG/DL (ref 70–99)
GLUCOSE BLD-MCNC: 77 MG/DL (ref 65–105)
GLUCOSE BLD-MCNC: 78 MG/DL (ref 65–105)
GLUCOSE BLD-MCNC: 80 MG/DL (ref 65–105)
GLUCOSE BLD-MCNC: 84 MG/DL (ref 65–105)
GLUCOSE BLD-MCNC: 88 MG/DL (ref 65–105)
HCT VFR BLD CALC: 28.4 % (ref 36.3–47.1)
HEMOGLOBIN: 8.8 G/DL (ref 11.9–15.1)
IMMATURE GRANULOCYTES: 0 %
LYMPHOCYTES # BLD: 25 % (ref 24–43)
MCH RBC QN AUTO: 29.2 PG (ref 25.2–33.5)
MCHC RBC AUTO-ENTMCNC: 31 G/DL (ref 28.4–34.8)
MCV RBC AUTO: 94.4 FL (ref 82.6–102.9)
MONOCYTES # BLD: 10 % (ref 3–12)
PDW BLD-RTO: 15 % (ref 11.8–14.4)
PLATELET # BLD: 206 K/UL (ref 138–453)
PLATELET ESTIMATE: ABNORMAL
PMV BLD AUTO: 11.2 FL (ref 8.1–13.5)
POTASSIUM SERPL-SCNC: 4.4 MMOL/L (ref 3.7–5.3)
RBC # BLD: 3.01 M/UL (ref 3.95–5.11)
RBC # BLD: ABNORMAL 10*6/UL
SEG NEUTROPHILS: 63 % (ref 36–65)
SEGMENTED NEUTROPHILS ABSOLUTE COUNT: 4.79 K/UL (ref 1.5–8.1)
SODIUM BLD-SCNC: 140 MMOL/L (ref 135–144)
TOTAL PROTEIN: 6.4 G/DL (ref 6.4–8.3)
WBC # BLD: 7.6 K/UL (ref 3.5–11.3)
WBC # BLD: ABNORMAL 10*3/UL

## 2017-12-03 PROCEDURE — S0028 INJECTION, FAMOTIDINE, 20 MG: HCPCS | Performed by: INTERNAL MEDICINE

## 2017-12-03 PROCEDURE — 6360000002 HC RX W HCPCS: Performed by: HOSPITALIST

## 2017-12-03 PROCEDURE — 80053 COMPREHEN METABOLIC PANEL: CPT

## 2017-12-03 PROCEDURE — 82947 ASSAY GLUCOSE BLOOD QUANT: CPT

## 2017-12-03 PROCEDURE — 6370000000 HC RX 637 (ALT 250 FOR IP): Performed by: INTERNAL MEDICINE

## 2017-12-03 PROCEDURE — 2500000003 HC RX 250 WO HCPCS: Performed by: INTERNAL MEDICINE

## 2017-12-03 PROCEDURE — 94762 N-INVAS EAR/PLS OXIMTRY CONT: CPT

## 2017-12-03 PROCEDURE — 99233 SBSQ HOSP IP/OBS HIGH 50: CPT | Performed by: INTERNAL MEDICINE

## 2017-12-03 PROCEDURE — 36415 COLL VENOUS BLD VENIPUNCTURE: CPT

## 2017-12-03 PROCEDURE — 2580000003 HC RX 258: Performed by: INTERNAL MEDICINE

## 2017-12-03 PROCEDURE — 2000000000 HC ICU R&B

## 2017-12-03 PROCEDURE — 85025 COMPLETE CBC W/AUTO DIFF WBC: CPT

## 2017-12-03 PROCEDURE — 2580000003 HC RX 258: Performed by: HOSPITALIST

## 2017-12-03 RX ORDER — DEXTROSE AND SODIUM CHLORIDE 5; .45 G/100ML; G/100ML
INJECTION, SOLUTION INTRAVENOUS CONTINUOUS
Status: DISCONTINUED | OUTPATIENT
Start: 2017-12-03 | End: 2017-12-03

## 2017-12-03 RX ADMIN — HEPARIN SODIUM 5000 UNITS: 5000 INJECTION, SOLUTION INTRAVENOUS; SUBCUTANEOUS at 21:00

## 2017-12-03 RX ADMIN — Medication 10 ML: at 09:03

## 2017-12-03 RX ADMIN — Medication 10 ML: at 21:01

## 2017-12-03 RX ADMIN — CARVEDILOL 25 MG: 25 TABLET, FILM COATED ORAL at 18:48

## 2017-12-03 RX ADMIN — HYDRALAZINE HYDROCHLORIDE 25 MG: 25 TABLET, FILM COATED ORAL at 14:48

## 2017-12-03 RX ADMIN — DEXTROSE MONOHYDRATE 100 ML/HR: 50 INJECTION, SOLUTION INTRAVENOUS at 06:17

## 2017-12-03 RX ADMIN — DEXTROSE AND SODIUM CHLORIDE: 5; 450 INJECTION, SOLUTION INTRAVENOUS at 01:00

## 2017-12-03 RX ADMIN — FAMOTIDINE 20 MG: 10 INJECTION, SOLUTION INTRAVENOUS at 09:04

## 2017-12-03 RX ADMIN — DEXTROSE MONOHYDRATE 12.5 G: 500 INJECTION PARENTERAL at 06:17

## 2017-12-03 RX ADMIN — HYDRALAZINE HYDROCHLORIDE 25 MG: 25 TABLET, FILM COATED ORAL at 21:00

## 2017-12-03 RX ADMIN — HEPARIN SODIUM 5000 UNITS: 5000 INJECTION, SOLUTION INTRAVENOUS; SUBCUTANEOUS at 06:54

## 2017-12-03 RX ADMIN — HEPARIN SODIUM 5000 UNITS: 5000 INJECTION, SOLUTION INTRAVENOUS; SUBCUTANEOUS at 14:48

## 2017-12-03 RX ADMIN — FAMOTIDINE 20 MG: 10 INJECTION, SOLUTION INTRAVENOUS at 21:01

## 2017-12-03 RX ADMIN — FUROSEMIDE 20 MG: 10 INJECTION, SOLUTION INTRAVENOUS at 09:04

## 2017-12-03 RX ADMIN — DEXTROSE AND SODIUM CHLORIDE: 5; 450 INJECTION, SOLUTION INTRAVENOUS at 09:03

## 2017-12-03 NOTE — PROGRESS NOTES
file     Social History Narrative    No narrative on file       Family History:     Family History   Problem Relation Age of Onset    Heart Disease Paternal Grandmother     Diabetes Father     Hypertension Father     Stroke Father     Diabetes Mother     Hypertension Mother     Breast Cancer Mother     Asthma Brother     Cancer Sister      lung    Cancer Maternal Uncle      bone    Cancer Maternal Aunt         Allergies:   Pcn [penicillins]     Review of Systems:   Unable to provide. Sedated on ventilator. Physical Examination :     Patient Vitals for the past 8 hrs:   BP Temp Temp src Pulse Resp SpO2   12/03/17 0800 (!) 151/75 - - (!) 47 14 100 %   12/03/17 0730 - - - 53 13 100 %   12/03/17 0700 133/75 - - 55 17 100 %   12/03/17 0630 - - - 72 21 99 %   12/03/17 0600 (!) 147/69 - - 52 16 100 %   12/03/17 0530 - - - 67 17 99 %   12/03/17 0500 (!) 145/72 - - 55 15 100 %   12/03/17 0430 - - - 55 - 100 %   12/03/17 0400 (!) 146/77 99.1 °F (37.3 °C) Oral 59 19 100 %   12/03/17 0330 - - - 68 20 100 %   12/03/17 0300 (!) 146/69 - - 61 19 99 %   12/03/17 0230 - - - 61 17 100 %   12/03/17 0200 (!) 149/61 - - 56 13 100 %   12/03/17 0130 - - - 61 17 100 %   12/03/17 0100 134/66 - - 57 18 100 %   12/03/17 0030 (!) 153/95 99.1 °F (37.3 °C) Oral 59 18 99 %     General Appearance: Off ventilator. Extubated and satting well on nasal cannula. Head:  Normocephalic, no trauma  Eyes: Pupils equal, round, reactive to light; sclera anicteric; conjunctivae pink. No embolic phenomena. ENT: Oropharynx clear, without erythema, exudate, or thrush. No tenderness of sinuses. Mouth/throat: mucosa pink and moist. No lesions. Neck:Supple, without lymphadenopathy. Thyroid normal, No bruits. Pulmonary/Chest: Clear to auscultation, upper airway noises. No dullness to percussion. Cardiovascular: Regular rate and rhythm without murmurs, rubs, or gallops. Abdomen: Soft, non tender. Bowel sounds normal. No organomegaly.  Morbidly

## 2017-12-03 NOTE — PROGRESS NOTES
adenomas. GI/Bowel: No free fluid, free air, or bowel obstruction is noted. Small nonstrangulated fat containing umbilical hernia is noted. Pelvis: Appendix is visualized and is unremarkable. No abnormal mass lesions, pelvic or inguinal adenopathy is noted. Indwelling Noel catheter in the bladder is noted. Air in the bladder is noted likely due to catheterization. Peritoneum/Retroperitoneum: Aorta is normal in caliber without evidence of aneurysm. No retroperitoneal mass or adenopathy is noted. No mesenteric adenopathy is seen. Bones/Soft Tissues: Degenerative changes are present in the SI joints and lower lumbar facets. Spondylosis is present without suspicious lytic or blastic lesion. Estimated biologic radiation dose for this procedure:  1233.56 mGy/cm2.     1.  Hypodense lesions in the liver, not clearly cystic. These may represent hemangiomas, but further assessment with performance of ultrasound is advised which can be performed on a nonemergent basis. 2.   Gallbladder is slightly hyperdense without distinct gallstones. Sludge is not excluded. Right upper quadrant ultrasound may prove helpful. 3.   Bilateral adrenal masses, largest on the right of 2.6 cm, likely adenomas. 4.  No bowel obstruction or evidence of appendicitis. No adenopathy or other evidence of acute abdominopelvic process. RECOMMENDATIONS: Ultrasound of the liver and right upper quadrant to assess liver lesions and gallbladder. Ct Head Wo Contrast    No acute intracranial abnormality. Right basal ganglia chronic lacunar infarcts. Moderate chronic microvascular ischemic changes of cerebral white matter.      Ct Head Wo Contrast    Result Date: 11/18/2017  EXAMINATION: CT OF THE HEAD WITHOUT CONTRAST  11/18/2017 12:47 pm TECHNIQUE: CT of the head was performed without the administration of intravenous contrast. Dose modulation, iterative reconstruction, and/or weight based adjustment of the mA/kV was utilized to reduce the RETROPERITONEAL ULTRASOUND OF THE KIDNEYS AND URINARY BLADDER 11/19/2017 COMPARISON: None HISTORY: ORDERING SYSTEM PROVIDED HISTORY: RENAL FAILURE, ACUTE (KIDNEY INJURY) FINDINGS: Kidneys: The right kidney measures 9.84 x 5.16 x 4.25 cm. The left kidney measures 10.78 x 5.26 x 529 cm. Visualization is suboptimal due to bowel gas and body habitus. Kidneys demonstrate normal cortical echogenicity. No evidence of hydronephrosis or intrarenal stones. No cortical thinning is noted. Bladder: Indwelling Noel catheter is noted precluding accurate assessment of the bladder. Limited visualization of the kidneys due to bowel gas and body habitus. Kidneys to the extent imaged are unremarkable. Bladder could not be evaluated due to indwelling Noel catheter. Austyn Bautista MD  Emergency Medicine Resident  Trauma & General Surgery Service  12/3/17, 6:26 AM                   Trauma Attending Reina Vasquez      I have reviewed the above TECSS note(s) and confirmed the key elements of the medical history and physical exam. I have seen and examined the pt. I have discussed the findings, established the care plan and recommendations with Resident, GCS RN, bedside nurse. Resident, GCS RN, bedside RN  Pt not able to participate in exam  Could ask NS for recs  Recall when pt more alert      Eleonora Check, DO  12/3/2017  11:30 AM

## 2017-12-03 NOTE — PROGRESS NOTES
[facility] [address]  Occupational Therapy Not Seen Note    Patient not available for Occupational Therapy due to:    [] Testing:    [] Hemodialysis    [] Blood Transfusion in Progress    []Refusal by Patient:    [] Surgery/Procedure:    [] Strict Bedrest    [] Sedation    [] Spine Precautions     [] Pt being transferred to palliative care at this time. Spoke with pt/family and OT services to be defered. [] Pt independent with functional mobility and functional tasks. Pt with no OT acute care needs at this time, will defer OT eval.    [x] Other: Per RN pt not appropriate for OT this date, extremely drowsy and difficult to wake. Next Scheduled Treatment: Attempt on 12/4 as appropriate. Signature:  Khloe SCHUMACHER/KEVIN

## 2017-12-03 NOTE — PROGRESS NOTES
Patient remains non-verbal.  Appears less responsive and interactive than previous. Difficult to assess neurological exam as patient is not reliably following commands as she was last night and this morning. Did not recently receive any narcotics. Will get CT head to evaluate further and rule out interval CNS etiology. Will continue to monitor neurological status and consider neurology consult.

## 2017-12-03 NOTE — PROGRESS NOTES
Vasopressin       [] Dobutamine  [] Phenylephrine         [] Epinephrine    CENTRAL LINES:     [] No   [] Yes   (Date of Insertion:   )           If yes -     [] Right IJ     [] Left IJ [] Right Femoral [] Left Femoral                   [] Right Subclavian [] Left Subclavian       MUÑOZ'S CATHETER:   [x] No   [] Yes  (Date of Insertion:   )     URINE OUTPUT:            [x] Good   [] Low              [] Anuric      OBJECTIVE:     VITAL SIGNS:  BP (!) 149/61   Pulse 56   Temp 99.1 °F (37.3 °C) (Oral)   Resp 13   Ht 5' 9\" (1.753 m)   Wt 259 lb 0.7 oz (117.5 kg)   SpO2 100%   BMI 38.25 kg/m²   Tmax over 24 hours:  Temp (24hrs), Av.2 °F (37.3 °C), Min:99 °F (37.2 °C), Max:99.5 °F (37.5 °C)      No data found. No intake or output data in the 24 hours ending 17 0801  Wt Readings from Last 2 Encounters:   17 259 lb 0.7 oz (117.5 kg)     Body mass index is 38.25 kg/m². PHYSICAL EXAMINATION:  Constitutional: Follows commands occasionally, not communicating  EENT: PERRLA, EOMI, sclera clear, anicteric, oropharynx clear, no lesions, neck supple with midline trachea. Neck: Supple, symmetrical, trachea midline, no adenopathy,  no jvd, skin normal  Respiratory: clear to auscultation, no wheezes or rales and unlabored breathing.  No intercostal tenderness  Cardiovascular: regular rate and rhythm, normal S1, S2, no murmur noted and 2+ pulses throughout  Abdomen: soft, nontender, nondistended, no masses or organomegaly  Extremities:  Bilateral 1+ pedal edema       MEDICATIONS:    Scheduled Meds:   furosemide  20 mg Intravenous Daily    famotidine (PEPCID) injection  20 mg Intravenous BID    insulin lispro  0-12 Units Subcutaneous Q6H    hydrALAZINE  25 mg Oral 3 times per day    carvedilol  25 mg Oral BID WC    cloNIDine  1 patch Transdermal Weekly    amLODIPine  10 mg Oral Daily    silver sulfADIAZINE   Topical Daily    sodium chloride flush  10 mL Intravenous 2 times per day    heparin

## 2017-12-04 LAB
GLUCOSE BLD-MCNC: 135 MG/DL (ref 65–105)
GLUCOSE BLD-MCNC: 136 MG/DL (ref 65–105)
GLUCOSE BLD-MCNC: 138 MG/DL (ref 65–105)

## 2017-12-04 PROCEDURE — 2580000003 HC RX 258: Performed by: HOSPITALIST

## 2017-12-04 PROCEDURE — 99233 SBSQ HOSP IP/OBS HIGH 50: CPT | Performed by: INTERNAL MEDICINE

## 2017-12-04 PROCEDURE — 2060000000 HC ICU INTERMEDIATE R&B

## 2017-12-04 PROCEDURE — 6360000002 HC RX W HCPCS: Performed by: HOSPITALIST

## 2017-12-04 PROCEDURE — 2500000003 HC RX 250 WO HCPCS: Performed by: INTERNAL MEDICINE

## 2017-12-04 PROCEDURE — 6370000000 HC RX 637 (ALT 250 FOR IP): Performed by: INTERNAL MEDICINE

## 2017-12-04 PROCEDURE — 82947 ASSAY GLUCOSE BLOOD QUANT: CPT

## 2017-12-04 PROCEDURE — S0028 INJECTION, FAMOTIDINE, 20 MG: HCPCS | Performed by: INTERNAL MEDICINE

## 2017-12-04 PROCEDURE — 99223 1ST HOSP IP/OBS HIGH 75: CPT | Performed by: PSYCHIATRY & NEUROLOGY

## 2017-12-04 PROCEDURE — 6370000000 HC RX 637 (ALT 250 FOR IP): Performed by: HOSPITALIST

## 2017-12-04 PROCEDURE — 94762 N-INVAS EAR/PLS OXIMTRY CONT: CPT

## 2017-12-04 RX ADMIN — HEPARIN SODIUM 5000 UNITS: 5000 INJECTION, SOLUTION INTRAVENOUS; SUBCUTANEOUS at 06:31

## 2017-12-04 RX ADMIN — AMLODIPINE BESYLATE 10 MG: 10 TABLET ORAL at 08:59

## 2017-12-04 RX ADMIN — FAMOTIDINE 20 MG: 10 INJECTION, SOLUTION INTRAVENOUS at 09:00

## 2017-12-04 RX ADMIN — HEPARIN SODIUM 5000 UNITS: 5000 INJECTION, SOLUTION INTRAVENOUS; SUBCUTANEOUS at 14:00

## 2017-12-04 RX ADMIN — HEPARIN SODIUM 5000 UNITS: 5000 INJECTION, SOLUTION INTRAVENOUS; SUBCUTANEOUS at 22:13

## 2017-12-04 RX ADMIN — POLYMYXIN B SULFATE, BACITRACIN ZINC, NEOMYCIN SULFATE: 5000; 3.5; 4 OINTMENT TOPICAL at 09:01

## 2017-12-04 RX ADMIN — HYDRALAZINE HYDROCHLORIDE 25 MG: 25 TABLET, FILM COATED ORAL at 06:34

## 2017-12-04 RX ADMIN — CARVEDILOL 25 MG: 25 TABLET, FILM COATED ORAL at 17:00

## 2017-12-04 RX ADMIN — Medication 10 ML: at 20:46

## 2017-12-04 RX ADMIN — HYDRALAZINE HYDROCHLORIDE 25 MG: 25 TABLET, FILM COATED ORAL at 22:13

## 2017-12-04 RX ADMIN — Medication 10 ML: at 09:01

## 2017-12-04 RX ADMIN — FUROSEMIDE 20 MG: 10 INJECTION, SOLUTION INTRAVENOUS at 09:01

## 2017-12-04 RX ADMIN — HYDRALAZINE HYDROCHLORIDE 25 MG: 25 TABLET, FILM COATED ORAL at 14:00

## 2017-12-04 RX ADMIN — CARVEDILOL 25 MG: 25 TABLET, FILM COATED ORAL at 09:00

## 2017-12-04 NOTE — FLOWSHEET NOTE
VISIT:  Patient appears unconscious. When approached patient looked like she was trying to open her eyes. Family not present. ASSESSMENT: Patient unable to respond. INTERVENTION:  Words of encouragement. Prayer    PLAN:  Chaplains are available for on-going emotional and/or spiritual support.        12/04/17 1348   Encounter Summary   Services provided to: Patient   Referral/Consult From: Rounding   Complexity of Encounter Low   Length of Encounter 15 minutes   Routine   Type Follow up   Assessment Unable to respond   Intervention Prayer   Spiritual/Baptism   Type Spiritual support   Outcome (Patient unable to respond)

## 2017-12-04 NOTE — CONSULTS
Neurosurgery Consult Note                                              Reason for Consult:  c-spine clearance  Requesting Physician:  Jeferson Lujan: Dr. Bernice Mcmillan    History Obtained From:  non-family caregiver - RN, electronic medical record    CHIEF COMPLAINT:       AMS    HISTORY OF PRESENT ILLNESS:                 The patient is a 69 y.o.  female who has been admitted in MICU since 11/18, found down initially w AMS covered in feces by her son, laying face-down on her futon. No signs of trauma however she had multiple open pressure ulcers across abdomen, umbilicus, groin. It is estimated she had been laying there ~2 days. She was intubated for airway protection in the ED for low GCS, required aggressive fluid resuscitation, cardiac/septic workup, and ICU admission. Treated for hypotension, SUSAN, acute respiratory failure unspecified, rhabdomyolysis, metabolic encephalopathy. CTH was negative for acute injury though revealed chronic right lacunar infarcts in basal ganglia. TLS was cleared by trauma, but patient cannot cooperate w flex/ex post-extubation due to mental status. On my assessment, patient is not alert enough to participate much w exam, gives occasional one-syllable answers, follows simple commands, though left is weaker than right. PAST MEDICAL HISTORY :       Past Medical History:    Past Medical History:   Diagnosis Date    Depression     Heart murmur     HTN (hypertension)        Past Surgical History:    Past Surgical History:   Procedure Laterality Date    OTHER SURGICAL HISTORY      bump under skin on buttocks    TUBAL LIGATION         Social History:   Social History     Social History    Marital status:      Spouse name: N/A    Number of children: N/A    Years of education: N/A     Occupational History    Not on file.      Social History Main Topics    Smoking status: Not on file    Smokeless tobacco: Not on file    Alcohol use Not on GLUCOSEU NEGATIVE 11/18/2017    AMORPHOUS NOT REPORTED 11/18/2017          Radiology Review:    Ct Abdomen Pelvis Wo Iv Contrast Additional Contrast? None    Result Date: 11/18/2017  EXAMINATION: CT OF THE ABDOMEN AND PELVIS WITHOUT CONTRAST 11/18/2017 12:47 pm TECHNIQUE: CT of the abdomen and pelvis was performed without the administration of intravenous contrast. Multiplanar reformatted images are provided for review. Dose modulation, iterative reconstruction, and/or weight based adjustment of the mA/kV was utilized to reduce the radiation dose to as low as reasonably achievable. COMPARISON: None. HISTORY: ORDERING SYSTEM PROVIDED HISTORY: found down TECHNOLOGIST PROVIDED HISTORY: Additional Contrast?->None FINDINGS: Lower Chest: Mild atelectasis is present at the right lung base. No effusion, localizing consolidation, or worrisome nodules are present. Heart size is normal. Organs: An 8 mm hypodensity with indeterminate but low Hounsfield numbers is noted in the left lobe of the liver. This may represent a hemangioma. An additional 8 mm lesion is present in the posterior right lobe of the liver with similar characteristics. Spleen, pancreas, and kidneys are unremarkable without hydronephrosis or nephrolithiasis. Gallbladder is slightly hyperdense without distinct gallstones. A 1 cm nodular mass is present in the left adrenal with a 1.5 x 1 cm superior hypodense right adrenal mass and an inferior right adrenal mass of 2.6 cm, likely adenomas. GI/Bowel: No free fluid, free air, or bowel obstruction is noted. Small nonstrangulated fat containing umbilical hernia is noted. Pelvis: Appendix is visualized and is unremarkable. No abnormal mass lesions, pelvic or inguinal adenopathy is noted. Indwelling Noel catheter in the bladder is noted. Air in the bladder is noted likely due to catheterization. Peritoneum/Retroperitoneum: Aorta is normal in caliber without evidence of aneurysm.   No retroperitoneal mass or adenopathy is noted. No mesenteric adenopathy is seen. Bones/Soft Tissues: Degenerative changes are present in the SI joints and lower lumbar facets. Spondylosis is present without suspicious lytic or blastic lesion. Estimated biologic radiation dose for this procedure:  1233.56 mGy/cm2.     1.  Hypodense lesions in the liver, not clearly cystic. These may represent hemangiomas, but further assessment with performance of ultrasound is advised which can be performed on a nonemergent basis. 2.   Gallbladder is slightly hyperdense without distinct gallstones. Sludge is not excluded. Right upper quadrant ultrasound may prove helpful. 3.   Bilateral adrenal masses, largest on the right of 2.6 cm, likely adenomas. 4.  No bowel obstruction or evidence of appendicitis. No adenopathy or other evidence of acute abdominopelvic process. RECOMMENDATIONS: Ultrasound of the liver and right upper quadrant to assess liver lesions and gallbladder. Ct Head Wo Contrast    Result Date: 12/3/2017  EXAMINATION: CT OF THE HEAD WITHOUT CONTRAST  12/3/2017 12:07 am TECHNIQUE: CT of the head was performed without the administration of intravenous contrast. Dose modulation, iterative reconstruction, and/or weight based adjustment of the mA/kV was utilized to reduce the radiation dose to as low as reasonably achievable. COMPARISON: 11/18/2017 HISTORY: ORDERING SYSTEM PROVIDED HISTORY: NEURO DEFICIT(S), SUBACUTE, PROGRESSIVE OR FLUCTUATING TECHNOLOGIST PROVIDED HISTORY: Has a \"code stroke\" or \"stroke alert\" been called? ->No FINDINGS: BRAIN/VENTRICLES: There is no acute intracranial hemorrhage, mass effect or midline shift. No abnormal extra-axial fluid collection. The gray-white differentiation is maintained without evidence of an acute infarct. There is no evidence of hydrocephalus. Confluent cerebral white matter hypodensity. Mild generalized brain parenchymal volume loss. Right basal ganglia chronic lacunar infarcts.  ORBITS: The Dose modulation, iterative reconstruction, and/or weight based adjustment of the mA/kV was utilized to reduce the radiation dose to as low as reasonably achievable. COMPARISON: No priors HISTORY: ORDERING SYSTEM PROVIDED HISTORY: found down FINDINGS: Mediastinum: The cardiac size is normal. There is no significant mediastinal, hilar or axillary lymphadenopathy. The thyroid gland and esophagus are grossly normal.  Absence of IV contrast limits evaluation of the major vessels. Lungs/pleura: Minor streaky pleural-parenchymal densities in the posterior left lung base suggestive of residual of pneumonitis or atelectasis. No significant nodules or masses. No pleural effusion or pneumothorax. ETT terminates above brian. Upper Abdomen: No significant abnormalities. Soft Tissues/Bones: No acute abnormality of the bones. The superficial soft tissues show no significant abnormalities. 1. Minor streaky right posterior basal lower lobes density suggestive of atelectasis or scarring. 2. ETT in place terminating above brian. 3. No acute process evident in the chest.     Ct Cervical Spine Wo Contrast    Result Date: 11/18/2017  EXAMINATION: CT OF THE CERVICAL SPINE WITHOUT CONTRAST 11/18/2017 12:47 pm TECHNIQUE: CT of the cervical spine was performed without the administration of intravenous contrast. Multiplanar reformatted images are provided for review. Dose modulation, iterative reconstruction, and/or weight based adjustment of the mA/kV was utilized to reduce the radiation dose to as low as reasonably achievable. COMPARISON: None. HISTORY: ORDERING SYSTEM PROVIDED HISTORY: found down FINDINGS: BONES/ALIGNMENT: There is no evidence of an acute cervical spine fracture. There is normal alignment of the cervical spine. DEGENERATIVE CHANGES: Exuberant anterior bridging osteophytes are noted from C4 through C7 with disc space narrowing. Small osteophytes elsewhere. Facet arthropathy evident.  SOFT TISSUES: There is no prevertebral soft tissue swelling. ETT in place. Visualized lung apices show no pneumothorax. No acute abnormality of the cervical spine. Degenerative changes with exuberant bridging osteophytes. Ct Thoracic Spine Wo Contrast    Result Date: 12/2/2017  EXAMINATION: CT OF THE LUMBAR SPINE WITHOUT CONTRAST; CT OF THE THORACIC SPINE WITHOUT CONTRAST 12/1/2017 TECHNIQUE: CT of the lumbar spine was performed without the administration of intravenous contrast. Multiplanar reformatted images are provided for review. Dose modulation, iterative reconstruction, and/or weight based adjustment of the mA/kV was utilized to reduce the radiation dose to as low as reasonably achievable.; CT of the thoracic spine was performed without the administration of intravenous contrast. Multiplanar reformatted images are provided for review. Dose modulation, iterative reconstruction, and/or weight based adjustment of the mA/kV was utilized to reduce the radiation dose to as low as reasonably achievable. COMPARISON: None HISTORY: ORDERING SYSTEM PROVIDED HISTORY: trauma TECHNOLOGIST PROVIDED HISTORY: Reason for exam:->trauma; ORDERING SYSTEM PROVIDED HISTORY: trauma FINDINGS: BONES/ALIGNMENT:  There is no gross evidence of an acute fracture of the thoracic or lumbar spine. There is normal alignment of the spine. DEGENERATIVE CHANGES: L5-S1 intervertebral disc space narrowing. Facet arthrosis from L2-3 through L5-S1, most severe at L4-5 and on the right at L5-S1. Multifactorial moderate central spinal canal stenosis at L3-4 and L4-5. Osseous mild-to-moderate neural foraminal stenosis at L3-4 through L5-S1, bilaterally. Moderate central spinal canal stenosis at T3-4, T4-5, T5-6 and T6-7. Moderate bilateral neural foraminal stenosis at T1-2, T2-3 and severe bilateral neural foraminal stenosis at T3-4, T4-5, T5-6, T6-7, and on the right at T7-8. Left SI joint sclerosis likely degenerative in origin.  SOFT TISSUES: No paraspinal mass T5-6, T6-7, and on the right at T7-8. Left SI joint sclerosis likely degenerative in origin. SOFT TISSUES: No paraspinal mass is seen. Minimal dependent subsegmental atelectasis within both lungs. Minimal presacral edema. No displaced sacral fracture identified. No evidence of acute traumatic injury of the thoracic or lumbar spine. Nonspecific presacral edema. Severe multilevel thoracic spondylosis. Moderate multilevel lumbar spondylosis. Xr Chest Portable    Result Date: 11/23/2017  EXAMINATION: SINGLE VIEW OF THE CHEST 11/22/2017 9:50 am COMPARISON: 11/18/2017 HISTORY: ORDERING SYSTEM PROVIDED HISTORY: ETT tube placement TECHNOLOGIST PROVIDED HISTORY: Reason for exam:->ETT tube placement Acuity: Unknown Type of Exam: Unknown Follow-up. FINDINGS: Monitor wires overlie the chest.  The patient has an endotracheal tube with the tip at the level of T3. There is an NG tube which courses below the level of the diaphragm. The trachea is midline. The cardiac silhouette is mildly prominent but stable. There is no overt failure. The lungs are otherwise stable. There is no acute pulmonary process. Stable chest.     Xr Chest Portable    Result Date: 11/22/2017  EXAMINATION: SINGLE VIEW OF THE CHEST 11/22/2017 6:27 pm COMPARISON: 11/22/2017 at 10:19 a.m. HISTORY: ORDERING SYSTEM PROVIDED HISTORY: ETT moved, verify placement TECHNOLOGIST PROVIDED HISTORY: Reason for exam:->ETT moved, verify placement FINDINGS: Endotracheal tube remains in satisfactory position. NG tube tip not well visualized. The lungs are without acute focal process. There is no effusion or pneumothorax. The cardiomediastinal silhouette is stable again demonstrating cardiomegaly. The osseous structures are stable. No acute process. Stable cardiomegaly Endotracheal tube in satisfactory position     Xr Chest Portable    Result Date: 11/18/2017  EXAMINATION: SINGLE VIEW OF THE CHEST 11/18/2017 11:40 am COMPARISON: None.  HISTORY: y.o. female found down w AMS at home for estimated 2 days. She has been admitted since 11/18 in MICU with severe dehydration, respiratory failure, SUSAN, rhabdomyolysis, and metabolic encephalopathy. She had been treated with multiple antibiotics for open skin infections with underlying hypotension. Extubated 12/2. Consulted for spine clearance. Left sided weakness likely attributed to chronic right lacunar infarcts within basal ganglia on CTH.    - No neurosurgical interventions planned for now. - Neuro checks per protocol  - No anticoagulants/antiplatelets/NSAIDs  - Continue cervical collar  - MRI cervical, thoracic  - Additional recommendations may follow. Please contact us with any changes in the patient's neurologic exam.  Thank you for the consult.     Nicky Nixon PA-C   12/4/2017  10:50 AM  Neurosurgery pager [phone]

## 2017-12-04 NOTE — PROGRESS NOTES
Critical Care Team - Daily Progress Note      Date and time: 12/4/2017 8:36 AM  Patient's name:  Jorge Alberto Yung Record Number: [id number]  Patient's account/billing number: [de-identified]  Patient's YOB: 1950  Age: 69 y.o. Date of Admission: 11/18/2017 11:20 AM  Length of stay during current admission: 16      Primary Care Physician: No primary care provider on file.   ICU Attending Physician: Dr. Genesis Duffy Status: Full Code    Reason for ICU admission: Encephalopathy  SUBJECTIVE:     OVERNIGHT EVENTS:      Blood sugars continued to be labile  Patient had NG tube placed and TF started  Patient awake and following commands, however nonverbal    AWAKE & FOLLOWING COMMANDS:  [] No   [x] Yes    CURRENT VENTILATION STATUS:     [] Ventilator  [] BIPAP  [x] Nasal Cannula [] Room Air      IF INTUBATED, ET TUBE MARKING AT LOWER LIP:       cms    SECRETIONS Amount:  [x] Small [] Moderate  [] Large  [] None  Color:     [x] White [] Colored  [] Bloody    SEDATION:  RAAS Score:  [] Propofol gtt  [] Versed gtt  [] Ativan gtt   [x] No Sedation    PARALYZED:  [x] No    [] Yes    DIARRHEA:                [x] No                [] Yes  (C. Difficile status: [] positive                                                                                                                       [] negative                                                                                                                     [] pending)    VASOPRESSORS:  [x] No    [] Yes    If yes -   [] Levophed       [] Dopamine     [] Vasopressin       [] Dobutamine  [] Phenylephrine         [] Epinephrine    CENTRAL LINES:     [] No   [] Yes   (Date of Insertion:   )           If yes -     [] Right IJ     [] Left IJ [] Right Femoral [] Left Femoral                   [] Right Subclavian [] Left Subclavian       MUÑOZ'S CATHETER:   [x] No   [] Yes  (Date of Insertion:   )     URINE OUTPUT:            [x] Good   [] Low              []

## 2017-12-04 NOTE — FLOWSHEET NOTE
[facility]  Speech Language Pathology    Date: 12/4/2017  Patient Name: Ladan Bullock  YOB: 1950   AGE: 69 y.o. MRN: [id number]        Patient Not Available for Speech Therapy     Due to:  [] Testing  [] Hemodialysis  [] Cancelled by RN  [] Surgery   [] Intubation/Sedation/Pain Medication  [] Medical instability  [x] Other: Pt lethargic, unable to follow one unit commands. RN also reports pt also needs bedside swallow eval. Pt not appropriate to complete speech or swallow eval this morning.     Next scheduled treatment: 12/5/17  Completed by: Lianna Holloway M.S. [id number] [facility]

## 2017-12-04 NOTE — PLAN OF CARE
Problem: OXYGENATION/RESPIRATORY FUNCTION  Goal: Patient will achieve/maintain normal respiratory rate/effort  Respiratory rate and effort will be within normal limits for the patient   Outcome: Ongoing      Problem: ASPIRATION PRECAUTIONS  Goal: Patient's risk of aspiration is minimized  Outcome: Ongoing      Problem: Skin Integrity:  Goal: Will show no infection signs and symptoms  Will show no infection signs and symptoms   Outcome: Ongoing      Problem: Falls - Risk of  Goal: Absence of falls  Outcome: Ongoing      Problem: Nutrition  Goal: Optimal nutrition therapy  Outcome: Ongoing

## 2017-12-04 NOTE — PROGRESS NOTES
Not on file     Social History Narrative    No narrative on file       Family History:     Family History   Problem Relation Age of Onset    Heart Disease Paternal Grandmother     Diabetes Father     Hypertension Father     Stroke Father     Diabetes Mother     Hypertension Mother     Breast Cancer Mother     Asthma Brother     Cancer Sister      lung    Cancer Maternal Uncle      bone    Cancer Maternal Aunt         Allergies:   Pcn [penicillins]     Review of Systems:   Unable to provide. Sedated on ventilator. Physical Examination :     Patient Vitals for the past 8 hrs:   BP Temp Temp src Pulse Resp SpO2 Weight   12/04/17 0700 134/82 - - 66 18 98 % -   12/04/17 0633 - - - - - - 253 lb 1.4 oz (114.8 kg)   12/04/17 0600 (!) 143/63 - - 67 17 98 % -   12/04/17 0500 (!) 157/115 - - 73 16 98 % -   12/04/17 0400 (!) 146/66 98.8 °F (37.1 °C) Oral 62 17 98 % -   12/04/17 0300 (!) 152/64 - - 59 17 97 % -   12/04/17 0200 (!) 149/74 - - 60 17 97 % -   12/04/17 0100 (!) 149/65 - - 70 17 97 % -     General Appearance: Off ventilator. Extubated and satting well on nasal cannula. Head:  Normocephalic, no trauma  Eyes: Pupils equal, round, reactive to light; sclera anicteric; conjunctivae pink. No embolic phenomena. ENT: Oropharynx clear, without erythema, exudate, or thrush. No tenderness of sinuses. Mouth/throat: mucosa pink and moist. No lesions. Neck:Supple, without lymphadenopathy. Thyroid normal, No bruits. Pulmonary/Chest: Clear to auscultation, upper airway noises. No dullness to percussion. Cardiovascular: Regular rate and rhythm without murmurs, rubs, or gallops. Abdomen: Soft, non tender. Bowel sounds normal. No organomegaly. Morbidly obese. skin lesions over the abdominal pannus are clean and healing. No evidence of overt cellulitis is noted. All four Extremities: Morbidly obese,edematous. Neurologic:. Does not follow commands. .  Skin: Warm and dry with good turgor. No signs of peripheral

## 2017-12-04 NOTE — PROGRESS NOTES
Nutrition Assessment    Type and Reason for Visit: Reassess    Nutrition Recommendations: Recommend continue Glucerna (diabetic) formula at 65 ml per hour (1872 kcal, 94 g protein)    Malnutrition Assessment:  · Malnutrition Status: Insufficient data    Nutrition Diagnosis:   · Problem: Inadequate oral intake  · Etiology: related to Impaired respiratory function-inability to consume food     Signs and symptoms:  as evidenced by NPO status due to medical condition, Nutrition support - EN    Nutrition Assessment:  · Subjective Assessment: Pt tolerating tube feed at goal. + small BM. · Current Nutrition Therapies:  · Oral Diet Orders: NPO   · Tube Feeding (TF) Orders:   · Formula: Diabetic  · Rate (ml/hr):65 ml/hr    · Volume (ml/day): 1560 ml/day  · Duration: Continuous 24hrs  · TF Residuals: Less than or equal to 250ml  · Water Flushes:  (250 ml every 6 hr)  · Goal TF & Flush Orders Provides: 1872 kcal and 94 g pro per day   · Anthropometric Measures:  · Ht: 5' 9\" (175.3 cm)   · Current Body Wt: 253 lb (114.8 kg)  · Admission Body Wt: 218 lb 7.6 oz (99.1 kg)  · Ideal Body Wt: 160 lb (72.6 kg), % Ideal Body 136% using admission wt   · Adjusted Body Wt: 179 lb (81.2 kg), BMI Classification: BMI 30.0 - 34.9 Obese Class I  · Comparative Standards (Estimated Nutrition Needs):  · Estimated Daily Total Kcal: 7994-5201 kcal per day   · Estimated Daily Protein (g):  g pro per day   Estimated Intake vs Estimated Needs: Intake Meets Needs    Nutrition Risk Level: Moderate    Nutrition Interventions:   Continue current Tube Feeding  Continued Inpatient Monitoring, Education Not Indicated    Nutrition Evaluation:   · Evaluation: Goal achieved   · Goals: meet % of estimated nutrition needs    · Monitoring: TF Intake, TF Tolerance    See Adult Nutrition Doc Flowsheet for more detail.      Electronically signed by Jb Bruno RD, MANUEL on 12/4/17 at 1:19 PM    Contact Number: [phone]

## 2017-12-05 ENCOUNTER — APPOINTMENT (OUTPATIENT)
Dept: MRI IMAGING | Age: 67
DRG: 064 | End: 2017-12-05
Payer: MEDICARE

## 2017-12-05 PROBLEM — E11.9 TYPE 2 DIABETES MELLITUS (HCC): Status: ACTIVE | Noted: 2017-12-05

## 2017-12-05 LAB
ABSOLUTE EOS #: 0.15 K/UL (ref 0–0.44)
ABSOLUTE IMMATURE GRANULOCYTE: <0.03 K/UL (ref 0–0.3)
ABSOLUTE LYMPH #: 1.59 K/UL (ref 1.1–3.7)
ABSOLUTE MONO #: 0.61 K/UL (ref 0.1–1.2)
ALBUMIN SERPL-MCNC: 2.2 G/DL (ref 3.5–5.2)
ALBUMIN/GLOBULIN RATIO: 0.6 (ref 1–2.5)
ALP BLD-CCNC: 75 U/L (ref 35–104)
ALT SERPL-CCNC: 24 U/L (ref 5–33)
ANION GAP SERPL CALCULATED.3IONS-SCNC: 12 MMOL/L (ref 9–17)
AST SERPL-CCNC: 17 U/L
BASOPHILS # BLD: 0 % (ref 0–2)
BASOPHILS ABSOLUTE: <0.03 K/UL (ref 0–0.2)
BILIRUB SERPL-MCNC: 0.25 MG/DL (ref 0.3–1.2)
BUN BLDV-MCNC: 17 MG/DL (ref 8–23)
BUN/CREAT BLD: ABNORMAL (ref 9–20)
CALCIUM SERPL-MCNC: 7.6 MG/DL (ref 8.6–10.4)
CHLORIDE BLD-SCNC: 101 MMOL/L (ref 98–107)
CO2: 25 MMOL/L (ref 20–31)
CREAT SERPL-MCNC: 0.78 MG/DL (ref 0.5–0.9)
DIFFERENTIAL TYPE: ABNORMAL
EOSINOPHILS RELATIVE PERCENT: 3 % (ref 1–4)
GFR AFRICAN AMERICAN: >60 ML/MIN
GFR NON-AFRICAN AMERICAN: >60 ML/MIN
GFR SERPL CREATININE-BSD FRML MDRD: ABNORMAL ML/MIN/{1.73_M2}
GFR SERPL CREATININE-BSD FRML MDRD: ABNORMAL ML/MIN/{1.73_M2}
GLUCOSE BLD-MCNC: 114 MG/DL (ref 65–105)
GLUCOSE BLD-MCNC: 142 MG/DL (ref 65–105)
GLUCOSE BLD-MCNC: 142 MG/DL (ref 65–105)
GLUCOSE BLD-MCNC: 145 MG/DL (ref 65–105)
GLUCOSE BLD-MCNC: 146 MG/DL (ref 70–99)
HCT VFR BLD CALC: 29.5 % (ref 36.3–47.1)
HEMOGLOBIN: 8.8 G/DL (ref 11.9–15.1)
IMMATURE GRANULOCYTES: 0 %
LYMPHOCYTES # BLD: 31 % (ref 24–43)
MCH RBC QN AUTO: 29.5 PG (ref 25.2–33.5)
MCHC RBC AUTO-ENTMCNC: 29.8 G/DL (ref 28.4–34.8)
MCV RBC AUTO: 99 FL (ref 82.6–102.9)
MONOCYTES # BLD: 12 % (ref 3–12)
PDW BLD-RTO: 14.8 % (ref 11.8–14.4)
PLATELET # BLD: 236 K/UL (ref 138–453)
PLATELET ESTIMATE: ABNORMAL
PMV BLD AUTO: 10 FL (ref 8.1–13.5)
POTASSIUM SERPL-SCNC: 3.9 MMOL/L (ref 3.7–5.3)
RBC # BLD: 2.98 M/UL (ref 3.95–5.11)
RBC # BLD: ABNORMAL 10*6/UL
SEG NEUTROPHILS: 53 % (ref 36–65)
SEGMENTED NEUTROPHILS ABSOLUTE COUNT: 2.69 K/UL (ref 1.5–8.1)
SODIUM BLD-SCNC: 138 MMOL/L (ref 135–144)
TOTAL PROTEIN: 6.2 G/DL (ref 6.4–8.3)
WBC # BLD: 5.1 K/UL (ref 3.5–11.3)
WBC # BLD: ABNORMAL 10*3/UL

## 2017-12-05 PROCEDURE — 90670 PCV13 VACCINE IM: CPT | Performed by: INTERNAL MEDICINE

## 2017-12-05 PROCEDURE — 6360000002 HC RX W HCPCS: Performed by: EMERGENCY MEDICINE

## 2017-12-05 PROCEDURE — 36415 COLL VENOUS BLD VENIPUNCTURE: CPT

## 2017-12-05 PROCEDURE — 99226 PR SBSQ OBSERVATION CARE/DAY 35 MINUTES: CPT | Performed by: PSYCHIATRY & NEUROLOGY

## 2017-12-05 PROCEDURE — 6360000002 HC RX W HCPCS: Performed by: HOSPITALIST

## 2017-12-05 PROCEDURE — 85025 COMPLETE CBC W/AUTO DIFF WBC: CPT

## 2017-12-05 PROCEDURE — 6370000000 HC RX 637 (ALT 250 FOR IP): Performed by: INTERNAL MEDICINE

## 2017-12-05 PROCEDURE — 70553 MRI BRAIN STEM W/O & W/DYE: CPT

## 2017-12-05 PROCEDURE — 72146 MRI CHEST SPINE W/O DYE: CPT

## 2017-12-05 PROCEDURE — 82947 ASSAY GLUCOSE BLOOD QUANT: CPT

## 2017-12-05 PROCEDURE — 97110 THERAPEUTIC EXERCISES: CPT

## 2017-12-05 PROCEDURE — A9579 GAD-BASE MR CONTRAST NOS,1ML: HCPCS | Performed by: STUDENT IN AN ORGANIZED HEALTH CARE EDUCATION/TRAINING PROGRAM

## 2017-12-05 PROCEDURE — 6370000000 HC RX 637 (ALT 250 FOR IP): Performed by: HOSPITALIST

## 2017-12-05 PROCEDURE — 2580000003 HC RX 258: Performed by: STUDENT IN AN ORGANIZED HEALTH CARE EDUCATION/TRAINING PROGRAM

## 2017-12-05 PROCEDURE — 80053 COMPREHEN METABOLIC PANEL: CPT

## 2017-12-05 PROCEDURE — 72141 MRI NECK SPINE W/O DYE: CPT

## 2017-12-05 PROCEDURE — 99232 SBSQ HOSP IP/OBS MODERATE 35: CPT | Performed by: INTERNAL MEDICINE

## 2017-12-05 PROCEDURE — 99233 SBSQ HOSP IP/OBS HIGH 50: CPT | Performed by: INTERNAL MEDICINE

## 2017-12-05 PROCEDURE — 6360000002 HC RX W HCPCS: Performed by: INTERNAL MEDICINE

## 2017-12-05 PROCEDURE — G0009 ADMIN PNEUMOCOCCAL VACCINE: HCPCS | Performed by: INTERNAL MEDICINE

## 2017-12-05 PROCEDURE — 6360000004 HC RX CONTRAST MEDICATION: Performed by: STUDENT IN AN ORGANIZED HEALTH CARE EDUCATION/TRAINING PROGRAM

## 2017-12-05 PROCEDURE — 2060000000 HC ICU INTERMEDIATE R&B

## 2017-12-05 PROCEDURE — 94762 N-INVAS EAR/PLS OXIMTRY CONT: CPT

## 2017-12-05 PROCEDURE — 90686 IIV4 VACC NO PRSV 0.5 ML IM: CPT | Performed by: INTERNAL MEDICINE

## 2017-12-05 PROCEDURE — 2580000003 HC RX 258: Performed by: HOSPITALIST

## 2017-12-05 PROCEDURE — G0008 ADMIN INFLUENZA VIRUS VAC: HCPCS | Performed by: INTERNAL MEDICINE

## 2017-12-05 RX ORDER — SODIUM CHLORIDE 0.9 % (FLUSH) 0.9 %
10 SYRINGE (ML) INJECTION 2 TIMES DAILY
Status: DISCONTINUED | OUTPATIENT
Start: 2017-12-05 | End: 2017-12-14 | Stop reason: HOSPADM

## 2017-12-05 RX ADMIN — HYDRALAZINE HYDROCHLORIDE 25 MG: 25 TABLET, FILM COATED ORAL at 15:21

## 2017-12-05 RX ADMIN — HYDRALAZINE HYDROCHLORIDE 25 MG: 25 TABLET, FILM COATED ORAL at 06:44

## 2017-12-05 RX ADMIN — INSULIN LISPRO 2 UNITS: 100 INJECTION, SOLUTION INTRAVENOUS; SUBCUTANEOUS at 09:28

## 2017-12-05 RX ADMIN — AMLODIPINE BESYLATE 10 MG: 10 TABLET ORAL at 09:28

## 2017-12-05 RX ADMIN — HEPARIN SODIUM 5000 UNITS: 5000 INJECTION, SOLUTION INTRAVENOUS; SUBCUTANEOUS at 15:24

## 2017-12-05 RX ADMIN — Medication 10 ML: at 20:36

## 2017-12-05 RX ADMIN — INSULIN LISPRO 2 UNITS: 100 INJECTION, SOLUTION INTRAVENOUS; SUBCUTANEOUS at 14:00

## 2017-12-05 RX ADMIN — PNEUMOCOCCAL 13-VALENT CONJUGATE VACCINE 0.5 ML: 2.2; 2.2; 2.2; 2.2; 2.2; 4.4; 2.2; 2.2; 2.2; 2.2; 2.2; 2.2; 2.2 INJECTION, SUSPENSION INTRAMUSCULAR at 14:14

## 2017-12-05 RX ADMIN — FUROSEMIDE 20 MG: 10 INJECTION, SOLUTION INTRAVENOUS at 11:49

## 2017-12-05 RX ADMIN — HEPARIN SODIUM 5000 UNITS: 5000 INJECTION, SOLUTION INTRAVENOUS; SUBCUTANEOUS at 06:44

## 2017-12-05 RX ADMIN — HYDRALAZINE HYDROCHLORIDE 25 MG: 25 TABLET, FILM COATED ORAL at 20:35

## 2017-12-05 RX ADMIN — ENOXAPARIN SODIUM 120 MG: 120 INJECTION SUBCUTANEOUS at 20:38

## 2017-12-05 RX ADMIN — Medication 10 ML: at 09:28

## 2017-12-05 RX ADMIN — INFLUENZA VIRUS VACCINE 0.5 ML: 15; 15; 15; 15 SUSPENSION INTRAMUSCULAR at 14:18

## 2017-12-05 RX ADMIN — GADOPENTETATE DIMEGLUMINE 20 ML: 469.01 INJECTION INTRAVENOUS at 17:04

## 2017-12-05 RX ADMIN — CARVEDILOL 25 MG: 25 TABLET, FILM COATED ORAL at 09:28

## 2017-12-05 ASSESSMENT — PAIN SCALES - WONG BAKER: WONGBAKER_NUMERICALRESPONSE: 0

## 2017-12-05 NOTE — PROGRESS NOTES
[address] [facility]    PROGRESS NOTE            Date:   12/5/2017  Patient name:  Parrish Silverman  Date of admission:  11/18/2017 11:20 AM  MRN:   [id number]  YOB: 1950    CHIEF COMPLAINT     History Obtained From:  Patient and chart review. HISTORY OF PRESENT ILLNESS     The patient is a 69 y.o.  female who is an ICU transfer who initially presented with altered mentation after she was found down by her son. When brought here she was intubated due to acute respiratory failure and encephalopathy. EEG done showed moderate diffuse cerebral dysfunction. She has been having waxing and waning mentation and was extubated when it improved. She also developed SUSAN, lactic acidosis and leukocytosis all of which has resolved as of now. She has a H/O HTN and DM-2. She also has a C-collar in place, the management of which is being done by neurosurgery due to patient's mentation. She still has C-spine collar in place. MRI spine pending for clearance. She is still in altered mentation , on tube feeds and responds barely to sternal rub    INTERVAL HISTORY :    - patient seen and examined at the bedside.  -no acute events overnight  -patient still in altered state and barely responds.  - VS : stable   -On tube feeds     PAST MEDICAL HISTORY       has a past medical history of Depression; Heart murmur; and HTN (hypertension). PAST SURGICAL HISTORY       has a past surgical history that includes Tubal ligation and other surgical history.      HOME MEDICATIONS        Prior to Admission medications    Not on File       ALLERGIES      Pcn [penicillins]    SOCIAL HISTORY            FAMILY HISTORY      family history includes Asthma in her brother; Breast Cancer in her mother; Cancer in her maternal aunt, maternal uncle, and sister; Diabetes in her father and mother; Heart Disease in her paternal grandmother; Hypertension in her father and mother; Stroke in her father. PHYSICAL EXAM      BP (!) 128/53   Pulse 73   Temp 99.3 °F (37.4 °C) (Axillary)   Resp 24   Ht 5' 9\" (1.753 m)   Wt 253 lb 1.4 oz (114.8 kg)   SpO2 98%   BMI 37.37 kg/m²      · General appearance: well nourished  · HEENT: Head: Normocephalic, no lesions, without obvious abnormality. · Lungs: clear to auscultation bilaterally  · Heart: regular rate and rhythm, S1, S2 normal, no murmur, click, rub or gallop  · Abdomen: soft, non-tender; bowel sounds normal; no masses,  no organomegaly  · Extremities: extremities normal, atraumatic, no cyanosis or edema  · Neurological: unable to assess due to patient's mentation   · Skin - no rash, no lump   · Eye no icterus no redness  · Psych-normal affect   · NEURO-no limb weakness  No facial droop  · Lymphatic system-no lymphadenopathy no splenomegaly     DIAGNOSTICS      Laboratory Testing:  CBC:   Recent Labs      12/05/17   0635   WBC  5.1   HGB  8.8*   PLT  236     BMP:    Recent Labs      12/03/17   0515  12/05/17   0635   NA  140  138   K  4.4  3.9   CL  102  101   CO2  26  25   BUN  16  17   CREATININE  0.77  0.78   GLUCOSE  75  146*     S. Calcium:  Recent Labs      12/05/17   0635   CALCIUM  7.6*     S. Ionized Calcium:No results for input(s): IONCA in the last 72 hours. S. Magnesium:No results for input(s): MG in the last 72 hours. S. Phosphorus:No results for input(s): PHOS in the last 72 hours. S. Glucose:  Recent Labs      12/04/17   2045  12/05/17   0243  12/05/17   0800   POCGLU  138*  114*  142*     Glycosylated hemoglobin A1C: No results for input(s): LABA1C in the last 72 hours. INR: No results for input(s): INR in the last 72 hours. Hepatic functions:   Recent Labs      12/05/17   0635   ALKPHOS  75   ALT  24   AST  17   PROT  6.2*   BILITOT  0.25*   LABALBU  2.2*     Pancreatic functions:No results for input(s): LACTA, AMYLASE in the last 72 hours.   S. Lactic Acid: No results for input(s): LACTA in the last 72 hours. Cardiac enzymes:No results for input(s): CKTOTAL, CKMB, CKMBINDEX, TROPONINI in the last 72 hours. BNP:No results for input(s): BNP in the last 72 hours. Lipid profile: No results for input(s): CHOL, TRIG, HDL, LDLCALC in the last 72 hours. Invalid input(s): LDL  Blood Gases: No results found for: PH, PCO2, PO2, HCO3, O2SAT  Thyroid functions:   Lab Results   Component Value Date    TSH 2.20 11/18/2017        Imaging/Diagonstics:      CXR: No acute cardiopulmonary findings. ASSESSMENT     Principal Problem:    Altered mental status  Active Problems:    Encephalopathy    Rhabdomyolysis    Acute kidney injury (HCC)    Hyperkalemia    Acute renal failure (HCC)    Morbid obesity (HCC)    Acute metabolic encephalopathy    Acute respiratory failure with hypoxia (HCC)    Abrasions of multiple sites    Neutrophilic leukocytosis    Infected open wound    Recurrent major depressive disorder (HCC)    Type 2 diabetes mellitus ([facility] [address]        PLAN      1. AMS: intemittent   - continue to monitor  -CTH : negative ; right basal ganglia chronic lacunar infarcts (12/03)  -Neurology following : intermittent encephalopathy   -Psych eval pending   -Previous H/O depression      2. Hypertension :   - monitor BP  - hydralazine 25 mg TID  - coreg 25 mg BID  - norvasc 10 mg OD   - clonidine patch  - Lasix 20 mg OD      3. Diabetes mellitus : uncontrolled  - monitor Blood glucose 4xAC and HS   - insulin sliding scale. - HbA1C : 7.5 (11/19/2017)      Ania Irvin MD      Department of Internal Medicine  [facility]         12/5/2017, 8:50 AM  Attending Physician Statement  Patient seen and examined  I have discussed the care of the patient, including pertinent history and exam findings,  with the resident. I have reviewed the key elements of all parts of the encounter with the resident. I agree with the assessment, plan and orders as documented by the resident.     Shanae Campos

## 2017-12-05 NOTE — PROGRESS NOTES
ICU PATIENT TRANSFER NOTE        Patient:  Kwabena Erickson  YOB: 1950    MRN: [id number]     Acct: [de-identified]     Admit date: 11/18/2017    Code Status:- full code     Reason for ICU Admission:-  AMS and fall     SUPPORT DEVICES: [] Ventilator [] BIPAP  [x] Nasal Cannula [] Room Air    Consultations:- [] Cardiology [] Nephrology  [] Hemo onco  [] GI                               [x] ID [] ENT  [] Rheum [] Endo   []Physiotherapy                                 Others: neurology, neurosurgery, OB/GYN , Trauma , palliative care     NUTRITION:  [] NPO [] Tube Feeding (Specify: ) [] TPN  [] PO    Central Lines:- [] No   [] Yes           If yes - Days/Date of Insertion         Pt seen and Chart reviewed. ICU COURSE :-   The patient is a 69 y.o.  female who is an ICU transfer who initially presented with altered mentation after she was found down by her son. When brought here she was intubated due to acute respiratory failure and encephalopathy. EEG done showed moderate diffuse cerebral dysfunction. She has been having waxing and waning mentation and was extubated when it improved. She also developed SUSAN, lactic acidosis and leukocytosis all of which has resolved as of now. She has a H/O HTN and DM-2. She also has a C-collar in place, the management of which is being done by neurosurgery due to patient's mentation. She still has C-spine collar in place. MRI spine pending for clearance.    She is still in altered mentation , on tube feeds and responds barely to sternal rub.      Physical Exam:  Vitals: BP (!) 144/61   Pulse 65   Temp 98.1 °F (36.7 °C) (Oral)   Resp 19   Ht 5' 9\" (1.753 m)   Wt 253 lb 1.4 oz (114.8 kg)   SpO2 98%   BMI 37.37 kg/m²   24 hour intake/output:  Intake/Output Summary (Last 24 hours) at 12/05/17 0209  Last data filed at 12/04/17 2230   Gross per 24 hour   Intake             1334 ml   Output                0 ml Net             1334 ml     Last 3 weights: Wt Readings from Last 3 Encounters:   12/04/17 253 lb 1.4 oz (114.8 kg)       General appearance: alert and cooperative with exam  HEENT: Head: Normocephalic, no lesions, without obvious abnormality. Neck: no adenopathy, no carotid bruit, no JVD, supple, symmetrical, trachea midline and thyroid not enlarged, symmetric, no tenderness/mass/nodules  Lungs: clear to auscultation bilaterally  Heart: regular rate and rhythm, S1, S2 normal, no murmur, click, rub or gallop  Abdomen: soft, non-tender; bowel sounds normal; no masses,  no organomegaly  Extremities: extremities normal, atraumatic, no cyanosis or edema  Neurologic: not responding, minimally responsive to sternal rub. Medications:Current Inpatient  Scheduled Meds:   pneumococcal 13-valent conjugate  0.5 mL Intramuscular Once    influenza virus vaccine  0.5 mL Intramuscular Once    furosemide  20 mg Intravenous Daily    insulin lispro  0-12 Units Subcutaneous Q6H    hydrALAZINE  25 mg Oral 3 times per day    carvedilol  25 mg Oral BID WC    cloNIDine  1 patch Transdermal Weekly    amLODIPine  10 mg Oral Daily    sodium chloride flush  10 mL Intravenous 2 times per day    heparin (porcine)  5,000 Units Subcutaneous 3 times per day     Continuous Infusions:   dextrose 100 mL/hr (12/03/17 0617)     PRN Meds:hydrALAZINE, glucose, dextrose, glucagon (rDNA), dextrose, neomycin-bacitracin-polymyxin, oxyCODONE-acetaminophen, fentanNYL, fentanNYL, sodium chloride flush, acetaminophen, magnesium hydroxide, ondansetron, albuterol sulfate HFA    Objective:    CBC:   Recent Labs      12/03/17   0515   WBC  7.6   HGB  8.8*   PLT  206     BMP:  Recent Labs      12/03/17   0515   NA  140   K  4.4   CL  102   CO2  26   BUN  16   CREATININE  0.77   GLUCOSE  75     Calcium:  Recent Labs      12/03/17 0515   CALCIUM  7.9*     Ionized Calcium:No results for input(s): IONCA in the last 72 hours.   Magnesium:No results for input(s): MG in the last 72 hours. Phosphorus:No results for input(s): PHOS in the last 72 hours. BNP:No results for input(s): BNP in the last 72 hours. Glucose:  Recent Labs      12/04/17   0917  12/04/17   1423  12/04/17   2045   POCGLU  135*  136*  138*     HgbA1C: No results for input(s): LABA1C in the last 72 hours. INR: No results for input(s): INR in the last 72 hours. Hepatic: Recent Labs      12/03/17   0515   ALKPHOS  77   ALT  30   AST  27   PROT  6.4   BILITOT  0.34   LABALBU  2.3*     Amylase and Lipase:No results for input(s): LACTA, AMYLASE in the last 72 hours. Lactic Acid: No results for input(s): LACTA in the last 72 hours. CARDIAC ENZYMES:No results for input(s): CKTOTAL, CKMB, CKMBINDEX, TROPONINI in the last 72 hours. BNP: No results for input(s): BNP in the last 72 hours. Lipids: No results for input(s): CHOL, TRIG, HDL, LDLCALC in the last 72 hours. Invalid input(s): LDL  ABGs: No results found for: PH, PCO2, PO2, HCO3, O2SAT  Thyroid:   Lab Results   Component Value Date    TSH 2.20 11/18/2017      Urinalysis: No results for input(s): BACTERIA, BLOODU, CLARITYU, COLORU, PHUR, PROTEINU, RBCUA, SPECGRAV, BILIRUBINUR, NITRU, WBCUA, LEUKOCYTESUR, GLUCOSEU in the last 72 hours. CULTURES:      Assessment:  Principal Problem:    Altered mental status  Active Problems:    Encephalopathy    Rhabdomyolysis    Acute kidney injury (HCC)    Hyperkalemia    Acute renal failure (HCC)    Morbid obesity (HCC)    Acute metabolic encephalopathy    Acute respiratory failure with hypoxia (HCC)    Abrasions of multiple sites    Neutrophilic leukocytosis    Infected open wound    Recurrent major depressive disorder (HCC)    Type 2 diabetes mellitus ([facility] [address]        Plan:    1. AMS:   - continue to monitor  -CTH : negative ; right basal ganglia chronic lacunar infarcts (12/03)  -Neurology following : intermittent encephalopathy   -Psych eval : suggestion to start SSRI  -Previous H/O depression      2.

## 2017-12-05 NOTE — PROGRESS NOTES
[facility] - Progress Note    Today's Date and Time: 12/5/2017, 9:02 AM    Impression :   1. Encephalopathy-improved  2. Acute respiratory failure. On nasal cannula  3. SUSAN  4. Multiple skin abrasions  5. Reactive leukocytosis  6. Hypernatremia    Recommendations     · Supportive care  · Monitor off antibiotics  · Air pressure mattress  · Wound care    Infection Control Recommendations   Kennedy precautions    Antimicrobial Stewardship Recommendations     · Avoid penicillins  Chief complaint/reason for consultation:     · Infected wounds  History of Present Illness:     INITIAL HISTORY:    Randee Barnett is a 69y.o.-year-old  female who was initially admitted on 11/18/2017. Patient seen at the request of Claressa Apley. Patient with past Hx of HPTN, depression. Patient presented through ER after being found down by son after a 24 hr period. Patient reportedly had not been feeling well for about a month. She has slowed down her activities although she had remained leaving independently. Reportedly had a fall a month PTA, for which she had not sought help, and was afraid of falling down again; hence the reduction of her activities. In the ER she was found to have altered mentation, rhabdomyolysis, SUSAN, hyperkalemia, hyperglycemia , multiple skin abrasions, hypotension, acute respiratory failure, metabolic acidosis with increased anion gap secondary to lactic acidosis and acute kidney injury. Patient required mechanical ventilation, management of SUSAN and of hypotension. More recently patient has manifested underlying HPTN. ID asked to evaluate because of skin injuries and concern with risk of secondary infection. CURRENT EVALUATION :12/5/2017     The patient was seen and examined at bedside this morning. She was sleeping and did not appear to be in acute distress. Remains afebrile  VS stable  Hemodynamically stable.      Wounds are

## 2017-12-05 NOTE — PROGRESS NOTES
Able to review MRI check list with patient's son Brittney Johnson. MRI form signed and faxed to MRI.

## 2017-12-05 NOTE — PROGRESS NOTES
Pt transferred to unit, vital signs obtained, TMS applied, pt cooperating with parts of assessment, Room orientation performed, call light within reach Will continue to monitor for additional needs.

## 2017-12-05 NOTE — PROGRESS NOTES
secondary to stenosis.  Again, motion artifact limits the exam.       A/P:  This is a 69 y.o. female found down w AMS at home for estimated 2 days. She has been admitted since 11/18 in MICU with severe dehydration, respiratory failure, SUSAN, rhabdomyolysis, and metabolic encephalopathy. She had been treated with multiple antibiotics for open skin infections with underlying hypotension. No visible signs of trauma. Extubated 12/2. Consulted for spine clearance. Left sided weakness likely attributed to chronic right lacunar infarcts within basal ganglia on CTH.       - No neurosurgical interventions planned for now. - Neuro checks per protocol   - CERVICAL SPINE IS CLEAR, safe to remove collar at this time. TLS is still clear per trauma assessment. 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  This is likely chronic in nature, not caused by acute event or trauma. Patient is encephalopathic and not very cooperative w exam. In light of her multiple comorbidities, she is a poor surgical candidate for decompression. Recommend follow-up x2-4 weeks outpatient when mental status and/or neurologic exam improves to discuss if future surgery is warranted. NO ACUTE NEUROSURGICAL INTERVENTION AT THIS TIME    NEUROSURGERY TO SIGN OFF     Please page Neurosurgery pager with any questions. PATIENT TO FOLLOW UP IN CLINIC:  ---  [facility] -- Dr. Farnaz Guido [place] [address] [address] [place] [address]  [phone]      Please contact neurosurgery with any changes in patients neurologic status.        BIRDIE Lugo pager [phone]  12/5/17  8:50 AM

## 2017-12-06 ENCOUNTER — APPOINTMENT (OUTPATIENT)
Dept: CARDIAC CATH/INVASIVE PROCEDURES | Age: 67
DRG: 064 | End: 2017-12-06
Payer: MEDICARE

## 2017-12-06 LAB
GLUCOSE BLD-MCNC: 123 MG/DL (ref 65–105)
GLUCOSE BLD-MCNC: 131 MG/DL (ref 65–105)
GLUCOSE BLD-MCNC: 132 MG/DL (ref 65–105)
GLUCOSE BLD-MCNC: 144 MG/DL (ref 65–105)
GLUCOSE BLD-MCNC: 96 MG/DL (ref 65–105)
LV EF: 55 %
LVEF MODALITY: NORMAL

## 2017-12-06 PROCEDURE — 99232 SBSQ HOSP IP/OBS MODERATE 35: CPT | Performed by: INTERNAL MEDICINE

## 2017-12-06 PROCEDURE — 94762 N-INVAS EAR/PLS OXIMTRY CONT: CPT

## 2017-12-06 PROCEDURE — 6370000000 HC RX 637 (ALT 250 FOR IP): Performed by: INTERNAL MEDICINE

## 2017-12-06 PROCEDURE — 99226 PR SBSQ OBSERVATION CARE/DAY 35 MINUTES: CPT | Performed by: PSYCHIATRY & NEUROLOGY

## 2017-12-06 PROCEDURE — 6360000002 HC RX W HCPCS: Performed by: HOSPITALIST

## 2017-12-06 PROCEDURE — 99233 SBSQ HOSP IP/OBS HIGH 50: CPT | Performed by: INTERNAL MEDICINE

## 2017-12-06 PROCEDURE — 93312 ECHO TRANSESOPHAGEAL: CPT

## 2017-12-06 PROCEDURE — 6370000000 HC RX 637 (ALT 250 FOR IP): Performed by: HOSPITALIST

## 2017-12-06 PROCEDURE — 93325 DOPPLER ECHO COLOR FLOW MAPG: CPT

## 2017-12-06 PROCEDURE — 82947 ASSAY GLUCOSE BLOOD QUANT: CPT

## 2017-12-06 PROCEDURE — 2060000000 HC ICU INTERMEDIATE R&B

## 2017-12-06 RX ADMIN — FUROSEMIDE 20 MG: 10 INJECTION, SOLUTION INTRAVENOUS at 11:47

## 2017-12-06 RX ADMIN — CARVEDILOL 25 MG: 25 TABLET, FILM COATED ORAL at 08:00

## 2017-12-06 RX ADMIN — AMLODIPINE BESYLATE 10 MG: 10 TABLET ORAL at 08:00

## 2017-12-06 ASSESSMENT — PAIN SCALES - GENERAL
PAINLEVEL_OUTOF10: 0

## 2017-12-06 NOTE — PROGRESS NOTES
resident. I have reviewed the key elements of all parts of the encounter with the resident. I agree with the assessment, plan and orders as documented by the resident.     Melo Campos

## 2017-12-06 NOTE — PROGRESS NOTES
Neurology Resident Progress Note  Date: 2017  Time: 7:05 PM  Patient Name: Syl Raines  Patient : 1950  Room/Bed: 3429/9757-58  Allergies: Allergies   Allergen Reactions    Pcn [Penicillins]        Interval History:  Briefly, this is a  69 y.o. female with Hx/o depression, HTN, morbid obesity, admitted on 2017 for whom neurology consultation 12/4 AM was requested for \"intermittent encephalopathy. \" Consultant read EEG 17 done for metabolic encephalopathy in setting of acute renal failure' EEG was moderately nonspecifically slow and disorganized. Pt uneventfully extubated on ; CC service describes pt as occasionally following commands and not communicating. Noncontrast CT brain 12/3 unchanged form 17 - films reviewed by consultant,  nothing acute on either study, moderate SVID, mild atrophy, 1 cm chronic R paraganglionic lacunar infarction. MRI brain  films reviewed by neurology consultant shows multifocal bilateral acute infarcts all sub-5mm, mostly punctate, B and both supra- and infratentorial, strongly suggestive of  embolic or hypercoagulable etiology. Patient had been somnolent throughout the day today.      Current Medications:  Scheduled Meds:   sodium chloride flush  10 mL Intravenous BID    enoxaparin  1 mg/kg Subcutaneous BID    furosemide  20 mg Intravenous Daily    insulin lispro  0-12 Units Subcutaneous Q6H    hydrALAZINE  25 mg Oral 3 times per day    carvedilol  25 mg Oral BID WC    cloNIDine  1 patch Transdermal Weekly    amLODIPine  10 mg Oral Daily    sodium chloride flush  10 mL Intravenous 2 times per day       Continuous Infusions:   dextrose 100 mL/hr (17 0617)       Vitals:  Patient Vitals for the past 8 hrs:   BP Temp Temp src Pulse Resp SpO2   17 1514 (!) 142/54 99.5 °F (37.5 °C) Axillary 64 17 99 %   17 1115 (!) 124/57 99.5 °F (37.5 °C) Axillary 59 15 99 %     Height and Weight  Height: 5' 9\" (175.3 cm)  Weight:

## 2017-12-06 NOTE — PLAN OF CARE
Problem: Nutrition  Goal: Optimal nutrition therapy  Outcome: Ongoing  Nutrition Problem: Inadequate oral intake  Intervention: Food and/or Nutrient Delivery:  (Restart TF as able.)  Nutritional Goals: meet % of estimated nutrition needs

## 2017-12-06 NOTE — PROGRESS NOTES
Physical Therapy  DATE: 2017    NAME: Scotty Reid  MRN: [id number]   : 1950    Patient not seen this date for Physical Therapy due to:  [] Blood transfusion in progress  [] Hemodialysis  []  Patient Declined  [] Spine Precautions   [] Strict Bedrest  [x] Surgery/ Procedure: Pt at [facility] per RN Nicole Ace  [] Testing      [] Other        [] PT being discontinued at this time. Patient independent. No further needs. [] PT being discontinued at this time as the patient has been transferred to palliative care. No further needs. Hallie Herrera  This treatment/evaluation completed by signing SPT. Signing PT agrees with treatment and documentation.

## 2017-12-06 NOTE — PROGRESS NOTES
Received in room 4 on [facility] pt. Is congested, suctioned small amt. Of oral secretions, pt. Non responsive verbally, unable to sign consent, Son Elvin Glez gave verbal consent per telephone conversation with 2 RNs.

## 2017-12-06 NOTE — PLAN OF CARE
Problem: Skin Integrity:  Goal: Will show no infection signs and symptoms  Will show no infection signs and symptoms   Outcome: Ongoing  Pt Q2T, waffle mattress in place, Davion and skin assessments completed, heels elevated off the bed, Will continue to monitor       Problem: HEMODYNAMIC STATUS  Goal: Patient has stable vital signs and fluid balance  Outcome: Ongoing

## 2017-12-06 NOTE — PROGRESS NOTES
Speech Language Pathology  [facility]  Speech Language Pathology    Date: 12/6/2017  Patient Name: Julio Cesar Stone  YOB: 1950   AGE: 69 y.o. MRN: [id number]        Patient Not Available for Speech Therapy     Due to:  [] Testing  [] Hemodialysis  [] Cancelled by RN  [] Surgery   [] Intubation/Sedation/Pain Medication  [] Medical instability  [x] Other: Pt unable to be waken up, no appropriate for speech eval this AM. ST to check back in PM if time permits    Next scheduled treatment: 12/7/17   Completed by: Roma Brown,  Clinician    Co-signed by Euell Bosworth. LEEANNA/SLP

## 2017-12-06 NOTE — PROGRESS NOTES
Nutrition Assessment    Type and Reason for Visit: Reassess    Nutrition Recommendations: Restart TF (Glucerna/diabetic, goal of 65 mL/hr) when able. Nutrition Diagnosis:   · Problem: Inadequate oral intake  · Etiology: related to Cognitive or neurological impairment, Difficulty swallowing     Signs and symptoms:  as evidenced by NPO status due to medical condition    Nutrition Assessment:  · Subjective Assessment: TF currently on hold for ARTURO today. BM yesterday noted. · Wound Type: Skin Tears  · Current Nutrition Therapies:  · Oral Diet Orders: NPO   · Anthropometric Measures:  · Ht: 5' 9\" (175.3 cm)   · Current Body Wt: 253 lb (114.8 kg)  · Admission Body Wt: 218 lb 7.6 oz (99.1 kg)  · Ideal Body Wt: 160 lb (72.6 kg), % Ideal Body 136% using admission wt   · Adjusted Body Wt: 179 lb (81.2 kg)   · BMI Classification: BMI 30.0 - 34.9 Obese Class I  · Comparative Standards (Estimated Nutrition Needs):  · Estimated Daily Total Kcal: 6373-4795 kcal per day   · Estimated Daily Protein (g):  g pro per day     Estimated Intake vs Estimated Needs: Intake Less Than Needs    Nutrition Risk Level: Moderate    Nutrition Interventions:    (Restart TF as able.)  Continued Inpatient Monitoring, Education Not Indicated    Nutrition Evaluation:   · Evaluation: Goal achieved   · Goals: meet % of estimated nutrition needs     · Monitoring: NPO Status, Ascites/Edema, Chewing/Swallowing    See Adult Nutrition Doc Flowsheet for more detail.      Electronically signed by Solitario Jeter, MS, RD, LD on 12/6/17 at 3:26 PM    Contact Number: [phone]

## 2017-12-06 NOTE — PROGRESS NOTES
Neurology Resident Progress Note  Date: 2017  Time: 8:50 AM  Patient Name: Tri Mitchell  Patient : 1950  Room/Bed: [facility]  Allergies: Allergies   Allergen Reactions    Pcn [Penicillins]        Interval History:  Briefly, this is a  69 y.o. female with Hx/o depression, HTN, morbid obesity, admitted on 2017 for whom neurology consultation 12/4 AM was requested for \"intermittent encephalopathy. \" Consultant read EEG 17 done for metabolic encephalopathy in setting of acute renal failure;  EEG was moderately nonspecifically slow and disorganized. Pt uneventfully extubated on ; CC service describes pt as occasionally following commands and not communicating. Noncontrast CT brain 12/3 unchanged form 17 - films reviewed by consultant,  nothing acute on either study, moderate SVID, mild atrophy, 1 cm chronic R paraganglionic lacunar infarction. MRI brain  films reviewed by neurology consultant shows multifocal bilateral acute infarcts all sub-5mm, mostly punctate, B and both supra- and infratentorial, strongly suggestive of  embolic or hypercoagulable etiology. Patient had been somnolent throughout the day today.      Current Medications:  Scheduled Meds:   sodium chloride flush  10 mL Intravenous BID    enoxaparin  1 mg/kg Subcutaneous BID    furosemide  20 mg Intravenous Daily    insulin lispro  0-12 Units Subcutaneous Q6H    hydrALAZINE  25 mg Oral 3 times per day    carvedilol  25 mg Oral BID WC    cloNIDine  1 patch Transdermal Weekly    amLODIPine  10 mg Oral Daily    sodium chloride flush  10 mL Intravenous 2 times per day       Continuous Infusions:   dextrose 100 mL/hr (17 0617)       Vitals:  Patient Vitals for the past 8 hrs:   BP Temp Temp src Pulse Resp SpO2 Height Weight   17 0730 (!) 163/58 98.3 °F (36.8 °C) Oral 70 22 92 % 5' 9\" (1.753 m) 253 lb (114.8 kg)   17 0415 (!) 101/53 99 °F (37.2 °C) Oral 75 23 94 % - -     Height and Weight  Height: 5' 9\" (175.3 cm)  Weight: 253 lb (114.8 kg)  Weight Method: Actual  BSA (Calculated - sq m): 2.36 sq meters  BMI (Calculated): 37.4  Body mass index is 37.36 kg/m². <16 Severe malnutrition  1616.99 Moderate malnutrition  1718.49 Mild malnutrition  18.524.9 Normal  2529.9 Overweight (not obese)  3034.9 Obese class 1 (Low Risk)  3539.9 Obese class 2 (Moderate Risk)  ? 40 Obese class 3 (High Risk)    Labs (last 48 hours):  Recent Results (from the past 48 hour(s))   POC Glucose Fingerstick    Collection Time: 12/04/17  9:17 AM   Result Value Ref Range    POC Glucose 135 (H) 65 - 105 mg/dL   POC Glucose Fingerstick    Collection Time: 12/04/17  2:23 PM   Result Value Ref Range    POC Glucose 136 (H) 65 - 105 mg/dL   POC Glucose Fingerstick    Collection Time: 12/04/17  8:45 PM   Result Value Ref Range    POC Glucose 138 (H) 65 - 105 mg/dL   POC Glucose Fingerstick    Collection Time: 12/05/17  2:43 AM   Result Value Ref Range    POC Glucose 114 (H) 65 - 105 mg/dL   CBC auto differential    Collection Time: 12/05/17  6:35 AM   Result Value Ref Range    WBC 5.1 3.5 - 11.3 k/uL    RBC 2.98 (L) 3.95 - 5.11 m/uL    Hemoglobin 8.8 (L) 11.9 - 15.1 g/dL    Hematocrit 29.5 (L) 36.3 - 47.1 %    MCV 99.0 82.6 - 102.9 fL    MCH 29.5 25.2 - 33.5 pg    MCHC 29.8 28.4 - 34.8 g/dL    RDW 14.8 (H) 11.8 - 14.4 %    Platelets 103 984 - 427 k/uL    MPV 10.0 8.1 - 13.5 fL    Differential Type NOT REPORTED     WBC Morphology NOT REPORTED     RBC Morphology ANISOCYTOSIS PRESENT     Platelet Estimate NOT REPORTED     Seg Neutrophils 53 36 - 65 %    Lymphocytes 31 24 - 43 %    Monocytes 12 3 - 12 %    Eosinophils % 3 1 - 4 %    Basophils 0 0 - 2 %    Immature Granulocytes 0 0 %    Segs Absolute 2.69 1.50 - 8.10 k/uL    Absolute Lymph # 1.59 1.10 - 3.70 k/uL    Absolute Mono # 0.61 0.10 - 1.20 k/uL    Absolute Eos # 0.15 0.00 - 0.44 k/uL    Basophils # <0.03 0.00 - 0.20 k/uL    Absolute Immature Granulocyte <0.03 0.00 - 0.30 k/uL   Comprehensive Metabolic Panel    Collection Time: 12/05/17  6:35 AM   Result Value Ref Range    Glucose 146 (H) 70 - 99 mg/dL    BUN 17 8 - 23 mg/dL    CREATININE 0.78 0.50 - 0.90 mg/dL    Bun/Cre Ratio NOT REPORTED 9 - 20    Calcium 7.6 (L) 8.6 - 10.4 mg/dL    Sodium 138 135 - 144 mmol/L    Potassium 3.9 3.7 - 5.3 mmol/L    Chloride 101 98 - 107 mmol/L    CO2 25 20 - 31 mmol/L    Anion Gap 12 9 - 17 mmol/L    Alkaline Phosphatase 75 35 - 104 U/L    ALT 24 5 - 33 U/L    AST 17 <32 U/L    Total Bilirubin 0.25 (L) 0.3 - 1.2 mg/dL    Total Protein 6.2 (L) 6.4 - 8.3 g/dL    Alb 2.2 (L) 3.5 - 5.2 g/dL    Albumin/Globulin Ratio 0.6 (L) 1.0 - 2.5    GFR Non-African American >60 >60 mL/min    GFR African American >60 >60 mL/min    GFR Comment          GFR Staging NOT REPORTED    POC Glucose Fingerstick    Collection Time: 12/05/17  8:00 AM   Result Value Ref Range    POC Glucose 142 (H) 65 - 105 mg/dL   POC Glucose Fingerstick    Collection Time: 12/05/17  1:57 PM   Result Value Ref Range    POC Glucose 145 (H) 65 - 105 mg/dL   POC Glucose Fingerstick    Collection Time: 12/05/17  9:20 PM   Result Value Ref Range    POC Glucose 142 (H) 65 - 105 mg/dL   POC Glucose Fingerstick    Collection Time: 12/06/17 12:30 AM   Result Value Ref Range    POC Glucose 123 (H) 65 - 105 mg/dL   POC Glucose Fingerstick    Collection Time: 12/06/17  5:42 AM   Result Value Ref Range    POC Glucose 131 (H) 65 - 105 mg/dL       RADIOLOGY:  MRIb 12/5:   1. Numerous acute ischemic infarcts in the cerebral white matter, right   thalamus, and left alex with the distribution in multiple vascular   territories suggesting a central embolic etiology. 2. No intracranial hemorrhage or mass effect. 3. Multifocal remote lacunar infarcts and moderate chronic white matter   microvascular ischemic changes. 4. Trace bilateral mastoid effusions.      [address] [place] [address] 12/3:   No acute intracranial abnormality.       Right basal ganglia chronic lacunar

## 2017-12-07 LAB
ABSOLUTE EOS #: 0.31 K/UL (ref 0–0.44)
ABSOLUTE IMMATURE GRANULOCYTE: 0.03 K/UL (ref 0–0.3)
ABSOLUTE LYMPH #: 1.61 K/UL (ref 1.1–3.7)
ABSOLUTE MONO #: 0.56 K/UL (ref 0.1–1.2)
ALBUMIN SERPL-MCNC: 2.3 G/DL (ref 3.5–5.2)
ALBUMIN/GLOBULIN RATIO: 0.6 (ref 1–2.5)
ALP BLD-CCNC: 70 U/L (ref 35–104)
ALT SERPL-CCNC: 27 U/L (ref 5–33)
ANION GAP SERPL CALCULATED.3IONS-SCNC: 13 MMOL/L (ref 9–17)
AST SERPL-CCNC: 29 U/L
BASOPHILS # BLD: 0 % (ref 0–2)
BASOPHILS ABSOLUTE: <0.03 K/UL (ref 0–0.2)
BILIRUB SERPL-MCNC: 0.29 MG/DL (ref 0.3–1.2)
BUN BLDV-MCNC: 16 MG/DL (ref 8–23)
BUN/CREAT BLD: ABNORMAL (ref 9–20)
CALCIUM SERPL-MCNC: 7.9 MG/DL (ref 8.6–10.4)
CHLORIDE BLD-SCNC: 104 MMOL/L (ref 98–107)
CO2: 24 MMOL/L (ref 20–31)
CREAT SERPL-MCNC: 0.72 MG/DL (ref 0.5–0.9)
DIFFERENTIAL TYPE: ABNORMAL
EOSINOPHILS RELATIVE PERCENT: 5 % (ref 1–4)
GFR AFRICAN AMERICAN: >60 ML/MIN
GFR NON-AFRICAN AMERICAN: >60 ML/MIN
GFR SERPL CREATININE-BSD FRML MDRD: ABNORMAL ML/MIN/{1.73_M2}
GFR SERPL CREATININE-BSD FRML MDRD: ABNORMAL ML/MIN/{1.73_M2}
GLUCOSE BLD-MCNC: 75 MG/DL (ref 65–105)
GLUCOSE BLD-MCNC: 90 MG/DL (ref 65–105)
GLUCOSE BLD-MCNC: 96 MG/DL (ref 65–105)
GLUCOSE BLD-MCNC: 97 MG/DL (ref 65–105)
GLUCOSE BLD-MCNC: 98 MG/DL (ref 70–99)
GLUCOSE BLD-MCNC: 99 MG/DL (ref 65–105)
HCT VFR BLD CALC: 30.2 % (ref 36.3–47.1)
HEMOGLOBIN: 8.8 G/DL (ref 11.9–15.1)
IMMATURE GRANULOCYTES: 1 %
LYMPHOCYTES # BLD: 27 % (ref 24–43)
MCH RBC QN AUTO: 29.1 PG (ref 25.2–33.5)
MCHC RBC AUTO-ENTMCNC: 29.1 G/DL (ref 28.4–34.8)
MCV RBC AUTO: 100 FL (ref 82.6–102.9)
MONOCYTES # BLD: 9 % (ref 3–12)
PARTIAL THROMBOPLASTIN TIME: 23 SEC (ref 21.3–31.3)
PDW BLD-RTO: 15.4 % (ref 11.8–14.4)
PLATELET # BLD: 263 K/UL (ref 138–453)
PLATELET ESTIMATE: ABNORMAL
PMV BLD AUTO: 9.7 FL (ref 8.1–13.5)
POTASSIUM SERPL-SCNC: 4.1 MMOL/L (ref 3.7–5.3)
RBC # BLD: 3.02 M/UL (ref 3.95–5.11)
RBC # BLD: ABNORMAL 10*6/UL
SEG NEUTROPHILS: 58 % (ref 36–65)
SEGMENTED NEUTROPHILS ABSOLUTE COUNT: 3.4 K/UL (ref 1.5–8.1)
SODIUM BLD-SCNC: 141 MMOL/L (ref 135–144)
TOTAL PROTEIN: 6.4 G/DL (ref 6.4–8.3)
WBC # BLD: 5.9 K/UL (ref 3.5–11.3)
WBC # BLD: ABNORMAL 10*3/UL

## 2017-12-07 PROCEDURE — G8987 SELF CARE CURRENT STATUS: HCPCS

## 2017-12-07 PROCEDURE — 6360000002 HC RX W HCPCS: Performed by: INTERNAL MEDICINE

## 2017-12-07 PROCEDURE — 97530 THERAPEUTIC ACTIVITIES: CPT

## 2017-12-07 PROCEDURE — G8997 SWALLOW GOAL STATUS: HCPCS

## 2017-12-07 PROCEDURE — G8996 SWALLOW CURRENT STATUS: HCPCS

## 2017-12-07 PROCEDURE — 2580000003 HC RX 258: Performed by: STUDENT IN AN ORGANIZED HEALTH CARE EDUCATION/TRAINING PROGRAM

## 2017-12-07 PROCEDURE — G8978 MOBILITY CURRENT STATUS: HCPCS

## 2017-12-07 PROCEDURE — 99233 SBSQ HOSP IP/OBS HIGH 50: CPT | Performed by: INTERNAL MEDICINE

## 2017-12-07 PROCEDURE — 36415 COLL VENOUS BLD VENIPUNCTURE: CPT

## 2017-12-07 PROCEDURE — 2580000003 HC RX 258: Performed by: HOSPITALIST

## 2017-12-07 PROCEDURE — G8979 MOBILITY GOAL STATUS: HCPCS

## 2017-12-07 PROCEDURE — 99211 OFF/OP EST MAY X REQ PHY/QHP: CPT

## 2017-12-07 PROCEDURE — 6360000002 HC RX W HCPCS: Performed by: EMERGENCY MEDICINE

## 2017-12-07 PROCEDURE — 6360000002 HC RX W HCPCS: Performed by: HOSPITALIST

## 2017-12-07 PROCEDURE — 97164 PT RE-EVAL EST PLAN CARE: CPT

## 2017-12-07 PROCEDURE — 99232 SBSQ HOSP IP/OBS MODERATE 35: CPT | Performed by: PSYCHIATRY & NEUROLOGY

## 2017-12-07 PROCEDURE — 99223 1ST HOSP IP/OBS HIGH 75: CPT | Performed by: INTERNAL MEDICINE

## 2017-12-07 PROCEDURE — 97166 OT EVAL MOD COMPLEX 45 MIN: CPT

## 2017-12-07 PROCEDURE — 92610 EVALUATE SWALLOWING FUNCTION: CPT

## 2017-12-07 PROCEDURE — 99232 SBSQ HOSP IP/OBS MODERATE 35: CPT | Performed by: INTERNAL MEDICINE

## 2017-12-07 PROCEDURE — 2060000000 HC ICU INTERMEDIATE R&B

## 2017-12-07 PROCEDURE — 82947 ASSAY GLUCOSE BLOOD QUANT: CPT

## 2017-12-07 PROCEDURE — 94762 N-INVAS EAR/PLS OXIMTRY CONT: CPT

## 2017-12-07 PROCEDURE — G8988 SELF CARE GOAL STATUS: HCPCS

## 2017-12-07 PROCEDURE — 80053 COMPREHEN METABOLIC PANEL: CPT

## 2017-12-07 PROCEDURE — 85730 THROMBOPLASTIN TIME PARTIAL: CPT

## 2017-12-07 PROCEDURE — 85025 COMPLETE CBC W/AUTO DIFF WBC: CPT

## 2017-12-07 RX ORDER — LABETALOL HYDROCHLORIDE 5 MG/ML
10 INJECTION, SOLUTION INTRAVENOUS EVERY 4 HOURS PRN
Status: DISCONTINUED | OUTPATIENT
Start: 2017-12-07 | End: 2017-12-14 | Stop reason: HOSPADM

## 2017-12-07 RX ADMIN — HYDRALAZINE HYDROCHLORIDE 10 MG: 20 INJECTION INTRAMUSCULAR; INTRAVENOUS at 15:44

## 2017-12-07 RX ADMIN — HYDRALAZINE HYDROCHLORIDE 10 MG: 20 INJECTION INTRAMUSCULAR; INTRAVENOUS at 21:31

## 2017-12-07 RX ADMIN — ENOXAPARIN SODIUM 120 MG: 120 INJECTION SUBCUTANEOUS at 21:30

## 2017-12-07 RX ADMIN — FUROSEMIDE 20 MG: 10 INJECTION, SOLUTION INTRAVENOUS at 08:21

## 2017-12-07 RX ADMIN — ENOXAPARIN SODIUM 120 MG: 120 INJECTION SUBCUTANEOUS at 08:20

## 2017-12-07 RX ADMIN — Medication 10 ML: at 21:31

## 2017-12-07 RX ADMIN — Medication 10 ML: at 21:39

## 2017-12-07 ASSESSMENT — PAIN SCALES - GENERAL: PAINLEVEL_OUTOF10: 0

## 2017-12-07 NOTE — PROGRESS NOTES
[address] [facility].    PROGRESS NOTE            Date:   12/7/2017  Patient name:  Shannan Ramirez  Date of admission:  11/18/2017 11:20 AM  MRN:   [id number]  YOB: 1950    CHIEF COMPLAINT     History Obtained From:  Patient and chart review. HISTORY OF PRESENT ILLNESS     The patient is a 69 y.o.  female who is an ICU transfer who initially presented with altered mentation after she was found down by her son. When brought here she was intubated due to acute respiratory failure and encephalopathy. EEG done showed moderate diffuse cerebral dysfunction. She has been having waxing and waning mentation and was extubated when it improved. She also developed SUSAN, lactic acidosis and leukocytosis all of which has resolved as of now. She has a H/O HTN and DM-2. She also has a C-collar in place, the management of which is being done by neurosurgery due to patient's mentation. She still has C-spine collar in place. MRI spine pending for clearance. She is still in altered mentation , on tube feeds and responds barely to sternal rub    INTERVAL HISTORY :    - Patient seen and examined at the bedside  - No acute events overnight  - mentation about the same  - swallow study followed by PEG placement possibly tomorrow. PAST MEDICAL HISTORY       has a past medical history of Depression; Heart murmur; and HTN (hypertension). PAST SURGICAL HISTORY       has a past surgical history that includes Tubal ligation and other surgical history.      HOME MEDICATIONS        Prior to Admission medications    Not on File       ALLERGIES      Pcn [penicillins]    SOCIAL HISTORY            FAMILY HISTORY      family history includes Asthma in her brother; Breast Cancer in her mother; Cancer in her maternal aunt, maternal uncle, and sister; Diabetes in her father and mother; Heart Disease in her paternal grandmother; Hypertension in her father and mother; Stroke in her father. PHYSICAL EXAM      BP (!) 155/71   Pulse 72   Temp 99.1 °F (37.3 °C) (Oral)   Resp 22   Ht 5' 9\" (1.753 m)   Wt 253 lb (114.8 kg)   SpO2 100%   BMI 37.36 kg/m²      · General appearance: well nourished  · HEENT: Head: Normocephalic, no lesions, without obvious abnormality. · Lungs: clear to auscultation bilaterally  · Heart: regular rate and rhythm, S1, S2 normal, no murmur, click, rub or gallop  · Abdomen: soft, non-tender; bowel sounds normal; no masses,  no organomegaly  · Extremities: extremities normal, atraumatic, no cyanosis or edema  · Neurological: unable to assess due to patient's mentation   · Skin - no rash, no lump   · Eye no icterus no redness  · Psych-normal affect   · NEURO-no limb weakness  No facial droop  · Lymphatic system-no lymphadenopathy no splenomegaly     DIAGNOSTICS      Laboratory Testing:  CBC:   Recent Labs      12/07/17   0632   WBC  5.9   HGB  8.8*   PLT  263     BMP:    Recent Labs      12/05/17   0635  12/07/17   0635   NA  138  141   K  3.9  4.1   CL  101  104   CO2  25  24   BUN  17  16   CREATININE  0.78  0.72   GLUCOSE  146*  98     S. Calcium:  Recent Labs      12/07/17   0635   CALCIUM  7.9*     S. Ionized Calcium:No results for input(s): IONCA in the last 72 hours. S. Magnesium:No results for input(s): MG in the last 72 hours. S. Phosphorus:No results for input(s): PHOS in the last 72 hours. S. Glucose:  Recent Labs      12/07/17   0641  12/07/17   0742  12/07/17   1105   POCGLU  99  96  97     Glycosylated hemoglobin A1C: No results for input(s): LABA1C in the last 72 hours. INR: No results for input(s): INR in the last 72 hours. Hepatic functions:   Recent Labs      12/07/17   0635   ALKPHOS  70   ALT  27   AST  29   PROT  6.4   BILITOT  0.29*   LABALBU  2.3*     Pancreatic functions:No results for input(s): LACTA, AMYLASE in the last 72 hours. S. Lactic Acid: No results for input(s): LACTA in the last 72 hours.   Cardiac enzymes:No results for input(s): CKTOTAL, CKMB, CKMBINDEX, TROPONINI in the last 72 hours. BNP:No results for input(s): BNP in the last 72 hours. Lipid profile: No results for input(s): CHOL, TRIG, HDL, LDLCALC in the last 72 hours. Invalid input(s): LDL  Blood Gases: No results found for: PH, PCO2, PO2, HCO3, O2SAT  Thyroid functions:   Lab Results   Component Value Date    TSH 2.20 11/18/2017        Imaging/Diagonstics:      CXR: No acute cardiopulmonary findings. ASSESSMENT     Principal Problem:    Altered mental status  Active Problems:    Encephalopathy    Rhabdomyolysis    Acute kidney injury (HCC)    Hyperkalemia    Acute renal failure (HCC)    Morbid obesity (HCC)    Acute metabolic encephalopathy    Acute respiratory failure with hypoxia (HCC)    Abrasions of multiple sites    Neutrophilic leukocytosis    Infected open wound    Recurrent major depressive disorder (HCC)    Type 2 diabetes mellitus ([address]    Cerebrovascular accident (CVA) due to embolism of precerebral artery ([address]        PLAN      1. AMS: intemittent   - continue to monitor  - CTH : negative ; right basal ganglia chronic lacunar infarcts (12/03)  - MRI brain : Numerous acute ischemic infarcts in the cerebral white matter, right  thalamus, and left alex with the distribution in multiple vascular territories . - Neurology following : started the patient on full  lovenox 120 mg BID   - ARTURO :  LVEF :55% , no thrombus or valvuar vegetation    2. Hypertension :   - monitor BP  - hydralazine 25 mg TID  - coreg 25 mg BID  - norvasc 10 mg OD   - clonidine patch  - Lasix 20 mg OD      3. Diabetes mellitus : uncontrolled  - monitor Blood glucose 4xAC and HS   - insulin sliding scale.    - HbA1C : 7.5 (11/19/2017)        Hesham Garcia MD      Department of Internal Medicine  [facility]         12/7/2017, 11:40 AM    Attending Physician Statement  Patient seen and examined  I have discussed the care of the

## 2017-12-07 NOTE — PROGRESS NOTES
Physical Therapy    Facility/Department: [facility] NEURO  Re- Assessment    NAME: Christianne Estes  : 1950  MRN: [id number]    Date of Service: 2017  The patient is a 69 y.o.  female who is an ICU transfer who initially presented with altered mentation after she was found down by her son. When brought here she was intubated due to acute respiratory failure and encephalopathy. EEG done showed moderate diffuse cerebral dysfunction. She has been having waxing and waning mentation and was extubated when it improved. She also developed SUSAN, lactic acidosis and leukocytosis all of which has resolved as of now. She has a H/O HTN and DM-2. She also has a C-collar in place, the management of which is being done by neurosurgery due to patient's mentation. She still has C-spine collar in place. MRI spine pending for clearance. She is still in altered mentation , on tube feeds and responds barely to sternal rub. Patient Diagnosis(es): The primary encounter diagnosis was Acute renal failure, unspecified acute renal failure type ([address] Diagnoses of Acute respiratory failure, unspecified whether with hypoxia or hypercapnia (HCC) and Acute metabolic encephalopathy were also pertinent to this visit. has a past medical history of Depression; Heart murmur; and HTN (hypertension). has a past surgical history that includes Tubal ligation and other surgical history.     Restrictions  Restrictions/Precautions  Restrictions/Precautions: Fall Risk, General Precautions  Required Braces or Orthoses?: No  Position Activity Restriction  Other position/activity restrictions: activity as tolerated  Vision/Hearing  Vision:  (unable to assess)  Hearing:  (unable to assess)     Subjective  General  Chart Reviewed: Yes  Patient assessed for rehabilitation services?: Yes  Family / Caregiver Present: No  Follows Commands: Impaired  Other (Comment): Pt presenting with decreased altertness this AM. Able to follow some commands with max

## 2017-12-07 NOTE — PROGRESS NOTES
disoriented)  Observation/Palpation  Posture: Fair (during sitting EOB )  Balance  Sitting Balance: Maximum assistance (x2, however SBA at end of session, sat for 8 min)  Standing Balance: Unable to assess(comment) (D/t cognition, weakness)  Standing Balance  Sit to stand: Unable to assess  Stand to sit: Unable to assess  ADL  Feeding: Setup;Maximum assistance  Grooming: Dependent/Total  UE Bathing: Dependent/Total  LE Bathing: Dependent/Total  UE Dressing: Dependent/Total  LE Dressing: Dependent/Total  Toileting: Dependent/Total  Tone RUE  RUE Tone: Hypotonic  RUE Modified Colton Scale  RUE Modified Colton Scale Completed: No  Tone LUE  LUE Tone: Normotonic  Coordination  Movements Are Fluid And Coordinated: No  Coordination and Movement description: Fine motor impairments;Gross motor impairments;Decreased speed;Right UE;Left UE     Bed mobility  Supine to Sit: Maximum assistance (x2)  Sit to Supine: Maximum assistance (x2)  Scooting: Dependent/Total  Transfers  Sit to stand: Unable to assess  Stand to sit: Unable to assess     Cognition  Overall Cognitive Status: Exceptions  Following Commands: Follows one step commands with repetition; Inconsistently follows commands  Attention Span: Difficulty attending to directions  Safety Judgement: Decreased awareness of need for assistance  Problem Solving: Assistance required to generate solutions  Insights: Decreased awareness of deficits  Initiation: Requires cues for all  Sequencing: Requires cues for all  Cognition Comment: Pt only spoke 1 word this date-\"ow\" when transferred sit>supine        Sensation  Overall Sensation Status:  (Unable to assess due to pt not communicating well )    LUE AROM (degrees)  LUE AROM : Exceptions  LUE General AROM: Incomplete ROM in hand, minimal AROM noted in all other joints  RUE AROM (degrees)  RUE AROM : Exceptions  R Shoulder Flexion 0-180: Limited to 30 min  R Elbow Extension 145-0: Limited to 80 degrees  LUE Strength  Gross LUE tolerance of 30 min     Therapy Time   Individual Concurrent Group Co-treatment   Time In 1004         Time Out 1030         Minutes 26            Discharge recommendations discussed with patient during initial evaluation.     Lauren Mcneil OTR/L

## 2017-12-07 NOTE — PROGRESS NOTES
Swallow eval is complete. Patient failed. Per Zacarias Delacruz NP from GI team states primary team needs to get consent from family before peg tube is place 12/8. Primary team notified and states GI needs to explain procedure to family. Both teams notified. Will continue to monitor.

## 2017-12-07 NOTE — PLAN OF CARE
Problem: ASPIRATION PRECAUTIONS  Goal: Patient's risk of aspiration is minimized  Outcome: Ongoing      Problem: SKIN INTEGRITY  Goal: Skin integrity is maintained or improved  Outcome: Ongoing      Problem: Skin Integrity:  Goal: Absence of new skin breakdown  Absence of new skin breakdown   Outcome: Ongoing      Problem: Risk for Impaired Skin Integrity  Goal: Tissue integrity - skin and mucous membranes  Structural intactness and normal physiological function of skin and  mucous membranes.    Outcome: Ongoing

## 2017-12-07 NOTE — CONSULTS
for allowing us to participate in the care of this pleasant patient. 3. We'll follow with you. Discussed with Nurse. MD Gerri Dawson MD  Cell: [phone]    This note is created with the assistance of a speech recognition program.  While intending to generate a document that actually reflects the content of the visit, the document can still have some errors including those of syntax and sound a like substitutions which may escape proof reading. It such instances, actual meaning can be extrapolated by contextual diversion.

## 2017-12-07 NOTE — CONSULTS
[facility]    GASTROENTEROLOGY CONSULT    Patient:   Tri Mitchell   :    1950   Facility:   [address] [place] [address]   Date:    2017  Admission Dx: Altered mental status [R41.82]  Requesting physician: Gunner Martni MD  Reason for consult:  PEG     SUBJECTIVE     HISTORY OF PRESENT ILLNESS  This is a 69 y.o. A.A.  female who was admitted 2017 with Altered mental status [R41.82]. We have been asked to see the patient in consultation by Gunner Martin MD for PEG. Patient presented with AMS after being found down by family. She has had an extended hospital course including intubation with eventual extubation. MRI brain 17 films reviewed by neurology consultant shows multifocal bilateral acute infarcts strongly suggestive of  embolic or hypercoagulable etiology. Her mentation has remained altered with decrease alertness and inanition with out N/G nutrition . GI has been consulted for PEG for long tern nutrition.      OBJECTIVE:     PAST MEDICAL/SURGICAL HISTORY  Past Medical History:   Diagnosis Date    Depression     Heart murmur     HTN (hypertension)      Past Surgical History:   Procedure Laterality Date    OTHER SURGICAL HISTORY      bump under skin on buttocks    TUBAL LIGATION         ALLERGIES:  Allergies   Allergen Reactions    Pcn [Penicillins]        HOME MEDICATIONS:  Prior to Admission medications    Not on File       CURRENT MEDICATIONS:  Scheduled Meds:   sodium chloride flush  10 mL Intravenous BID    enoxaparin  1 mg/kg Subcutaneous BID    furosemide  20 mg Intravenous Daily    insulin lispro  0-12 Units Subcutaneous Q6H    hydrALAZINE  25 mg Oral 3 times per day    carvedilol  25 mg Oral BID WC    cloNIDine  1 patch Transdermal Weekly    amLODIPine  10 mg Oral Daily    sodium chloride flush  10 mL Intravenous 2 times per day     Continuous Infusions:   dextrose 100 mL/hr (17 0617)     PRN 0.29*   LABALBU  2.2*  2.3*         IMPRESSION:     1. AMS  2. Inanition without supplemental means of nutrition    PLAN   1. Plan is for swallow study today by primary services. 2. Plan for PEG tube tomorrow  if patient fails swallow test and if OK by family. Primary services to discuss PEG with family. This plan was formulated in collaboration with Dr. Raz Mcbride  Thank you for allowing us to participate in the care of your patient.     Electronically signed by: Janes Johnson CNP on 12/7/2017 at 7:51 AM

## 2017-12-07 NOTE — PLAN OF CARE
nursing   Hospice Care: No   Patient appropriate for Outpatient [address]: No     Patient/Caregiver Teaching:     [] Indicates understanding        [] Needs reinforcement  [] Unsuccessful                          [] Verbal Understanding  [] Demonstrated understanding            [x] No evidence of learning  [] Refused teaching                               [] N/A       Electronically signed by Ewa Chávez RN, CWON on 12/7/2017 at 1:22 PM

## 2017-12-07 NOTE — PROGRESS NOTES
Brother     Cancer Sister      lung    Cancer Maternal Uncle      bone    Cancer Maternal Aunt         Allergies:   Pcn [penicillins]     Review of Systems: Updated 12/7/2017     Unable to provide due to AMS    Physical Examination :     Patient Vitals for the past 8 hrs:   BP Temp Temp src Pulse Resp SpO2   12/07/17 0730 (!) 174/61 99.5 °F (37.5 °C) Oral 77 20 97 %   12/07/17 0415 - 99.3 °F (37.4 °C) Oral - - -     General Appearance: Appears asleep. Comfortable. NAD  Head: Normocephalic, no trauma  Eyes: PERRLA ; Sclera anicteric; conjunctivae pink. ENT: Oropharynx clear, without erythema, exudate, or thrush. No tenderness of sinuses. Mouth/throat: mucosa pink and dry. No lesions. Neck: Supple. No lymphadenopathy. Pulmonary/Chest: Clear to auscultation. No dullness to percussion. Cardiovascular: Regular rate/rhythm. Negative for murmurs, rubs, or gallops. Abdomen: Soft, non tender. Bowel sounds normal. No organomegaly. Morbidly obese. + linear skin lesions over the abdominal pannus. Wounds improving with Silvadene   Extremities: Morbidly obese  Neurologic: Does not follow commands. Skin: Warm and dry with good turgor. No signs of peripheral arterial or venous insufficiency. She has multiple abrasions of the skin and ulcerations which are in the process of healing. Left-sided wounds on the chest are slowly improving. Medical Decision Making: Updated 12/7/2017     I have independently reviewed/ordered the following labs:  11/20/2017  7:29 AM - Rodríguez [facility]ton Incoming Lab Results From [facility]     Component Results     Component Collected Lab   Specimen Description 11/18/2017  5:06  [address]   . BLOOD    Special Requests 11/18/2017  5:06  [address]   right hand 7ml    Culture 11/18/2017  5:06  [address]   NO GROWTH 2 DAYS        CBC with Differential:   Recent Labs      12/05/17   0635  12/07/17   0632   WBC  5.1  5.9   HGB  8.8*  8.8*   HCT

## 2017-12-07 NOTE — PROGRESS NOTES
Daily    insulin lispro  0-12 Units Subcutaneous Q6H    hydrALAZINE  25 mg Oral 3 times per day    carvedilol  25 mg Oral BID WC    cloNIDine  1 patch Transdermal Weekly    amLODIPine  10 mg Oral Daily    sodium chloride flush  10 mL Intravenous 2 times per day       Continuous Infusions:   dextrose 100 mL/hr (12/03/17 0617)       Vitals:  Patient Vitals for the past 8 hrs:   BP Temp Temp src Pulse Resp SpO2   12/07/17 0730 (!) 174/61 99.5 °F (37.5 °C) Oral 77 20 97 %   12/07/17 0415 - 99.3 °F (37.4 °C) Oral - - -     Height and Weight  Height: 5' 9\" (175.3 cm)  Weight: 253 lb (114.8 kg)  Weight Method: Estimated  BSA (Calculated - sq m): 2.36 sq meters  BMI (Calculated): 37.4  Body mass index is 37.36 kg/m². <16 Severe malnutrition  1616.99 Moderate malnutrition  1718.49 Mild malnutrition  18.524.9 Normal  2529.9 Overweight (not obese)  3034.9 Obese class 1 (Low Risk)  3539.9 Obese class 2 (Moderate Risk)  ? 40 Obese class 3 (High Risk)    EXAM:  Gen: Resting in bed, NAD  CV: RRR  Resp: CTAB  Abd: soft, non-distended  Skin: Cap refill <2 seconds    NEURO EXAM:  Alert but not oriented. Does not follow commands, but moves all 4 extremities to pain  Pupils 3mm and reactive bilaterally. does orient visually in full horizontal range, and does shake or nod her head appropriately. Mildly to moderately lethargic. Nonverbal mostly, but does nod or shake her head appropriately.   Probably full strength  in all extremities  Bilateral patellar and biceps reflexes 2+    Labs (last 48 hours):  Recent Results (from the past 48 hour(s))   POC Glucose Fingerstick    Collection Time: 12/05/17  1:57 PM   Result Value Ref Range    POC Glucose 145 (H) 65 - 105 mg/dL   POC Glucose Fingerstick    Collection Time: 12/05/17  9:20 PM   Result Value Ref Range    POC Glucose 142 (H) 65 - 105 mg/dL   POC Glucose Fingerstick    Collection Time: 12/06/17 12:30 AM   Result Value Ref Range    POC Glucose 123 (H) 65 - 105 mg/dL   POC <32 U/L    Total Bilirubin 0.29 (L) 0.3 - 1.2 mg/dL    Total Protein 6.4 6.4 - 8.3 g/dL    Alb 2.3 (L) 3.5 - 5.2 g/dL    Albumin/Globulin Ratio 0.6 (L) 1.0 - 2.5    GFR Non-African American >60 >60 mL/min    GFR African American >60 >60 mL/min    GFR Comment          GFR Staging NOT REPORTED    POC Glucose Fingerstick    Collection Time: 12/07/17  6:41 AM   Result Value Ref Range    POC Glucose 99 65 - 105 mg/dL   POC Glucose Fingerstick    Collection Time: 12/07/17  7:42 AM   Result Value Ref Range    POC Glucose 96 65 - 105 mg/dL       DATA  PM         []Hide copied text  []Hover for attribution information  [facility]  Transesophageal Echocardiogram        Today's Date:            12/6/2017  Ordering Physician:  Dr. Jarrett Velasquez Lobar  Indication:                  Acute ischemic infarcts     Operators:  Primary:                     Dr. Dennise Francisco (Attending Physician)  Assistant:                   Glenn Moreno MD (Cardiovascular Fellow)     Patient seen and examined. History and Physical reviewed. Labs reviewed.     After informed consent was obtained with explanation of the risks and benefits, the patient was brought to Cath lab. All sedation was administered by the cardiologist. The oropharynx was pre anesthetisized with cetacaine spray.        ARTURO:     Structures:     LA:       Normal  RAFAEL:    No thrombus  RA:      Normal  RV:      Normal  LV:       Normal  Estimated LVEF:         55%  Aorta:               Mild atheromatous disease arch  Pericardium:    No pericardial effusion  Septum:           No intracardiac shunt via color Doppler. No intracardiac shunt via injection of agitated saline.        Valves:     Mitral Valve:    Structurally normal. Trace-to-mild regurgitation is identified. Aortic Valve: The aortic valve is trileaflet and opens adequately. Trace-to-mild regurgitiation is identified. Tricuspid valve: Structurally normal. Mild regurgitation is identified.   Pulmonary valve: Normal.

## 2017-12-08 ENCOUNTER — APPOINTMENT (OUTPATIENT)
Dept: CT IMAGING | Age: 67
DRG: 064 | End: 2017-12-08
Payer: MEDICARE

## 2017-12-08 LAB
ABSOLUTE EOS #: 0.24 K/UL (ref 0–0.44)
ABSOLUTE IMMATURE GRANULOCYTE: 0.03 K/UL (ref 0–0.3)
ABSOLUTE LYMPH #: 1.58 K/UL (ref 1.1–3.7)
ABSOLUTE MONO #: 0.56 K/UL (ref 0.1–1.2)
ANION GAP SERPL CALCULATED.3IONS-SCNC: 15 MMOL/L (ref 9–17)
BASOPHILS # BLD: 0 % (ref 0–2)
BASOPHILS ABSOLUTE: <0.03 K/UL (ref 0–0.2)
BUN BLDV-MCNC: 15 MG/DL (ref 8–23)
BUN/CREAT BLD: ABNORMAL (ref 9–20)
CALCIUM SERPL-MCNC: 8.2 MG/DL (ref 8.6–10.4)
CHLORIDE BLD-SCNC: 103 MMOL/L (ref 98–107)
CO2: 22 MMOL/L (ref 20–31)
CREAT SERPL-MCNC: 0.66 MG/DL (ref 0.5–0.9)
DIFFERENTIAL TYPE: ABNORMAL
EOSINOPHILS RELATIVE PERCENT: 5 % (ref 1–4)
GFR AFRICAN AMERICAN: >60 ML/MIN
GFR NON-AFRICAN AMERICAN: >60 ML/MIN
GFR SERPL CREATININE-BSD FRML MDRD: ABNORMAL ML/MIN/{1.73_M2}
GFR SERPL CREATININE-BSD FRML MDRD: ABNORMAL ML/MIN/{1.73_M2}
GLUCOSE BLD-MCNC: 71 MG/DL (ref 65–105)
GLUCOSE BLD-MCNC: 73 MG/DL (ref 65–105)
GLUCOSE BLD-MCNC: 76 MG/DL (ref 65–105)
GLUCOSE BLD-MCNC: 84 MG/DL (ref 65–105)
GLUCOSE BLD-MCNC: 86 MG/DL (ref 65–105)
GLUCOSE BLD-MCNC: 90 MG/DL (ref 70–99)
HCT VFR BLD CALC: 32 % (ref 36.3–47.1)
HEMOGLOBIN: 10.1 G/DL (ref 11.9–15.1)
HOMOCYSTEINE: 13.4 UMOL/L
IMMATURE GRANULOCYTES: 1 %
LYMPHOCYTES # BLD: 30 % (ref 24–43)
MCH RBC QN AUTO: 29.7 PG (ref 25.2–33.5)
MCHC RBC AUTO-ENTMCNC: 31.6 G/DL (ref 28.4–34.8)
MCV RBC AUTO: 94.1 FL (ref 82.6–102.9)
MONOCYTES # BLD: 11 % (ref 3–12)
PARTIAL THROMBOPLASTIN TIME: 23.3 SEC (ref 21.3–31.3)
PDW BLD-RTO: 15.5 % (ref 11.8–14.4)
PLATELET # BLD: 301 K/UL (ref 138–453)
PLATELET ESTIMATE: ABNORMAL
PMV BLD AUTO: 9.5 FL (ref 8.1–13.5)
POTASSIUM SERPL-SCNC: 4 MMOL/L (ref 3.7–5.3)
RBC # BLD: 3.4 M/UL (ref 3.95–5.11)
RBC # BLD: ABNORMAL 10*6/UL
SEG NEUTROPHILS: 53 % (ref 36–65)
SEGMENTED NEUTROPHILS ABSOLUTE COUNT: 2.79 K/UL (ref 1.5–8.1)
SODIUM BLD-SCNC: 140 MMOL/L (ref 135–144)
WBC # BLD: 5.2 K/UL (ref 3.5–11.3)
WBC # BLD: ABNORMAL 10*3/UL

## 2017-12-08 PROCEDURE — 6360000004 HC RX CONTRAST MEDICATION: Performed by: INTERNAL MEDICINE

## 2017-12-08 PROCEDURE — 2580000003 HC RX 258: Performed by: HOSPITALIST

## 2017-12-08 PROCEDURE — 85730 THROMBOPLASTIN TIME PARTIAL: CPT

## 2017-12-08 PROCEDURE — 71260 CT THORAX DX C+: CPT

## 2017-12-08 PROCEDURE — 81291 MTHFR GENE: CPT

## 2017-12-08 PROCEDURE — 2060000000 HC ICU INTERMEDIATE R&B

## 2017-12-08 PROCEDURE — 81241 F5 GENE: CPT

## 2017-12-08 PROCEDURE — 6360000002 HC RX W HCPCS: Performed by: INTERNAL MEDICINE

## 2017-12-08 PROCEDURE — 6360000002 HC RX W HCPCS: Performed by: HOSPITALIST

## 2017-12-08 PROCEDURE — 80048 BASIC METABOLIC PNL TOTAL CA: CPT

## 2017-12-08 PROCEDURE — 74177 CT ABD & PELVIS W/CONTRAST: CPT

## 2017-12-08 PROCEDURE — 86147 CARDIOLIPIN ANTIBODY EA IG: CPT

## 2017-12-08 PROCEDURE — 99233 SBSQ HOSP IP/OBS HIGH 50: CPT | Performed by: PSYCHIATRY & NEUROLOGY

## 2017-12-08 PROCEDURE — 85025 COMPLETE CBC W/AUTO DIFF WBC: CPT

## 2017-12-08 PROCEDURE — 99232 SBSQ HOSP IP/OBS MODERATE 35: CPT | Performed by: INTERNAL MEDICINE

## 2017-12-08 PROCEDURE — 2580000003 HC RX 258: Performed by: STUDENT IN AN ORGANIZED HEALTH CARE EDUCATION/TRAINING PROGRAM

## 2017-12-08 PROCEDURE — 94762 N-INVAS EAR/PLS OXIMTRY CONT: CPT

## 2017-12-08 PROCEDURE — 76937 US GUIDE VASCULAR ACCESS: CPT

## 2017-12-08 PROCEDURE — 82947 ASSAY GLUCOSE BLOOD QUANT: CPT

## 2017-12-08 PROCEDURE — 81240 F2 GENE: CPT

## 2017-12-08 PROCEDURE — 83090 ASSAY OF HOMOCYSTEINE: CPT

## 2017-12-08 PROCEDURE — 36415 COLL VENOUS BLD VENIPUNCTURE: CPT

## 2017-12-08 RX ORDER — SODIUM CHLORIDE 9 MG/ML
INJECTION, SOLUTION INTRAVENOUS CONTINUOUS
Status: DISCONTINUED | OUTPATIENT
Start: 2017-12-08 | End: 2017-12-14 | Stop reason: HOSPADM

## 2017-12-08 RX ADMIN — FUROSEMIDE 20 MG: 10 INJECTION, SOLUTION INTRAVENOUS at 09:51

## 2017-12-08 RX ADMIN — SODIUM CHLORIDE: 9 INJECTION, SOLUTION INTRAVENOUS at 21:12

## 2017-12-08 RX ADMIN — Medication 10 ML: at 21:11

## 2017-12-08 RX ADMIN — HYDRALAZINE HYDROCHLORIDE 10 MG: 20 INJECTION INTRAMUSCULAR; INTRAVENOUS at 00:48

## 2017-12-08 RX ADMIN — Medication 10 ML: at 09:51

## 2017-12-08 RX ADMIN — IOPAMIDOL 130 ML: 755 INJECTION, SOLUTION INTRAVENOUS at 10:46

## 2017-12-08 ASSESSMENT — PAIN SCALES - GENERAL: PAINLEVEL_OUTOF10: 0

## 2017-12-08 NOTE — PROGRESS NOTES
Date: 2017  Time: 8:54 AM  Patient Name: Shannan Ramirez  Patient : 1950  Room/Bed: 4083/5974-55  Allergies: Allergies   Allergen Reactions    Pcn [Penicillins]        Interval History:  Briefly, this is a  69 y.o. female with Hx/o depression, HTN, morbid obesity, admitted on 2017 for whom neurology consultation 12/4 AM was requested for \"intermittent encephalopathy. \" Consultant read EEG 17 done for metabolic encephalopathy in setting of acute renal failure;  EEG was moderately nonspecifically slow and disorganized. Pt uneventfully extubated on ; CC service describes pt as occasionally following commands and not communicating. Noncontrast CT brain 12/3 unchanged form 17 - films reviewed by consultant,  nothing acute on either study, moderate SVID, mild atrophy, 1 cm chronic R paraganglionic lacunar infarction. MRI brain  films reviewed by neurology consultant shows multifocal bilateral acute infarcts all sub-5mm, mostly punctate, B and both supra- and infratentorial, strongly suggestive of  embolic or hypercoagulable etiology. ARTURO   demonstrated no cardioembolic source. She remains on Lovenox full anticoagulation dose. GI consult done  anticipated PEG. On 2017 patient  Had noncontrast CT scan of abdomen and pelvis and thorax; of these the only potentially pertinent findings were hypodense lesions in the liver for which nonemergent ultrasound evaluation was suggested by the radiologist.  Also these studies having been done without contrast do not ideally qualify as a occult carcinoma screen. Patient has been less somnolent throughout the day today. With the therapist and a lift, she was 7 minutes with standby assist to sitting over edge of bed this morning.     Current Medications:  Scheduled Meds:   sodium chloride flush  10 mL Intravenous BID    enoxaparin  1 mg/kg Subcutaneous BID    furosemide  20 mg Intravenous Daily    insulin lispro  0-12 Units Subcutaneous Q6H    hydrALAZINE  25 mg Oral 3 times per day    carvedilol  25 mg Oral BID WC    cloNIDine  1 patch Transdermal Weekly    amLODIPine  10 mg Oral Daily    sodium chloride flush  10 mL Intravenous 2 times per day       Continuous Infusions:   dextrose 100 mL/hr (12/03/17 0617)       Vitals:  Patient Vitals for the past 8 hrs:   BP Temp Temp src Pulse Resp SpO2   12/08/17 0800 (!) 158/69 97.2 °F (36.2 °C) Axillary 60 15 100 %   12/08/17 0430 (!) 156/88 98.8 °F (37.1 °C) Oral 69 15 99 %     Height and Weight  Height: 5' 9\" (175.3 cm)  Weight: 253 lb (114.8 kg)  Weight Method: Estimated  BSA (Calculated - sq m): 2.36 sq meters  BMI (Calculated): 37.4  Body mass index is 37.36 kg/m². <16 Severe malnutrition  1616.99 Moderate malnutrition  1718.49 Mild malnutrition  18.524.9 Normal  2529.9 Overweight (not obese)  3034.9 Obese class 1 (Low Risk)  3539.9 Obese class 2 (Moderate Risk)  ? 40 Obese class 3 (High Risk)    EXAM:  Gen: Resting in bed, NAD  CV: RRR  Resp: CTAB  Abd: soft, non-distended  Skin: Cap refill <2 seconds    NEURO EXAM:  Alert but not oriented. Does not follow commands, but moves all 4 extremities to pain  Pupils 3mm and reactive bilaterally. does orient visually in full horizontal range, and does shake or nod her head appropriately. Mildly to moderately lethargic. Nonverbal mostly, but does nod or shake her head appropriately.   Probably full strength  in all extremities  Bilateral patellar and biceps reflexes 2+    Labs (last 48 hours):  Recent Results (from the past 48 hour(s))   POC Glucose Fingerstick    Collection Time: 12/06/17 11:19 AM   Result Value Ref Range    POC Glucose 132 (H) 65 - 105 mg/dL   POC Glucose Fingerstick    Collection Time: 12/06/17  3:25 PM   Result Value Ref Range    POC Glucose 144 (H) 65 - 105 mg/dL   POC Glucose Fingerstick    Collection Time: 12/06/17  8:15 PM   Result Value Ref Range    POC Glucose 96 65 - 105 mg/dL   CBC WITH AUTO

## 2017-12-08 NOTE — PLAN OF CARE
Problem: Pain:  Goal: Pain level will decrease  Pain level will decrease   Outcome: Ongoing  Pain level will decrease

## 2017-12-08 NOTE — PROGRESS NOTES
Kamla  Occupational Therapy Not Seen Note    Patient not available for Occupational Therapy due to:    [] Testing:    [] Hemodialysis    [] Blood Transfusion in Progress    []Refusal by Patient:    [] Surgery/Procedure:    [] Strict Bedrest    [] Sedation    [] Spine Precautions     [] Pt being transferred to palliative care at this time. Spoke with pt/family and OT services to be defered. [] Pt independent with functional mobility and functional tasks. Pt with no OT acute care needs at this time, will defer OT eval.    [x] Other: attempt this am but pt very lethargic/drowsy. Max encouragement to wake- poor return. Pt's son arrived and doctors arrived and started to discuss peg placement. Writer exited room d/t pt not staying awake. Attempted again at 10:11 but pt on stretcher leaving for testing per RN report- CT scan.     Next Scheduled Treatment: 12/11/17    Signature: Christianne ROBINS/KEVIN

## 2017-12-08 NOTE — PROGRESS NOTES
_                Date:                           12/8/2017  Patient name:           Scotty Reid  Date of admission:  11/18/2017 11:20 AM  MRN:   [id number]  YOB: 1950  PCP:                           No primary care provider on file. Subjective:   Patient seen and examined  No acute issues overnight  Afebrile  No bleeding    Problem Lists:   Primary Problem:  Altered mental status   Current Problems:  Active Hospital Problems    Diagnosis Date Noted    Hypercoagulable state [facility]) [D68.59]     Cerebrovascular accident (CVA) due to embolism of precerebral artery ([address] [I63.10]     Type 2 diabetes mellitus ([address] [E11.9] 12/05/2017    Recurrent major depressive disorder ([facility] [F33.9]     Infected open wound [T14. 8XXA, L08.9]     Abrasions of multiple sites [T07. XXXA]     Neutrophilic leukocytosis [Q80.5]     Acute metabolic encephalopathy [K84.80]     Acute respiratory failure ([address] [J96.00]     Morbid obesity ([address] [E66.01] 11/19/2017    Encephalopathy [G93.40] 11/18/2017    Altered mental status [R41.82] 11/18/2017    Rhabdomyolysis [M62.82] 11/18/2017    Acute kidney injury ([address] [N17.9] 11/18/2017    Hyperkalemia [E87.5] 11/18/2017    Acute renal failure ([facility] [N17.9]      PMH:  Past Medical History:   Diagnosis Date    Depression     Heart murmur     HTN (hypertension)       Allergies:    Allergies   Allergen Reactions    Pcn [Penicillins]         Medications:   Scheduled Meds:   sodium chloride flush  10 mL Intravenous BID    enoxaparin  1 mg/kg Subcutaneous BID    furosemide  20 mg Intravenous Daily    insulin lispro  0-12 Units Subcutaneous Q6H    hydrALAZINE  25 mg Oral 3 times per day    carvedilol  25 mg Oral BID WC    cloNIDine  1 patch Transdermal Weekly    amLODIPine  10 mg Oral Daily    sodium chloride flush  10 mL Intravenous 2 times per day     PRN Medications: labetalol, hydrALAZINE, glucose, dextrose, glucagon (rDNA), dextrose, neomycin-bacitracin-polymyxin, oxyCODONE-acetaminophen, fentanNYL, fentanNYL, sodium chloride flush, acetaminophen, magnesium hydroxide, ondansetron, albuterol sulfate HFA  Continuous Infusions:   dextrose 100 mL/hr (12/03/17 0617)           Objective:     Vitals: BP (!) 158/69   Pulse 60   Temp 97.2 °F (36.2 °C) (Axillary)   Resp 15   Ht 5' 9\" (1.753 m)   Wt 253 lb (114.8 kg)   SpO2 100%   BMI 37.36 kg/m²     General appearance - well appearing, not in pain or distress   Mental status - alert and cooperative    Eyes - pupils equal and reactive, extraocular eye movements intact   Ears - bilateral TM's and external ear canals normal   Mouth - mucous membranes moist, pharynx normal without lesions   Neck - supple, no significant adenopathy   Lymphatics - no palpable lymphadenopathy, no hepatosplenomegaly   Chest - clear to auscultation, no wheezes, rales or rhonchi, symmetric air entry   Heart - normal rate, regular rhythm, normal S1, S2, no murmurs, rubs, clicks or gallops   Abdomen - soft, nontender, nondistended, no masses or organomegaly   Neurological - alert, oriented, normal speech, no focal findings or movement disorder noted   Musculoskeletal - no joint tenderness, deformity or swelling   Extremities - peripheral pulses normal, no pedal edema, no clubbing or cyanosis   Skin - normal coloration and turgor, no rashes, no suspicious skin lesions noted ,     Data:    No intake/output data recorded. In: 1440 [P.O.:250; I.V.:1190]  Out: -     LABS    CBC:   Recent Labs      12/07/17   0632   WBC  5.9   RBC  3.02*   HGB  8.8*   HCT  30.2*   MCV  100.0   RDW  15.4*   PLT  263     BMP:   Recent Labs      12/07/17   0635   NA  141   K  4.1   CL  104   CO2  24   BUN  16   CREATININE  0.72     ANEMIA STUDIES  No results for input(s): LABIRON, TIBC, FERRITIN, NIYNQXGZ12, FOLATE, OCCULTBLD in the last 72 hours. PT/INR: No results for input(s): PROTIME, INR in the last 72 hours.   APTT:   Recent Labs 12/07/17   1158   APTT  23.0     LFTS  Recent Labs      12/07/17   0635   ALKPHOS  70   ALT  27   AST  29   BILITOT  0.29*   LABALBU  2.3*       Radiology      Assessment      Principal Problem:    Altered mental status  Active Problems:    Encephalopathy    Rhabdomyolysis    Acute kidney injury (HCC)    Hyperkalemia    Acute renal failure (HCC)    Morbid obesity (HCC)    Acute metabolic encephalopathy    Acute respiratory failure (HCC)    Abrasions of multiple sites    Neutrophilic leukocytosis    Infected open wound    Recurrent major depressive disorder (HCC)    Type 2 diabetes mellitus ([address]    Cerebrovascular accident (CVA) due to embolism of precerebral artery ([address]    Hypercoagulable state ([facility]            Plan     1. The history from the patient is very limited. I reviewed records available and I reviewed the labs and imaging. There is no clear history of hypercoagulability or thrombosis in the past.  However due to lack of history we will do a hypercoagulable workup and we'll make further recommendations based on the results. 2. We'll follow with you.            Cori Nyhan, MD  PGY-2, Internal medicine resident  [facility], [place], [place]    Attending Physician Statement   I have discussed the care of Hemalatha Campbell, including pertinent history and exam findings with the resident. I have reviewed the key elements of all parts of the encounter with the resident. I have seen and examined the patient with the resident. I agree with the assessment and plan and status of the problem list as documented.                                [address] [place] [address] [facility]                          Cell: [phone]

## 2017-12-08 NOTE — PROGRESS NOTES
[address] [facility].    PROGRESS NOTE            Date:   12/8/2017  Patient name:  Keith Olmedo  Date of admission:  11/18/2017 11:20 AM  MRN:   [id number]  YOB: 1950    CHIEF COMPLAINT     History Obtained From:  Patient and chart review. HISTORY OF PRESENT ILLNESS     The patient is a 69 y.o.  female who is an ICU transfer who initially presented with altered mentation after she was found down by her son. When brought here she was intubated due to acute respiratory failure and encephalopathy. EEG done showed moderate diffuse cerebral dysfunction. She has been having waxing and waning mentation and was extubated when it improved. She also developed SUSAN, lactic acidosis and leukocytosis all of which has resolved as of now. She has a H/O HTN and DM-2. She also has a C-collar in place, the management of which is being done by neurosurgery due to patient's mentation. She still has C-spine collar in place. MRI spine pending for clearance. She is still in altered mentation , on tube feeds and responds barely to sternal rub    INTERVAL HISTORY :    - Patient seen and examined at the bedside.  - No acute events overnight. - mentation same as before but as per the son she talks to him and wakes up here and there/.  - Possible PEG today    PAST MEDICAL HISTORY       has a past medical history of Depression; Heart murmur; and HTN (hypertension). PAST SURGICAL HISTORY       has a past surgical history that includes Tubal ligation and other surgical history.      HOME MEDICATIONS        Prior to Admission medications    Not on File       ALLERGIES      Pcn [penicillins]    SOCIAL HISTORY            FAMILY HISTORY      family history includes Asthma in her brother; Breast Cancer in her mother; Cancer in her maternal aunt, maternal uncle, and sister; Diabetes in her father and mother; Heart Disease in her paternal grandmother;

## 2017-12-08 NOTE — PROGRESS NOTES
[facility] - Progress Note    Today's Date and Time: 12/8/2017, 11:20 AM    Impression :   1. Encephalopathy-improved  2. Acute respiratory failure. On nasal cannula  3. SUSAN  4. Multiple skin abrasions  5. Reactive leukocytosis  6. Hypernatremia    Recommendations   · Supportive care  · Monitor off antibiotics  · Air pressure mattress  · Wound care    Infection Control Recommendations   [place] precautions    Antimicrobial Stewardship Recommendations   · Avoid penicillins  Chief complaint/reason for consultation:   · Infected wounds  History of Present Illness:     INITIAL HISTORY:    Syl Raines is a 69y.o.-year-old  female who was initially admitted on 11/18/2017. Patient seen at the request of Monie Georges. Patient with past Hx of HPTN, depression. Patient presented through ER after being found down by son after a 24 hr period. Patient reportedly had not been feeling well for about a month. She has slowed down her activities although she had remained leaving independently. Reportedly had a fall a month PTA, for which she had not sought help, and was afraid of falling down again; hence the reduction of her activities. In the ER she was found to have altered mentation, rhabdomyolysis, SUSAN, hyperkalemia, hyperglycemia , multiple skin abrasions, hypotension, acute respiratory failure, metabolic acidosis with increased anion gap secondary to lactic acidosis and acute kidney injury. Patient required mechanical ventilation, management of SUSAN and of hypotension. More recently patient has manifested underlying HPTN. ID asked to evaluate because of skin injuries and concern with risk of secondary infection. CURRENT EVALUATION :12/8/2017     The patient was seen at bedside. She was alert this morning and responsive to commands. Non-verbal    Afebrile   VS stable     All wounds remained clean without any signs of cellulitis.  Good Allergies:   Pcn [penicillins]     Review of Systems: Updated 12/8/2017     Unable to provide due to AMS     Physical Examination :     Patient Vitals for the past 8 hrs:   BP Temp Temp src Pulse Resp SpO2   12/08/17 1102 - 99.6 °F (37.6 °C) Oral - - -   12/08/17 0800 (!) 158/69 97.2 °F (36.2 °C) Axillary 60 15 100 %   12/08/17 0430 (!) 156/88 98.8 °F (37.1 °C) Oral 69 15 99 %     General Appearance: Awake and alert. Comfortable. Not in acute distress  Head: Normocephalic, no trauma  Eyes: PERRLA ; Sclera anicteric; conjunctivae pink. ENT: Oropharynx clear, without erythema, exudate, or thrush. No tenderness of sinuses. Mouth/throat: mucosa pink and dry. No lesions. Neck: Supple without lymphadenopathy  Pulmonary/Chest: Clear to auscultation. No dullness to percussion. Cardiovascular: Regular rate and rhythm. Negative for murmurs, rubs, or gallops. Abdomen: Soft, non tender. Bowel sounds normal. No organomegaly. Morbidly obese. + linear skin lesions over the abdominal pannus. Good epithelialization   Extremities: Morbidly obese  Neurologic: Does not follow commands. Skin: Warm and dry with good turgor. No signs of peripheral arterial or venous insufficiency. She has multiple abrasions of the skin and ulcerations which are in the process of healing. Medical Decision Making: Updated 12/8/2017     I have independently reviewed/ordered the following labs:  11/20/2017  7:29 AM - Rod[facility] Incoming Lab Results From [facility]     Component Results     Component Collected Lab   Specimen Description 11/18/2017  5:06  [address]   . BLOOD    Special Requests 11/18/2017  5:06  [address]   right hand 7ml    Culture 11/18/2017  5:06  [address]   NO GROWTH 2 DAYS        CBC with Differential:   Recent Labs      12/07/17   0632   WBC  5.9   HGB  8.8*   HCT  30.2*   PLT  263   LYMPHOPCT  27   MONOPCT  9     BMP:   Recent Labs      12/07/17   0635   NA  141   K

## 2017-12-09 LAB
ALBUMIN SERPL-MCNC: 2.5 G/DL (ref 3.5–5.2)
ALBUMIN/GLOBULIN RATIO: 0.6 (ref 1–2.5)
ALP BLD-CCNC: 70 U/L (ref 35–104)
ALT SERPL-CCNC: 25 U/L (ref 5–33)
ANION GAP SERPL CALCULATED.3IONS-SCNC: 13 MMOL/L (ref 9–17)
AST SERPL-CCNC: 31 U/L
BILIRUB SERPL-MCNC: 0.35 MG/DL (ref 0.3–1.2)
BUN BLDV-MCNC: 15 MG/DL (ref 8–23)
BUN/CREAT BLD: ABNORMAL (ref 9–20)
CALCIUM SERPL-MCNC: 8.1 MG/DL (ref 8.6–10.4)
CHLORIDE BLD-SCNC: 102 MMOL/L (ref 98–107)
CO2: 21 MMOL/L (ref 20–31)
CREAT SERPL-MCNC: 0.55 MG/DL (ref 0.5–0.9)
GFR AFRICAN AMERICAN: >60 ML/MIN
GFR NON-AFRICAN AMERICAN: >60 ML/MIN
GFR SERPL CREATININE-BSD FRML MDRD: ABNORMAL ML/MIN/{1.73_M2}
GFR SERPL CREATININE-BSD FRML MDRD: ABNORMAL ML/MIN/{1.73_M2}
GLUCOSE BLD-MCNC: 100 MG/DL (ref 65–105)
GLUCOSE BLD-MCNC: 104 MG/DL (ref 65–105)
GLUCOSE BLD-MCNC: 108 MG/DL (ref 70–99)
GLUCOSE BLD-MCNC: 109 MG/DL (ref 65–105)
GLUCOSE BLD-MCNC: 115 MG/DL (ref 65–105)
GLUCOSE BLD-MCNC: 116 MG/DL (ref 65–105)
GLUCOSE BLD-MCNC: 68 MG/DL (ref 65–105)
GLUCOSE BLD-MCNC: 81 MG/DL (ref 65–105)
GLUCOSE BLD-MCNC: 91 MG/DL (ref 65–105)
POTASSIUM SERPL-SCNC: 4.6 MMOL/L (ref 3.7–5.3)
SODIUM BLD-SCNC: 136 MMOL/L (ref 135–144)
TOTAL PROTEIN: 6.6 G/DL (ref 6.4–8.3)

## 2017-12-09 PROCEDURE — 99232 SBSQ HOSP IP/OBS MODERATE 35: CPT | Performed by: INTERNAL MEDICINE

## 2017-12-09 PROCEDURE — 99233 SBSQ HOSP IP/OBS HIGH 50: CPT | Performed by: PSYCHIATRY & NEUROLOGY

## 2017-12-09 PROCEDURE — 80053 COMPREHEN METABOLIC PANEL: CPT

## 2017-12-09 PROCEDURE — 82947 ASSAY GLUCOSE BLOOD QUANT: CPT

## 2017-12-09 PROCEDURE — 2060000000 HC ICU INTERMEDIATE R&B

## 2017-12-09 PROCEDURE — 6360000002 HC RX W HCPCS: Performed by: EMERGENCY MEDICINE

## 2017-12-09 PROCEDURE — 6360000002 HC RX W HCPCS: Performed by: HOSPITALIST

## 2017-12-09 PROCEDURE — 36415 COLL VENOUS BLD VENIPUNCTURE: CPT

## 2017-12-09 PROCEDURE — 2580000003 HC RX 258: Performed by: INTERNAL MEDICINE

## 2017-12-09 RX ADMIN — FUROSEMIDE 20 MG: 10 INJECTION, SOLUTION INTRAVENOUS at 10:03

## 2017-12-09 RX ADMIN — ENOXAPARIN SODIUM 120 MG: 120 INJECTION SUBCUTANEOUS at 22:03

## 2017-12-09 RX ADMIN — DEXTROSE MONOHYDRATE 100 ML/HR: 50 INJECTION, SOLUTION INTRAVENOUS at 17:37

## 2017-12-09 RX ADMIN — DEXTROSE MONOHYDRATE 12.5 G: 25 INJECTION, SOLUTION INTRAVENOUS at 03:09

## 2017-12-09 NOTE — PROGRESS NOTES
dextrose, glucagon (rDNA), dextrose, neomycin-bacitracin-polymyxin, oxyCODONE-acetaminophen, fentanNYL, fentanNYL, sodium chloride flush, acetaminophen, magnesium hydroxide, ondansetron  Continuous Infusions:   sodium chloride 75 mL/hr at 12/08/17 2112    dextrose 100 mL/hr (12/03/17 0617)           Objective:     Vitals: BP (!) 155/106   Pulse 60   Temp 97.7 °F (36.5 °C) (Oral)   Resp 14   Ht 5' 9\" (1.753 m)   Wt 253 lb (114.8 kg)   SpO2 100%   BMI 37.36 kg/m²     General appearance - well appearing, not in pain or distress   Mental status - alert and cooperative    Eyes - pupils equal and reactive, extraocular eye movements intact   Ears - bilateral TM's and external ear canals normal   Mouth - mucous membranes moist, pharynx normal without lesions   Neck - supple, no significant adenopathy   Lymphatics - no palpable lymphadenopathy, no hepatosplenomegaly   Chest - clear to auscultation, no wheezes, rales or rhonchi, symmetric air entry   Heart - normal rate, regular rhythm, normal S1, S2, no murmurs, rubs, clicks or gallops   Abdomen - soft, nontender, nondistended, no masses or organomegaly   Neurological - alert, oriented, normal speech, no focal findings or movement disorder noted   Musculoskeletal - no joint tenderness, deformity or swelling   Extremities - peripheral pulses normal, no pedal edema, no clubbing or cyanosis   Skin - normal coloration and turgor, no rashes, no suspicious skin lesions noted ,     Data:    No intake/output data recorded.   In: 988 [I.V.:988]  Out: -     LABS    CBC:   Recent Labs      12/07/17   0632  12/08/17   1409   WBC  5.9  5.2   RBC  3.02*  3.40*   HGB  8.8*  10.1*   HCT  30.2*  32.0*   MCV  100.0  94.1   RDW  15.4*  15.5*   PLT  263  301     BMP:   Recent Labs      12/07/17   0635  12/08/17   1409  12/09/17   1038   NA  141  140  136   K  4.1  4.0  4.6   CL  104  103  102   CO2  24  22  21   BUN  16  15  15   CREATININE  0.72  0.66  0.55     ANEMIA STUDIES  No

## 2017-12-09 NOTE — PROGRESS NOTES
MONOPCT  9  11     BMP:   Recent Labs      12/07/17   0635  12/08/17   1409   NA  141  140   K  4.1  4.0   CL  104  103   CO2  24  22   BUN  16  15   CREATININE  0.72  0.66     Hepatic Function Panel:   Recent Labs      12/07/17   0635   PROT  6.4   LABALBU  2.3*   BILITOT  0.29*   ALKPHOS  70   ALT  27   AST  29     No results for input(s): RPR in the last 72 hours. No results for input(s): HIV in the last 72 hours. No results for input(s): BC in the last 72 hours. Lab Results   Component Value Date    MUCUS NOT REPORTED 11/18/2017    RBC 3.40 12/08/2017    TRICHOMONAS NOT REPORTED 11/18/2017    WBC 5.2 12/08/2017    YEAST NOT REPORTED 11/18/2017    TURBIDITY CLEAR 11/18/2017     Lab Results   Component Value Date    CREATININE 0.66 12/08/2017    GLUCOSE 90 12/08/2017     Thank you for allowing us to participate in the care of this patient. Please call with questions. Cy Wilkinson MD, PGY-1  12/9/17  8:58 AM       I have discussed the care of the patient, including pertinent history and exam findings,  with the resident. I have seen and examined the patient and the key elements of all parts of the encounter have been performed by me. I agree with the assessment, plan and orders as documented by the resident.     Soila Richardson, Infectious Diseases

## 2017-12-09 NOTE — PLAN OF CARE
Problem: Skin Integrity:  Goal: Absence of new skin breakdown  Absence of new skin breakdown   Outcome: Ongoing  Pt turned and reposition every 2 hours.

## 2017-12-09 NOTE — PROGRESS NOTES
Date: 2017  Time: 10:26 AM  Patient Name: Vinayak Aiken  Patient : 1950  Room/Bed: 7958/2595-16  Allergies: Allergies   Allergen Reactions    Pcn [Penicillins]        Interval History:  Briefly, this is a  69 y.o. female with Hx/o depression, HTN, morbid obesity, admitted on 2017 for whom neurology consultation 12/4 AM was requested for \"intermittent encephalopathy. \" Consultant read EEG 17 done for metabolic encephalopathy in setting of acute renal failure;  EEG was moderately nonspecifically slow and disorganized. Pt uneventfully extubated on ; CC service describes pt as occasionally following commands and not communicating. Noncontrast CT brain 12/3 unchanged form 17 - films reviewed by consultant,  nothing acute on either study, moderate SVID, mild atrophy, 1 cm chronic R paraganglionic lacunar infarction. MRI brain  films reviewed by neurology consultant shows multifocal bilateral acute infarcts all sub-5mm, mostly punctate, B and both supra- and infratentorial, strongly suggestive of  embolic or hypercoagulable etiology. ARTURO   demonstrated no cardioembolic source. She remains on Lovenox full anticoagulation dose. GI consult done  anticipated PEG. On 2017 patient  Had noncontrast CT scan of abdomen and pelvis and thorax; of these the only potentially pertinent findings were hypodense lesions in the liver for which nonemergent ultrasound evaluation was suggested by the radiologist.  Also these studies having been done without contrast do not ideally qualify as a occult carcinoma screen. Patient has been less somnolent Generally last couple days. Contrast CT thorax and abdomen/pelvis  found no lung  /mediastinal lesions but stated that there was still not fully characterized 3 liver nodules and 1 adrenal nodule, recommended unenhanced MRI.     Current Medications:  Scheduled Meds:   sodium chloride flush  10 mL Intravenous BID    Collection Time: 12/07/17  3:13 PM   Result Value Ref Range    POC Glucose 90 65 - 105 mg/dL   POC Glucose Fingerstick    Collection Time: 12/07/17  9:08 PM   Result Value Ref Range    POC Glucose 75 65 - 105 mg/dL   POC Glucose Fingerstick    Collection Time: 12/08/17 12:16 AM   Result Value Ref Range    POC Glucose 73 65 - 105 mg/dL   POC Glucose Fingerstick    Collection Time: 12/08/17  5:44 AM   Result Value Ref Range    POC Glucose 76 65 - 105 mg/dL   POC Glucose Fingerstick    Collection Time: 12/08/17 11:03 AM   Result Value Ref Range    POC Glucose 84 65 - 105 mg/dL   CBC WITH AUTO DIFFERENTIAL    Collection Time: 12/08/17  2:09 PM   Result Value Ref Range    WBC 5.2 3.5 - 11.3 k/uL    RBC 3.40 (L) 3.95 - 5.11 m/uL    Hemoglobin 10.1 (L) 11.9 - 15.1 g/dL    Hematocrit 32.0 (L) 36.3 - 47.1 %    MCV 94.1 82.6 - 102.9 fL    MCH 29.7 25.2 - 33.5 pg    MCHC 31.6 28.4 - 34.8 g/dL    RDW 15.5 (H) 11.8 - 14.4 %    Platelets 703 977 - 229 k/uL    MPV 9.5 8.1 - 13.5 fL    Differential Type NOT REPORTED     WBC Morphology NOT REPORTED     RBC Morphology ANISOCYTOSIS PRESENT     Platelet Estimate NOT REPORTED     Seg Neutrophils 53 36 - 65 %    Lymphocytes 30 24 - 43 %    Monocytes 11 3 - 12 %    Eosinophils % 5 (H) 1 - 4 %    Basophils 0 0 - 2 %    Immature Granulocytes 1 (H) 0 %    Segs Absolute 2.79 1.50 - 8.10 k/uL    Absolute Lymph # 1.58 1.10 - 3.70 k/uL    Absolute Mono # 0.56 0.10 - 1.20 k/uL    Absolute Eos # 0.24 0.00 - 0.44 k/uL    Basophils # <0.03 0.00 - 0.20 k/uL    Absolute Immature Granulocyte 0.03 0.00 - 0.30 k/uL   APTT    Collection Time: 12/08/17  2:09 PM   Result Value Ref Range    PTT 23.3 21.3 - 31.3 sec   HOMOCYSTEINE, SERUM    Collection Time: 12/08/17  2:09 PM   Result Value Ref Range    Homocysteine 13.4 <15.0 umol/L   BASIC METABOLIC PANEL    Collection Time: 12/08/17  2:09 PM   Result Value Ref Range    Glucose 90 70 - 99 mg/dL    BUN 15 8 - 23 mg/dL    CREATININE 0.66 0.50 - 0.90 mg/dL

## 2017-12-09 NOTE — PROGRESS NOTES
enzymes:No results for input(s): CKTOTAL, CKMB, CKMBINDEX, TROPONINI in the last 72 hours. BNP:No results for input(s): BNP in the last 72 hours. Lipid profile: No results for input(s): CHOL, TRIG, HDL, LDLCALC in the last 72 hours. Invalid input(s): LDL  Blood Gases: No results found for: PH, PCO2, PO2, HCO3, O2SAT  Thyroid functions:   Lab Results   Component Value Date    TSH 2.20 11/18/2017        Imaging/Diagonstics:      CXR: No acute cardiopulmonary findings. ASSESSMENT     Principal Problem:    Altered mental status  Active Problems:    Encephalopathy    Rhabdomyolysis    Acute kidney injury (HCC)    Hyperkalemia    Acute renal failure (HCC)    Morbid obesity (HCC)    Acute metabolic encephalopathy    Acute respiratory failure (HCC)    Abrasions of multiple sites    Neutrophilic leukocytosis    Infected open wound    Recurrent major depressive disorder (HCC)    Type 2 diabetes mellitus ([address]    Cerebrovascular accident (CVA) due to embolism of precerebral artery ([address]    Hypercoagulable state ([address]    Acute respiratory failure with hypoxia ([address]      PLAN      1. AMS: intemittent   - continue to monitor  - CTH : negative ; right basal ganglia chronic lacunar infarcts (12/03)  - MRI brain : Numerous acute ischemic infarcts in the cerebral white matter, right  thalamus, and left alex with the distribution in multiple vascular territories . - Neurology following : started the patient on full  lovenox 120 mg BID   - ARTURO :  LVEF :55% , no thrombus or valvuar vegetation    2. Hypertension :   - labetalol 10 mg PRN till PEG tube can be inserted as she is not able to take any of her BP medications. - monitor BP  - hydralazine 25 mg TID  - coreg 25 mg BID  - norvasc 10 mg OD   - clonidine patch  - Lasix 20 mg OD      3. Diabetes mellitus : uncontrolled  - monitor Blood glucose 4xAC and HS   - insulin sliding scale.    - HbA1C : 7.5 (11/19/2017)      Lynna Favre, MD      Department of Internal [address] [place] [address]         12/9/2017, 6:55 AM         IM Attending    Pt seen and examined today   I have discussed the care of pt , including pertinent history and exam findings,  with the resident. I have reviewed the key elements of all parts of the encounter with the resident. I agree with the assessment, plan and orders as documented by the resident.     Electronically signed by Joyce Mcnally MD on 12/9/2017 at 8:43 PM

## 2017-12-10 LAB
ABSOLUTE EOS #: 0.14 K/UL (ref 0–0.44)
ABSOLUTE IMMATURE GRANULOCYTE: 0.09 K/UL (ref 0–0.3)
ABSOLUTE LYMPH #: 1.75 K/UL (ref 1.1–3.7)
ABSOLUTE MONO #: 0.35 K/UL (ref 0.1–1.2)
ANION GAP SERPL CALCULATED.3IONS-SCNC: 13 MMOL/L (ref 9–17)
BASOPHILS # BLD: 0 % (ref 0–2)
BASOPHILS ABSOLUTE: <0.03 K/UL (ref 0–0.2)
BUN BLDV-MCNC: 14 MG/DL (ref 8–23)
BUN/CREAT BLD: ABNORMAL (ref 9–20)
CALCIUM SERPL-MCNC: 8 MG/DL (ref 8.6–10.4)
CHLORIDE BLD-SCNC: 100 MMOL/L (ref 98–107)
CO2: 23 MMOL/L (ref 20–31)
CREAT SERPL-MCNC: 0.58 MG/DL (ref 0.5–0.9)
DIFFERENTIAL TYPE: ABNORMAL
EOSINOPHILS RELATIVE PERCENT: 3 % (ref 1–4)
GFR AFRICAN AMERICAN: >60 ML/MIN
GFR NON-AFRICAN AMERICAN: >60 ML/MIN
GFR SERPL CREATININE-BSD FRML MDRD: ABNORMAL ML/MIN/{1.73_M2}
GFR SERPL CREATININE-BSD FRML MDRD: ABNORMAL ML/MIN/{1.73_M2}
GLUCOSE BLD-MCNC: 115 MG/DL (ref 65–105)
GLUCOSE BLD-MCNC: 115 MG/DL (ref 70–99)
GLUCOSE BLD-MCNC: 116 MG/DL (ref 65–105)
GLUCOSE BLD-MCNC: 120 MG/DL (ref 65–105)
GLUCOSE BLD-MCNC: 122 MG/DL (ref 65–105)
GLUCOSE BLD-MCNC: 129 MG/DL (ref 65–105)
GLUCOSE BLD-MCNC: 89 MG/DL (ref 65–105)
HCT VFR BLD CALC: 32.2 % (ref 36.3–47.1)
HEMOGLOBIN: 10.2 G/DL (ref 11.9–15.1)
IMMATURE GRANULOCYTES: 2 %
LYMPHOCYTES # BLD: 37 % (ref 24–43)
MCH RBC QN AUTO: 29.5 PG (ref 25.2–33.5)
MCHC RBC AUTO-ENTMCNC: 31.7 G/DL (ref 28.4–34.8)
MCV RBC AUTO: 93.1 FL (ref 82.6–102.9)
MONOCYTES # BLD: 7 % (ref 3–12)
PARTIAL THROMBOPLASTIN TIME: 21.8 SEC (ref 21.3–31.3)
PDW BLD-RTO: 15.3 % (ref 11.8–14.4)
PLATELET # BLD: 273 K/UL (ref 138–453)
PLATELET ESTIMATE: ABNORMAL
PMV BLD AUTO: 9.4 FL (ref 8.1–13.5)
POTASSIUM SERPL-SCNC: 3.5 MMOL/L (ref 3.7–5.3)
RBC # BLD: 3.46 M/UL (ref 3.95–5.11)
RBC # BLD: ABNORMAL 10*6/UL
SEG NEUTROPHILS: 51 % (ref 36–65)
SEGMENTED NEUTROPHILS ABSOLUTE COUNT: 2.37 K/UL (ref 1.5–8.1)
SODIUM BLD-SCNC: 136 MMOL/L (ref 135–144)
WBC # BLD: 4.7 K/UL (ref 3.5–11.3)
WBC # BLD: ABNORMAL 10*3/UL

## 2017-12-10 PROCEDURE — 6360000002 HC RX W HCPCS: Performed by: EMERGENCY MEDICINE

## 2017-12-10 PROCEDURE — 2580000003 HC RX 258: Performed by: HOSPITALIST

## 2017-12-10 PROCEDURE — 82947 ASSAY GLUCOSE BLOOD QUANT: CPT

## 2017-12-10 PROCEDURE — 85025 COMPLETE CBC W/AUTO DIFF WBC: CPT

## 2017-12-10 PROCEDURE — 2060000000 HC ICU INTERMEDIATE R&B

## 2017-12-10 PROCEDURE — 99232 SBSQ HOSP IP/OBS MODERATE 35: CPT | Performed by: INTERNAL MEDICINE

## 2017-12-10 PROCEDURE — 36415 COLL VENOUS BLD VENIPUNCTURE: CPT

## 2017-12-10 PROCEDURE — 99232 SBSQ HOSP IP/OBS MODERATE 35: CPT | Performed by: PSYCHIATRY & NEUROLOGY

## 2017-12-10 PROCEDURE — 6360000002 HC RX W HCPCS: Performed by: HOSPITALIST

## 2017-12-10 PROCEDURE — 85730 THROMBOPLASTIN TIME PARTIAL: CPT

## 2017-12-10 PROCEDURE — 80048 BASIC METABOLIC PNL TOTAL CA: CPT

## 2017-12-10 PROCEDURE — 2580000003 HC RX 258: Performed by: INTERNAL MEDICINE

## 2017-12-10 RX ADMIN — DEXTROSE MONOHYDRATE 100 ML/HR: 50 INJECTION, SOLUTION INTRAVENOUS at 09:18

## 2017-12-10 RX ADMIN — FUROSEMIDE 20 MG: 10 INJECTION, SOLUTION INTRAVENOUS at 09:00

## 2017-12-10 RX ADMIN — Medication 10 ML: at 09:00

## 2017-12-10 RX ADMIN — ENOXAPARIN SODIUM 120 MG: 120 INJECTION SUBCUTANEOUS at 19:54

## 2017-12-10 RX ADMIN — Medication 10 ML: at 19:55

## 2017-12-10 RX ADMIN — ENOXAPARIN SODIUM 120 MG: 120 INJECTION SUBCUTANEOUS at 09:00

## 2017-12-10 RX ADMIN — DEXTROSE MONOHYDRATE 100 ML/HR: 50 INJECTION, SOLUTION INTRAVENOUS at 20:19

## 2017-12-10 ASSESSMENT — PAIN SCALES - GENERAL: PAINLEVEL_OUTOF10: 0

## 2017-12-10 NOTE — PROGRESS NOTES
[facility] - Progress Note    Today's Date and Time: 12/10/2017, 10:52 AM    Impression :   1. Encephalopathy-improving  2. Acute respiratory failure. On nasal cannula  3. SUSAN - improving  4. Multiple skin abrasions  5. Reactive leukocytosis- resolved  6. Hypernatremia- resolved    Recommendations   · Wound care to all the wounds - all improving on care  · Care to the peg, aspiration precautions  · No need for antibiotics     Infection Control Recommendations   [place] precautions    Antimicrobial Stewardship Recommendations   · Avoid penicillins  Chief complaint/reason for consultation:   · Infected wounds  History of Present Illness:     INITIAL HISTORY:    Shannan Ramirez is a 69y.o.-year-old  female who was initially admitted on 11/18/2017. Patient seen at the request of Paula Lee. Patient with past Hx of HPTN, depression. Patient presented through ER after being found down by son after a 24 hr period. Patient reportedly had not been feeling well for about a month. She has slowed down her activities although she had remained leaving independently. Reportedly had a fall a month PTA, for which she had not sought help, and was afraid of falling down again; hence the reduction of her activities. In the ER she was found to have altered mentation, rhabdomyolysis, SUSAN, hyperkalemia, hyperglycemia , multiple skin abrasions, hypotension, acute respiratory failure, metabolic acidosis with increased anion gap secondary to lactic acidosis and acute kidney injury. Patient required mechanical ventilation, management of SUSAN and of hypotension. More recently patient has manifested underlying HPTN. ID asked to evaluate because of skin injuries and concern with risk of secondary infection.      CURRENT EVALUATION :12/10/2017   No fever  BP stable   no nausea, vomiting, diarrhea and wants to eat by mouth  Not SOB  Wounds clean and closing  WBCnormal  Has

## 2017-12-10 NOTE — PROGRESS NOTES
Pt cooperative and does communicate in quiet voice. Denies pain. Pt turned q2hrs and remains incontinent of bowel and bladder. drsg changed on left thigh twice this shift with moderate drainage and odor. Family decided today to have peg tube placed tomorrow. lovenox injection tomorrow in the am will be held for procedure. Bed in low position and call bell within reach.

## 2017-12-10 NOTE — PLAN OF CARE
Problem: Pain:  Goal: Control of acute pain  Control of acute pain   Outcome: Met This Shift      Problem: Falls - Risk of  Goal: Absence of falls  Outcome: Met This Shift      Problem: Risk for Impaired Skin Integrity  Goal: Tissue integrity - skin and mucous membranes  Structural intactness and normal physiological function of skin and  mucous membranes.    Outcome: Ongoing

## 2017-12-10 NOTE — PROGRESS NOTES
father. PHYSICAL EXAM      BP (!) 149/72   Pulse 65   Temp 98.7 °F (37.1 °C) (Axillary)   Resp 15   Ht 5' 9\" (1.753 m)   Wt 255 lb 11.7 oz (116 kg)   SpO2 100%   BMI 37.77 kg/m²      · General appearance: well nourished  · HEENT: Head: Normocephalic, no lesions, without obvious abnormality. · Lungs: clear to auscultation bilaterally  · Heart: regular rate and rhythm, S1, S2 normal, no murmur, click, rub or gallop  · Abdomen: soft, non-tender; bowel sounds normal; no masses,  no organomegaly  · Extremities: extremities normal, atraumatic, no cyanosis or edema  · Neurological: unable to assess due to patient's mentation   · Skin - no rash, no lump   · Eye no icterus no redness  · Psych-normal affect   · NEURO-no limb weakness  No facial droop  · Lymphatic system-no lymphadenopathy no splenomegaly     DIAGNOSTICS      Laboratory Testing:  CBC:   Recent Labs      12/10/17   0703   WBC  4.7   HGB  10.2*   PLT  273     BMP:    Recent Labs      12/08/17   1409  12/09/17   1038  12/10/17   0703   NA  140  136  136   K  4.0  4.6  3.5*   CL  103  102  100   CO2  22  21  23   BUN  15  15  14   CREATININE  0.66  0.55  0.58   GLUCOSE  90  108*  115*     S. Calcium:  Recent Labs      12/10/17   0703   CALCIUM  8.0*     S. Ionized Calcium:No results for input(s): IONCA in the last 72 hours. S. Magnesium:No results for input(s): MG in the last 72 hours. S. Phosphorus:No results for input(s): PHOS in the last 72 hours. S. Glucose:  Recent Labs      12/09/17   2012  12/10/17   0142  12/10/17   0831   POCGLU  100  89  120*     Glycosylated hemoglobin A1C: No results for input(s): LABA1C in the last 72 hours. INR: No results for input(s): INR in the last 72 hours. Hepatic functions:   Recent Labs      12/09/17   1038   ALKPHOS  70   ALT  25   AST  31   PROT  6.6   BILITOT  0.35   LABALBU  2.5*     Pancreatic functions:No results for input(s): LACTA, AMYLASE in the last 72 hours.   S. Lactic Acid: No results for input(s): LACTA in the last 72 hours. Cardiac enzymes:No results for input(s): CKTOTAL, CKMB, CKMBINDEX, TROPONINI in the last 72 hours. BNP:No results for input(s): BNP in the last 72 hours. Lipid profile: No results for input(s): CHOL, TRIG, HDL, LDLCALC in the last 72 hours. Invalid input(s): LDL  Blood Gases: No results found for: PH, PCO2, PO2, HCO3, O2SAT  Thyroid functions:   Lab Results   Component Value Date    TSH 2.20 11/18/2017        Imaging/Diagonstics:      CXR: No acute cardiopulmonary findings. ASSESSMENT     Principal Problem:    Altered mental status  Active Problems:    Encephalopathy    Rhabdomyolysis    Acute kidney injury (HCC)    Hyperkalemia    Acute renal failure (HCC)    Morbid obesity (HCC)    Acute metabolic encephalopathy    Acute respiratory failure (HCC)    Abrasions of multiple sites    Neutrophilic leukocytosis    Infected open wound    Recurrent major depressive disorder (HCC)    Type 2 diabetes mellitus ([address]    Cerebrovascular accident (CVA) due to embolism of precerebral artery ([address]    Hypercoagulable state ([address]    Acute respiratory failure with hypoxia ([address]      PLAN      - plan for PEG tomorrow once son Osmin Walker calls back with final decision after speaking with other siblings  - will hold morning lovenox  - continue ASA/statin/plavix    Korin Willett MD  PGY-3 Internal Medicine Resident  [address]  12/10/2017      IM Attending    Pt seen and examined today   I have discussed the care of pt , including pertinent history and exam findings,  with the resident. I have reviewed the key elements of all parts of the encounter with the resident. I agree with the assessment, plan and orders as documented by the resident.     Electronically signed by Earle Pepper MD on 12/10/2017 at 8:10 PM

## 2017-12-10 NOTE — PROGRESS NOTES
Date: 12/10/2017  Time: 8:53 AM  Patient Name: Carol Quintero  Patient : 1950  Room/Bed: [facility]/  Allergies: Allergies   Allergen Reactions    Pcn [Penicillins]        Interval History:  Briefly, this is a  69 y.o. female with Hx/o depression, HTN, morbid obesity, admitted on 2017 for whom neurology consultation 12/4 AM was requested for \"intermittent encephalopathy. \" Consultant read EEG 17 done for metabolic encephalopathy in setting of acute renal failure;  EEG was moderately nonspecifically slow and disorganized. Pt uneventfully extubated on ; CC service describes pt as occasionally following commands and not communicating. Noncontrast CT brain 12/3 unchanged form 17 - films reviewed by consultant,  nothing acute on either study, moderate SVID, mild atrophy, 1 cm chronic R paraganglionic lacunar infarction. MRI brain  films reviewed by neurology consultant shows multifocal bilateral acute infarcts all sub-5mm, mostly punctate, B and both supra- and infratentorial, strongly suggestive of  embolic or hypercoagulable etiology. ARTURO   demonstrated no cardioembolic source. She remains on Lovenox full anticoagulation dose. GI consult done  anticipated PEG. On 2017 patient  Had noncontrast CT scan of abdomen and pelvis and thorax; of these the only potentially pertinent findings were hypodense lesions in the liver for which nonemergent ultrasound evaluation was suggested by the radiologist.  Also these studies having been done without contrast do not ideally qualify as a occult carcinoma screen. Patient has been less somnolent generally last couple days. Contrast CT thorax and abdomen/pelvis  found no lung  /mediastinal lesions but stated that there was still not fully characterized 3 liver nodules and 1 adrenal nodule, recommended unenhanced MRI.     Current Medications:  Scheduled Meds:   sodium chloride flush  10 mL Intravenous BID    65 - 105 mg/dL   POC Glucose Fingerstick    Collection Time: 12/09/17  3:34 AM   Result Value Ref Range    POC Glucose 116 (H) 65 - 105 mg/dL   POC Glucose Fingerstick    Collection Time: 12/09/17  3:48 AM   Result Value Ref Range    POC Glucose 104 65 - 105 mg/dL   POC Glucose Fingerstick    Collection Time: 12/09/17  6:12 AM   Result Value Ref Range    POC Glucose 81 65 - 105 mg/dL   POC Glucose Fingerstick    Collection Time: 12/09/17  8:36 AM   Result Value Ref Range    POC Glucose 91 65 - 105 mg/dL   COMPREHENSIVE METABOLIC PANEL    Collection Time: 12/09/17 10:38 AM   Result Value Ref Range    Glucose 108 (H) 70 - 99 mg/dL    BUN 15 8 - 23 mg/dL    CREATININE 0.55 0.50 - 0.90 mg/dL    Bun/Cre Ratio NOT REPORTED 9 - 20    Calcium 8.1 (L) 8.6 - 10.4 mg/dL    Sodium 136 135 - 144 mmol/L    Potassium 4.6 3.7 - 5.3 mmol/L    Chloride 102 98 - 107 mmol/L    CO2 21 20 - 31 mmol/L    Anion Gap 13 9 - 17 mmol/L    Alkaline Phosphatase 70 35 - 104 U/L    ALT 25 5 - 33 U/L    AST 31 <32 U/L    Total Bilirubin 0.35 0.3 - 1.2 mg/dL    Total Protein 6.6 6.4 - 8.3 g/dL    Alb 2.5 (L) 3.5 - 5.2 g/dL    Albumin/Globulin Ratio 0.6 (L) 1.0 - 2.5    GFR Non-African American >60 >60 mL/min    GFR African American >60 >60 mL/min    GFR Comment          GFR Staging NOT REPORTED    POC Glucose Fingerstick    Collection Time: 12/09/17 11:03 AM   Result Value Ref Range    POC Glucose 109 (H) 65 - 105 mg/dL   POC Glucose Fingerstick    Collection Time: 12/09/17  4:15 PM   Result Value Ref Range    POC Glucose 115 (H) 65 - 105 mg/dL   POC Glucose Fingerstick    Collection Time: 12/09/17  8:12 PM   Result Value Ref Range    POC Glucose 100 65 - 105 mg/dL   POC Glucose Fingerstick    Collection Time: 12/10/17  1:42 AM   Result Value Ref Range    POC Glucose 89 65 - 105 mg/dL   CBC WITH AUTO DIFFERENTIAL    Collection Time: 12/10/17  7:03 AM   Result Value Ref Range    WBC 4.7 3.5 - 11.3 k/uL    RBC 3.46 (L) 3.95 - 5.11 m/uL    Hemoglobin 10.2 (L) 11.9 - 15.1 g/dL    Hematocrit 32.2 (L) 36.3 - 47.1 %    MCV 93.1 82.6 - 102.9 fL    MCH 29.5 25.2 - 33.5 pg    MCHC 31.7 28.4 - 34.8 g/dL    RDW 15.3 (H) 11.8 - 14.4 %    Platelets 203 104 - 958 k/uL    MPV 9.4 8.1 - 13.5 fL    Differential Type NOT REPORTED     Seg Neutrophils 51 36 - 65 %    Lymphocytes 37 24 - 43 %    Monocytes 7 3 - 12 %    Eosinophils % 3 1 - 4 %    Basophils 0 0 - 2 %    Immature Granulocytes 2 (H) 0 %    Segs Absolute 2.37 1.50 - 8.10 k/uL    Absolute Lymph # 1.75 1.10 - 3.70 k/uL    Absolute Mono # 0.35 0.10 - 1.20 k/uL    Absolute Eos # 0.14 0.00 - 0.44 k/uL    Basophils # <0.03 0.00 - 0.20 k/uL    Absolute Immature Granulocyte 0.09 0.00 - 0.30 k/uL    WBC Morphology NOT REPORTED     RBC Morphology ANISOCYTOSIS PRESENT     Platelet Estimate NOT REPORTED    APTT    Collection Time: 12/10/17  7:03 AM   Result Value Ref Range    PTT 21.8 21.3 - 31.3 sec   BASIC METABOLIC PANEL    Collection Time: 12/10/17  7:03 AM   Result Value Ref Range    Glucose 115 (H) 70 - 99 mg/dL    BUN 14 8 - 23 mg/dL    CREATININE 0.58 0.50 - 0.90 mg/dL    Bun/Cre Ratio NOT REPORTED 9 - 20    Calcium 8.0 (L) 8.6 - 10.4 mg/dL    Sodium 136 135 - 144 mmol/L    Potassium 3.5 (L) 3.7 - 5.3 mmol/L    Chloride 100 98 - 107 mmol/L    CO2 23 20 - 31 mmol/L    Anion Gap 13 9 - 17 mmol/L    GFR Non-African American >60 >60 mL/min    GFR African American >60 >60 mL/min    GFR Comment          GFR Staging NOT REPORTED    POC Glucose Fingerstick    Collection Time: 12/10/17  8:31 AM   Result Value Ref Range    POC Glucose 120 (H) 65 - 105 mg/dL       DATA     EXAMINATION:   CT OF THE ABDOMEN AND PELVIS WITH CONTRAST, 12/8/2017 10:47 am       TECHNIQUE:   CT of the abdomen and pelvis was performed with the administration of   intravenous contrast. Multiplanar reformatted images are provided for review.    Dose modulation, iterative reconstruction, and/or weight based adjustment of   the mA/kV was utilized to reduce the matter, right   thalamus, and left alex with the distribution in multiple vascular   territories suggesting a central embolic etiology. 2. No intracranial hemorrhage or mass effect. 3. Multifocal remote lacunar infarcts and moderate chronic white matter   microvascular ischemic changes. 4. Trace bilateral mastoid effusions. [address] 12/3:   No acute intracranial abnormality.       Right basal ganglia chronic lacunar infarcts.  Moderate chronic microvascular   ischemic changes of cerebral white matter. Impression and recommendations:  - Continue current care per primary  - Switch prophylactic AC to therapeutic full-dose lovenox BID  - ARTURO without cardioembolic source  - Hematology consultant considered that anticoagulation would likely not be needed; this would not meet the standard of care given her bilateral numerous supratentorial and infratentorial acute  Infarctions. The hypercoagulable workup is intended to try to identify objectively hematologic abnormalities associated with hypercoagulability; the decision of whether or not to keep on anticoagulation however has already been made by the consultant in order to meet the standard of care. - Occult cancer screening per primary; contrast CT chest, abdomen/pelvis 12/8 revealed findings in the abdomen/pelvis study pertaining to liver and adrenal gland for which further radiographic evaluation is recommended.   Possible PEG

## 2017-12-10 NOTE — PROGRESS NOTES
STUDIES  No results for input(s): LABIRON, TIBC, FERRITIN, [id number], FOLATE, OCCULTBLD in the last 72 hours. PT/INR: No results for input(s): PROTIME, INR in the last 72 hours. APTT:   Recent Labs      12/08/17   1409  12/10/17   0703   APTT  23.3  21.8     LFTS  Recent Labs      12/09/17   1038   ALKPHOS  70   ALT  25   AST  31   BILITOT  0.35   LABALBU  2.5*       Radiology      Assessment      Principal Problem:    Altered mental status  Active Problems:    Encephalopathy    Rhabdomyolysis    Acute kidney injury (HCC)    Hyperkalemia    Acute renal failure (HCC)    Morbid obesity (HCC)    Acute metabolic encephalopathy    Acute respiratory failure (HCC)    Abrasions of multiple sites    Neutrophilic leukocytosis    Infected open wound    Recurrent major depressive disorder (HCC)    Type 2 diabetes mellitus ([address]    Cerebrovascular accident (CVA) due to embolism of precerebral artery ([address]    Hypercoagulable state ([address]    Acute respiratory failure with hypoxia ([address]            Plan     1. The history from the patient is very limited. I reviewed records available and I reviewed the labs and imaging. There is no clear history of hypercoagulability or thrombosis in the past.  However due to lack of history we will do a hypercoagulable workup and we'll make further recommendations based on the results. 2. She will need long term anticoagulation regardless   3. We'll follow with you.          Joseph Love MD  Hematologist/Medical Oncologist    Cell: [phone]      This note is created with the assistance of a speech recognition program.  While intending to generate a document that actually reflects the content of the visit, the document can still have some errors including those of syntax and sound a like substitutions which may escape proof reading. It such instances, actual meaning can be extrapolated by contextual diversion.

## 2017-12-11 ENCOUNTER — ANESTHESIA EVENT (OUTPATIENT)
Dept: ENDOSCOPY | Age: 67
DRG: 064 | End: 2017-12-11
Payer: MEDICARE

## 2017-12-11 ENCOUNTER — ANESTHESIA (OUTPATIENT)
Dept: ENDOSCOPY | Age: 67
DRG: 064 | End: 2017-12-11
Payer: MEDICARE

## 2017-12-11 VITALS
OXYGEN SATURATION: 93 % | RESPIRATION RATE: 27 BRPM | SYSTOLIC BLOOD PRESSURE: 104 MMHG | DIASTOLIC BLOOD PRESSURE: 57 MMHG

## 2017-12-11 LAB
ABSOLUTE EOS #: 0.14 K/UL (ref 0–0.44)
ABSOLUTE IMMATURE GRANULOCYTE: 0.2 K/UL (ref 0–0.3)
ABSOLUTE LYMPH #: 2.41 K/UL (ref 1.1–3.7)
ABSOLUTE MONO #: 0.49 K/UL (ref 0.1–1.2)
ALBUMIN SERPL-MCNC: 2.6 G/DL (ref 3.5–5.2)
ALBUMIN/GLOBULIN RATIO: 0.6 (ref 1–2.5)
ALP BLD-CCNC: 80 U/L (ref 35–104)
ALT SERPL-CCNC: 23 U/L (ref 5–33)
ANION GAP SERPL CALCULATED.3IONS-SCNC: 14 MMOL/L (ref 9–17)
ANTICARDIOLIPIN IGA ANTIBODY: 6.2 APU
ANTICARDIOLIPIN IGG ANTIBODY: 2.8 GPU
AST SERPL-CCNC: 24 U/L
BASOPHILS # BLD: 1 % (ref 0–2)
BASOPHILS ABSOLUTE: 0.04 K/UL (ref 0–0.2)
BILIRUB SERPL-MCNC: 0.68 MG/DL (ref 0.3–1.2)
BUN BLDV-MCNC: 11 MG/DL (ref 8–23)
BUN/CREAT BLD: ABNORMAL (ref 9–20)
CALCIUM SERPL-MCNC: 8 MG/DL (ref 8.6–10.4)
CARDIOLIPIN AB IGM: 41.9 MPU
CHLORIDE BLD-SCNC: 97 MMOL/L (ref 98–107)
CO2: 21 MMOL/L (ref 20–31)
CREAT SERPL-MCNC: 0.55 MG/DL (ref 0.5–0.9)
DIFFERENTIAL TYPE: ABNORMAL
EOSINOPHILS RELATIVE PERCENT: 2 % (ref 1–4)
GFR AFRICAN AMERICAN: >60 ML/MIN
GFR NON-AFRICAN AMERICAN: >60 ML/MIN
GFR SERPL CREATININE-BSD FRML MDRD: ABNORMAL ML/MIN/{1.73_M2}
GFR SERPL CREATININE-BSD FRML MDRD: ABNORMAL ML/MIN/{1.73_M2}
GLUCOSE BLD-MCNC: 110 MG/DL (ref 65–105)
GLUCOSE BLD-MCNC: 115 MG/DL (ref 65–105)
GLUCOSE BLD-MCNC: 117 MG/DL (ref 65–105)
GLUCOSE BLD-MCNC: 127 MG/DL (ref 70–99)
GLUCOSE BLD-MCNC: 137 MG/DL (ref 65–105)
HCT VFR BLD CALC: 34.3 % (ref 36.3–47.1)
HEMOGLOBIN: 10.3 G/DL (ref 11.9–15.1)
IMMATURE GRANULOCYTES: 3 %
LYMPHOCYTES # BLD: 38 % (ref 24–43)
MCH RBC QN AUTO: 29.6 PG (ref 25.2–33.5)
MCHC RBC AUTO-ENTMCNC: 30 G/DL (ref 28.4–34.8)
MCV RBC AUTO: 98.6 FL (ref 82.6–102.9)
MONOCYTES # BLD: 8 % (ref 3–12)
PARTIAL THROMBOPLASTIN TIME: 29.3 SEC (ref 21.3–31.3)
PDW BLD-RTO: 14.8 % (ref 11.8–14.4)
PLATELET # BLD: 347 K/UL (ref 138–453)
PLATELET ESTIMATE: ABNORMAL
PMV BLD AUTO: 9.5 FL (ref 8.1–13.5)
POTASSIUM SERPL-SCNC: 3.8 MMOL/L (ref 3.7–5.3)
RBC # BLD: 3.48 M/UL (ref 3.95–5.11)
RBC # BLD: ABNORMAL 10*6/UL
SEG NEUTROPHILS: 48 % (ref 36–65)
SEGMENTED NEUTROPHILS ABSOLUTE COUNT: 3.05 K/UL (ref 1.5–8.1)
SODIUM BLD-SCNC: 132 MMOL/L (ref 135–144)
TOTAL PROTEIN: 6.7 G/DL (ref 6.4–8.3)
WBC # BLD: 6.3 K/UL (ref 3.5–11.3)
WBC # BLD: ABNORMAL 10*3/UL

## 2017-12-11 PROCEDURE — 6360000002 HC RX W HCPCS: Performed by: HOSPITALIST

## 2017-12-11 PROCEDURE — B246ZZ4 ULTRASONOGRAPHY OF RIGHT AND LEFT HEART, TRANSESOPHAGEAL: ICD-10-PCS | Performed by: INTERNAL MEDICINE

## 2017-12-11 PROCEDURE — 2580000003 HC RX 258: Performed by: STUDENT IN AN ORGANIZED HEALTH CARE EDUCATION/TRAINING PROGRAM

## 2017-12-11 PROCEDURE — 2060000000 HC ICU INTERMEDIATE R&B

## 2017-12-11 PROCEDURE — 82947 ASSAY GLUCOSE BLOOD QUANT: CPT

## 2017-12-11 PROCEDURE — 3700000001 HC ADD 15 MINUTES (ANESTHESIA): Performed by: INTERNAL MEDICINE

## 2017-12-11 PROCEDURE — 6370000000 HC RX 637 (ALT 250 FOR IP): Performed by: INTERNAL MEDICINE

## 2017-12-11 PROCEDURE — 6360000002 HC RX W HCPCS: Performed by: STUDENT IN AN ORGANIZED HEALTH CARE EDUCATION/TRAINING PROGRAM

## 2017-12-11 PROCEDURE — 3700000000 HC ANESTHESIA ATTENDED CARE: Performed by: INTERNAL MEDICINE

## 2017-12-11 PROCEDURE — 6360000002 HC RX W HCPCS: Performed by: INTERNAL MEDICINE

## 2017-12-11 PROCEDURE — 76937 US GUIDE VASCULAR ACCESS: CPT

## 2017-12-11 PROCEDURE — 2580000003 HC RX 258: Performed by: INTERNAL MEDICINE

## 2017-12-11 PROCEDURE — 2580000003 HC RX 258: Performed by: HOSPITALIST

## 2017-12-11 PROCEDURE — 2500000003 HC RX 250 WO HCPCS: Performed by: NURSE ANESTHETIST, CERTIFIED REGISTERED

## 2017-12-11 PROCEDURE — 99232 SBSQ HOSP IP/OBS MODERATE 35: CPT | Performed by: PSYCHIATRY & NEUROLOGY

## 2017-12-11 PROCEDURE — 7100000011 HC PHASE II RECOVERY - ADDTL 15 MIN: Performed by: INTERNAL MEDICINE

## 2017-12-11 PROCEDURE — 3609018000 HC EGD ESOPHAGOGASTRODUODENOSCOPY PEG TUBE INSERTION: Performed by: INTERNAL MEDICINE

## 2017-12-11 PROCEDURE — 99232 SBSQ HOSP IP/OBS MODERATE 35: CPT | Performed by: INTERNAL MEDICINE

## 2017-12-11 PROCEDURE — 6360000002 HC RX W HCPCS: Performed by: NURSE ANESTHETIST, CERTIFIED REGISTERED

## 2017-12-11 PROCEDURE — 85025 COMPLETE CBC W/AUTO DIFF WBC: CPT

## 2017-12-11 PROCEDURE — 2580000003 HC RX 258: Performed by: NURSE ANESTHETIST, CERTIFIED REGISTERED

## 2017-12-11 PROCEDURE — 7100000010 HC PHASE II RECOVERY - FIRST 15 MIN: Performed by: INTERNAL MEDICINE

## 2017-12-11 PROCEDURE — 94762 N-INVAS EAR/PLS OXIMTRY CONT: CPT

## 2017-12-11 PROCEDURE — 85730 THROMBOPLASTIN TIME PARTIAL: CPT

## 2017-12-11 PROCEDURE — 80053 COMPREHEN METABOLIC PANEL: CPT

## 2017-12-11 PROCEDURE — C1889 IMPLANT/INSERT DEVICE, NOC: HCPCS | Performed by: INTERNAL MEDICINE

## 2017-12-11 PROCEDURE — 99233 SBSQ HOSP IP/OBS HIGH 50: CPT | Performed by: INTERNAL MEDICINE

## 2017-12-11 RX ORDER — SODIUM CHLORIDE 0.9 % (FLUSH) 0.9 %
10 SYRINGE (ML) INJECTION EVERY 12 HOURS SCHEDULED
Status: DISCONTINUED | OUTPATIENT
Start: 2017-12-11 | End: 2017-12-14 | Stop reason: HOSPADM

## 2017-12-11 RX ORDER — GLYCOPYRROLATE 0.2 MG/ML
INJECTION INTRAMUSCULAR; INTRAVENOUS PRN
Status: DISCONTINUED | OUTPATIENT
Start: 2017-12-11 | End: 2017-12-11 | Stop reason: SDUPTHER

## 2017-12-11 RX ORDER — SODIUM CHLORIDE 0.9 % (FLUSH) 0.9 %
10 SYRINGE (ML) INJECTION PRN
Status: DISCONTINUED | OUTPATIENT
Start: 2017-12-11 | End: 2017-12-14 | Stop reason: HOSPADM

## 2017-12-11 RX ORDER — DEXTROSE MONOHYDRATE 50 MG/ML
INJECTION, SOLUTION INTRAVENOUS CONTINUOUS PRN
Status: DISCONTINUED | OUTPATIENT
Start: 2017-12-11 | End: 2017-12-11

## 2017-12-11 RX ORDER — PROPOFOL 10 MG/ML
INJECTION, EMULSION INTRAVENOUS PRN
Status: DISCONTINUED | OUTPATIENT
Start: 2017-12-11 | End: 2017-12-11 | Stop reason: SDUPTHER

## 2017-12-11 RX ORDER — PANTOPRAZOLE SODIUM 40 MG/1
40 TABLET, DELAYED RELEASE ORAL
Status: DISCONTINUED | OUTPATIENT
Start: 2017-12-11 | End: 2017-12-12

## 2017-12-11 RX ORDER — DEXTROSE MONOHYDRATE 50 MG/ML
INJECTION, SOLUTION INTRAVENOUS CONTINUOUS PRN
Status: DISCONTINUED | OUTPATIENT
Start: 2017-12-11 | End: 2017-12-11 | Stop reason: SDUPTHER

## 2017-12-11 RX ORDER — SUCRALFATE ORAL 1 G/10ML
1 SUSPENSION ORAL
Status: DISCONTINUED | OUTPATIENT
Start: 2017-12-11 | End: 2017-12-14 | Stop reason: HOSPADM

## 2017-12-11 RX ORDER — LIDOCAINE HYDROCHLORIDE 10 MG/ML
INJECTION, SOLUTION INFILTRATION; PERINEURAL PRN
Status: DISCONTINUED | OUTPATIENT
Start: 2017-12-11 | End: 2017-12-11 | Stop reason: SDUPTHER

## 2017-12-11 RX ORDER — LEVOFLOXACIN 5 MG/ML
500 INJECTION, SOLUTION INTRAVENOUS ONCE
Status: COMPLETED | OUTPATIENT
Start: 2017-12-11 | End: 2017-12-11

## 2017-12-11 RX ADMIN — ENOXAPARIN SODIUM 120 MG: 120 INJECTION SUBCUTANEOUS at 20:44

## 2017-12-11 RX ADMIN — Medication 10 ML: at 20:46

## 2017-12-11 RX ADMIN — Medication 10 ML: at 09:55

## 2017-12-11 RX ADMIN — HYDRALAZINE HYDROCHLORIDE 25 MG: 25 TABLET, FILM COATED ORAL at 20:44

## 2017-12-11 RX ADMIN — LEVOFLOXACIN 500 MG: 5 INJECTION, SOLUTION INTRAVENOUS at 11:41

## 2017-12-11 RX ADMIN — Medication 10 ML: at 09:59

## 2017-12-11 RX ADMIN — Medication 10 ML: at 20:47

## 2017-12-11 RX ADMIN — FUROSEMIDE 20 MG: 10 INJECTION, SOLUTION INTRAVENOUS at 09:34

## 2017-12-11 RX ADMIN — ONDANSETRON 4 MG: 2 INJECTION INTRAMUSCULAR; INTRAVENOUS at 11:05

## 2017-12-11 RX ADMIN — GLYCOPYRROLATE 0.2 MG: 0.2 INJECTION INTRAMUSCULAR; INTRAVENOUS at 11:05

## 2017-12-11 RX ADMIN — DEXTROSE MONOHYDRATE: 50 INJECTION, SOLUTION INTRAVENOUS at 10:38

## 2017-12-11 RX ADMIN — DEXTROSE MONOHYDRATE 100 ML/HR: 50 INJECTION, SOLUTION INTRAVENOUS at 06:40

## 2017-12-11 RX ADMIN — PROPOFOL 220 MG: 10 INJECTION, EMULSION INTRAVENOUS at 11:15

## 2017-12-11 RX ADMIN — LIDOCAINE HYDROCHLORIDE 50 MG: 10 INJECTION, SOLUTION INFILTRATION; PERINEURAL at 10:59

## 2017-12-11 ASSESSMENT — PAIN SCALES - GENERAL
PAINLEVEL_OUTOF10: 0

## 2017-12-11 ASSESSMENT — PAIN - FUNCTIONAL ASSESSMENT: PAIN_FUNCTIONAL_ASSESSMENT: 0-10

## 2017-12-11 NOTE — PROCEDURES
[facility]  EGD with PEG placement    Patient: Mercedez Helm   : 1950  Med Rec#: [id number]   Acc#: [id number]       PROCEDURE: EGD with placement of Gastrostomy Tube    INDICATION:   - Dysphagia. MEDICATIONS: Levaquin 50o mg IV x 1. PNC allergy. COMPLICATIONS: None. Prior to the procedure, a history and physical exam was performed and informed consent was obtained. After adequate sedation was achieved, the scope was inserted into the mouth and advanced to the second portion of the duodenum. As the scope was withdrawn, the anatomy and the appearance of the mucosa was noted. While the tip of the scope was in the stomach, finger pressure was placed on the external abdominal wall and displacement was noted endoscopically. In addition, transillumination of the abdominal wall was noted. The abdominal wall was then prepped. The area was then probed with the local anesthetic needle; the first air aspirated corresponded with the appearance of the tip of the needle in the stomach. Using standard technique, a gastrostomy tube was then placed using the \"PULL\" method. The scope was then re-inserted  to ensure proper positioning of the bumper, and to ensure that there was no significant bleeding. FINDINGS:  LA grade D esophagitis at GEJ. Multiple clean based ulcers in distal gastric cavity ( antrum and incisura). Unremarkable D. PLAN: See post-PEG orders. G tube can be used immediately for medication and in 4 hrs for water. Feeding can be  started tomorrow if no problem. Irrigate after each use. Protonix BID for 8 weeks then daily. Carafate 1 sugey BID for 14 days. Antireflux measures. H pylori stool ag. Repeat EGD in 8 weks.     Liam Hernández M.D.

## 2017-12-11 NOTE — ANESTHESIA PRE PROCEDURE
mellitus ([facility] E11.9    Cerebrovascular accident (CVA) due to embolism of precerebral artery (HCC) I63.10    Hypercoagulable state ([facility] D68.59    Acute respiratory failure with hypoxia ([facility] J96.01       Past Medical History:        Diagnosis Date    Depression     Heart murmur     HTN (hypertension)        Past Surgical History:        Procedure Laterality Date    OTHER SURGICAL HISTORY      bump under skin on buttocks    TUBAL LIGATION         Social History:    Social History   Substance Use Topics    Smoking status: Former Smoker    Smokeless tobacco: Never Used    Alcohol use Not on file                                Counseling given: Not Answered      Vital Signs (Current):   Vitals:    12/10/17 2030 12/11/17 0000 12/11/17 0415 12/11/17 0952   BP: (!) 151/61 (!) 141/78 (!) 138/106    Pulse: 62 60 75 76   Resp: 13 15 14 14   Temp: 36.8 °C (98.2 °F) 36.9 °C (98.5 °F) 37.1 °C (98.8 °F) 36.6 °C (97.8 °F)   TempSrc: Oral Oral Oral Temporal   SpO2: 100% 99% 100% 97%   Weight:       Height:                                                  BP Readings from Last 3 Encounters:   12/11/17 (!) 138/106       NPO Status: Time of last liquid consumption: 2300                        Time of last solid consumption: 2300                        Date of last liquid consumption: 12/10/17                        Date of last solid food consumption: 12/10/17    BMI:   Wt Readings from Last 3 Encounters:   12/10/17 255 lb 11.7 oz (116 kg)     Body mass index is 37.77 kg/m².     CBC:   Lab Results   Component Value Date    WBC 6.3 12/11/2017    RBC 3.48 12/11/2017    HGB 10.3 12/11/2017    HCT 34.3 12/11/2017    MCV 98.6 12/11/2017    RDW 14.8 12/11/2017     12/11/2017       CMP:   Lab Results   Component Value Date     12/11/2017    K 3.8 12/11/2017    CL 97 12/11/2017    CO2 21 12/11/2017    BUN 11 12/11/2017    CREATININE 0.55 12/11/2017    GFRAA >60 12/11/2017    LABGLOM >60 12/11/2017    GLUCOSE 127 12/11/2017    PROT 6.7 12/11/2017    CALCIUM 8.0 12/11/2017    BILITOT 0.68 12/11/2017    ALKPHOS 80 12/11/2017    AST 24 12/11/2017    ALT 23 12/11/2017       POC Tests:   Recent Labs      12/11/17   0324   POCGLU  110*       Coags:   Lab Results   Component Value Date    APTT 29.3 12/11/2017       HCG (If Applicable): No results found for: PREGTESTUR, PREGSERUM, HCG, HCGQUANT     ABGs: No results found for: PHART, PO2ART, YGE8WOY, PZZ7WAZ, BEART, A1ATXDVS     Type & Screen (If Applicable):  No results found for: LABABO, [address]    Anesthesia Evaluation    Airway:         Dental:          Pulmonary:   (+) COPD:  decreased breath sounds,                            ROS comment: Hx Resp Failure   Cardiovascular:    (+) hypertension: moderate, murmur, pulmonary hypertension: mild,         Rhythm: regular  Rate: normal                    Neuro/Psych:   (+) CVA:, depression/anxiety              ROS comment: Encephalopathy  Mental Changes GI/Hepatic/Renal:   (+) renal disease:,           Endo/Other:    (+) Type II DM, , .                 Abdominal:       Abdomen: soft. Vascular:   + PVD, aortic or cerebral, . Anesthesia Plan      TIVA     ASA 4     (Patient admitted unresponsive, Hyperkalemic, Rhado, dehydrated. Improved with IV fluid administration )  Induction: intravenous. MIPS: Prophylactic antiemetics administered. Anesthetic plan and risks discussed with legal guardian. Plan discussed with attending.                   Basil Meek CRNA   12/11/2017

## 2017-12-11 NOTE — PLAN OF CARE
Problem: Falls - Risk of  Goal: Absence of falls  Outcome: Ongoing  No falls to date. Bed in lowest position c call light within reach. Floor free from obstacles and pt VU to call out with needs or assistance c ambulation. Will continue to monitor additional needs.

## 2017-12-11 NOTE — PROGRESS NOTES
Physical Therapy  DATE: 2017    NAME: Anibal Quevedo  MRN: [id number]   : 1950    Patient not seen this date for Physical Therapy due to:  [] Blood transfusion in progress  [] Hemodialysis  []  Patient Declined  [] Spine Precautions   [] Strict Bedrest  [x] Surgery/ Procedure: Pt to GI for PEG tube per [facility]. Ck bk tomorrow. [] Testing      [] Other        [] PT being discontinued at this time. Patient independent. No further needs. [] PT being discontinued at this time as the patient has been transferred to palliative care. No further needs. [facility]  This treatment/evaluation completed by signing SPT. Signing PT agrees with treatment and documentation.

## 2017-12-11 NOTE — PROGRESS NOTES
grandmother; Hypertension in her father and mother; Stroke in her father. PHYSICAL EXAM      BP (!) 138/106   Pulse 76   Temp 97.8 °F (36.6 °C) (Temporal)   Resp 14   Ht 5' 9\" (1.753 m)   Wt 255 lb 11.7 oz (116 kg)   SpO2 97%   BMI 37.77 kg/m²      · General appearance: well nourished  · HEENT: Head: Normocephalic, no lesions, without obvious abnormality. · Lungs: clear to auscultation bilaterally  · Heart: regular rate and rhythm, S1, S2 normal, no murmur, click, rub or gallop  · Abdomen: soft, non-tender; bowel sounds normal; no masses,  no organomegaly  · Extremities: extremities normal, atraumatic, no cyanosis or edema  · Neurological: unable to assess due to patient's mentation   · Skin - no rash, no lump   · Eye no icterus no redness  · Psych-normal affect   · NEURO-no limb weakness  No facial droop  · Lymphatic system-no lymphadenopathy no splenomegaly     DIAGNOSTICS      Laboratory Testing:  CBC:   Recent Labs      12/11/17   0755   WBC  6.3   HGB  10.3*   PLT  347     BMP:    Recent Labs      12/09/17   1038  12/10/17   0703  12/11/17   0755   NA  136  136  132*   K  4.6  3.5*  3.8   CL  102  100  97*   CO2  21  23  21   BUN  15  14  11   CREATININE  0.55  0.58  0.55   GLUCOSE  108*  115*  127*     S. Calcium:  Recent Labs      12/11/17   0755   CALCIUM  8.0*     S. Ionized Calcium:No results for input(s): IONCA in the last 72 hours. S. Magnesium:No results for input(s): MG in the last 72 hours. S. Phosphorus:No results for input(s): PHOS in the last 72 hours. S. Glucose:  Recent Labs      12/10/17   1623  12/10/17   2208  12/11/17   0324   POCGLU  116*  115*  110*     Glycosylated hemoglobin A1C: No results for input(s): LABA1C in the last 72 hours. INR: No results for input(s): INR in the last 72 hours.   Hepatic functions:   Recent Labs      12/11/17   0755   ALKPHOS  80   ALT  23   AST  24   PROT  6.7   BILITOT  0.68   LABALBU  2.6*     Pancreatic functions:No results for input(s):

## 2017-12-11 NOTE — PROGRESS NOTES
acute process evident in the chest.     Ct Chest W Contrast, 12/8/2017  1. No lung nodules or masses. Minor posterior basal subsegmental atelectasis on the left. 2. Interval removal of ET tube. 3. There is an indeterminate 2.3 x 1.5 cm right adrenal nodule. Further characterization with dedicated adrenal mass protocol CT or MRI suggested. Ct Cervical Spine Wo Contrast, 11/18/2017  No acute abnormality of the cervical spine. Degenerative changes with exuberant bridging osteophytes. Ct Thoracic Spine Wo Contrast, 12/2/2017  No evidence of acute traumatic injury of the thoracic or lumbar spine. Nonspecific presacral edema. Severe multilevel thoracic spondylosis. Moderate multilevel lumbar spondylosis. Ct Lumbar Spine Wo Contrast, 12/2/2017  No evidence of acute traumatic injury of the thoracic or lumbar spine. Nonspecific presacral edema. Severe multilevel thoracic spondylosis. Moderate multilevel lumbar spondylosis. Mri Cervical Spine Wo Contrast,  12/6/2017  Disc and osteophytes result in stenosis of the thecal sac and narrowing of the neural foramina throughout the cervical region as discussed above. Mri Thoracic Spine Wo Contrast, 12/6/2017  The study is limited by motion artifact. The bony spinal canal overall is congenitally small. There is facet and ligamentum flavum hypertrophy in the mid thoracic region, which appears to cause stenosis of the thecal sac as discussed above. A repeat study may be useful to better evaluate the spinal canal contents. There is questionable signal abnormality in the thoracic spinal cord at the T6-7 level, which may represent myelomalacia secondary to stenosis. Again, motion artifact limits the exam.     Ct Abdomen Pelvis W Iv Contrast Additional Contrast? None,  12/8/2017  1. Incompletely assessed hepatic hypodensities with two in the left lobe measuring 5 mm and 10 mm and one in the right lobe measuring 10 mm.  Additionally there is an indeterminate 2.3 cm right adrenal nodule and diffuse nodular fullness of the left adrenal gland. Consider contrast-enhanced MRI for further evaluation. Xr Chest Portable, 11/23/2017  Stable chest.    Us Retroperitoneal Complete,11/19/2017  Limited visualization of the kidneys due to bowel gas and body habitus. Kidneys to the extent imaged are unremarkable. Bladder could not be evaluated due to indwelling Noel catheter. Mri Brain W Wo Contrast, 12/5/2017  1. Numerous acute ischemic infarcts in the cerebral white matter, right thalamus, and left alex with the distribution in multiple vascular territories suggesting a central embolic etiology. 2. No intracranial hemorrhage or mass effect. 3. Multifocal remote lacunar infarcts and moderate chronic white matter microvascular ischemic changes. 4. Trace bilateral mastoid effusions. Impression and Plan  - Continue current primary care. - Switch to prophylactic AC to therapeutric full dose Lovenox BID.  - ARTURO without Cardio embolic source  - Hematology consultant considered that anticoagulation would likely not be needed, this would not meet the standard of care given her bilateral numerous supratentorial and infratentorial acute Infarctions. The hyper coaguable work up is intended to try identify objectively hematologic abnormalities associated with hypercoagulability, the decision whether or not to keep on anticoagulation however has already been made by the consultant in order to meet the standard of care. - Occult cancer Screening per primary , contrast CT chest, abdomen/pelvis 12/8 revealed findings in the abdomen/ pelvis study pertaining to liver and adrenal gland for which further radiographic evaluation is recommended. -  PEG tube placement today. Discussed with Dr. Jarred Young, Patient and Nurse. Will follow.     Charles Garnett MD  PGY-2, Internal Medicine Resident  [address] [facility]          I have discussed the case of Shiloh Dutton

## 2017-12-11 NOTE — PROGRESS NOTES
Nutrition Assessment    Type and Reason for Visit: Reassess    Nutrition Recommendations:Continue NPO. Start TF as able- recommend resume Glucerna (diabetic) goal 65 ml/hr. Will continue to follow. Nutrition Diagnosis:   · Problem: Inadequate oral intake  · Etiology: related to Cognitive or neurological impairment, Difficulty swallowing     Signs and symptoms:  as evidenced by NPO status due to medical condition    Nutrition Assessment:  · Subjective Assessment: Pt resting in bed. S/p PEG placement today. · Current Nutrition Therapies:  · Oral Diet Orders: NPO   · Tube Feeding (TF) Orders: None. Pt previously on Diabetic formula (Glucerna) at 65 mL/hr = 1872 kcals, 94 gm protein per day. · Anthropometric Measures:  · Ht: 5' 9\" (175.3 cm)   · Current Body Wt: 255 lb 11.7 oz (116 kg)  · Admission Body Wt: 218 lb 7.6 oz (99.1 kg)  · Ideal Body Wt: 160 lb (72.6 kg),   · % [place] Body 136% using admission wt   · Adjusted Body Wt: 179 lb (81.2 kg)   · Comparative Standards (Estimated Nutrition Needs):  · Estimated Daily Total Kcal: 4810-2598 kcal per day   · Estimated Daily Protein (g):  g pro per day     Estimated Intake vs Estimated Needs: Intake Less Than Needs    Nutrition Risk Level: Moderate    Nutrition Interventions:   Continue NPO. Start TF as able- recommend resume Glucerna goal 65 ml/hr. Continued Inpatient Monitoring, Education Not Indicated    Nutrition Evaluation:   · Evaluation: Progressing toward goals   · Goals: meet % of estimated nutrition needs    · Monitoring: NPO Status, TF Intake, TF Tolerance, Weight, Comparative Standards, Pertinent Labs    See Adult Nutrition Doc Flowsheet for more detail.      Electronically signed by Frances Elena RD, LD on 12/11/17 at 3:59 PM    Contact Number: [phone]

## 2017-12-11 NOTE — PROGRESS NOTES
Father     Stroke Father     Diabetes Mother     Hypertension Mother     Breast Cancer Mother     Asthma Brother     Cancer Sister      lung    Cancer Maternal Uncle      bone    Cancer Maternal Aunt         Allergies:   Pcn [penicillins]     Review of Systems: Updated 12/11/2017     Unable to provide due to AMS   No nausea, vomiting, diarrhea and wants to eat, while she is kept NPO for dysphagia    Physical Examination : Updated 12/11/2017       Patient Vitals for the past 8 hrs:   BP Temp Temp src Pulse Resp SpO2   12/11/17 1159 135/71 - - 86 19 -   12/11/17 1140 117/87 - - 79 16 -   12/11/17 1124 - 98 °F (36.7 °C) Temporal 79 23 96 %   12/11/17 0952 - 97.8 °F (36.6 °C) Temporal 76 14 97 %     Physical Exam   Constitutional: She appears well-developed and well-nourished. Morbidly obese. HENT:   Head: Normocephalic and atraumatic. Nose: Nose normal.   Eyes: Conjunctivae are normal. Pupils are equal, round, and reactive to light. Neck: Normal range of motion. Neck supple. Cardiovascular: Normal rate and regular rhythm. Exam reveals no gallop. Pulmonary/Chest: Effort normal and breath sounds normal. She has no wheezes. Abdominal: Soft. Bowel sounds are normal. She exhibits no mass. There is no tenderness. There is no rebound and no guarding. New PEG tube    Musculoskeletal: Normal range of motion. She exhibits no edema. Neurological: She is alert. Awake, and alert, non verbal but can nod her head yes or no. Skin: Skin is warm and dry. Has multiple abrasions of the skin and ulcerations which are in the process of healing. Located under pannus, breasts and in axillae. New PEG site.        Medical Decision Making: Updated 12/11/2017     I have independently reviewed/ordered the following labs:  11/20/2017  7:29 AM - Rodríguez Shelton Incoming Lab Results From [facility]     Component Results     Component Collected Lab   Specimen Description 11/18/2017  5:06  [facility]

## 2017-12-12 ENCOUNTER — APPOINTMENT (OUTPATIENT)
Dept: CT IMAGING | Age: 67
DRG: 064 | End: 2017-12-12
Payer: MEDICARE

## 2017-12-12 LAB
ABSOLUTE EOS #: 0.06 K/UL (ref 0–0.44)
ABSOLUTE IMMATURE GRANULOCYTE: 0.2 K/UL (ref 0–0.3)
ABSOLUTE LYMPH #: 2.55 K/UL (ref 1.1–3.7)
ABSOLUTE MONO #: 0.71 K/UL (ref 0.1–1.2)
ANION GAP SERPL CALCULATED.3IONS-SCNC: 13 MMOL/L (ref 9–17)
BASOPHILS # BLD: 0 % (ref 0–2)
BASOPHILS ABSOLUTE: 0.04 K/UL (ref 0–0.2)
BUN BLDV-MCNC: 9 MG/DL (ref 8–23)
BUN/CREAT BLD: ABNORMAL (ref 9–20)
CALCIUM SERPL-MCNC: 8.2 MG/DL (ref 8.6–10.4)
CHLORIDE BLD-SCNC: 98 MMOL/L (ref 98–107)
CO2: 23 MMOL/L (ref 20–31)
CREAT SERPL-MCNC: 0.66 MG/DL (ref 0.5–0.9)
DIFFERENTIAL TYPE: ABNORMAL
EOSINOPHILS RELATIVE PERCENT: 1 % (ref 1–4)
GFR AFRICAN AMERICAN: >60 ML/MIN
GFR NON-AFRICAN AMERICAN: >60 ML/MIN
GFR SERPL CREATININE-BSD FRML MDRD: ABNORMAL ML/MIN/{1.73_M2}
GFR SERPL CREATININE-BSD FRML MDRD: ABNORMAL ML/MIN/{1.73_M2}
GLUCOSE BLD-MCNC: 107 MG/DL (ref 65–105)
GLUCOSE BLD-MCNC: 113 MG/DL (ref 65–105)
GLUCOSE BLD-MCNC: 124 MG/DL (ref 65–105)
GLUCOSE BLD-MCNC: 149 MG/DL (ref 70–99)
HCT VFR BLD CALC: 34.3 % (ref 36.3–47.1)
HEMOGLOBIN: 10.9 G/DL (ref 11.9–15.1)
IMMATURE GRANULOCYTES: 2 %
LYMPHOCYTES # BLD: 29 % (ref 24–43)
MCH RBC QN AUTO: 30 PG (ref 25.2–33.5)
MCHC RBC AUTO-ENTMCNC: 31.8 G/DL (ref 28.4–34.8)
MCV RBC AUTO: 94.5 FL (ref 82.6–102.9)
MONOCYTES # BLD: 8 % (ref 3–12)
PARTIAL THROMBOPLASTIN TIME: 30.7 SEC (ref 21.3–31.3)
PDW BLD-RTO: 14.9 % (ref 11.8–14.4)
PLATELET # BLD: 340 K/UL (ref 138–453)
PLATELET ESTIMATE: ABNORMAL
PMV BLD AUTO: 9.3 FL (ref 8.1–13.5)
POTASSIUM SERPL-SCNC: 3.3 MMOL/L (ref 3.7–5.3)
POTASSIUM SERPL-SCNC: 4.2 MMOL/L (ref 3.7–5.3)
RBC # BLD: 3.63 M/UL (ref 3.95–5.11)
RBC # BLD: ABNORMAL 10*6/UL
SEG NEUTROPHILS: 60 % (ref 36–65)
SEGMENTED NEUTROPHILS ABSOLUTE COUNT: 5.4 K/UL (ref 1.5–8.1)
SODIUM BLD-SCNC: 134 MMOL/L (ref 135–144)
WBC # BLD: 9 K/UL (ref 3.5–11.3)
WBC # BLD: ABNORMAL 10*3/UL

## 2017-12-12 PROCEDURE — 6360000002 HC RX W HCPCS: Performed by: HOSPITALIST

## 2017-12-12 PROCEDURE — 36415 COLL VENOUS BLD VENIPUNCTURE: CPT

## 2017-12-12 PROCEDURE — 2500000003 HC RX 250 WO HCPCS: Performed by: STUDENT IN AN ORGANIZED HEALTH CARE EDUCATION/TRAINING PROGRAM

## 2017-12-12 PROCEDURE — 82947 ASSAY GLUCOSE BLOOD QUANT: CPT

## 2017-12-12 PROCEDURE — 94762 N-INVAS EAR/PLS OXIMTRY CONT: CPT

## 2017-12-12 PROCEDURE — 97530 THERAPEUTIC ACTIVITIES: CPT

## 2017-12-12 PROCEDURE — 6370000000 HC RX 637 (ALT 250 FOR IP): Performed by: INTERNAL MEDICINE

## 2017-12-12 PROCEDURE — 6370000000 HC RX 637 (ALT 250 FOR IP): Performed by: STUDENT IN AN ORGANIZED HEALTH CARE EDUCATION/TRAINING PROGRAM

## 2017-12-12 PROCEDURE — 80048 BASIC METABOLIC PNL TOTAL CA: CPT

## 2017-12-12 PROCEDURE — 70498 CT ANGIOGRAPHY NECK: CPT

## 2017-12-12 PROCEDURE — 85730 THROMBOPLASTIN TIME PARTIAL: CPT

## 2017-12-12 PROCEDURE — 97535 SELF CARE MNGMENT TRAINING: CPT

## 2017-12-12 PROCEDURE — 6360000004 HC RX CONTRAST MEDICATION: Performed by: PSYCHIATRY & NEUROLOGY

## 2017-12-12 PROCEDURE — 99239 HOSP IP/OBS DSCHRG MGMT >30: CPT | Performed by: INTERNAL MEDICINE

## 2017-12-12 PROCEDURE — 70496 CT ANGIOGRAPHY HEAD: CPT

## 2017-12-12 PROCEDURE — 2060000000 HC ICU INTERMEDIATE R&B

## 2017-12-12 PROCEDURE — 99233 SBSQ HOSP IP/OBS HIGH 50: CPT | Performed by: INTERNAL MEDICINE

## 2017-12-12 PROCEDURE — 6370000000 HC RX 637 (ALT 250 FOR IP): Performed by: HOSPITALIST

## 2017-12-12 PROCEDURE — 2580000003 HC RX 258: Performed by: STUDENT IN AN ORGANIZED HEALTH CARE EDUCATION/TRAINING PROGRAM

## 2017-12-12 PROCEDURE — 99232 SBSQ HOSP IP/OBS MODERATE 35: CPT | Performed by: PSYCHIATRY & NEUROLOGY

## 2017-12-12 PROCEDURE — 85025 COMPLETE CBC W/AUTO DIFF WBC: CPT

## 2017-12-12 PROCEDURE — 99232 SBSQ HOSP IP/OBS MODERATE 35: CPT | Performed by: INTERNAL MEDICINE

## 2017-12-12 PROCEDURE — 84132 ASSAY OF SERUM POTASSIUM: CPT

## 2017-12-12 PROCEDURE — 2580000003 HC RX 258: Performed by: INTERNAL MEDICINE

## 2017-12-12 PROCEDURE — 97110 THERAPEUTIC EXERCISES: CPT

## 2017-12-12 RX ORDER — POTASSIUM CHLORIDE 20MEQ/15ML
40 LIQUID (ML) ORAL ONCE
Status: COMPLETED | OUTPATIENT
Start: 2017-12-12 | End: 2017-12-12

## 2017-12-12 RX ORDER — POTASSIUM CHLORIDE 20 MEQ/1
40 TABLET, EXTENDED RELEASE ORAL ONCE
Status: DISCONTINUED | OUTPATIENT
Start: 2017-12-12 | End: 2017-12-12

## 2017-12-12 RX ORDER — ESOMEPRAZOLE SODIUM 40 MG/5ML
40 INJECTION INTRAVENOUS 2 TIMES DAILY
Status: DISCONTINUED | OUTPATIENT
Start: 2017-12-12 | End: 2017-12-14 | Stop reason: HOSPADM

## 2017-12-12 RX ORDER — 0.9 % SODIUM CHLORIDE 0.9 %
10 VIAL (ML) INJECTION 2 TIMES DAILY
Status: DISCONTINUED | OUTPATIENT
Start: 2017-12-12 | End: 2017-12-14 | Stop reason: HOSPADM

## 2017-12-12 RX ORDER — PANTOPRAZOLE SODIUM 40 MG/10ML
40 INJECTION, POWDER, LYOPHILIZED, FOR SOLUTION INTRAVENOUS 2 TIMES DAILY
Status: DISCONTINUED | OUTPATIENT
Start: 2017-12-12 | End: 2017-12-12

## 2017-12-12 RX ORDER — 0.9 % SODIUM CHLORIDE 0.9 %
10 VIAL (ML) INJECTION DAILY
Status: DISCONTINUED | OUTPATIENT
Start: 2017-12-12 | End: 2017-12-12

## 2017-12-12 RX ADMIN — PANTOPRAZOLE SODIUM 40 MG: 40 TABLET, DELAYED RELEASE ORAL at 08:07

## 2017-12-12 RX ADMIN — AMLODIPINE BESYLATE 10 MG: 10 TABLET ORAL at 08:08

## 2017-12-12 RX ADMIN — ONDANSETRON 4 MG: 2 INJECTION INTRAMUSCULAR; INTRAVENOUS at 02:47

## 2017-12-12 RX ADMIN — SUCRALFATE 1 G: 1 SUSPENSION ORAL at 17:13

## 2017-12-12 RX ADMIN — CARVEDILOL 25 MG: 25 TABLET, FILM COATED ORAL at 08:07

## 2017-12-12 RX ADMIN — POTASSIUM CHLORIDE 40 MEQ: 40 SOLUTION ORAL at 10:13

## 2017-12-12 RX ADMIN — ESOMEPRAZOLE SODIUM 40 MG: 40 INJECTION, POWDER, LYOPHILIZED, FOR SOLUTION INTRAVENOUS at 21:22

## 2017-12-12 RX ADMIN — RIVAROXABAN 20 MG: 20 TABLET, FILM COATED ORAL at 17:13

## 2017-12-12 RX ADMIN — HYDRALAZINE HYDROCHLORIDE 25 MG: 25 TABLET, FILM COATED ORAL at 13:41

## 2017-12-12 RX ADMIN — HYDRALAZINE HYDROCHLORIDE 25 MG: 25 TABLET, FILM COATED ORAL at 08:09

## 2017-12-12 RX ADMIN — CARVEDILOL 25 MG: 25 TABLET, FILM COATED ORAL at 17:13

## 2017-12-12 RX ADMIN — IOPAMIDOL 90 ML: 755 INJECTION, SOLUTION INTRAVENOUS at 16:19

## 2017-12-12 RX ADMIN — Medication 10 ML: at 21:20

## 2017-12-12 RX ADMIN — SUCRALFATE 1 G: 1 SUSPENSION ORAL at 10:14

## 2017-12-12 RX ADMIN — HYDRALAZINE HYDROCHLORIDE 25 MG: 25 TABLET, FILM COATED ORAL at 21:20

## 2017-12-12 RX ADMIN — SUCRALFATE 1 G: 1 SUSPENSION ORAL at 08:08

## 2017-12-12 RX ADMIN — Medication 10 ML: at 21:22

## 2017-12-12 RX ADMIN — SODIUM CHLORIDE: 9 INJECTION, SOLUTION INTRAVENOUS at 08:06

## 2017-12-12 RX ADMIN — FUROSEMIDE 20 MG: 10 INJECTION, SOLUTION INTRAVENOUS at 08:07

## 2017-12-12 NOTE — PROGRESS NOTES
_                Date:                           12/12/2017  Patient name:           Jack Sutherland  Date of admission:  11/18/2017 11:20 AM  MRN:   [id number]  YOB: 1950  PCP:                           No primary care provider on file. Subjective:     No acute events in the night  Got PEG tube yesterday not started on nutrition yet  No pain  Afebrile     Problem Lists:   Primary Problem:  Altered mental status   Current Problems:  Active Hospital Problems    Diagnosis Date Noted    Acute respiratory failure with hypoxia ([address] [J96.01]     Hypercoagulable state ([address] [D68.59]     Cerebrovascular accident (CVA) due to embolism of precerebral artery ([address] [I63.10]     Type 2 diabetes mellitus ([address] [E11.9] 12/05/2017    Recurrent major depressive disorder ([address] [F33.9]     Infected open wound [T14. 8XXA, L08.9]     Abrasions of multiple sites [T07. XXXA]     Neutrophilic leukocytosis [Q65.2]     Acute metabolic encephalopathy [E22.08]     Acute respiratory failure ([address] [J96.00]     Morbid obesity ([address] [E66.01] 11/19/2017    Encephalopathy [G93.40] 11/18/2017    Altered mental status [R41.82] 11/18/2017    Rhabdomyolysis [M62.82] 11/18/2017    Acute kidney injury ([address] [N17.9] 11/18/2017    Hyperkalemia [E87.5] 11/18/2017    Acute renal failure ([address] [N17.9]      PMH:  Past Medical History:   Diagnosis Date    Depression     Heart murmur     HTN (hypertension)       Allergies:    Allergies   Allergen Reactions    Pcn [Penicillins]         Medications:   Scheduled Meds:   sodium chloride flush  10 mL Intravenous 2 times per day    sodium chloride flush  10 mL Intravenous 2 times per day    pantoprazole  40 mg Oral BID AC    sucralfate  1 g Oral TID AC    rivaroxaban  20 mg Oral Daily    sodium chloride flush  10 mL Intravenous BID    furosemide  20 mg Intravenous Daily    insulin lispro  0-12 Units Subcutaneous Q6H    hydrALAZINE  25 mg Oral 3 times per day    carvedilol 0. 66     ANEMIA STUDIES  No results for input(s): LABIRON, TIBC, FERRITIN, TCHINTSN97, FOLATE, OCCULTBLD in the last 72 hours. PT/INR: No results for input(s): PROTIME, INR in the last 72 hours. APTT:   Recent Labs      12/10/17   0703  12/11/17   0755  12/12/17   0639   APTT  21.8  29.3  30.7     LFTS  Recent Labs      12/11/17   0755   ALKPHOS  80   ALT  23   AST  24   BILITOT  0.68   LABALBU  2.6*       Radiology      Assessment      Principal Problem:    Altered mental status  Active Problems:    Encephalopathy    Rhabdomyolysis    Acute kidney injury (HCC)    Hyperkalemia    Acute renal failure (HCC)    Morbid obesity (HCC)    Acute metabolic encephalopathy    Acute respiratory failure (HCC)    Abrasions of multiple sites    Neutrophilic leukocytosis    Infected open wound    Recurrent major depressive disorder (HCC)    Type 2 diabetes mellitus ([address]    Cerebrovascular accident (CVA) due to embolism of precerebral artery ([address]    Hypercoagulable state ([address]    Acute respiratory failure with hypoxia ([address]            Plan     1. The history from the patient is very limited. I reviewed records available and I reviewed the labs and imaging. There is no clear history of hypercoagulability or thrombosis in the past.  However due to lack of history we will do a hypercoagulable workup and we'll make further recommendations based on the results. Pending results. 2. Anticardiolipin Ab IgM positive - will repeat in 12 weeks  3. She will need long term anticoagulation regardless   4. We'll follow with you.        Electronically signed by Cori Nyhan, MD on 12/12/2017 at 1:41 PM    Attending Physician Statement   I have discussed the care of this patient, including pertinent history and exam findings, with the resident. I have seen and examined the patient and the key elements of all parts of the encounter have been performed by me.  I agree with the assessment, plan and orders as documented by the resident and with

## 2017-12-12 NOTE — PROGRESS NOTES
Physical Therapy  Facility/Department: [facility] NEURO  Daily Treatment Note  NAME: Parrish Silverman  : 1950  MRN: [id number]    Date of Service: 2017    Patient Diagnosis(es):   Patient Active Problem List    Diagnosis Date Noted    Acute [facility] with [facility]     Hypercoagulable state ([facility]     Cerebrovascular accident (CVA) due to embolism of precerebral artery ([facility]     Type 2 diabetes mellitus ([facility] 2017    Recurrent major depressive disorder ([facility]     Infected open wound     Abrasions of multiple sites     Neutrophilic leukocytosis     Acute metabolic encephalopathy     Acute respiratory failure (HCC)     Morbid obesity ([facility] 2017    Encephalopathy 2017    Altered mental status 2017    Rhabdomyolysis 2017    Acute kidney injury ([facility] 2017    Hyperkalemia 2017    Acute renal failure (HCC)     HTN (hypertension)     Heart murmur     Depression        Past Medical History:   Diagnosis Date    Depression     Heart murmur     HTN (hypertension)      Past Surgical History:   Procedure Laterality Date    OTHER SURGICAL HISTORY      bump under skin on buttocks    NM EGD PERCUTANEOUS PLACEMENT GASTROSTOMY TUBE N/A 2017    EGD ESOPHAGOGASTRODUODENOSCOPY PEG TUBE INSERTION performed by Haily Carvalho MD at [address] [place] [address] [id number]         Restrictions  Restrictions/Precautions  Restrictions/Precautions: Fall Risk, General Precautions  Required Braces or Orthoses?: No  Position Activity Restriction  Other position/activity restrictions: ceiling lift is needed d/t pt is obese and unable to assist in transfers  Subjective   General  Chart Reviewed: Yes  Response To Previous Treatment: Patient unable to report, no changes reported from family or staff  Family / Caregiver Present: No  General Comment  Comments: Pt nonverbal throughout other than voicing \"excuse me\" after sneezing. Pt not following most commands.  eyes

## 2017-12-12 NOTE — PROGRESS NOTES
no acute issues or concerns today. Has undergone placement of PEG, by GI, because of dysphagia. .Site clean. Afebrile  VS stable    She does not have any chills, cough, shortness of breath, chest pains or palpitations. No new issues reported. Neurology continuing work up for cerebral embolism and possible coagulopathy    Wounds clean and closing  WBC normal    Discussed with patient, RN. I have personally reviewed the past medical history, past surgical history, medications, social history, and family history, and I have updated the database accordingly. Past Medical History:     Past Medical History:   Diagnosis Date    Depression     Heart murmur     HTN (hypertension)        Past Surgical  History:     Past Surgical History:   Procedure Laterality Date    OTHER SURGICAL HISTORY      bump under skin on buttocks    TUBAL LIGATION         Medications:      sodium chloride flush  10 mL Intravenous 2 times per day    sodium chloride flush  10 mL Intravenous 2 times per day    pantoprazole  40 mg Oral BID AC    sucralfate  1 g Oral TID AC    rivaroxaban  20 mg Oral Daily    sodium chloride flush  10 mL Intravenous BID    furosemide  20 mg Intravenous Daily    insulin lispro  0-12 Units Subcutaneous Q6H    hydrALAZINE  25 mg Oral 3 times per day    carvedilol  25 mg Oral BID WC    cloNIDine  1 patch Transdermal Weekly    amLODIPine  10 mg Oral Daily    sodium chloride flush  10 mL Intravenous 2 times per day       Social History:     Social History     Social History    Marital status:      Spouse name: N/A    Number of children: N/A    Years of education: N/A     Occupational History    Not on file.      Social History Main Topics    Smoking status: Former Smoker    Smokeless tobacco: Never Used    Alcohol use Not on file    Drug use: Unknown    Sexual activity: Not on file     Other Topics Concern    Not on file     Social History Narrative    No narrative on file

## 2017-12-12 NOTE — PROGRESS NOTES
GI note  S/P PEG yesterday. PEG tube site unremarkable. Able to rotate PEG tube at insertion site. TF infusing. Patient to follow up for repeat EGD in 8 weeks. PEG tube note faxed to GI clinic. GI will sign off. Khloe Oakley, CNP

## 2017-12-12 NOTE — PROGRESS NOTES
EGD ESOPHAGOGASTRODUODENOSCOPY PEG TUBE INSERTION performed by Kathrin Hennessy MD at [facility]    TUBAL LIGATION             Medications:     sodium chloride flush  10 mL Intravenous 2 times per day    sodium chloride flush  10 mL Intravenous 2 times per day    pantoprazole  40 mg Oral BID AC    sucralfate  1 g Oral TID AC    rivaroxaban  20 mg Oral Daily    sodium chloride flush  10 mL Intravenous BID    furosemide  20 mg Intravenous Daily    insulin lispro  0-12 Units Subcutaneous Q6H    hydrALAZINE  25 mg Oral 3 times per day    carvedilol  25 mg Oral BID WC    cloNIDine  1 patch Transdermal Weekly    amLODIPine  10 mg Oral Daily    sodium chloride flush  10 mL Intravenous 2 times per day     PRN Meds include: sodium chloride flush, sodium chloride flush, labetalol, hydrALAZINE, glucose, dextrose, glucagon (rDNA), dextrose, neomycin-bacitracin-polymyxin, oxyCODONE-acetaminophen, fentanNYL, fentanNYL, sodium chloride flush, acetaminophen, magnesium hydroxide, ondansetron    Objective:   BP (!) 152/63   Pulse 67   Temp 97.6 °F (36.4 °C) (Oral)   Resp 16   Ht 5' 9\" (1.753 m)   Wt 255 lb 11.7 oz (116 kg)   SpO2 96%   BMI 37.77 kg/m²     Blood pressure range: Systolic (58SQX), ECP:221 , Min:104 , HBL:402   ; Diastolic (11WOW), ZAZ:61, Min:56, Max:92        NEUROLOGIC EXAMINATION  GENERAL  Appears comfortable and in no distress. HEENT  NC/ AT   cardiovascular  S1 and S2 heard; palpation of pulses: radial pulse    NECK  Supple and no bruits heard   MENTAL STATUS:  Awake, cannot assess orientation as she is non verbal at times. Follows few simple commands.    CRANIAL NERVES: II     -      PERRLA,   Visual fields intact to confrontation  III,IV,VI -  EOMs full, no afferent defect, no ORIN, no ptosis  V     -     Normal facial sensation   VII    -     Normal facial symmetry  VIII   -     Intact hearing   IX,X -     Symmetrical palate  XI    -     Symmetrical shoulder shrug  XII   -     Midline 12/3/2017  No acute intracranial abnormality. Right basal ganglia chronic lacunar infarcts. Moderate chronic microvascular ischemic changes of cerebral white matter. Ct Head Wo Contrast, 11/18/2017  No acute intracranial abnormality with chronic ischemic changes as described. Ct Chest Wo Contrast, 11/18/2017  1. Minor streaky right posterior basal lower lobes density suggestive of atelectasis or scarring. 2. ETT in place terminating above brian. 3. No acute process evident in the chest.     Ct Chest W Contrast, 12/8/2017  1. No lung nodules or masses. Minor posterior basal subsegmental atelectasis on the left. 2. Interval removal of ET tube. 3. There is an indeterminate 2.3 x 1.5 cm right adrenal nodule. Further characterization with dedicated adrenal mass protocol CT or MRI suggested. Ct Cervical Spine Wo Contrast, 11/18/2017  No acute abnormality of the cervical spine. Degenerative changes with exuberant bridging osteophytes. Ct Thoracic Spine Wo Contrast, 12/2/2017  No evidence of acute traumatic injury of the thoracic or lumbar spine. Nonspecific presacral edema. Severe multilevel thoracic spondylosis. Moderate multilevel lumbar spondylosis. Ct Lumbar Spine Wo Contrast, 12/2/2017  No evidence of acute traumatic injury of the thoracic or lumbar spine. Nonspecific presacral edema. Severe multilevel thoracic spondylosis. Moderate multilevel lumbar spondylosis. Mri Cervical Spine Wo Contrast,  12/6/2017  Disc and osteophytes result in stenosis of the thecal sac and narrowing of the neural foramina throughout the cervical region as discussed above. Mri Thoracic Spine Wo Contrast, 12/6/2017  The study is limited by motion artifact. The bony spinal canal overall is congenitally small. There is facet and ligamentum flavum hypertrophy in the mid thoracic region, which appears to cause stenosis of the thecal sac as discussed above.   A repeat study may be useful to better evaluate the with you.

## 2017-12-12 NOTE — PROGRESS NOTES
to assess(comment)  Bed mobility  Rolling to Left: Dependent/Total (+3)  Rolling to Right: Dependent/Total (+3)  Supine to Sit: Dependent/Total (+2)  Sit to Supine: Dependent/Total (+2)  Scooting: Dependent/Total (+2)  Comment: ceiling lift to reposition to the [facility] and pillow support provided to UB for support/protection. Attendance  Participation: Passive participation/ active at times    Assessment   Pt at this time is not safe to return home d/t needing assistance w/ transfers and care. Pt would benefit from continued OT therapy services to address Performance deficits / Impairments: Decreased safe awareness;Decreased functional mobility ; Decreased balance;Decreased ADL status; Decreased high-level IADLs;Decreased endurance, decreased coordination, decreased cognition, decreased strength. Prognosis: Poor  Patient Education: bed mob/ orientation- poor return  REQUIRES OT FOLLOW UP: Yes  Activity Tolerance  Activity Tolerance: Treatment limited secondary to decreased cognition;Patient limited by fatigue  Safety Devices  Safety Devices in place: Yes  Type of devices: All fall risk precautions in place; Bed alarm in place;Call light within reach; Left in bed;Nurse notified         Plan   Plan  Times per week: 2-3 x  Current Treatment Recommendations: Strengthening, Balance Training, Functional Mobility Training, Safety Education & Training, Self-Care / ADL, Equipment Evaluation, Education, & procurement, Patient/Caregiver Education & Training, Endurance Training    Short term goals  Time Frame for Short term goals: Pt will by discharge   Short term goal 1: demo supine<>sit transfer with max Ax1 and mod Ax1  Short term goal 2: demo static/dynamic sitting on EOB with min A for 12 min  Short term goal 3: demo simple ADL grooming task seated with setup and max A and increased time  Short term goal 4: demo activity tolerance of 30 min       Therapy Time   Individual Concurrent Group Co-treatment   Time In  11:13      PT Time Out  11:50         Minutes  Writer billed only 1 ADL d/t co-tx                 SHIMON Scott/KEVIN

## 2017-12-12 NOTE — PROGRESS NOTES
[address] [facility].    PROGRESS NOTE            Date:   12/12/2017  Patient name:  Randee Barnett  Date of admission:  11/18/2017 11:20 AM  MRN:   [id number]  YOB: 1950    CHIEF COMPLAINT     History Obtained From:  Patient and chart review. HISTORY OF PRESENT ILLNESS     The patient is a 69 y.o.  female who is an ICU transfer who initially presented with altered mentation after she was found down by her son. When brought here she was intubated due to acute respiratory failure and encephalopathy. EEG done showed moderate diffuse cerebral dysfunction. She has been having waxing and waning mentation and was extubated when it improved. She also developed SUSAN, lactic acidosis and leukocytosis all of which has resolved as of now. She has a H/O HTN and DM-2. She also has a C-collar in place, the management of which is being done by neurosurgery due to patient's mentation. She still has C-spine collar in place. MRI spine pending for clearance. She is still in altered mentation , on tube feeds and responds barely to sternal rub    INTERVAL HISTORY :    - patient seen and examined at the bedside  - had 2 episodes of vomiting last night  - PEG tube placed yesterday  - starting tube feeds  - patient responding to verbal stimuli. PAST MEDICAL HISTORY       has a past medical history of Depression; Heart murmur; and HTN (hypertension). PAST SURGICAL HISTORY       has a past surgical history that includes Tubal ligation; other surgical history; and egd percutaneous placement gastrostomy tube (N/A, 12/11/2017). HOME MEDICATIONS        Prior to Admission medications    Not on File       ALLERGIES      Pcn [penicillins]    SOCIAL HISTORY       reports that she has quit smoking.  She has never used smokeless tobacco.     FAMILY HISTORY      family history includes Asthma in her brother; Breast Cancer in her mother; Cancer in her PROT  6.7   BILITOT  0.68   LABALBU  2.6*     Pancreatic functions:No results for input(s): LACTA, AMYLASE in the last 72 hours. S. Lactic Acid: No results for input(s): LACTA in the last 72 hours. Cardiac enzymes:No results for input(s): CKTOTAL, CKMB, CKMBINDEX, TROPONINI in the last 72 hours. BNP:No results for input(s): BNP in the last 72 hours. Lipid profile: No results for input(s): CHOL, TRIG, HDL, LDLCALC in the last 72 hours. Invalid input(s): LDL  Blood Gases: No results found for: PH, PCO2, PO2, HCO3, O2SAT  Thyroid functions:   Lab Results   Component Value Date    TSH 2.20 11/18/2017        Imaging/Diagonstics:      CXR: No acute cardiopulmonary findings. ASSESSMENT     Principal Problem:    Altered mental status  Active Problems:    Encephalopathy    Rhabdomyolysis    Acute kidney injury (HCC)    Hyperkalemia    Acute renal failure (HCC)    Morbid obesity (HCC)    Acute metabolic encephalopathy    Acute respiratory failure (HCC)    Abrasions of multiple sites    Neutrophilic leukocytosis    Infected open wound    Recurrent major depressive disorder (HCC)    Type 2 diabetes mellitus ([address]    Cerebrovascular accident (CVA) due to embolism of precerebral artery ([address]    Hypercoagulable state ([address]    Acute respiratory failure with hypoxia ([address]      PLAN      1. AMS: intemittent   - continue to monitor  - CTH : negative ; right basal ganglia chronic lacunar infarcts (12/03)  - MRI brain : Numerous acute ischemic infarcts in the cerebral white matter, right  thalamus, and left alex with the distribution in multiple vascular territories . - Xeralto 20 mg OD   - ARTURO :  LVEF :55% , no thrombus or valvuar vegetation     2. Hypertension :   - labetalol 10 mg PRN till PEG tube can be inserted as she is not able to take any of her BP medications. - monitor BP  - hydralazine 25 mg TID  - coreg 25 mg BID  - norvasc 10 mg OD   - clonidine patch  - Lasix 20 mg OD      3.  Diabetes mellitus :

## 2017-12-13 LAB
ABSOLUTE EOS #: 0.15 K/UL (ref 0–0.44)
ABSOLUTE IMMATURE GRANULOCYTE: 0.22 K/UL (ref 0–0.3)
ABSOLUTE LYMPH #: 2.96 K/UL (ref 1.1–3.7)
ABSOLUTE MONO #: 0.85 K/UL (ref 0.1–1.2)
ALBUMIN SERPL-MCNC: 3 G/DL (ref 3.5–5.2)
ALBUMIN/GLOBULIN RATIO: 0.8 (ref 1–2.5)
ALP BLD-CCNC: 84 U/L (ref 35–104)
ALT SERPL-CCNC: 24 U/L (ref 5–33)
ANION GAP SERPL CALCULATED.3IONS-SCNC: 12 MMOL/L (ref 9–17)
AST SERPL-CCNC: 22 U/L
BASOPHILS # BLD: 0 % (ref 0–2)
BASOPHILS ABSOLUTE: <0.03 K/UL (ref 0–0.2)
BILIRUB SERPL-MCNC: 0.37 MG/DL (ref 0.3–1.2)
BUN BLDV-MCNC: 13 MG/DL (ref 8–23)
BUN/CREAT BLD: ABNORMAL (ref 9–20)
CALCIUM SERPL-MCNC: 8.3 MG/DL (ref 8.6–10.4)
CHLORIDE BLD-SCNC: 102 MMOL/L (ref 98–107)
CO2: 24 MMOL/L (ref 20–31)
CREAT SERPL-MCNC: 0.82 MG/DL (ref 0.5–0.9)
DIFFERENTIAL TYPE: ABNORMAL
EOSINOPHILS RELATIVE PERCENT: 2 % (ref 1–4)
GFR AFRICAN AMERICAN: >60 ML/MIN
GFR NON-AFRICAN AMERICAN: >60 ML/MIN
GFR SERPL CREATININE-BSD FRML MDRD: ABNORMAL ML/MIN/{1.73_M2}
GFR SERPL CREATININE-BSD FRML MDRD: ABNORMAL ML/MIN/{1.73_M2}
GLUCOSE BLD-MCNC: 102 MG/DL (ref 65–105)
GLUCOSE BLD-MCNC: 111 MG/DL (ref 65–105)
GLUCOSE BLD-MCNC: 127 MG/DL (ref 65–105)
GLUCOSE BLD-MCNC: 131 MG/DL (ref 65–105)
GLUCOSE BLD-MCNC: 149 MG/DL (ref 70–99)
HCT VFR BLD CALC: 32.7 % (ref 36.3–47.1)
HEMOGLOBIN: 10.6 G/DL (ref 11.9–15.1)
IMMATURE GRANULOCYTES: 3 %
LYMPHOCYTES # BLD: 35 % (ref 24–43)
MCH RBC QN AUTO: 29.8 PG (ref 25.2–33.5)
MCHC RBC AUTO-ENTMCNC: 32.4 G/DL (ref 28.4–34.8)
MCV RBC AUTO: 91.9 FL (ref 82.6–102.9)
MONOCYTES # BLD: 10 % (ref 3–12)
PARTIAL THROMBOPLASTIN TIME: 25.1 SEC (ref 21.3–31.3)
PDW BLD-RTO: 15.8 % (ref 11.8–14.4)
PLATELET # BLD: 278 K/UL (ref 138–453)
PLATELET ESTIMATE: ABNORMAL
PMV BLD AUTO: 9.7 FL (ref 8.1–13.5)
POTASSIUM SERPL-SCNC: 3.9 MMOL/L (ref 3.7–5.3)
RBC # BLD: 3.56 M/UL (ref 3.95–5.11)
RBC # BLD: ABNORMAL 10*6/UL
SEG NEUTROPHILS: 50 % (ref 36–65)
SEGMENTED NEUTROPHILS ABSOLUTE COUNT: 4.38 K/UL (ref 1.5–8.1)
SODIUM BLD-SCNC: 138 MMOL/L (ref 135–144)
TOTAL PROTEIN: 6.8 G/DL (ref 6.4–8.3)
WBC # BLD: 8.6 K/UL (ref 3.5–11.3)
WBC # BLD: ABNORMAL 10*3/UL

## 2017-12-13 PROCEDURE — 2500000003 HC RX 250 WO HCPCS: Performed by: STUDENT IN AN ORGANIZED HEALTH CARE EDUCATION/TRAINING PROGRAM

## 2017-12-13 PROCEDURE — 97530 THERAPEUTIC ACTIVITIES: CPT

## 2017-12-13 PROCEDURE — 6370000000 HC RX 637 (ALT 250 FOR IP): Performed by: INTERNAL MEDICINE

## 2017-12-13 PROCEDURE — 6370000000 HC RX 637 (ALT 250 FOR IP): Performed by: HOSPITALIST

## 2017-12-13 PROCEDURE — 99232 SBSQ HOSP IP/OBS MODERATE 35: CPT | Performed by: INTERNAL MEDICINE

## 2017-12-13 PROCEDURE — 80053 COMPREHEN METABOLIC PANEL: CPT

## 2017-12-13 PROCEDURE — 2580000003 HC RX 258: Performed by: INTERNAL MEDICINE

## 2017-12-13 PROCEDURE — 6370000000 HC RX 637 (ALT 250 FOR IP): Performed by: STUDENT IN AN ORGANIZED HEALTH CARE EDUCATION/TRAINING PROGRAM

## 2017-12-13 PROCEDURE — 99211 OFF/OP EST MAY X REQ PHY/QHP: CPT

## 2017-12-13 PROCEDURE — 6360000002 HC RX W HCPCS: Performed by: HOSPITALIST

## 2017-12-13 PROCEDURE — 94762 N-INVAS EAR/PLS OXIMTRY CONT: CPT

## 2017-12-13 PROCEDURE — 2580000003 HC RX 258: Performed by: STUDENT IN AN ORGANIZED HEALTH CARE EDUCATION/TRAINING PROGRAM

## 2017-12-13 PROCEDURE — G9160 LANG COMP GOAL STATUS: HCPCS

## 2017-12-13 PROCEDURE — 2060000000 HC ICU INTERMEDIATE R&B

## 2017-12-13 PROCEDURE — 82947 ASSAY GLUCOSE BLOOD QUANT: CPT

## 2017-12-13 PROCEDURE — 99233 SBSQ HOSP IP/OBS HIGH 50: CPT | Performed by: INTERNAL MEDICINE

## 2017-12-13 PROCEDURE — 99232 SBSQ HOSP IP/OBS MODERATE 35: CPT | Performed by: PSYCHIATRY & NEUROLOGY

## 2017-12-13 PROCEDURE — G9159 LANG COMP CURRENT STATUS: HCPCS

## 2017-12-13 PROCEDURE — 97535 SELF CARE MNGMENT TRAINING: CPT

## 2017-12-13 PROCEDURE — 92523 SPEECH SOUND LANG COMPREHEN: CPT

## 2017-12-13 PROCEDURE — 85025 COMPLETE CBC W/AUTO DIFF WBC: CPT

## 2017-12-13 PROCEDURE — 85730 THROMBOPLASTIN TIME PARTIAL: CPT

## 2017-12-13 PROCEDURE — 97110 THERAPEUTIC EXERCISES: CPT

## 2017-12-13 RX ORDER — HYDRALAZINE HYDROCHLORIDE 25 MG/1
25 TABLET, FILM COATED ORAL EVERY 8 HOURS SCHEDULED
Qty: 90 TABLET | Refills: 3 | Status: SHIPPED | OUTPATIENT
Start: 2017-12-13

## 2017-12-13 RX ORDER — BACITRACIN, NEOMYCIN, POLYMYXIN B 400; 3.5; 5 [USP'U]/G; MG/G; [USP'U]/G
OINTMENT TOPICAL
Qty: 5 TUBE | Refills: 0 | Status: SHIPPED | OUTPATIENT
Start: 2017-12-13 | End: 2017-12-23

## 2017-12-13 RX ORDER — AMLODIPINE BESYLATE 10 MG/1
10 TABLET ORAL DAILY
Qty: 30 TABLET | Refills: 3 | Status: SHIPPED | OUTPATIENT
Start: 2017-12-14

## 2017-12-13 RX ORDER — CARVEDILOL 25 MG/1
25 TABLET ORAL 2 TIMES DAILY WITH MEALS
Qty: 60 TABLET | Refills: 3 | Status: SHIPPED | OUTPATIENT
Start: 2017-12-13

## 2017-12-13 RX ORDER — PANTOPRAZOLE SODIUM 40 MG/1
40 TABLET, DELAYED RELEASE ORAL 2 TIMES DAILY
Qty: 30 TABLET | Refills: 3 | Status: SHIPPED | OUTPATIENT
Start: 2017-12-13

## 2017-12-13 RX ORDER — FUROSEMIDE 20 MG/1
20 TABLET ORAL DAILY
Qty: 30 TABLET | Refills: 0 | Status: ON HOLD | OUTPATIENT
Start: 2017-12-13 | End: 2020-08-30 | Stop reason: HOSPADM

## 2017-12-13 RX ORDER — SUCRALFATE ORAL 1 G/10ML
1 SUSPENSION ORAL 2 TIMES DAILY
Qty: 280 ML | Refills: 0 | Status: SHIPPED | OUTPATIENT
Start: 2017-12-13 | End: 2018-01-23 | Stop reason: ALTCHOICE

## 2017-12-13 RX ADMIN — SUCRALFATE 1 G: 1 SUSPENSION ORAL at 10:36

## 2017-12-13 RX ADMIN — ESOMEPRAZOLE SODIUM 40 MG: 40 INJECTION, POWDER, LYOPHILIZED, FOR SOLUTION INTRAVENOUS at 20:44

## 2017-12-13 RX ADMIN — INSULIN LISPRO 2 UNITS: 100 INJECTION, SOLUTION INTRAVENOUS; SUBCUTANEOUS at 08:14

## 2017-12-13 RX ADMIN — ESOMEPRAZOLE SODIUM 40 MG: 40 INJECTION, POWDER, LYOPHILIZED, FOR SOLUTION INTRAVENOUS at 08:16

## 2017-12-13 RX ADMIN — Medication 10 ML: at 08:16

## 2017-12-13 RX ADMIN — AMLODIPINE BESYLATE 10 MG: 10 TABLET ORAL at 08:15

## 2017-12-13 RX ADMIN — Medication 10 ML: at 20:44

## 2017-12-13 RX ADMIN — FUROSEMIDE 20 MG: 10 INJECTION, SOLUTION INTRAVENOUS at 08:15

## 2017-12-13 RX ADMIN — HYDRALAZINE HYDROCHLORIDE 25 MG: 25 TABLET, FILM COATED ORAL at 06:01

## 2017-12-13 RX ADMIN — CARVEDILOL 25 MG: 25 TABLET, FILM COATED ORAL at 08:15

## 2017-12-13 RX ADMIN — Medication 10 ML: at 20:46

## 2017-12-13 RX ADMIN — RIVAROXABAN 20 MG: 20 TABLET, FILM COATED ORAL at 17:23

## 2017-12-13 RX ADMIN — HYDRALAZINE HYDROCHLORIDE 25 MG: 25 TABLET, FILM COATED ORAL at 23:17

## 2017-12-13 RX ADMIN — SUCRALFATE 1 G: 1 SUSPENSION ORAL at 06:01

## 2017-12-13 RX ADMIN — HYDRALAZINE HYDROCHLORIDE 25 MG: 25 TABLET, FILM COATED ORAL at 15:04

## 2017-12-13 RX ADMIN — SUCRALFATE 1 G: 1 SUSPENSION ORAL at 15:04

## 2017-12-13 NOTE — PROGRESS NOTES
8869  12/12/17   1431  12/13/17   0720   NA  132*  134*   --   138   K  3.8  3.3*  4.2  3.9   CL  97*  98   --   102   CO2  21  23   --   24   BUN  11  9   --   13   CREATININE  0.55  0.66   --   0.82     ANEMIA STUDIES  No results for input(s): LABIRON, TIBC, FERRITIN, GRISZFTP92, FOLATE, OCCULTBLD in the last 72 hours. PT/INR: No results for input(s): PROTIME, INR in the last 72 hours. APTT:   Recent Labs      12/11/17 0755  12/12/17   0639  12/13/17   0720   APTT  29.3  30.7  25.1     LFTS  Recent Labs      12/11/17   0755  12/13/17   0720   ALKPHOS  80  84   ALT  23  24   AST  24  22   BILITOT  0.68  0.37   LABALBU  2.6*  3.0*       Radiology      Assessment      Principal Problem:    Altered mental status  Active Problems:    Encephalopathy    Rhabdomyolysis    Acute kidney injury (HCC)    Hyperkalemia    Acute renal failure (HCC)    Morbid obesity (HCC)    Acute metabolic encephalopathy    Acute respiratory failure (HCC)    Abrasions of multiple sites    Neutrophilic leukocytosis    Infected open wound    Recurrent major depressive disorder (HCC)    Type 2 diabetes mellitus ([address]    Cerebrovascular accident (CVA) due to embolism of precerebral artery ([address]    Hypercoagulable state ([address]    Acute respiratory failure with hypoxia ([address]            Plan     1. The history from the patient is very limited. I reviewed records available and I reviewed the labs and imaging. There is no clear history of hypercoagulability or thrombosis in the past.  However due to lack of history we will do a hypercoagulable workup and we'll make further recommendations based on the results. Pending results. 2. Anticardiolipin Ab IgM positive - will repeat in 12 weeks  3. She will need long term anticoagulation regardless   4.  We'll follow with you.        Electronically signed by Clifford Barrientos MD on 12/13/2017 at 11:23 AM    Attending Physician Statement   I have discussed the care of this patient, including pertinent history and

## 2017-12-13 NOTE — PROGRESS NOTES
thoracic region, which appears to cause stenosis of the thecal sac as discussed above. A repeat study may be useful to better evaluate the spinal canal contents. There is questionable signal abnormality in the thoracic spinal cord at the T6-7 level, which may represent myelomalacia secondary to stenosis. Again, motion artifact limits the exam.     Ct Abdomen Pelvis W Iv Contrast Additional Contrast? None,  12/8/2017  1. Incompletely assessed hepatic hypodensities with two in the left lobe measuring 5 mm and 10 mm and one in the right lobe measuring 10 mm. Additionally there is an indeterminate 2.3 cm right adrenal nodule and diffuse nodular fullness of the left adrenal gland. Consider contrast-enhanced MRI for further evaluation. Xr Chest Portable, 11/23/2017  Stable chest.    Us Retroperitoneal Complete,11/19/2017  Limited visualization of the kidneys due to bowel gas and body habitus. Kidneys to the extent imaged are unremarkable. Bladder could not be evaluated due to indwelling Noel catheter. Mri Brain (w/wo) (12/5/2017):  Numerous acute ischemic infarcts in the cerebral white matter, right thalamus, and left alex with the distribution in multiple vascular territories suggesting a central embolic etiology. 2. No intracranial hemorrhage or mass effect. 3. Multifocal remote lacunar infarcts and moderate chronic white matter microvascular ischemic changes. 4. Trace bilateral mastoid effusions. Hyper coag workup (12/8/17): Anticardiolipin antibody Ig G nl at 2.8 and ig M elevated at 41.9 (<21)  DILAN (11/19/17): Negative    CTA Head and Neck, 12/13/2017  No occlusion of Intracranial vasculature  Beaded appearance of distal cervical internal and vertebral arteries  Chronic white matter Ischemic white matter disease. Chronic bilateral lacunar infarcts. 16 mm left thyroid nodule.       Impression and Plan: Ms. Nikita Gerber is a 69 y.o. female with   Encephalopathy; cerebral embolism; ARTURO with no

## 2017-12-13 NOTE — PROGRESS NOTES
throughout session for pt to keep eyes open and participate in therapy. Pt attempted supine/sit transfer to EOB max A x2 needing to return to supine in bed d/t pt staying straight and not bending at waist to complete the seated position. Second attempt to sit EOB max A x2 with pt completing transfer. Pt able to sit EOB max A x10 minutes with pt needing min A x1 minute out of the 10 minutes. Writer instructed pt to grasp wash cloth and wash face with verbal cues needed. Pt unable to complete task, only able to bring wash cloth up to nose despite verbal and tactile cues. Pt returned to supine in bed for pericare and brief mgmt. See above for LOF for all tasks. Pt encouraged to use BUE to assist during session with poor return. Pt retired to supine in bed with call light within reach.     Jerome SHIMON/KEVIN

## 2017-12-13 NOTE — PROGRESS NOTES
resting comfortably and was not in acute distress. PEG feedings were started yesterday; she is tolerating it well. Site remains clean  No nausea/vomiting. Afebrile overnight  VS remain stable    She denied any pain, SOB or discomfort. Wounds clean and closing  WBC normal    CTA head and neck yesterday revealed 16 mm thyroid nodule. In light of the patient's clinical presentation, further evaluation of the nodule may be warranted    Discussed with patient, RN. I have personally reviewed the past medical history, past surgical history, medications, social history, and family history, and I have updated the database accordingly. Past Medical History:     Past Medical History:   Diagnosis Date    Depression     Heart murmur     HTN (hypertension)        Past Surgical  History:     Past Surgical History:   Procedure Laterality Date    OTHER SURGICAL HISTORY      bump under skin on buttocks    TUBAL LIGATION         Medications:      sodium chloride (PF)  10 mL Intravenous BID    And    esomeprazole  40 mg Intravenous BID    sodium chloride flush  10 mL Intravenous 2 times per day    sodium chloride flush  10 mL Intravenous 2 times per day    sucralfate  1 g Oral TID AC    rivaroxaban  20 mg Oral Daily    sodium chloride flush  10 mL Intravenous BID    furosemide  20 mg Intravenous Daily    insulin lispro  0-12 Units Subcutaneous Q6H    hydrALAZINE  25 mg Oral 3 times per day    carvedilol  25 mg Oral BID WC    cloNIDine  1 patch Transdermal Weekly    amLODIPine  10 mg Oral Daily    sodium chloride flush  10 mL Intravenous 2 times per day       Social History:     Social History     Social History    Marital status:      Spouse name: N/A    Number of children: N/A    Years of education: N/A     Occupational History    Not on file.      Social History Main Topics    Smoking status: Former Smoker    Smokeless tobacco: Never Used    Alcohol use Not on file    Drug use: Unknown

## 2017-12-13 NOTE — PROGRESS NOTES
3.56 12/13/2017    HGB 10.6 12/13/2017     CMP:  Albumin:    Lab Results   Component Value Date    LABALBU 3.0 12/13/2017     PT/INR:  No results found for: PROTIME, INR  HgBA1c:    Lab Results   Component Value Date    LABA1C 7.5 11/19/2017     PTT: No components found for: LABPTT      Assessment:     Patient Active Problem List   Diagnosis    HTN (hypertension)    Heart murmur    Depression    Encephalopathy    Altered mental status    Rhabdomyolysis    Acute kidney injury ([address]    Hyperkalemia    Acute renal failure (HCC)    Morbid obesity (HCC)    Acute metabolic encephalopathy    Acute respiratory failure ([address]    Abrasions of multiple sites    Neutrophilic leukocytosis    Infected open wound    Recurrent major depressive disorder ([address]    Type 2 diabetes mellitus ([address]    Cerebrovascular accident (CVA) due to embolism of precerebral artery ([address]    Hypercoagulable state ([address]    Acute respiratory failure with hypoxia ([address]       Measurements:     12/13/17 1210   Wound 11/18/17 Other (Comment) Thigh Lateral;Right   Date First Assessed/Time First Assessed: 11/18/17 1700   Wound Type: (c) Other (Comment)  Wound Event: Trauma  Location: Thigh  Wound Location Orientation: Lateral;Right  Pre-existing: Yes  Photo Taken: Yes   Wound Type Wound   Wound Pressure Stage  3   Dressing Status Changed   Dressing Changed Changed/New   Dressing/Treatment Dry dressing;Moist to moist   Wound Cleansed Rinsed/Irrigated with saline   Dressing Change Due 12/13/17   Wound Length (cm) 2 cm   Wound Width (cm) 2.5 cm   Wound Depth (cm)  2   Calculated Wound Size (cm^2) (l*w) 5 cm^2   Wound Assessment Clean;Bleeding;Pink;Slough; Yellow   Drainage Amount Small   Drainage Description Purulent;Sanguinous   Odor None   Jigna-wound Assessment Clean;Dry   Pink%Wound Bed 95   Yellow%Wound Bed 5     Chest and low abdominal Mepilex Transfer Ag dressings changed 12/12/17.   Wounds beneath clean, pink, moist. Areas of estrada crust

## 2017-12-13 NOTE — PROGRESS NOTES
Patient/family involved in developing goals and treatment plan: yes    Subjective:    General  Chart Reviewed: Yes     Objective:     Oral/Motor  Oral Motor: Exceptions to WFL (decreased ROM and strength)    Auditory Comprehension  Comprehension: Exceptions  Yes/No Questions: Mild  Basic Questions: Moderate  Complex Questions: Severe  One Step Basic Commands: Mild  Two Step Basic Commands: Severe  Multistep Basic Commands: Severe  Conversation: Moderate         Expression  Primary Mode of Expression: Verbal    Verbal Expression  Verbal Expression: Exceptions to functional limits (oriented to self and place, not president or time)  Automatic Speech: Moderate  Confrontation: Moderate  Conversation: Moderate         Motor Speech  Motor Speech: Within Functional Limits  Intelligibility: Mild      Education:  Patient Education: yes  Patient Education Response: No evidence of learning    G-Code:  SLP G-Codes  Functional Limitations: Spoken language comprehension  Swallow Current Status (): 100 percent impaired, limited or restricted  Swallow Goal Status (): At least 80 percent but less than 100 percent impaired, limited or restricted  Spoken Language Comprehension Current Status (): At least 60 percent but less than 80 percent impaired, limited or restricted  Spoken Language Comprehension Goal Status ():  At least 40 percent but less than 60 percent impaired, limited or restricted         Therapy Time:   Individual Concurrent Group Co-treatment   Time In [facility] 63, SLP  12/13/2017 1:18 PM

## 2017-12-13 NOTE — PROGRESS NOTES
Max A x2 to scoot up in bed with pt able to assist today in lifting head. Pt demonstrates improved fuctional mobility in UE during elbow flexion ROM and  strength. R UE strength>L. Assessment   Assessment: Pt sat EOB x 10min with Max A while completing UE/LE tasks. Pt demonstrates decreased sitting balance control and leans posterior and R. Pt completed bed mobility with minimal ability to assist in rolling. Pt improved to following more commands with increased alertness and vocal communication throughout. Pt can continue to benefit from PT for improved safety in functional mobility tasks. Patient Education: importance of sitting EOB and joint mobility   REQUIRES PT FOLLOW UP: Yes  Activity Tolerance  Activity Tolerance: Patient limited by cognitive status; Patient limited by pain; Patient limited by fatigue;Patient limited by endurance     Goals  Short term goals  Time Frame for Short term goals: 15  Short term goal 1: Pt to improve B UE/LE AROM to [facility]  Short term goal 2: Pt to follow more commands and increase alertness throughout PT treatments   Short term goal 3: Pt to improve endurance to tolerate 30 minute PT treatment   Short term goal 4: Pt to improve sitting balance to good with SBA dangling EOB x 8 min  Short term goal 5: Pt to complete sit <>stand transfer with least restrictive device and Mod A x2   Patient Goals   Patient goals : Unable to state    Plan    Plan  Times per week: 5x/week  Safety Devices  Type of devices: All fall risk precautions in place, Nurse notified, Left in bed, Patient at risk for falls, Call light within reach  Restraints  Initially in place: No     Therapy Time   Individual Concurrent Group Co-treatment   Time In 1410         Time Out 1455         Minutes 45         Timed Code Treatment Minutes: [address]  This treatment/evaluation completed by signing SPT. Signing PT agrees with treatment and documentation.

## 2017-12-14 VITALS
WEIGHT: 255.73 LBS | RESPIRATION RATE: 14 BRPM | SYSTOLIC BLOOD PRESSURE: 109 MMHG | HEIGHT: 69 IN | HEART RATE: 58 BPM | DIASTOLIC BLOOD PRESSURE: 48 MMHG | TEMPERATURE: 98.6 F | OXYGEN SATURATION: 98 % | BODY MASS INDEX: 37.88 KG/M2

## 2017-12-14 PROBLEM — N17.9 ACUTE KIDNEY INJURY (HCC): Status: RESOLVED | Noted: 2017-11-18 | Resolved: 2017-12-14

## 2017-12-14 PROBLEM — E87.5 HYPERKALEMIA: Status: RESOLVED | Noted: 2017-11-18 | Resolved: 2017-12-14

## 2017-12-14 PROBLEM — M62.82 RHABDOMYOLYSIS: Status: RESOLVED | Noted: 2017-11-18 | Resolved: 2017-12-14

## 2017-12-14 LAB
ABSOLUTE EOS #: 0.17 K/UL (ref 0–0.44)
ABSOLUTE IMMATURE GRANULOCYTE: 0.34 K/UL (ref 0–0.3)
ABSOLUTE LYMPH #: 2.94 K/UL (ref 1.1–3.7)
ABSOLUTE MONO #: 0.71 K/UL (ref 0.1–1.2)
ANION GAP SERPL CALCULATED.3IONS-SCNC: 14 MMOL/L (ref 9–17)
BASOPHILS # BLD: 0 % (ref 0–2)
BASOPHILS ABSOLUTE: 0.03 K/UL (ref 0–0.2)
BUN BLDV-MCNC: 16 MG/DL (ref 8–23)
BUN/CREAT BLD: ABNORMAL (ref 9–20)
CALCIUM SERPL-MCNC: 7.9 MG/DL (ref 8.6–10.4)
CHLORIDE BLD-SCNC: 101 MMOL/L (ref 98–107)
CO2: 22 MMOL/L (ref 20–31)
CREAT SERPL-MCNC: 0.67 MG/DL (ref 0.5–0.9)
DIFFERENTIAL TYPE: ABNORMAL
EOSINOPHILS RELATIVE PERCENT: 2 % (ref 1–4)
GFR AFRICAN AMERICAN: >60 ML/MIN
GFR NON-AFRICAN AMERICAN: >60 ML/MIN
GFR SERPL CREATININE-BSD FRML MDRD: ABNORMAL ML/MIN/{1.73_M2}
GFR SERPL CREATININE-BSD FRML MDRD: ABNORMAL ML/MIN/{1.73_M2}
GLUCOSE BLD-MCNC: 134 MG/DL (ref 65–105)
GLUCOSE BLD-MCNC: 135 MG/DL (ref 65–105)
GLUCOSE BLD-MCNC: 137 MG/DL (ref 70–99)
GLUCOSE BLD-MCNC: 138 MG/DL (ref 65–105)
HCT VFR BLD CALC: 29.2 % (ref 36.3–47.1)
HEMOGLOBIN: 9.2 G/DL (ref 11.9–15.1)
IMMATURE GRANULOCYTES: 4 %
LYMPHOCYTES # BLD: 37 % (ref 24–43)
MCH RBC QN AUTO: 29.5 PG (ref 25.2–33.5)
MCHC RBC AUTO-ENTMCNC: 31.5 G/DL (ref 28.4–34.8)
MCV RBC AUTO: 93.6 FL (ref 82.6–102.9)
MONOCYTES # BLD: 9 % (ref 3–12)
PARTIAL THROMBOPLASTIN TIME: 18.1 SEC (ref 21.3–31.3)
PDW BLD-RTO: 16 % (ref 11.8–14.4)
PLATELET # BLD: 363 K/UL (ref 138–453)
PLATELET ESTIMATE: ABNORMAL
PMV BLD AUTO: 9.8 FL (ref 8.1–13.5)
POTASSIUM SERPL-SCNC: 4.3 MMOL/L (ref 3.7–5.3)
RBC # BLD: 3.12 M/UL (ref 3.95–5.11)
RBC # BLD: ABNORMAL 10*6/UL
SEG NEUTROPHILS: 48 % (ref 36–65)
SEGMENTED NEUTROPHILS ABSOLUTE COUNT: 3.84 K/UL (ref 1.5–8.1)
SODIUM BLD-SCNC: 137 MMOL/L (ref 135–144)
WBC # BLD: 8 K/UL (ref 3.5–11.3)
WBC # BLD: ABNORMAL 10*3/UL

## 2017-12-14 PROCEDURE — 94762 N-INVAS EAR/PLS OXIMTRY CONT: CPT

## 2017-12-14 PROCEDURE — 99232 SBSQ HOSP IP/OBS MODERATE 35: CPT | Performed by: NURSE PRACTITIONER

## 2017-12-14 PROCEDURE — 99232 SBSQ HOSP IP/OBS MODERATE 35: CPT | Performed by: INTERNAL MEDICINE

## 2017-12-14 PROCEDURE — 85025 COMPLETE CBC W/AUTO DIFF WBC: CPT

## 2017-12-14 PROCEDURE — 36415 COLL VENOUS BLD VENIPUNCTURE: CPT

## 2017-12-14 PROCEDURE — 80048 BASIC METABOLIC PNL TOTAL CA: CPT

## 2017-12-14 PROCEDURE — 6370000000 HC RX 637 (ALT 250 FOR IP): Performed by: HOSPITALIST

## 2017-12-14 PROCEDURE — 6370000000 HC RX 637 (ALT 250 FOR IP): Performed by: INTERNAL MEDICINE

## 2017-12-14 PROCEDURE — 82947 ASSAY GLUCOSE BLOOD QUANT: CPT

## 2017-12-14 PROCEDURE — 85730 THROMBOPLASTIN TIME PARTIAL: CPT

## 2017-12-14 PROCEDURE — 2580000003 HC RX 258: Performed by: INTERNAL MEDICINE

## 2017-12-14 PROCEDURE — 99239 HOSP IP/OBS DSCHRG MGMT >30: CPT | Performed by: INTERNAL MEDICINE

## 2017-12-14 PROCEDURE — 2580000003 HC RX 258: Performed by: STUDENT IN AN ORGANIZED HEALTH CARE EDUCATION/TRAINING PROGRAM

## 2017-12-14 PROCEDURE — 2500000003 HC RX 250 WO HCPCS: Performed by: STUDENT IN AN ORGANIZED HEALTH CARE EDUCATION/TRAINING PROGRAM

## 2017-12-14 PROCEDURE — 6360000002 HC RX W HCPCS: Performed by: HOSPITALIST

## 2017-12-14 RX ADMIN — SUCRALFATE 1 G: 1 SUSPENSION ORAL at 06:01

## 2017-12-14 RX ADMIN — HYDRALAZINE HYDROCHLORIDE 25 MG: 25 TABLET, FILM COATED ORAL at 14:24

## 2017-12-14 RX ADMIN — Medication 10 ML: at 08:33

## 2017-12-14 RX ADMIN — FUROSEMIDE 20 MG: 10 INJECTION, SOLUTION INTRAVENOUS at 08:30

## 2017-12-14 RX ADMIN — SUCRALFATE 1 G: 1 SUSPENSION ORAL at 10:56

## 2017-12-14 RX ADMIN — CARVEDILOL 25 MG: 25 TABLET, FILM COATED ORAL at 16:18

## 2017-12-14 RX ADMIN — HYDRALAZINE HYDROCHLORIDE 25 MG: 25 TABLET, FILM COATED ORAL at 06:01

## 2017-12-14 RX ADMIN — AMLODIPINE BESYLATE 10 MG: 10 TABLET ORAL at 08:30

## 2017-12-14 RX ADMIN — Medication 10 ML: at 08:31

## 2017-12-14 RX ADMIN — SUCRALFATE 1 G: 1 SUSPENSION ORAL at 16:18

## 2017-12-14 RX ADMIN — CARVEDILOL 25 MG: 25 TABLET, FILM COATED ORAL at 08:30

## 2017-12-14 RX ADMIN — ESOMEPRAZOLE SODIUM 40 MG: 40 INJECTION, POWDER, LYOPHILIZED, FOR SOLUTION INTRAVENOUS at 08:31

## 2017-12-14 NOTE — PROGRESS NOTES
_                Date:                           12/14/2017  Patient name:           Parrish Silverman  Date of admission:  11/18/2017 11:20 AM  MRN:   [id number]  YOB: 1950  PCP:                           No primary care provider on file. Subjective:     No acute events in the night  Got PEG tube yesterday tolerating feeds per peg  No pain  Afebrile   Mentation much better  Interacting today    Problem Lists:   Primary Problem:  Altered mental status   Current Problems:  Active Hospital Problems    Diagnosis Date Noted    Acute respiratory failure with hypoxia ([address] [J96.01]     Hypercoagulable state ([address] [D68.59]     Cerebrovascular accident (CVA) due to embolism of precerebral artery ([address] [I63.10]     Type 2 diabetes mellitus ([address] [E11.9] 12/05/2017    Recurrent major depressive disorder ([address] [F33.9]     Infected open wound [T14. 8XXA, L08.9]     Abrasions of multiple sites [T07. XXXA]     Neutrophilic leukocytosis [Z53.8]     Acute metabolic encephalopathy [R22.77]     Acute respiratory failure ([address] [J96.00]     Morbid obesity ([address] [E66.01] 11/19/2017    Encephalopathy [G93.40] 11/18/2017    Altered mental status [R41.82] 11/18/2017    Rhabdomyolysis [M62.82] 11/18/2017    Acute kidney injury ([address] [N17.9] 11/18/2017    Hyperkalemia [E87.5] 11/18/2017    Acute renal failure ([address] [N17.9]      PMH:  Past Medical History:   Diagnosis Date    Depression     Heart murmur     HTN (hypertension)       Allergies:    Allergies   Allergen Reactions    Pcn [Penicillins]         Medications:   Scheduled Meds:   sodium chloride (PF)  10 mL Intravenous BID    And    esomeprazole  40 mg Intravenous BID    sodium chloride flush  10 mL Intravenous 2 times per day    sodium chloride flush  10 mL Intravenous 2 times per day    sucralfate  1 g Oral TID AC    rivaroxaban  20 mg Oral Daily    sodium chloride flush  10 mL Intravenous BID    furosemide  20 mg Intravenous Daily    insulin lispro  0-12 Units Subcutaneous Q6H    hydrALAZINE  25 mg Oral 3 times per day    carvedilol  25 mg Oral BID WC    cloNIDine  1 patch Transdermal Weekly    amLODIPine  10 mg Oral Daily    sodium chloride flush  10 mL Intravenous 2 times per day     PRN Medications: sodium chloride flush, sodium chloride flush, labetalol, hydrALAZINE, glucose, dextrose, glucagon (rDNA), dextrose, neomycin-bacitracin-polymyxin, oxyCODONE-acetaminophen, fentanNYL, fentanNYL, sodium chloride flush, acetaminophen, magnesium hydroxide, ondansetron  Continuous Infusions:   sodium chloride 20 mL/hr at 12/12/17 0943    dextrose 100 mL/hr (12/11/17 0640)           Objective:     Vitals: BP (!) 146/63   Pulse 62   Temp 99 °F (37.2 °C) (Oral)   Resp 17   Ht 5' 9\" (1.753 m)   Wt 255 lb 11.7 oz (116 kg)   SpO2 96%   BMI 37.77 kg/m²     Physical exam -   Physical Exam   Constitutional: She is oriented to person, place, and time. She appears well-developed. No distress. HENT:   Head: Normocephalic and atraumatic. Eyes: Conjunctivae and EOM are normal. Pupils are equal, round, and reactive to light. Neck: Normal range of motion. Neck supple. Cardiovascular: Normal rate, regular rhythm and normal heart sounds. Pulmonary/Chest: Effort normal and breath sounds normal. No respiratory distress. Abdominal: Soft. Bowel sounds are normal. She exhibits no distension. There is no tenderness. PEG tube+   Neurological: She is alert and oriented to person, place, and time. No cranial nerve deficit. Data:    No intake/output data recorded.   In: 2670 [I.V.:484; NG/GT:1313]  Out: -     LABS    CBC:   Recent Labs      12/12/17   0639  12/13/17   0720  12/14/17   0641   WBC  9.0  8.6  8.0   RBC  3.63*  3.56*  3.12*   HGB  10.9*  10.6*  9.2*   HCT  34.3*  32.7*  29.2*   MCV  94.5  91.9  93.6   RDW  14.9*  15.8*  16.0*   PLT  340  278  363     BMP:   Recent Labs      12/12/17   0639  12/12/17   1431  12/13/17   0720 12/14/17   0641   NA  134*   --   138  137   K  3.3*  4.2  3.9  4.3   CL  98   --   102  101   CO2  23   --   24  22   BUN  9   --   13  16   CREATININE  0.66   --   0.82  0.67     ANEMIA STUDIES  No results for input(s): LABIRON, TIBC, FERRITIN, ELRGIQLW72, FOLATE, OCCULTBLD in the last 72 hours. PT/INR: No results for input(s): PROTIME, INR in the last 72 hours. APTT:   Recent Labs      12/12/17   0639  12/13/17   0720  12/14/17   0641   APTT  30.7  25.1  18.1*     LFTS  Recent Labs      12/13/17   0720   ALKPHOS  80   ALT  24   AST  22   BILITOT  0.37   LABALBU  3.0*       Radiology      Assessment      Principal Problem:    Altered mental status  Active Problems:    Encephalopathy    Rhabdomyolysis    Acute kidney injury (HCC)    Hyperkalemia    Acute renal failure (HCC)    Morbid obesity (HCC)    Acute metabolic encephalopathy    Acute respiratory failure (HCC)    Abrasions of multiple sites    Neutrophilic leukocytosis    Infected open wound    Recurrent major depressive disorder (HCC)    Type 2 diabetes mellitus ([address]    Cerebrovascular accident (CVA) due to embolism of precerebral artery ([address]    Hypercoagulable state ([address]    Acute respiratory failure with hypoxia ([address]            Plan     1. The history from the patient is very limited. I reviewed records available and I reviewed the labs and imaging. There is no clear history of hypercoagulability or thrombosis in the past.  However due to lack of history we will do a hypercoagulable workup and we'll make further recommendations based on the results. Pending results. 2. Anticardiolipin Ab IgM positive - will repeat in 12 weeks  3. She will need long term anticoagulation regardless   4. We'll follow with you.        Electronically signed by Caryl Payne MD on 12/14/2017 at 10:32 AM     Attending Physician Statement   I have discussed the care of this patient, including pertinent history and exam findings, with the resident.  I have seen and examined the

## 2017-12-14 NOTE — CARE COORDINATION
Attempted to see patient for discharge planning. No family present and patient is unable to verbalize at this time.
Family at bedside, chose [facility] as 1st choice, [facility] as 2nd. Call to [facility], spoke with Paige Sebastian.   Face sheet faxed for review
Jasmin Gil, the son, was at patient's bedside. Explained that more paperwork was faxed and waiting on a decision. He states that he spoke with Laura Khoury from [facility] earlier and he was going to check into finances. Son is not understanding the situation, about the insurance. Attempted to contact Son, Jasmin Gil by phone after he left; VM not set up yet. Left  with daughter, Lubna Hernández. Waiting on a return call. [facility] with Karen Baldojarrett, patient's daughter, and explained the insurance status. Asked if they applied for medicaid before. Unknown. Writer did leave  for Ivan/HELP to call writer to HELP with the situation tomorrow. Laura Khoury from [facility] states that patient can stay for 100 days, but on day 21, there will be out of pocket expenses. Writer explained this to Lubna Hernández, the daughter. Lubna Edgar states that she will be up here tomorrow to speak with Homar Smith to see if we can come up with some POC.
Left  with central intake at [facility]. Informed them that patient had PEG placed today and as soon as tube feed is tolerated, patient can be discharged. Waiting on a return phone call.
Paulo Thornton, the liaison stopped in to check on patient. Informed her that the family is making the decision today whether to place PEG tube or not. Son is supposed to contact nurse with decision soon and the physicians would like to place it today if agreeable. Once the patient gets PEG placed and tolerates the tube feeds, [address] [place] [address] can take the patient. Paulo Thornton and I discussed that the patient may be ready over the weekend and [facility] Boxer is OK with that. Will just need to contact Melchor Boxer; [address]  Fax number is: [phone].
Per quality flow rounds with RN weaning on vent, patient following intermittent commands.  If able to eventually extubate will likely need SNF  16:00 Fadi with help visiting no family at bedside call [phone] if family visits
Pt is now extubated. Spoke with family at bedside (2 sons and daughter). They would like pt to go to a SNF at discharge. Provided another list for review. Questions answered and encouraged they make choice by Monday so referrals could be sent. They will discuss and look into facilities and provide 4 choices by Monday.
Pt is weaning. Await TLS clearance before extubating. MD spoke with son today. Continue current plan of care. Unsure of discharge disposition-depends if pt is able to be extubated. SNF vs LTACH.
Spoke with Ivan/HELP. He will come up to the unit when family arrives. Will speak with them about finances etc....
Transportation provider: family  Transportation arrangements needed for discharge: Yes    Discharge Plan: Spoke with son at length and he verbalized the safety concerns he has for the patient in her home environment due to her increased weakness prior to admission. Family stated they are agreeable to [facility] or SNF placement whichever is deemed appropriate for the patient for discharge. Son also requested information in regards to obtaining any assistance from the [place] for the patient. Writer is unfamiliar but will inquire to provide family with available information if possible.         Electronically signed by Kennis Skiff, RN on 11/19/17 at 6:36 PM

## 2017-12-14 NOTE — PROGRESS NOTES
Father     Stroke Father     Diabetes Mother     Hypertension Mother     Breast Cancer Mother     Asthma Brother     Cancer Sister      lung    Cancer Maternal Uncle      bone    Cancer Maternal Aunt        Objective:   BP (!) 146/63   Pulse 62   Temp 99 °F (37.2 °C) (Oral)   Resp 17   Ht 5' 9\" (1.753 m)   Wt 255 lb 11.7 oz (116 kg)   SpO2 96%   BMI 37.77 kg/m²     Blood pressure range: Systolic (83OHZ), HWH:919 , Min:105 , QAQ:807   ; Diastolic (55GWL), JMA:93, Min:53, Max:77      Review of Systems:  Constitutional  Negative for fever and chills    HEENT  Negative for ear discharge, ear pain, nosebleed    Eyes  Negative for photophobia, pain and discharge    Respiratory  Negative for hemoptysis and sputum    Cardiovascular  Negative for orthopnea, claudication and PND    Gastrointestinal  Negative for abdominal pain, diarrhea, blood in stool    Musculoskeletal  Negative for joint pain, negative for myalgia    Skin  Negative for rash or itching    Endo/heme/allergies  Negative for polydipsia, environmental allergy    Psychiatric/behavioral  Negative for suicidal ideation. Patient is not anxious        NEUROLOGIC EXAMINATION  GENERAL  Appears comfortable and in no distress   HEENT  NC/ AT   NECK  Supple   MENTAL STATUS:  Alert, negativistic behavior, does not answer any orientation questions, cannot assess speech as patient will not talk during exam, no hallucination or delusion. Follows only some simple commands.    CRANIAL NERVES: II     -      Visual fields intact to confrontation  III,IV,VI -  Looks side to side  V     -     Grossly Normal facial sensation  VII    -     Face appears symmetric  VIII   -     Intact hearing  IX,X -     SARAH  XI    -     SARAH  XII   -     SARAH   MOTOR FUNCTION:  Moves all 4 limbs but not on command and no involuntary movements, no tremor   SENSORY FUNCTION:  Brisk withdrawal to all 4 limbs to pain   CEREBELLAR FUNCTION:  No tremor   REFLEX FUNCTION:  Symmetric, no matter disease. Merla Snipe are   chronic appearing bilateral lacunar infarcts.  Multiple small infarcts seen   on MRI are not appreciated on the current examination.       4. Approximately 16 mm left thyroid nodule.  Recommend further evaluation   with thyroid ultrasound. ARTURO (12/6/17) -  Left ventricle is normal in sized with increased wall thickness, normal  systolic function, estimated EF 55%. Left atrial appendage showed no evidence of clot. Negative bubble study, no shunt noted. Mild tricuspid regurgitation. EEG (11/21/17) -  This EEG, during which the patient is described as poorly  responsive, is moderately abnormal due to diffuse background  disorganization and slowing. This indicates the presence of moderate  diffuse cerebral dysfunction of a nonspecific nature. Impression and Plan: Ms. Shannan Ramirez is a 69 y.o. female with encephalopathy, improving. MRI brain with multiple acute ischemic infarcts suggestive of cardioembolic source. Elevated anticardiolipin antibody. S/p PEG on 12/12. Patient on Xarelto 20mg qd; hypercoag workup in progress. Patient to follow up with heme/onc upon discharge. Continue PT/OT; awaiting placement    Will follow with you.

## 2017-12-14 NOTE — PROGRESS NOTES
[facility] - Progress Note    Today's Date and Time: 12/14/2017, 12:14 PM    Impression :   1. Encephalopathy-improving  2. Cerebral embolism  3. Thyroid nodule  4. Acute respiratory failure. 5. SUSAN - improving  6. Multiple skin abrasions  7. Reactive leukocytosis- resolved  8. Hypernatremia- resolved  9. S/P placement of PEG 12-11-17  10. Elevated anticardiolipin antibody    Recommendations   · Wound care with Sivadene  · Aspiration precautions  · No need for antibiotics at this stage    Infection Control Recommendations   [place] precautions    Antimicrobial Stewardship Recommendations   · Avoid penicillins  Chief complaint/reason for consultation:   · Infected wounds  History of Present Illness:     INITIAL HISTORY:    Tri Mitchell is a 69y.o.-year-old  female who was initially admitted on 11/18/2017. Patient seen at the request of López Mahan. Patient with past Hx of HPTN, depression. Patient presented through ER after being found down by son after a 24 hr period. Patient reportedly had not been feeling well for about a month. She has slowed down her activities although she had remained leaving independently. Reportedly had a fall a month PTA, for which she had not sought help, and was afraid of falling down again; hence the reduction of her activities. In the ER she was found to have altered mentation, rhabdomyolysis, SUSAN, hyperkalemia, hyperglycemia , multiple skin abrasions, hypotension, acute respiratory failure, metabolic acidosis with increased anion gap secondary to lactic acidosis and acute kidney injury. Patient required mechanical ventilation, management of SUSAN and of hypotension. More recently patient has manifested underlying HPTN. ID asked to evaluate because of skin injuries and concern with risk of secondary infection. CURRENT EVALUATION :12/14/2017     The patient was examined this morning.  NAD  Continues to tolerate PEG feeds. No nausea/vomiting  She denied any pain at this time. Afebrile   Stable VS    Wounds clean and closing  WBC wnl    CTA head and neck (12/12/2017) showed 16 mm thyroid nodule. In light of the patient's clinical presentation, further evaluation of the nodule may be warranted    Per Primary team, ready for discharge. Discussed with patient, RN. I have personally reviewed the past medical history, past surgical history, medications, social history, and family history, and I have updated the database accordingly. Past Medical History:     Past Medical History:   Diagnosis Date    Depression     Heart murmur     HTN (hypertension)        Past Surgical  History:     Past Surgical History:   Procedure Laterality Date    OTHER SURGICAL HISTORY      bump under skin on buttocks    TUBAL LIGATION         Medications:      sodium chloride (PF)  10 mL Intravenous BID    And    esomeprazole  40 mg Intravenous BID    sodium chloride flush  10 mL Intravenous 2 times per day    sodium chloride flush  10 mL Intravenous 2 times per day    sucralfate  1 g Oral TID AC    rivaroxaban  20 mg Oral Daily    sodium chloride flush  10 mL Intravenous BID    furosemide  20 mg Intravenous Daily    insulin lispro  0-12 Units Subcutaneous Q6H    hydrALAZINE  25 mg Oral 3 times per day    carvedilol  25 mg Oral BID WC    cloNIDine  1 patch Transdermal Weekly    amLODIPine  10 mg Oral Daily    sodium chloride flush  10 mL Intravenous 2 times per day       Social History:     Social History     Social History    Marital status:      Spouse name: N/A    Number of children: N/A    Years of education: N/A     Occupational History    Not on file.      Social History Main Topics    Smoking status: Former Smoker    Smokeless tobacco: Never Used    Alcohol use Not on file    Drug use: Unknown    Sexual activity: Not on file     Other Topics Concern    Not on file     Social History Narrative disease. Nik Dura are   chronic appearing bilateral lacunar infarcts.  Multiple small infarcts seen   on MRI are not appreciated on the current examination.       4. Approximately 16 mm left thyroid nodule.  Recommend further evaluation   with thyroid ultrasound. Thank you for allowing us to participate in the care of this patient. Please call with questions. 12/14/2017    12:14 PM       I have examined the patient, reviewed the patient's medical history in detail and updated the computerized patient record. Above exam and data confirmed.     Tracei Carvajal MD

## 2017-12-14 NOTE — PROGRESS NOTES
[facility]    PROGRESS NOTE            Date:   12/14/2017  Patient name:  Omid Mcconnell  Date of admission:  11/18/2017 11:20 AM  MRN:   [id number]  YOB: 1950    CHIEF COMPLAINT     History Obtained From:  Patient and chart review. HISTORY OF PRESENT ILLNESS     The patient is a 69 y.o.  female who is an ICU transfer who initially presented with altered mentation after she was found down by her son. When brought here she was intubated due to acute respiratory failure and encephalopathy. EEG done showed moderate diffuse cerebral dysfunction. She has been having waxing and waning mentation and was extubated when it improved. She also developed SUSAN, lactic acidosis and leukocytosis all of which has resolved as of now. She has a H/O HTN and DM-2. She also has a C-collar in place, the management of which is being done by neurosurgery due to patient's mentation. She still has C-spine collar in place. MRI spine pending for clearance. She is still in altered mentation , on tube feeds and responds barely to sternal rub    INTERVAL HISTORY :    - patient seen and examined at the bedside  - no acute events overnight  - tube feeds @ 65 ml/ hr, no residuals. - okay to discharge , waiting due to insurance issues. PAST MEDICAL HISTORY       has a past medical history of Depression; Heart murmur; and HTN (hypertension). PAST SURGICAL HISTORY       has a past surgical history that includes Tubal ligation; other surgical history; and egd percutaneous placement gastrostomy tube (N/A, 12/11/2017). HOME MEDICATIONS        Prior to Admission medications    Medication Sig Start Date End Date Taking?  Authorizing Provider   rivaroxaban (XARELTO) 20 MG TABS tablet Take 1 tablet by mouth daily 12/13/17  Yes Melvina Baig MD   cloNIDine (CATAPRES) 0.1 MG/24HR Place 1 patch onto the skin once a week 12/18/17  Yes Melvina Baig MD   hydrALAZINE (APRESOLINE) 25 MG tablet Take 1 tablet by mouth every 8 hours 12/13/17  Yes Brie Benitez MD   carvedilol (COREG) 25 MG tablet Take 1 tablet by mouth 2 times daily (with meals) 12/13/17  Yes Brie Benitez MD   amLODIPine (NORVASC) 10 MG tablet Take 1 tablet by mouth daily 12/14/17  Yes Brie Benitez MD   neomycin-bacitracin-polymyxin (NEOSPORIN) 400-5-5000 ointment Apply topically 2 times daily. 12/13/17 12/23/17 Yes Brie Benitez MD   sucralfate (CARAFATE) 1 GM/10ML suspension Take 10 mLs by mouth 2 times daily for 14 days 12/13/17 12/27/17 Yes Brie Benitez MD   pantoprazole (PROTONIX) 40 MG tablet Take 1 tablet by mouth 2 times daily Take protonix BID for 8 weeks and then daily 12/13/17  Yes Brie Benitez MD   LASIX 20 MG tablet Take 1 tablet by mouth daily 12/13/17  Yes Brie Benitez MD       ALLERGIES      Pcn [penicillins]    SOCIAL HISTORY       reports that she has quit smoking. She has never used smokeless tobacco.     FAMILY HISTORY      family history includes Asthma in her brother; Breast Cancer in her mother; Cancer in her maternal aunt, maternal uncle, and sister; Diabetes in her father and mother; Heart Disease in her paternal grandmother; Hypertension in her father and mother; Stroke in her father. PHYSICAL EXAM      BP (!) 146/63   Pulse 62   Temp 99 °F (37.2 °C) (Oral)   Resp 17   Ht 5' 9\" (1.753 m)   Wt 255 lb 11.7 oz (116 kg)   SpO2 96%   BMI 37.77 kg/m²      · General appearance: well nourished  · HEENT: Head: Normocephalic, no lesions, without obvious abnormality.   · Lungs: clear to auscultation bilaterally  · Heart: regular rate and rhythm, S1, S2 normal, no murmur, click, rub or gallop  · Abdomen: soft, non-tender; bowel sounds normal; no masses,  no organomegaly  · Extremities: extremities normal, atraumatic, no cyanosis or edema  · Neurological: patient responding to verbal stimuli   · Skin - no rash, no lump   · Eye no icterus no redness  · Psych-normal

## 2017-12-15 LAB
FACTOR V LEIDEN MUTATION: NORMAL
MTHFR MUTATION 677T/A1298C: NORMAL
PROTHROMBIN G20210A MUTATION: NORMAL

## 2017-12-19 NOTE — DISCHARGE SUMMARY
[address] [place] [address]     Department of Internal Medicine - Staff Internal Medicine Service    INPATIENT DISCHARGE SUMMARY        Patient Identification:  Randee Barnett is a 69 y.o. female. :  1950  MRN: [id number]     Acct: [de-identified]   Admit Date:  2017  Discharge date and time: 2017  5:16 PM   Attending Provider: No att. providers found                                     Admission Diagnoses: Altered mental status [R41.82]    Discharge Diagnoses:   Principal Problem:    Altered mental status  Active Problems:    Encephalopathy    Morbid obesity ([facility] [address]    Acute metabolic encephalopathy    Abrasions of multiple sites    Infected open wound    Recurrent major depressive disorder (HCC)    Type 2 diabetes mellitus ([facility] [address]    Cerebrovascular accident (CVA) due to embolism of precerebral artery ([facility] [address]    Hypercoagulable state ([facility] [address]       Consults:   GI, hematology/oncology, neurosurgery, neurology, palliative, ID     Brief Inpatient course: The patient presented with AMS, was in ICU , got intubated due to respiratory failure was transferred to stepdown once extubated. MRI brain done showed multiple acute ischemic stroke. She was started on full dose Lovenox and then switched over to Xeralto 20 mg. The patient's mentation was fluctuating throughout the hospital course. Due to her failing the swallow study and her AMS , a PEG tube was placed. The patient was discharged to SNF.        Procedures:  PEG tube placement    Any Hospital Acquired Infections: none    Discharge Functional Status:  stable    Disposition: SNF    Patient Instructions:   Discharge Medication List as of 2017  3:45 PM      START taking these medications    Details   rivaroxaban (XARELTO) 20 MG TABS tablet Take 1 tablet by mouth daily, Disp-30 tablet, R-0Normal      cloNIDine (CATAPRES) 0.1 MG/24HR Place 1 patch onto the skin once a week, Disp-4 patch, R-3Normal      hydrALAZINE (APRESOLINE) 25 MG tablet Take 1

## 2018-01-23 ENCOUNTER — TELEPHONE (OUTPATIENT)
Dept: NEUROLOGY | Age: 68
End: 2018-01-23

## 2018-01-23 ENCOUNTER — OFFICE VISIT (OUTPATIENT)
Dept: NEUROLOGY | Age: 68
End: 2018-01-23
Payer: MEDICARE

## 2018-01-23 VITALS — HEART RATE: 68 BPM | DIASTOLIC BLOOD PRESSURE: 66 MMHG | SYSTOLIC BLOOD PRESSURE: 120 MMHG

## 2018-01-23 DIAGNOSIS — I63.10 CEREBROVASCULAR ACCIDENT (CVA) DUE TO EMBOLISM OF PRECEREBRAL ARTERY (HCC): Primary | ICD-10-CM

## 2018-01-23 DIAGNOSIS — D68.59 HYPERCOAGULABLE STATE (HCC): ICD-10-CM

## 2018-01-23 DIAGNOSIS — G95.89 MYELOMALACIA (HCC): ICD-10-CM

## 2018-01-23 PROCEDURE — 1090F PRES/ABSN URINE INCON ASSESS: CPT | Performed by: NURSE PRACTITIONER

## 2018-01-23 PROCEDURE — G8427 DOCREV CUR MEDS BY ELIG CLIN: HCPCS | Performed by: NURSE PRACTITIONER

## 2018-01-23 PROCEDURE — G8400 PT W/DXA NO RESULTS DOC: HCPCS | Performed by: NURSE PRACTITIONER

## 2018-01-23 PROCEDURE — 4040F PNEUMOC VAC/ADMIN/RCVD: CPT | Performed by: NURSE PRACTITIONER

## 2018-01-23 PROCEDURE — 1036F TOBACCO NON-USER: CPT | Performed by: NURSE PRACTITIONER

## 2018-01-23 PROCEDURE — G8598 ASA/ANTIPLAT THER USED: HCPCS | Performed by: NURSE PRACTITIONER

## 2018-01-23 PROCEDURE — 99214 OFFICE O/P EST MOD 30 MIN: CPT | Performed by: NURSE PRACTITIONER

## 2018-01-23 PROCEDURE — 3017F COLORECTAL CA SCREEN DOC REV: CPT | Performed by: NURSE PRACTITIONER

## 2018-01-23 PROCEDURE — G8417 CALC BMI ABV UP PARAM F/U: HCPCS | Performed by: NURSE PRACTITIONER

## 2018-01-23 PROCEDURE — G8484 FLU IMMUNIZE NO ADMIN: HCPCS | Performed by: NURSE PRACTITIONER

## 2018-01-23 PROCEDURE — 1123F ACP DISCUSS/DSCN MKR DOCD: CPT | Performed by: NURSE PRACTITIONER

## 2018-01-23 PROCEDURE — 3014F SCREEN MAMMO DOC REV: CPT | Performed by: NURSE PRACTITIONER

## 2018-01-23 RX ORDER — SENNA AND DOCUSATE SODIUM 50; 8.6 MG/1; MG/1
1 TABLET, FILM COATED ORAL EVERY 12 HOURS PRN
Status: ON HOLD | COMMUNITY
End: 2020-08-30 | Stop reason: SDUPTHER

## 2018-01-23 RX ORDER — ATORVASTATIN CALCIUM 20 MG/1
20 TABLET, FILM COATED ORAL DAILY
Qty: 30 TABLET | Refills: 5
Start: 2018-01-23

## 2018-01-23 RX ORDER — ACETAMINOPHEN 325 MG/1
650 TABLET ORAL EVERY 4 HOURS PRN
COMMUNITY
End: 2018-04-17 | Stop reason: ALTCHOICE

## 2018-01-23 NOTE — LETTER
abnormal due to a diffuse background disorganization and slowing. An MRI of the cervical spine showed multi-level degenerative disc disease with some mild stenosis, and an MRI of the thoracic spine revealed some congenital spinal stenosis, with some myelomalacia at T6-T7, secondary to stenosis. She was evaluated by neurosurgery, , who recommended follow-up in 1 month for reevaluation of her thoracic abnormality. Patient was discharged to [facility]. Patient returns from [address] [place] [address] today for reevaluation. Patient is currently nonambulatory secondary to weakness in her legs bilaterally. She has a PEG tube for medication administration and feeding. She is alert and conversive and quite tearful.     Past Medical History:   Diagnosis Date    Depression     Heart murmur     HTN (hypertension)          Past Surgical History:   Procedure Laterality Date    OTHER SURGICAL HISTORY      bump under skin on buttocks    [place] EGD PERCUTANEOUS PLACEMENT GASTROSTOMY TUBE N/A 12/11/2017    EGD ESOPHAGOGASTRODUODENOSCOPY PEG TUBE INSERTION performed by Josee Richardson [facility] at [facility]    TUBAL LIGATION         Family History   Problem Relation Age of Onset    Heart Disease Paternal Grandmother     Diabetes Father     Hypertension Father     Stroke Father     Diabetes Mother     Hypertension Mother     Breast Cancer Mother     Asthma Brother     Cancer Sister      lung    Cancer Maternal Uncle      bone    Cancer Maternal Aunt        Social History   Substance Use Topics    Smoking status: Former Smoker    Smokeless tobacco: Never Used    Alcohol use Not on file                               REVIEW OF SYSTEMS    CONSTITUTIONAL Weight: absent, Appetite: absent, Fatigue: absent      HEENT Ears: normal, Visual disturbance: absent   RESPIRATORY Shortness of breath: absent, Cough: absent   CARDIOVASCULAR Chest pain: absent, Leg swelling :present GI Constipation: absent, Diarrhea: absent, Swallowing change: absent       Urinary frequency: absent, Urinary urgency: absent, Urinary incontinence: absent   MUSCULOSKELETAL Neck pain: absent, Back pain: present, Stiffness: absent, Muscle pain: absent, Joint pain: absent Restless legs: present   DERMATOLOGIC Hair loss: absent, Skin changes: absent   NEUROLOGIC Memory loss: absent, Confusion: absent, Seizures: absent Trouble walking or imbalance: present, Dizziness: absent, Weakness: present, Numbness: absent Tremor: absent, Spasm: absent, Speech difficulty: absent, Headache: absent, Light sensitivity: absent   PSYCHIATRIC Anxiety: absent, Hallucination: absent, Mood disorder: absent   HEMATOLOGIC Abnormal bleeding: absent, Anemia: absent, Clotting disorder: absent, Lymph gland changes: absent           Allergies   Allergen Reactions    Pcn [Penicillins]            Current Outpatient Prescriptions   Medication Sig Dispense Refill    MULTIPLE VITAMIN PO Take by mouth daily      sennosides-docusate sodium (SENOKOT-S) 8.6-50 MG tablet Take 1 tablet by mouth every 12 hours as needed for Constipation      acetaminophen (TYLENOL) 325 MG tablet Take 650 mg by mouth every 4 hours as needed for Pain      atorvastatin (LIPITOR) 20 MG tablet Take 1 tablet by mouth daily 30 tablet 5    rivaroxaban (XARELTO) 20 MG TABS tablet Take 1 tablet by mouth daily 30 tablet 0    cloNIDine (CATAPRES) 0.1 MG/24HR Place 1 patch onto the skin once a week 4 patch 3    hydrALAZINE (APRESOLINE) 25 MG tablet Take 1 tablet by mouth every 8 hours 90 tablet 3    carvedilol (COREG) 25 MG tablet Take 1 tablet by mouth 2 times daily (with meals) 60 tablet 3    amLODIPine (NORVASC) 10 MG tablet Take 1 tablet by mouth daily 30 tablet 3    pantoprazole (PROTONIX) 40 MG tablet Take 1 tablet by mouth 2 times daily Take protonix BID for 8 weeks and then daily 30 tablet 3    LASIX 20 MG tablet Take 1 tablet by mouth daily 30 tablet 0 No current facility-administered medications for this visit. PHYSICAL EXAMINATION       /66 (Site: Left Arm, Position: Sitting)   Pulse 68                                             . General Appearance:  Alert, cooperative, no signs of distress, appears stated age   Head:  Normocephalic, no signs of trauma   Eyes:  Conjunctiva/corneas clear;  eyelids intact   Ears:  Normal external ear and canals   Nose: Nares normal, mucosa normal, no drainage    Throat: Lips and tongue normal; teeth normal;  gums normal   Neck: Supple, intact flexion, extension and rotation;   trachea midline;  no adenopathy;   thyroid: not enlarged;   no carotid pulse abnormality   Back:   Symmetric, no curvature, ROM adequate   Lungs:   Respirations unlabored   Heart:  Regular rate and rhythm   Abdomen PEG tube in left upper quadrant       Extremities: Extremities normal, no cyanosis, no edema   Pulses: Symmetric over head and neck   Skin: Skin color, texture normal, no rashes, no lesions                                           NEUROLOGIC EXAMINATION    Neurologic Exam     Mental Status   Oriented to person, place, and time. Attention: normal.   Speech: speech is normal   Level of consciousness: alert  Normal comprehension. Cranial Nerves     CN II   Visual fields full to confrontation. CN III, IV, VI   Pupils are equal, round, and reactive to light. Extraocular motions are normal.     CN V   Facial sensation intact. CN VII   Facial expression full, symmetric.      CN VIII   CN VIII normal.     CN IX, X   CN IX normal.     CN XI   CN XI normal.     CN XII   CN XII normal.     Motor Exam   Muscle bulk: normal  Overall muscle tone: normal  Right arm tone: normal  Left arm tone: normal  Right arm pronator drift: absent  Left arm pronator drift: absent  Right leg tone: normal

## 2018-01-23 NOTE — PROGRESS NOTES
2/5    Sensory Exam   Light touch normal.   Vibration normal.   Pinprick normal.     Gait, Coordination, and Reflexes     Gait  Gait: normal    Coordination   Finger to nose coordination: normal    Tremor   Resting tremor: absent    Reflexes   Right brachioradialis: 2+  Left brachioradialis: 2+  Right biceps: 2+  Left biceps: 2+  Right triceps: 2+  Left triceps: 2+  Right patellar: 1+  Left patellar: 1+  Right achilles: 1+  Left achilles: 1+  Right plantar: equivocal  Left plantar: equivocal  Right ankle clonus: absent  Left ankle clonus: absent            ASSESSMENT/PLAN:       In summary, your patient, Keith Olmedo exhibits the following, with associated plan:    1. Recent stroke in November 2017 involving multiple vascular territories in the bilateral cerebral white matter, right thalamus, and left alex. Positive hypercoagulable state  1. Continue Xarelto  2. Add atorvastatin 20 mg daily for secondary stroke prevention  3. Continue physical, occupational, and speech therapy  4. Return for reevaluation in 3 months  2. Dysphagia  1. Continue PEG tube  2. Speech therapy for swallowing therapy  3. Thoracic myelomalacia T6-7  1.  To be reevaluated by Dr. Jose Juan Sanchez from neurosurgery            Signed: Wilner Shin, CNP

## 2018-01-31 ENCOUNTER — TELEPHONE (OUTPATIENT)
Dept: ONCOLOGY | Age: 68
End: 2018-01-31

## 2018-01-31 ENCOUNTER — OFFICE VISIT (OUTPATIENT)
Dept: ONCOLOGY | Age: 68
End: 2018-01-31
Payer: MEDICARE

## 2018-01-31 VITALS
BODY MASS INDEX: 42.34 KG/M2 | TEMPERATURE: 98.2 F | DIASTOLIC BLOOD PRESSURE: 61 MMHG | SYSTOLIC BLOOD PRESSURE: 111 MMHG | HEIGHT: 64 IN | HEART RATE: 62 BPM | WEIGHT: 248 LBS | RESPIRATION RATE: 18 BRPM

## 2018-01-31 DIAGNOSIS — R76.0 ANTI-CARDIOLIPIN ANTIBODY POSITIVE: ICD-10-CM

## 2018-01-31 DIAGNOSIS — I63.10 CEREBROVASCULAR ACCIDENT (CVA) DUE TO EMBOLISM OF PRECEREBRAL ARTERY (HCC): ICD-10-CM

## 2018-01-31 DIAGNOSIS — D68.59 HYPERCOAGULABLE STATE (HCC): Primary | ICD-10-CM

## 2018-01-31 PROCEDURE — 1090F PRES/ABSN URINE INCON ASSESS: CPT | Performed by: INTERNAL MEDICINE

## 2018-01-31 PROCEDURE — 1123F ACP DISCUSS/DSCN MKR DOCD: CPT | Performed by: INTERNAL MEDICINE

## 2018-01-31 PROCEDURE — 3014F SCREEN MAMMO DOC REV: CPT | Performed by: INTERNAL MEDICINE

## 2018-01-31 PROCEDURE — G8417 CALC BMI ABV UP PARAM F/U: HCPCS | Performed by: INTERNAL MEDICINE

## 2018-01-31 PROCEDURE — G8484 FLU IMMUNIZE NO ADMIN: HCPCS | Performed by: INTERNAL MEDICINE

## 2018-01-31 PROCEDURE — 99211 OFF/OP EST MAY X REQ PHY/QHP: CPT

## 2018-01-31 PROCEDURE — 1036F TOBACCO NON-USER: CPT | Performed by: INTERNAL MEDICINE

## 2018-01-31 PROCEDURE — 3017F COLORECTAL CA SCREEN DOC REV: CPT | Performed by: INTERNAL MEDICINE

## 2018-01-31 PROCEDURE — 4040F PNEUMOC VAC/ADMIN/RCVD: CPT | Performed by: INTERNAL MEDICINE

## 2018-01-31 PROCEDURE — G8598 ASA/ANTIPLAT THER USED: HCPCS | Performed by: INTERNAL MEDICINE

## 2018-01-31 PROCEDURE — 99214 OFFICE O/P EST MOD 30 MIN: CPT | Performed by: INTERNAL MEDICINE

## 2018-01-31 PROCEDURE — G8400 PT W/DXA NO RESULTS DOC: HCPCS | Performed by: INTERNAL MEDICINE

## 2018-01-31 PROCEDURE — G8427 DOCREV CUR MEDS BY ELIG CLIN: HCPCS | Performed by: INTERNAL MEDICINE

## 2018-03-07 ENCOUNTER — TELEPHONE (OUTPATIENT)
Dept: GASTROENTEROLOGY | Age: 68
End: 2018-03-07

## 2018-03-13 ENCOUNTER — OFFICE VISIT (OUTPATIENT)
Dept: INFECTIOUS DISEASES | Age: 68
End: 2018-03-13
Payer: MEDICARE

## 2018-03-13 VITALS
RESPIRATION RATE: 12 BRPM | HEIGHT: 64 IN | WEIGHT: 239 LBS | SYSTOLIC BLOOD PRESSURE: 137 MMHG | DIASTOLIC BLOOD PRESSURE: 67 MMHG | HEART RATE: 58 BPM | BODY MASS INDEX: 40.8 KG/M2 | TEMPERATURE: 98 F

## 2018-03-13 DIAGNOSIS — Z86.73 HISTORY OF CVA (CEREBROVASCULAR ACCIDENT): ICD-10-CM

## 2018-03-13 DIAGNOSIS — S71.001D WOUND, OPEN, HIP OR THIGH WITH COMPLICATION, RIGHT, SUBSEQUENT ENCOUNTER: Primary | ICD-10-CM

## 2018-03-13 DIAGNOSIS — M60.051 INFECTIVE MYOSITIS OF RIGHT THIGH: ICD-10-CM

## 2018-03-13 DIAGNOSIS — S71.101D WOUND, OPEN, HIP OR THIGH WITH COMPLICATION, RIGHT, SUBSEQUENT ENCOUNTER: Primary | ICD-10-CM

## 2018-03-13 DIAGNOSIS — H61.22 EXCESSIVE EAR WAX, LEFT: ICD-10-CM

## 2018-03-13 PROCEDURE — G8400 PT W/DXA NO RESULTS DOC: HCPCS | Performed by: INTERNAL MEDICINE

## 2018-03-13 PROCEDURE — G8482 FLU IMMUNIZE ORDER/ADMIN: HCPCS | Performed by: INTERNAL MEDICINE

## 2018-03-13 PROCEDURE — 1090F PRES/ABSN URINE INCON ASSESS: CPT | Performed by: INTERNAL MEDICINE

## 2018-03-13 PROCEDURE — G8428 CUR MEDS NOT DOCUMENT: HCPCS | Performed by: INTERNAL MEDICINE

## 2018-03-13 PROCEDURE — 1036F TOBACCO NON-USER: CPT | Performed by: INTERNAL MEDICINE

## 2018-03-13 PROCEDURE — G8417 CALC BMI ABV UP PARAM F/U: HCPCS | Performed by: INTERNAL MEDICINE

## 2018-03-13 PROCEDURE — 99215 OFFICE O/P EST HI 40 MIN: CPT | Performed by: INTERNAL MEDICINE

## 2018-03-13 PROCEDURE — 3017F COLORECTAL CA SCREEN DOC REV: CPT | Performed by: INTERNAL MEDICINE

## 2018-03-13 PROCEDURE — 3014F SCREEN MAMMO DOC REV: CPT | Performed by: INTERNAL MEDICINE

## 2018-03-13 PROCEDURE — 4040F PNEUMOC VAC/ADMIN/RCVD: CPT | Performed by: INTERNAL MEDICINE

## 2018-03-13 PROCEDURE — G8598 ASA/ANTIPLAT THER USED: HCPCS | Performed by: INTERNAL MEDICINE

## 2018-03-13 PROCEDURE — 1123F ACP DISCUSS/DSCN MKR DOCD: CPT | Performed by: INTERNAL MEDICINE

## 2018-03-13 NOTE — PROGRESS NOTES
egophony. Cardiovascular: Regular rate and rhythm without murmurs, rubs, or gallops. Abdomen: Soft, non tender. Bowel sounds normal. No organomegaly  All four Extremities: No cyanosis, clubbing, edema, or effusions. Neurologic: No gross sensory or motor deficits. Skin: Warm and dry with good turgor. No signs of peripheral arterial or venous insufficiency. Rt hip wound sinus tract open with brownish drainage noted. No foul odor. Tracks 10cm deep. Does not reach bone.     Medical Decision Making:   I have independently reviewed/ordered the following labs:    CBC with Differential:  Lab Results   Component Value Date    WBC 8.0 12/14/2017    WBC 8.6 12/13/2017    HGB 9.2 12/14/2017    HGB 10.6 12/13/2017    HCT 29.2 12/14/2017    HCT 32.7 12/13/2017     12/14/2017     12/13/2017    LYMPHOPCT 37 12/14/2017    LYMPHOPCT 35 12/13/2017    MONOPCT 9 12/14/2017    MONOPCT 10 12/13/2017     BMP:   Lab Results   Component Value Date     12/14/2017     12/13/2017    K 4.3 12/14/2017    K 3.9 12/13/2017     12/14/2017     12/13/2017    CO2 22 12/14/2017    CO2 24 12/13/2017    BUN 16 12/14/2017    BUN 13 12/13/2017    CREATININE 0.67 12/14/2017    CREATININE 0.82 12/13/2017     Hepatic Function Panel:  Lab Results   Component Value Date    PROT 6.8 12/13/2017    PROT 6.7 12/11/2017    LABALBU 3.0 12/13/2017    LABALBU 2.6 12/11/2017    BILIDIR 0.24 11/18/2017    IBILI 0.26 11/18/2017    BILITOT 0.37 12/13/2017    BILITOT 0.68 12/11/2017    ALKPHOS 84 12/13/2017    ALKPHOS 80 12/11/2017    ALT 24 12/13/2017    ALT 23 12/11/2017    AST 22 12/13/2017    AST 24 12/11/2017     Lab Results   Component Value Date    MUCUS NOT REPORTED 11/18/2017    RBC 3.12 12/14/2017    TRICHOMONAS NOT REPORTED 11/18/2017    WBC 8.0 12/14/2017    YEAST NOT REPORTED 11/18/2017    TURBIDITY CLEAR 11/18/2017     Lab Results   Component Value Date    CREATININE 0.67 12/14/2017    GLUCOSE 137 12/14/2017       Thank

## 2018-04-03 ENCOUNTER — OFFICE VISIT (OUTPATIENT)
Dept: SURGERY | Age: 68
End: 2018-04-03
Payer: MEDICARE

## 2018-04-03 VITALS
HEART RATE: 53 BPM | HEIGHT: 64 IN | DIASTOLIC BLOOD PRESSURE: 64 MMHG | BODY MASS INDEX: 40.8 KG/M2 | WEIGHT: 238.98 LBS | SYSTOLIC BLOOD PRESSURE: 137 MMHG

## 2018-04-03 DIAGNOSIS — Z93.1 PEG (PERCUTANEOUS ENDOSCOPIC GASTROSTOMY) STATUS (HCC): Primary | ICD-10-CM

## 2018-04-03 PROCEDURE — 3017F COLORECTAL CA SCREEN DOC REV: CPT | Performed by: SPECIALIST

## 2018-04-03 PROCEDURE — 1090F PRES/ABSN URINE INCON ASSESS: CPT | Performed by: SPECIALIST

## 2018-04-03 PROCEDURE — 1036F TOBACCO NON-USER: CPT | Performed by: SPECIALIST

## 2018-04-03 PROCEDURE — 3014F SCREEN MAMMO DOC REV: CPT | Performed by: SPECIALIST

## 2018-04-03 PROCEDURE — G8417 CALC BMI ABV UP PARAM F/U: HCPCS | Performed by: SPECIALIST

## 2018-04-03 PROCEDURE — G8400 PT W/DXA NO RESULTS DOC: HCPCS | Performed by: SPECIALIST

## 2018-04-03 PROCEDURE — 99212 OFFICE O/P EST SF 10 MIN: CPT | Performed by: SPECIALIST

## 2018-04-03 PROCEDURE — 4040F PNEUMOC VAC/ADMIN/RCVD: CPT | Performed by: SPECIALIST

## 2018-04-03 PROCEDURE — G8598 ASA/ANTIPLAT THER USED: HCPCS | Performed by: SPECIALIST

## 2018-04-03 PROCEDURE — 1123F ACP DISCUSS/DSCN MKR DOCD: CPT | Performed by: SPECIALIST

## 2018-04-03 PROCEDURE — G8427 DOCREV CUR MEDS BY ELIG CLIN: HCPCS | Performed by: SPECIALIST

## 2018-04-17 ENCOUNTER — OFFICE VISIT (OUTPATIENT)
Dept: INFECTIOUS DISEASES | Age: 68
End: 2018-04-17
Payer: MEDICARE

## 2018-04-17 VITALS
WEIGHT: 250 LBS | SYSTOLIC BLOOD PRESSURE: 114 MMHG | HEART RATE: 53 BPM | DIASTOLIC BLOOD PRESSURE: 66 MMHG | HEIGHT: 64 IN | OXYGEN SATURATION: 100 % | RESPIRATION RATE: 14 BRPM | BODY MASS INDEX: 42.68 KG/M2

## 2018-04-17 DIAGNOSIS — Z88.0 ALLERGY HISTORY, PENICILLIN: ICD-10-CM

## 2018-04-17 DIAGNOSIS — S81.801D NON-HEALING WOUND OF LOWER EXTREMITY, RIGHT, SUBSEQUENT ENCOUNTER: ICD-10-CM

## 2018-04-17 DIAGNOSIS — L89.214 DECUBITUS ULCER OF RIGHT HIP, STAGE 4 (HCC): Primary | ICD-10-CM

## 2018-04-17 PROCEDURE — G8400 PT W/DXA NO RESULTS DOC: HCPCS | Performed by: INTERNAL MEDICINE

## 2018-04-17 PROCEDURE — G8598 ASA/ANTIPLAT THER USED: HCPCS | Performed by: INTERNAL MEDICINE

## 2018-04-17 PROCEDURE — 4040F PNEUMOC VAC/ADMIN/RCVD: CPT | Performed by: INTERNAL MEDICINE

## 2018-04-17 PROCEDURE — 99214 OFFICE O/P EST MOD 30 MIN: CPT | Performed by: INTERNAL MEDICINE

## 2018-04-17 PROCEDURE — G8427 DOCREV CUR MEDS BY ELIG CLIN: HCPCS | Performed by: INTERNAL MEDICINE

## 2018-04-17 PROCEDURE — 3014F SCREEN MAMMO DOC REV: CPT | Performed by: INTERNAL MEDICINE

## 2018-04-17 PROCEDURE — 1123F ACP DISCUSS/DSCN MKR DOCD: CPT | Performed by: INTERNAL MEDICINE

## 2018-04-17 PROCEDURE — G8417 CALC BMI ABV UP PARAM F/U: HCPCS | Performed by: INTERNAL MEDICINE

## 2018-04-17 PROCEDURE — 3017F COLORECTAL CA SCREEN DOC REV: CPT | Performed by: INTERNAL MEDICINE

## 2018-04-17 PROCEDURE — 1036F TOBACCO NON-USER: CPT | Performed by: INTERNAL MEDICINE

## 2018-04-17 PROCEDURE — 1090F PRES/ABSN URINE INCON ASSESS: CPT | Performed by: INTERNAL MEDICINE

## 2018-04-17 RX ORDER — DOXYCYCLINE HYCLATE 100 MG
100 TABLET ORAL 2 TIMES DAILY
Qty: 60 TABLET | Refills: 2 | Status: SHIPPED | OUTPATIENT
Start: 2018-04-17 | End: 2018-05-17

## 2018-04-17 RX ORDER — POLYETHYLENE GLYCOL 3350 17 G/17G
17 POWDER, FOR SOLUTION ORAL DAILY PRN
COMMUNITY

## 2018-04-17 RX ORDER — TRAMADOL HYDROCHLORIDE 50 MG/1
50 TABLET ORAL EVERY 8 HOURS PRN
Status: ON HOLD | COMMUNITY
End: 2020-08-30 | Stop reason: HOSPADM

## 2018-04-23 ENCOUNTER — HOSPITAL ENCOUNTER (OUTPATIENT)
Facility: MEDICAL CENTER | Age: 68
End: 2018-04-23
Payer: MEDICARE

## 2018-04-24 ENCOUNTER — OFFICE VISIT (OUTPATIENT)
Dept: NEUROLOGY | Age: 68
End: 2018-04-24
Payer: MEDICARE

## 2018-04-24 VITALS — SYSTOLIC BLOOD PRESSURE: 129 MMHG | HEART RATE: 55 BPM | DIASTOLIC BLOOD PRESSURE: 70 MMHG

## 2018-04-24 DIAGNOSIS — M47.14 THORACIC MYELOPATHY: ICD-10-CM

## 2018-04-24 DIAGNOSIS — D68.59 HYPERCOAGULABLE STATE (HCC): ICD-10-CM

## 2018-04-24 DIAGNOSIS — I63.10 CEREBROVASCULAR ACCIDENT (CVA) DUE TO EMBOLISM OF PRECEREBRAL ARTERY (HCC): Primary | ICD-10-CM

## 2018-04-24 PROCEDURE — 99214 OFFICE O/P EST MOD 30 MIN: CPT | Performed by: NURSE PRACTITIONER

## 2018-04-24 PROCEDURE — G8427 DOCREV CUR MEDS BY ELIG CLIN: HCPCS | Performed by: NURSE PRACTITIONER

## 2018-04-24 PROCEDURE — 1036F TOBACCO NON-USER: CPT | Performed by: NURSE PRACTITIONER

## 2018-04-24 PROCEDURE — 3017F COLORECTAL CA SCREEN DOC REV: CPT | Performed by: NURSE PRACTITIONER

## 2018-04-24 PROCEDURE — G8417 CALC BMI ABV UP PARAM F/U: HCPCS | Performed by: NURSE PRACTITIONER

## 2018-04-24 PROCEDURE — G8400 PT W/DXA NO RESULTS DOC: HCPCS | Performed by: NURSE PRACTITIONER

## 2018-04-24 PROCEDURE — G8598 ASA/ANTIPLAT THER USED: HCPCS | Performed by: NURSE PRACTITIONER

## 2018-04-24 PROCEDURE — 1090F PRES/ABSN URINE INCON ASSESS: CPT | Performed by: NURSE PRACTITIONER

## 2018-04-24 PROCEDURE — 4040F PNEUMOC VAC/ADMIN/RCVD: CPT | Performed by: NURSE PRACTITIONER

## 2018-04-24 PROCEDURE — 1123F ACP DISCUSS/DSCN MKR DOCD: CPT | Performed by: NURSE PRACTITIONER

## 2018-04-24 RX ORDER — LIDOCAINE HYDROCHLORIDE 30 MG/G
CREAM TOPICAL 2 TIMES DAILY
Status: ON HOLD | COMMUNITY
End: 2020-08-30 | Stop reason: HOSPADM

## 2018-04-24 RX ORDER — LIDOCAINE HYDROCHLORIDE 40 MG/ML
SOLUTION TOPICAL 2 TIMES DAILY
COMMUNITY

## 2018-04-30 ENCOUNTER — HOSPITAL ENCOUNTER (OUTPATIENT)
Facility: MEDICAL CENTER | Age: 68
End: 2018-04-30
Payer: MEDICARE

## 2018-05-01 ENCOUNTER — TELEPHONE (OUTPATIENT)
Dept: ONCOLOGY | Age: 68
End: 2018-05-01

## 2018-05-01 ENCOUNTER — OFFICE VISIT (OUTPATIENT)
Dept: ONCOLOGY | Age: 68
End: 2018-05-01
Payer: MEDICARE

## 2018-05-01 ENCOUNTER — HOSPITAL ENCOUNTER (OUTPATIENT)
Age: 68
Discharge: HOME | End: 2018-05-01
Payer: MEDICARE

## 2018-05-01 VITALS
DIASTOLIC BLOOD PRESSURE: 64 MMHG | TEMPERATURE: 98.7 F | HEART RATE: 59 BPM | RESPIRATION RATE: 20 BRPM | SYSTOLIC BLOOD PRESSURE: 141 MMHG

## 2018-05-01 DIAGNOSIS — D68.59 HYPERCOAGULABLE STATE (HCC): Primary | ICD-10-CM

## 2018-05-01 PROCEDURE — 4040F PNEUMOC VAC/ADMIN/RCVD: CPT | Performed by: INTERNAL MEDICINE

## 2018-05-01 PROCEDURE — 1123F ACP DISCUSS/DSCN MKR DOCD: CPT | Performed by: INTERNAL MEDICINE

## 2018-05-01 PROCEDURE — G8427 DOCREV CUR MEDS BY ELIG CLIN: HCPCS | Performed by: INTERNAL MEDICINE

## 2018-05-01 PROCEDURE — G8417 CALC BMI ABV UP PARAM F/U: HCPCS | Performed by: INTERNAL MEDICINE

## 2018-05-01 PROCEDURE — 99214 OFFICE O/P EST MOD 30 MIN: CPT | Performed by: INTERNAL MEDICINE

## 2018-05-01 PROCEDURE — 1036F TOBACCO NON-USER: CPT | Performed by: INTERNAL MEDICINE

## 2018-05-01 PROCEDURE — G8598 ASA/ANTIPLAT THER USED: HCPCS | Performed by: INTERNAL MEDICINE

## 2018-05-01 PROCEDURE — 1090F PRES/ABSN URINE INCON ASSESS: CPT | Performed by: INTERNAL MEDICINE

## 2018-05-01 PROCEDURE — 3017F COLORECTAL CA SCREEN DOC REV: CPT | Performed by: INTERNAL MEDICINE

## 2018-05-01 PROCEDURE — 99211 OFF/OP EST MAY X REQ PHY/QHP: CPT

## 2018-05-01 PROCEDURE — G8400 PT W/DXA NO RESULTS DOC: HCPCS | Performed by: INTERNAL MEDICINE

## 2018-05-01 RX ORDER — ONDANSETRON 4 MG/1
4 TABLET, FILM COATED ORAL EVERY 8 HOURS PRN
COMMUNITY

## 2018-06-19 ENCOUNTER — OFFICE VISIT (OUTPATIENT)
Dept: INFECTIOUS DISEASES | Age: 68
End: 2018-06-19
Payer: MEDICARE

## 2018-06-19 VITALS
HEIGHT: 64 IN | SYSTOLIC BLOOD PRESSURE: 138 MMHG | HEART RATE: 87 BPM | TEMPERATURE: 97.4 F | DIASTOLIC BLOOD PRESSURE: 68 MMHG | OXYGEN SATURATION: 93 %

## 2018-06-19 DIAGNOSIS — S70.211D: ICD-10-CM

## 2018-06-19 DIAGNOSIS — T14.8XXA WOUND INFECTION: Primary | ICD-10-CM

## 2018-06-19 DIAGNOSIS — L08.9 WOUND INFECTION: Primary | ICD-10-CM

## 2018-06-19 DIAGNOSIS — L08.9: ICD-10-CM

## 2018-06-19 PROCEDURE — 4040F PNEUMOC VAC/ADMIN/RCVD: CPT | Performed by: INTERNAL MEDICINE

## 2018-06-19 PROCEDURE — G8417 CALC BMI ABV UP PARAM F/U: HCPCS | Performed by: INTERNAL MEDICINE

## 2018-06-19 PROCEDURE — 1090F PRES/ABSN URINE INCON ASSESS: CPT | Performed by: INTERNAL MEDICINE

## 2018-06-19 PROCEDURE — 1123F ACP DISCUSS/DSCN MKR DOCD: CPT | Performed by: INTERNAL MEDICINE

## 2018-06-19 PROCEDURE — 99214 OFFICE O/P EST MOD 30 MIN: CPT | Performed by: INTERNAL MEDICINE

## 2018-06-19 PROCEDURE — G8427 DOCREV CUR MEDS BY ELIG CLIN: HCPCS | Performed by: INTERNAL MEDICINE

## 2018-06-19 PROCEDURE — G8598 ASA/ANTIPLAT THER USED: HCPCS | Performed by: INTERNAL MEDICINE

## 2018-06-19 PROCEDURE — 1036F TOBACCO NON-USER: CPT | Performed by: INTERNAL MEDICINE

## 2018-06-19 PROCEDURE — 3017F COLORECTAL CA SCREEN DOC REV: CPT | Performed by: INTERNAL MEDICINE

## 2018-06-19 PROCEDURE — G8400 PT W/DXA NO RESULTS DOC: HCPCS | Performed by: INTERNAL MEDICINE

## 2018-06-24 ASSESSMENT — ENCOUNTER SYMPTOMS
TROUBLE SWALLOWING: 0
ABDOMINAL DISTENTION: 0
SHORTNESS OF BREATH: 0
EYE DISCHARGE: 0
RHINORRHEA: 0
DIARRHEA: 0
BACK PAIN: 0
NAUSEA: 0
VOICE CHANGE: 0
ABDOMINAL PAIN: 0
COUGH: 0
FACIAL SWELLING: 0
SORE THROAT: 0

## 2018-07-09 ENCOUNTER — HOSPITAL ENCOUNTER (OUTPATIENT)
Facility: MEDICAL CENTER | Age: 68
End: 2018-07-09
Payer: MEDICARE

## 2018-07-10 ENCOUNTER — OFFICE VISIT (OUTPATIENT)
Dept: ONCOLOGY | Age: 68
End: 2018-07-10
Payer: MEDICARE

## 2018-07-10 ENCOUNTER — TELEPHONE (OUTPATIENT)
Dept: ONCOLOGY | Age: 68
End: 2018-07-10

## 2018-07-10 VITALS
TEMPERATURE: 98.5 F | HEART RATE: 50 BPM | RESPIRATION RATE: 18 BRPM | SYSTOLIC BLOOD PRESSURE: 144 MMHG | DIASTOLIC BLOOD PRESSURE: 75 MMHG | WEIGHT: 250 LBS | BODY MASS INDEX: 42.89 KG/M2

## 2018-07-10 DIAGNOSIS — D68.59 HYPERCOAGULABLE STATE (HCC): Primary | ICD-10-CM

## 2018-07-10 DIAGNOSIS — I63.10 CEREBROVASCULAR ACCIDENT (CVA) DUE TO EMBOLISM OF PRECEREBRAL ARTERY (HCC): ICD-10-CM

## 2018-07-10 PROCEDURE — 1123F ACP DISCUSS/DSCN MKR DOCD: CPT | Performed by: INTERNAL MEDICINE

## 2018-07-10 PROCEDURE — G8417 CALC BMI ABV UP PARAM F/U: HCPCS | Performed by: INTERNAL MEDICINE

## 2018-07-10 PROCEDURE — 3017F COLORECTAL CA SCREEN DOC REV: CPT | Performed by: INTERNAL MEDICINE

## 2018-07-10 PROCEDURE — 99214 OFFICE O/P EST MOD 30 MIN: CPT | Performed by: INTERNAL MEDICINE

## 2018-07-10 PROCEDURE — 1036F TOBACCO NON-USER: CPT | Performed by: INTERNAL MEDICINE

## 2018-07-10 PROCEDURE — 99211 OFF/OP EST MAY X REQ PHY/QHP: CPT

## 2018-07-10 PROCEDURE — 4040F PNEUMOC VAC/ADMIN/RCVD: CPT | Performed by: INTERNAL MEDICINE

## 2018-07-10 PROCEDURE — G8598 ASA/ANTIPLAT THER USED: HCPCS | Performed by: INTERNAL MEDICINE

## 2018-07-10 PROCEDURE — G8400 PT W/DXA NO RESULTS DOC: HCPCS | Performed by: INTERNAL MEDICINE

## 2018-07-10 PROCEDURE — 1090F PRES/ABSN URINE INCON ASSESS: CPT | Performed by: INTERNAL MEDICINE

## 2018-07-10 PROCEDURE — G8427 DOCREV CUR MEDS BY ELIG CLIN: HCPCS | Performed by: INTERNAL MEDICINE

## 2018-07-10 NOTE — TELEPHONE ENCOUNTER
YURY ARRIVES VIA WHEELCHAIR FOR MD VISIT  DR Serrano Must IN TO SEE PATIENT  ORDERS RECEIVED  RV 6 MONTHS NO LABS  MD VISIT 1/8/19 @ 2PM  AVS PRINTED AND GIVEN TO PATIENT [facility] HOME  PATIENT DISCHARGED VIA WHEELCHAIR

## 2018-07-11 NOTE — PROGRESS NOTES
250 lb (113.4 kg)   BMI 42.89 kg/m²     Physical exam -   Physical Exam   Constitutional: She is oriented to person, place, and time. She appears well-developed. No distress. HENT:   Head: Normocephalic and atraumatic. Eyes: Conjunctivae and EOM are normal. Pupils are equal, round, and reactive to light. Neck: Normal range of motion. Neck supple. Cardiovascular: Normal rate, regular rhythm and normal heart sounds. Pulmonary/Chest: Effort normal and breath sounds normal. No respiratory distress. Abdominal: Soft. Bowel sounds are normal. She exhibits no distension. There is no tenderness. PEG tube+   Neurological: She is alert and oriented to person, place, and time. No cranial nerve deficit. Data:    No intake/output data recorded. In: 9843 [I.V.:484; NG/GT:1313]  Out: -     LABS    Lab Results   Component Value Date    WBC 8.0 12/14/2017    HGB 9.2 (L) 12/14/2017    HCT 29.2 (L) 12/14/2017    MCV 93.6 12/14/2017     12/14/2017     Lab Results   Component Value Date     12/14/2017    K 4.3 12/14/2017     12/14/2017    CO2 22 12/14/2017    BUN 16 12/14/2017    CREATININE 0.67 12/14/2017    GLUCOSE 137 12/14/2017    CALCIUM 7.9 12/14/2017      RESULTS:   MOLECULAR GENETIC DIAGNOSIS:       A. 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  Negative for MTHFR M1058C Mutation       MOLECULAR GENETIC DIAGNOSIS:     Negative for Factor V Leiden Mutation     RESULTS:   MOLECULAR GENETIC DIAGNOSIS:          Negative for Prothrombin 37553W   Mutation      Ref. Range 12/8/2017 14:09   Anticardiolipin IgG Latest Ref Range: <20 GPU 2.8   Cardiolipin Ab IgM Latest Ref Range: <20 MPU 41.9 (H)       Radiology  CTA Neck  Impression   1.  No high-grade stenosis, or focal occlusion involving intracranial   vasculature or cervical vasculature.       2. Beaded appearance of the distal bilateral cervical internal carotid   arteries, as well as bilateral vertebral arteries.  Findings may be seen in

## 2018-11-15 PROBLEM — A49.01 MSSA (METHICILLIN SUSCEPTIBLE STAPHYLOCOCCUS AUREUS) INFECTION: Status: ACTIVE | Noted: 2018-11-15

## 2018-11-15 PROBLEM — A49.8 PROTEUS INFECTION: Status: ACTIVE | Noted: 2018-11-15

## 2018-11-15 PROBLEM — S71.001A OPEN WOUND OF RIGHT HIP: Status: ACTIVE | Noted: 2018-11-15

## 2018-11-15 PROBLEM — Z88.0 ALLERGY TO PENICILLIN: Status: ACTIVE | Noted: 2018-11-15

## 2018-11-15 PROBLEM — A49.8 E COLI INFECTION: Status: ACTIVE | Noted: 2018-11-15

## 2018-12-26 ENCOUNTER — HOSPITAL ENCOUNTER (OUTPATIENT)
Facility: MEDICAL CENTER | Age: 68
End: 2018-12-26
Payer: MEDICARE

## 2019-09-16 NOTE — CONSULTS
The wire is inserted in the distal left anterior descending artery. female, who is resting comfortably  in bed. VITAL SIGNS:  Her blood pressure is 132/69, her pulse is 70, respirations  are 19, temperature is 98.8. HEENT:  Atraumatic, normocephalic. There is no Daugherty sign or racoon sign  noted. NECK:  She has no reproducible discomfort to my exam with palpation of her  cervical spine. She does grimace or flinch when I perform deep palpation  over her neck. EXTREMITIES:  Warm to touch with a degree of pitting edema in the distal  lower extremities. Radial pulses are 2+. NEUROLOGIC:  The patient is awake, alert, and follows only few commands. She will wiggle her toes when asked and squeeze my hands. She will not  follow any complex commands. I was unable to test motor or sensory exam  due to the patient's altered sensorium. No clonus or hyperreflexia is  noted. RADIOGRAPHIC IMAGING:  A cervical spine CT was reviewed and this study  demonstrated diffuse degenerative changes throughout the cervical spine,  but no acute findings, such as fracture or dislocation. Thoracic CT  demonstrated no acute findings, such as fracture or spine malalignment. The patient did also undergo a cervical and thoracic MRI. The cervical MRI  demonstrated mild degrees of spinal stenosis, but no evidence of large  focal disk herniation or spinal cord compression. There was no obvious  ligamentous injury noted as well. Thoracic CT demonstrated moderate to  early severe stenosis at the mid thoracic levels due to a congenitally  shallow spinal canal as well as what appears to be a calcification of  ligamentous tissue posteriorly. IMPRESSION:  The patient is a 53-year-old female who was found down nearly  3 weeks ago with altered mental status. The patient has now recently been  extubated. She remains encephalopathic. Neurologically, the patient is  awake, following few simple commands, moving all 4 extremities. She has no  obvious tenderness to palpation over the cervical spine.

## 2020-08-24 ENCOUNTER — HOSPITAL ENCOUNTER (INPATIENT)
Age: 70
LOS: 6 days | Discharge: NURSING, REHAB OR LONG-TERM CARE | DRG: 065 | End: 2020-08-30
Attending: EMERGENCY MEDICINE | Admitting: INTERNAL MEDICINE
Payer: MEDICARE

## 2020-08-24 ENCOUNTER — APPOINTMENT (OUTPATIENT)
Dept: GENERAL RADIOLOGY | Age: 70
DRG: 065 | End: 2020-08-24
Payer: MEDICARE

## 2020-08-24 DIAGNOSIS — M53.9 MULTILEVEL DEGENERATIVE DISC DISEASE: ICD-10-CM

## 2020-08-24 DIAGNOSIS — T76.01XA: Primary | ICD-10-CM

## 2020-08-24 DIAGNOSIS — A41.9 SEPTICEMIA (HCC): ICD-10-CM

## 2020-08-24 DIAGNOSIS — L03.317 CELLULITIS OF BUTTOCK: ICD-10-CM

## 2020-08-24 LAB
ABSOLUTE EOS #: 0.03 K/UL (ref 0–0.44)
ABSOLUTE IMMATURE GRANULOCYTE: 0.03 K/UL (ref 0–0.3)
ABSOLUTE LYMPH #: 1.49 K/UL (ref 1.1–3.7)
ABSOLUTE MONO #: 0.55 K/UL (ref 0.1–1.2)
ALBUMIN SERPL-MCNC: 3.4 G/DL (ref 3.5–5.2)
ALBUMIN/GLOBULIN RATIO: 0.7 (ref 1–2.5)
ALLEN TEST: NORMAL
ALP BLD-CCNC: 96 U/L (ref 35–104)
ALT SERPL-CCNC: 8 U/L (ref 5–33)
ANION GAP SERPL CALCULATED.3IONS-SCNC: 17 MMOL/L (ref 9–17)
ANION GAP: 8 MMOL/L (ref 7–16)
AST SERPL-CCNC: 15 U/L
BASOPHILS # BLD: 0 % (ref 0–2)
BASOPHILS ABSOLUTE: <0.03 K/UL (ref 0–0.2)
BILIRUB SERPL-MCNC: 0.45 MG/DL (ref 0.3–1.2)
BUN BLDV-MCNC: 14 MG/DL (ref 8–23)
BUN/CREAT BLD: ABNORMAL (ref 9–20)
CALCIUM SERPL-MCNC: 9.3 MG/DL (ref 8.6–10.4)
CHLORIDE BLD-SCNC: 102 MMOL/L (ref 98–107)
CO2: 21 MMOL/L (ref 20–31)
CREAT SERPL-MCNC: 0.52 MG/DL (ref 0.5–0.9)
DIFFERENTIAL TYPE: ABNORMAL
EOSINOPHILS RELATIVE PERCENT: 0 % (ref 1–4)
FIO2: NORMAL
GFR AFRICAN AMERICAN: >60 ML/MIN
GFR NON-AFRICAN AMERICAN: >60 ML/MIN
GFR NON-AFRICAN AMERICAN: >60 ML/MIN
GFR SERPL CREATININE-BSD FRML MDRD: >60 ML/MIN
GFR SERPL CREATININE-BSD FRML MDRD: ABNORMAL ML/MIN/{1.73_M2}
GFR SERPL CREATININE-BSD FRML MDRD: ABNORMAL ML/MIN/{1.73_M2}
GFR SERPL CREATININE-BSD FRML MDRD: NORMAL ML/MIN/{1.73_M2}
GLUCOSE BLD-MCNC: 134 MG/DL (ref 70–99)
GLUCOSE BLD-MCNC: 136 MG/DL (ref 74–100)
HCO3 VENOUS: 27.9 MMOL/L (ref 22–29)
HCT VFR BLD CALC: 48.8 % (ref 36.3–47.1)
HEMOGLOBIN: 14.3 G/DL (ref 11.9–15.1)
IMMATURE GRANULOCYTES: 0 %
INR BLD: 1
LACTIC ACID, SEPSIS WHOLE BLOOD: 3.7 MMOL/L (ref 0.5–1.9)
LACTIC ACID, SEPSIS: ABNORMAL MMOL/L (ref 0.5–1.9)
LYMPHOCYTES # BLD: 16 % (ref 24–43)
MCH RBC QN AUTO: 29.2 PG (ref 25.2–33.5)
MCHC RBC AUTO-ENTMCNC: 29.3 G/DL (ref 28.4–34.8)
MCV RBC AUTO: 99.6 FL (ref 82.6–102.9)
MODE: NORMAL
MONOCYTES # BLD: 6 % (ref 3–12)
NEGATIVE BASE EXCESS, VEN: NORMAL (ref 0–2)
NRBC AUTOMATED: 0 PER 100 WBC
O2 DEVICE/FLOW/%: NORMAL
O2 SAT, VEN: 74 % (ref 60–85)
PARTIAL THROMBOPLASTIN TIME: 24.2 SEC (ref 20.5–30.5)
PATIENT TEMP: NORMAL
PCO2, VEN: 49.2 MM HG (ref 41–51)
PDW BLD-RTO: 13.5 % (ref 11.8–14.4)
PH VENOUS: 7.36 (ref 7.32–7.43)
PLATELET # BLD: 331 K/UL (ref 138–453)
PLATELET ESTIMATE: ABNORMAL
PMV BLD AUTO: 9.9 FL (ref 8.1–13.5)
PO2, VEN: 41.1 MM HG (ref 30–50)
POC CHLORIDE: 110 MMOL/L (ref 98–107)
POC CREATININE: 0.67 MG/DL (ref 0.51–1.19)
POC HEMATOCRIT: 52 % (ref 36–46)
POC HEMOGLOBIN: 17.5 G/DL (ref 12–16)
POC IONIZED CALCIUM: 1.09 MMOL/L (ref 1.15–1.33)
POC LACTIC ACID: 3.61 MMOL/L (ref 0.56–1.39)
POC PCO2 TEMP: NORMAL MM HG
POC PH TEMP: NORMAL
POC PO2 TEMP: NORMAL MM HG
POC POTASSIUM: 3.8 MMOL/L (ref 3.5–4.5)
POC SODIUM: 146 MMOL/L (ref 138–146)
POSITIVE BASE EXCESS, VEN: 2 (ref 0–3)
POTASSIUM SERPL-SCNC: 4.4 MMOL/L (ref 3.7–5.3)
PROTHROMBIN TIME: 10.3 SEC (ref 9–12)
RBC # BLD: 4.9 M/UL (ref 3.95–5.11)
RBC # BLD: ABNORMAL 10*6/UL
SAMPLE SITE: NORMAL
SEG NEUTROPHILS: 78 % (ref 36–65)
SEGMENTED NEUTROPHILS ABSOLUTE COUNT: 7.27 K/UL (ref 1.5–8.1)
SODIUM BLD-SCNC: 140 MMOL/L (ref 135–144)
TOTAL CO2, VENOUS: 30 MMOL/L (ref 23–30)
TOTAL PROTEIN: 8 G/DL (ref 6.4–8.3)
TROPONIN INTERP: ABNORMAL
TROPONIN T: ABNORMAL NG/ML
TROPONIN, HIGH SENSITIVITY: 50 NG/L (ref 0–14)
WBC # BLD: 9.4 K/UL (ref 3.5–11.3)
WBC # BLD: ABNORMAL 10*3/UL

## 2020-08-24 PROCEDURE — 82947 ASSAY GLUCOSE BLOOD QUANT: CPT

## 2020-08-24 PROCEDURE — 84295 ASSAY OF SERUM SODIUM: CPT

## 2020-08-24 PROCEDURE — 2580000003 HC RX 258: Performed by: STUDENT IN AN ORGANIZED HEALTH CARE EDUCATION/TRAINING PROGRAM

## 2020-08-24 PROCEDURE — 85014 HEMATOCRIT: CPT

## 2020-08-24 PROCEDURE — 84132 ASSAY OF SERUM POTASSIUM: CPT

## 2020-08-24 PROCEDURE — 87150 DNA/RNA AMPLIFIED PROBE: CPT

## 2020-08-24 PROCEDURE — 71045 X-RAY EXAM CHEST 1 VIEW: CPT

## 2020-08-24 PROCEDURE — 2060000000 HC ICU INTERMEDIATE R&B

## 2020-08-24 PROCEDURE — 82565 ASSAY OF CREATININE: CPT

## 2020-08-24 PROCEDURE — 96365 THER/PROPH/DIAG IV INF INIT: CPT

## 2020-08-24 PROCEDURE — 80053 COMPREHEN METABOLIC PANEL: CPT

## 2020-08-24 PROCEDURE — 83605 ASSAY OF LACTIC ACID: CPT

## 2020-08-24 PROCEDURE — 87040 BLOOD CULTURE FOR BACTERIA: CPT

## 2020-08-24 PROCEDURE — 84484 ASSAY OF TROPONIN QUANT: CPT

## 2020-08-24 PROCEDURE — 85730 THROMBOPLASTIN TIME PARTIAL: CPT

## 2020-08-24 PROCEDURE — 96366 THER/PROPH/DIAG IV INF ADDON: CPT

## 2020-08-24 PROCEDURE — 99222 1ST HOSP IP/OBS MODERATE 55: CPT | Performed by: NURSE PRACTITIONER

## 2020-08-24 PROCEDURE — 87205 SMEAR GRAM STAIN: CPT

## 2020-08-24 PROCEDURE — 85610 PROTHROMBIN TIME: CPT

## 2020-08-24 PROCEDURE — 6360000002 HC RX W HCPCS: Performed by: STUDENT IN AN ORGANIZED HEALTH CARE EDUCATION/TRAINING PROGRAM

## 2020-08-24 PROCEDURE — 82330 ASSAY OF CALCIUM: CPT

## 2020-08-24 PROCEDURE — 82435 ASSAY OF BLOOD CHLORIDE: CPT

## 2020-08-24 PROCEDURE — 99285 EMERGENCY DEPT VISIT HI MDM: CPT

## 2020-08-24 PROCEDURE — 93005 ELECTROCARDIOGRAM TRACING: CPT | Performed by: STUDENT IN AN ORGANIZED HEALTH CARE EDUCATION/TRAINING PROGRAM

## 2020-08-24 PROCEDURE — 96367 TX/PROPH/DG ADDL SEQ IV INF: CPT

## 2020-08-24 PROCEDURE — 85025 COMPLETE CBC W/AUTO DIFF WBC: CPT

## 2020-08-24 PROCEDURE — 82803 BLOOD GASES ANY COMBINATION: CPT

## 2020-08-24 RX ORDER — 0.9 % SODIUM CHLORIDE 0.9 %
1000 INTRAVENOUS SOLUTION INTRAVENOUS ONCE
Status: COMPLETED | OUTPATIENT
Start: 2020-08-24 | End: 2020-08-24

## 2020-08-24 RX ADMIN — VANCOMYCIN HYDROCHLORIDE 2500 MG: 1 INJECTION, POWDER, LYOPHILIZED, FOR SOLUTION INTRAVENOUS at 21:31

## 2020-08-24 RX ADMIN — SODIUM CHLORIDE 1000 ML: 9 INJECTION, SOLUTION INTRAVENOUS at 21:30

## 2020-08-24 RX ADMIN — CEFEPIME 2 G: 2 INJECTION, POWDER, FOR SOLUTION INTRAVENOUS at 20:38

## 2020-08-24 RX ADMIN — SODIUM CHLORIDE 1000 ML: 9 INJECTION, SOLUTION INTRAVENOUS at 20:37
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
## 2020-08-24 NOTE — ED NOTES
Patient placed on purewick, clean sheets, ans warm blankets. Patient placed on monitor. EKG obtained and attempting lines.       Neida Gerber RN  08/24/20 1930
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
## 2020-08-25 ENCOUNTER — APPOINTMENT (OUTPATIENT)
Dept: GENERAL RADIOLOGY | Age: 70
DRG: 065 | End: 2020-08-25
Payer: MEDICARE

## 2020-08-25 PROBLEM — E86.1 HYPOTENSION DUE TO HYPOVOLEMIA: Status: ACTIVE | Noted: 2020-08-25

## 2020-08-25 PROBLEM — R10.811 RIGHT UPPER QUADRANT ABDOMINAL TENDERNESS WITHOUT REBOUND TENDERNESS: Status: ACTIVE | Noted: 2020-08-25

## 2020-08-25 PROBLEM — M53.9 MULTILEVEL DEGENERATIVE DISC DISEASE: Status: ACTIVE | Noted: 2020-08-25

## 2020-08-25 PROBLEM — R41.82 ALTERED MENTAL STATUS: Status: RESOLVED | Noted: 2017-11-18 | Resolved: 2020-08-25

## 2020-08-25 PROBLEM — E87.20 LACTIC ACIDOSIS: Status: ACTIVE | Noted: 2020-08-25

## 2020-08-25 PROBLEM — L89.42: Status: ACTIVE | Noted: 2020-08-25

## 2020-08-25 PROBLEM — L03.115 CELLULITIS OF RIGHT LOWER EXTREMITY: Status: ACTIVE | Noted: 2020-08-25

## 2020-08-25 PROBLEM — I95.89 HYPOTENSION DUE TO HYPOVOLEMIA: Status: ACTIVE | Noted: 2020-08-25

## 2020-08-25 PROBLEM — E86.0 DEHYDRATION: Status: ACTIVE | Noted: 2020-08-25

## 2020-08-25 PROBLEM — L30.8 DERMATITIS ASSOCIATED WITH MOISTURE: Status: ACTIVE | Noted: 2020-08-25

## 2020-08-25 PROBLEM — R77.8 ELEVATED TROPONIN: Status: ACTIVE | Noted: 2020-08-25

## 2020-08-25 PROBLEM — R19.7 DIARRHEA OF PRESUMED INFECTIOUS ORIGIN: Status: ACTIVE | Noted: 2020-08-25

## 2020-08-25 LAB
ALBUMIN SERPL-MCNC: 2.9 G/DL (ref 3.5–5.2)
ALBUMIN SERPL-MCNC: 2.9 G/DL (ref 3.5–5.2)
ALBUMIN/GLOBULIN RATIO: 0.8 (ref 1–2.5)
ALBUMIN/GLOBULIN RATIO: 0.9 (ref 1–2.5)
ALP BLD-CCNC: 69 U/L (ref 35–104)
ALP BLD-CCNC: 74 U/L (ref 35–104)
ALT SERPL-CCNC: 5 U/L (ref 5–33)
ALT SERPL-CCNC: 6 U/L (ref 5–33)
ANION GAP SERPL CALCULATED.3IONS-SCNC: 13 MMOL/L (ref 9–17)
AST SERPL-CCNC: 8 U/L
AST SERPL-CCNC: 9 U/L
BILIRUB SERPL-MCNC: 0.27 MG/DL (ref 0.3–1.2)
BILIRUB SERPL-MCNC: 0.29 MG/DL (ref 0.3–1.2)
BILIRUBIN DIRECT: 0.12 MG/DL
BILIRUBIN, INDIRECT: 0.15 MG/DL (ref 0–1)
BUN BLDV-MCNC: 14 MG/DL (ref 8–23)
BUN/CREAT BLD: ABNORMAL (ref 9–20)
C-REACTIVE PROTEIN: 73.7 MG/L (ref 0–5)
CALCIUM IONIZED: 1.08 MMOL/L (ref 1.13–1.33)
CALCIUM SERPL-MCNC: 8.2 MG/DL (ref 8.6–10.4)
CHLORIDE BLD-SCNC: 106 MMOL/L (ref 98–107)
CO2: 24 MMOL/L (ref 20–31)
CREAT SERPL-MCNC: 0.55 MG/DL (ref 0.5–0.9)
EKG ATRIAL RATE: 117 BPM
EKG P AXIS: 54 DEGREES
EKG P-R INTERVAL: 146 MS
EKG Q-T INTERVAL: 342 MS
EKG QRS DURATION: 78 MS
EKG QTC CALCULATION (BAZETT): 477 MS
EKG R AXIS: 50 DEGREES
EKG T AXIS: 41 DEGREES
EKG VENTRICULAR RATE: 117 BPM
ESTIMATED AVERAGE GLUCOSE: 120 MG/DL
FOLATE: 5.9 NG/ML
GFR AFRICAN AMERICAN: >60 ML/MIN
GFR NON-AFRICAN AMERICAN: >60 ML/MIN
GFR SERPL CREATININE-BSD FRML MDRD: ABNORMAL ML/MIN/{1.73_M2}
GFR SERPL CREATININE-BSD FRML MDRD: ABNORMAL ML/MIN/{1.73_M2}
GLOBULIN: ABNORMAL G/DL (ref 1.5–3.8)
GLUCOSE BLD-MCNC: 114 MG/DL (ref 65–105)
GLUCOSE BLD-MCNC: 142 MG/DL (ref 65–105)
GLUCOSE BLD-MCNC: 143 MG/DL (ref 65–105)
GLUCOSE BLD-MCNC: 173 MG/DL (ref 70–99)
GLUCOSE BLD-MCNC: 96 MG/DL (ref 65–105)
HBA1C MFR BLD: 5.8 % (ref 4–6)
HCT VFR BLD CALC: 38.3 % (ref 36.3–47.1)
HEMOGLOBIN: 11.7 G/DL (ref 11.9–15.1)
INR BLD: 1
LACTIC ACID, SEPSIS WHOLE BLOOD: 2.7 MMOL/L (ref 0.5–1.9)
LACTIC ACID, SEPSIS: ABNORMAL MMOL/L (ref 0.5–1.9)
LACTIC ACID, WHOLE BLOOD: 2.8 MMOL/L (ref 0.7–2.1)
LIPASE: 23 U/L (ref 13–60)
MAGNESIUM: 1.9 MG/DL (ref 1.6–2.6)
MCH RBC QN AUTO: 28.7 PG (ref 25.2–33.5)
MCHC RBC AUTO-ENTMCNC: 30.5 G/DL (ref 28.4–34.8)
MCV RBC AUTO: 94.1 FL (ref 82.6–102.9)
MYOGLOBIN: 34 NG/ML (ref 25–58)
MYOGLOBIN: 38 NG/ML (ref 25–58)
MYOGLOBIN: 39 NG/ML (ref 25–58)
NRBC AUTOMATED: 0 PER 100 WBC
PDW BLD-RTO: 13.6 % (ref 11.8–14.4)
PHOSPHORUS: 2.1 MG/DL (ref 2.6–4.5)
PLATELET # BLD: 301 K/UL (ref 138–453)
PMV BLD AUTO: 9.9 FL (ref 8.1–13.5)
POTASSIUM SERPL-SCNC: 3.2 MMOL/L (ref 3.7–5.3)
PROCALCITONIN: 0.05 NG/ML
PROTHROMBIN TIME: 10.3 SEC (ref 9–12)
RBC # BLD: 4.07 M/UL (ref 3.95–5.11)
SODIUM BLD-SCNC: 143 MMOL/L (ref 135–144)
TOTAL PROTEIN: 6.3 G/DL (ref 6.4–8.3)
TOTAL PROTEIN: 6.4 G/DL (ref 6.4–8.3)
TROPONIN INTERP: ABNORMAL
TROPONIN T: ABNORMAL NG/ML
TROPONIN, HIGH SENSITIVITY: 48 NG/L (ref 0–14)
TROPONIN, HIGH SENSITIVITY: 53 NG/L (ref 0–14)
TROPONIN, HIGH SENSITIVITY: 59 NG/L (ref 0–14)
TSH SERPL DL<=0.05 MIU/L-ACNC: 3.46 MIU/L (ref 0.3–5)
VITAMIN B-12: 690 PG/ML (ref 232–1245)
VITAMIN D 25-HYDROXY: 8.8 NG/ML (ref 30–100)
WBC # BLD: 11.2 K/UL (ref 3.5–11.3)

## 2020-08-25 PROCEDURE — 2580000003 HC RX 258: Performed by: NURSE PRACTITIONER

## 2020-08-25 PROCEDURE — 97110 THERAPEUTIC EXERCISES: CPT

## 2020-08-25 PROCEDURE — 6360000002 HC RX W HCPCS: Performed by: NURSE PRACTITIONER

## 2020-08-25 PROCEDURE — 73523 X-RAY EXAM HIPS BI 5/> VIEWS: CPT

## 2020-08-25 PROCEDURE — 82607 VITAMIN B-12: CPT

## 2020-08-25 PROCEDURE — 2060000000 HC ICU INTERMEDIATE R&B

## 2020-08-25 PROCEDURE — 80053 COMPREHEN METABOLIC PANEL: CPT

## 2020-08-25 PROCEDURE — 83690 ASSAY OF LIPASE: CPT

## 2020-08-25 PROCEDURE — 84484 ASSAY OF TROPONIN QUANT: CPT

## 2020-08-25 PROCEDURE — 84443 ASSAY THYROID STIM HORMONE: CPT

## 2020-08-25 PROCEDURE — 86140 C-REACTIVE PROTEIN: CPT

## 2020-08-25 PROCEDURE — 6370000000 HC RX 637 (ALT 250 FOR IP): Performed by: NURSE PRACTITIONER

## 2020-08-25 PROCEDURE — 6370000000 HC RX 637 (ALT 250 FOR IP): Performed by: INTERNAL MEDICINE

## 2020-08-25 PROCEDURE — 97162 PT EVAL MOD COMPLEX 30 MIN: CPT

## 2020-08-25 PROCEDURE — 93005 ELECTROCARDIOGRAM TRACING: CPT | Performed by: INTERNAL MEDICINE

## 2020-08-25 PROCEDURE — 36415 COLL VENOUS BLD VENIPUNCTURE: CPT

## 2020-08-25 PROCEDURE — 84145 PROCALCITONIN (PCT): CPT

## 2020-08-25 PROCEDURE — 97535 SELF CARE MNGMENT TRAINING: CPT

## 2020-08-25 PROCEDURE — 2500000003 HC RX 250 WO HCPCS: Performed by: INTERNAL MEDICINE

## 2020-08-25 PROCEDURE — 82306 VITAMIN D 25 HYDROXY: CPT

## 2020-08-25 PROCEDURE — 84100 ASSAY OF PHOSPHORUS: CPT

## 2020-08-25 PROCEDURE — 97530 THERAPEUTIC ACTIVITIES: CPT

## 2020-08-25 PROCEDURE — 85027 COMPLETE CBC AUTOMATED: CPT

## 2020-08-25 PROCEDURE — 2580000003 HC RX 258: Performed by: INTERNAL MEDICINE

## 2020-08-25 PROCEDURE — 82330 ASSAY OF CALCIUM: CPT

## 2020-08-25 PROCEDURE — 87506 IADNA-DNA/RNA PROBE TQ 6-11: CPT

## 2020-08-25 PROCEDURE — 99233 SBSQ HOSP IP/OBS HIGH 50: CPT | Performed by: INTERNAL MEDICINE

## 2020-08-25 PROCEDURE — 83735 ASSAY OF MAGNESIUM: CPT

## 2020-08-25 PROCEDURE — 97166 OT EVAL MOD COMPLEX 45 MIN: CPT

## 2020-08-25 PROCEDURE — 83874 ASSAY OF MYOGLOBIN: CPT

## 2020-08-25 PROCEDURE — 82947 ASSAY GLUCOSE BLOOD QUANT: CPT

## 2020-08-25 PROCEDURE — 73590 X-RAY EXAM OF LOWER LEG: CPT

## 2020-08-25 PROCEDURE — 83605 ASSAY OF LACTIC ACID: CPT

## 2020-08-25 PROCEDURE — 87449 NOS EACH ORGANISM AG IA: CPT

## 2020-08-25 PROCEDURE — 83036 HEMOGLOBIN GLYCOSYLATED A1C: CPT

## 2020-08-25 PROCEDURE — 82746 ASSAY OF FOLIC ACID SERUM: CPT

## 2020-08-25 PROCEDURE — 87324 CLOSTRIDIUM AG IA: CPT

## 2020-08-25 PROCEDURE — 85610 PROTHROMBIN TIME: CPT

## 2020-08-25 PROCEDURE — 80076 HEPATIC FUNCTION PANEL: CPT

## 2020-08-25 RX ORDER — PANTOPRAZOLE SODIUM 40 MG/1
40 TABLET, DELAYED RELEASE ORAL 2 TIMES DAILY
Status: DISCONTINUED | OUTPATIENT
Start: 2020-08-25 | End: 2020-08-25

## 2020-08-25 RX ORDER — TRAMADOL HYDROCHLORIDE 50 MG/1
50 TABLET ORAL EVERY 8 HOURS PRN
Status: DISCONTINUED | OUTPATIENT
Start: 2020-08-25 | End: 2020-08-30 | Stop reason: HOSPADM

## 2020-08-25 RX ORDER — POTASSIUM CHLORIDE 7.45 MG/ML
10 INJECTION INTRAVENOUS PRN
Status: DISCONTINUED | OUTPATIENT
Start: 2020-08-25 | End: 2020-08-30 | Stop reason: HOSPADM

## 2020-08-25 RX ORDER — SENNA AND DOCUSATE SODIUM 50; 8.6 MG/1; MG/1
1 TABLET, FILM COATED ORAL EVERY 12 HOURS PRN
Status: DISCONTINUED | OUTPATIENT
Start: 2020-08-25 | End: 2020-08-27

## 2020-08-25 RX ORDER — ACETAMINOPHEN 325 MG/1
650 TABLET ORAL EVERY 6 HOURS PRN
Status: DISCONTINUED | OUTPATIENT
Start: 2020-08-25 | End: 2020-08-30 | Stop reason: HOSPADM

## 2020-08-25 RX ORDER — SODIUM CHLORIDE 9 MG/ML
INJECTION, SOLUTION INTRAVENOUS CONTINUOUS
Status: DISCONTINUED | OUTPATIENT
Start: 2020-08-25 | End: 2020-08-26

## 2020-08-25 RX ORDER — FUROSEMIDE 20 MG/1
20 TABLET ORAL DAILY
Status: DISCONTINUED | OUTPATIENT
Start: 2020-08-25 | End: 2020-08-30 | Stop reason: HOSPADM

## 2020-08-25 RX ORDER — DEXTROSE MONOHYDRATE 25 G/50ML
12.5 INJECTION, SOLUTION INTRAVENOUS PRN
Status: DISCONTINUED | OUTPATIENT
Start: 2020-08-25 | End: 2020-08-30 | Stop reason: HOSPADM

## 2020-08-25 RX ORDER — CLONIDINE 0.1 MG/24H
1 PATCH, EXTENDED RELEASE TRANSDERMAL WEEKLY
Status: DISCONTINUED | OUTPATIENT
Start: 2020-08-25 | End: 2020-08-30 | Stop reason: HOSPADM

## 2020-08-25 RX ORDER — DEXTROSE MONOHYDRATE 50 MG/ML
100 INJECTION, SOLUTION INTRAVENOUS PRN
Status: DISCONTINUED | OUTPATIENT
Start: 2020-08-25 | End: 2020-08-30 | Stop reason: HOSPADM

## 2020-08-25 RX ORDER — SODIUM CHLORIDE 0.9 % (FLUSH) 0.9 %
10 SYRINGE (ML) INJECTION EVERY 12 HOURS SCHEDULED
Status: DISCONTINUED | OUTPATIENT
Start: 2020-08-25 | End: 2020-08-30 | Stop reason: HOSPADM

## 2020-08-25 RX ORDER — ACETAMINOPHEN 650 MG/1
650 SUPPOSITORY RECTAL EVERY 6 HOURS PRN
Status: DISCONTINUED | OUTPATIENT
Start: 2020-08-25 | End: 2020-08-30 | Stop reason: HOSPADM

## 2020-08-25 RX ORDER — NICOTINE POLACRILEX 4 MG
15 LOZENGE BUCCAL PRN
Status: DISCONTINUED | OUTPATIENT
Start: 2020-08-25 | End: 2020-08-30 | Stop reason: HOSPADM

## 2020-08-25 RX ORDER — SODIUM CHLORIDE, SODIUM LACTATE, POTASSIUM CHLORIDE, AND CALCIUM CHLORIDE .6; .31; .03; .02 G/100ML; G/100ML; G/100ML; G/100ML
1000 INJECTION, SOLUTION INTRAVENOUS ONCE
Status: COMPLETED | OUTPATIENT
Start: 2020-08-25 | End: 2020-08-25

## 2020-08-25 RX ORDER — HYDRALAZINE HYDROCHLORIDE 25 MG/1
25 TABLET, FILM COATED ORAL EVERY 8 HOURS SCHEDULED
Status: DISCONTINUED | OUTPATIENT
Start: 2020-08-25 | End: 2020-08-30 | Stop reason: HOSPADM

## 2020-08-25 RX ORDER — FAMOTIDINE 20 MG/1
20 TABLET, FILM COATED ORAL 2 TIMES DAILY
Status: DISCONTINUED | OUTPATIENT
Start: 2020-08-25 | End: 2020-08-30 | Stop reason: HOSPADM

## 2020-08-25 RX ORDER — SODIUM CHLORIDE 0.9 % (FLUSH) 0.9 %
10 SYRINGE (ML) INJECTION PRN
Status: DISCONTINUED | OUTPATIENT
Start: 2020-08-25 | End: 2020-08-30 | Stop reason: HOSPADM

## 2020-08-25 RX ORDER — AMLODIPINE BESYLATE 10 MG/1
10 TABLET ORAL DAILY
Status: DISCONTINUED | OUTPATIENT
Start: 2020-08-25 | End: 2020-08-30 | Stop reason: HOSPADM

## 2020-08-25 RX ORDER — POLYETHYLENE GLYCOL 3350 17 G/17G
17 POWDER, FOR SOLUTION ORAL DAILY PRN
Status: DISCONTINUED | OUTPATIENT
Start: 2020-08-25 | End: 2020-08-27

## 2020-08-25 RX ORDER — ATORVASTATIN CALCIUM 20 MG/1
20 TABLET, FILM COATED ORAL DAILY
Status: DISCONTINUED | OUTPATIENT
Start: 2020-08-25 | End: 2020-08-30 | Stop reason: HOSPADM

## 2020-08-25 RX ORDER — CARVEDILOL 25 MG/1
25 TABLET ORAL 2 TIMES DAILY WITH MEALS
Status: DISCONTINUED | OUTPATIENT
Start: 2020-08-25 | End: 2020-08-30 | Stop reason: HOSPADM

## 2020-08-25 RX ADMIN — TRAMADOL HYDROCHLORIDE 50 MG: 50 TABLET, FILM COATED ORAL at 15:01

## 2020-08-25 RX ADMIN — AMLODIPINE BESYLATE 10 MG: 10 TABLET ORAL at 08:49

## 2020-08-25 RX ADMIN — INSULIN LISPRO 2 UNITS: 100 INJECTION, SOLUTION INTRAVENOUS; SUBCUTANEOUS at 13:03

## 2020-08-25 RX ADMIN — HYDRALAZINE HYDROCHLORIDE 25 MG: 25 TABLET ORAL at 08:48

## 2020-08-25 RX ADMIN — SODIUM CHLORIDE, POTASSIUM CHLORIDE, SODIUM LACTATE AND CALCIUM CHLORIDE 1000 ML: 600; 310; 30; 20 INJECTION, SOLUTION INTRAVENOUS at 13:02

## 2020-08-25 RX ADMIN — CEFEPIME 2 G: 2 INJECTION, POWDER, FOR SOLUTION INTRAVENOUS at 03:15

## 2020-08-25 RX ADMIN — FAMOTIDINE 20 MG: 20 TABLET, FILM COATED ORAL at 08:49

## 2020-08-25 RX ADMIN — POTASSIUM & SODIUM PHOSPHATES POWDER PACK 280-160-250 MG 500 MG: 280-160-250 PACK at 20:52

## 2020-08-25 RX ADMIN — CEFEPIME 2 G: 2 INJECTION, POWDER, FOR SOLUTION INTRAVENOUS at 15:03

## 2020-08-25 RX ADMIN — RIVAROXABAN 20 MG: 20 TABLET, FILM COATED ORAL at 08:49

## 2020-08-25 RX ADMIN — FUROSEMIDE 20 MG: 20 TABLET ORAL at 08:49

## 2020-08-25 RX ADMIN — FAMOTIDINE 20 MG: 20 TABLET, FILM COATED ORAL at 20:51

## 2020-08-25 RX ADMIN — CARVEDILOL 25 MG: 25 TABLET, FILM COATED ORAL at 08:49

## 2020-08-25 RX ADMIN — SODIUM CHLORIDE: 9 INJECTION, SOLUTION INTRAVENOUS at 03:13

## 2020-08-25 RX ADMIN — VANCOMYCIN HYDROCHLORIDE 1500 MG: 10 INJECTION, POWDER, LYOPHILIZED, FOR SOLUTION INTRAVENOUS at 23:08

## 2020-08-25 RX ADMIN — Medication 10 ML: at 08:50

## 2020-08-25 RX ADMIN — POTASSIUM & SODIUM PHOSPHATES POWDER PACK 280-160-250 MG 500 MG: 280-160-250 PACK at 18:33

## 2020-08-25 RX ADMIN — METRONIDAZOLE 500 MG: 500 INJECTION, SOLUTION INTRAVENOUS at 21:52

## 2020-08-25 RX ADMIN — SODIUM CHLORIDE: 9 INJECTION, SOLUTION INTRAVENOUS at 19:03

## 2020-08-25 RX ADMIN — ATORVASTATIN CALCIUM 20 MG: 20 TABLET, FILM COATED ORAL at 08:49

## 2020-08-25 RX ADMIN — VANCOMYCIN HYDROCHLORIDE 1500 MG: 10 INJECTION, POWDER, LYOPHILIZED, FOR SOLUTION INTRAVENOUS at 08:48

## 2020-08-25 ASSESSMENT — ENCOUNTER SYMPTOMS
ABDOMINAL PAIN: 0
SINUS PRESSURE: 0
EYE ITCHING: 0
COUGH: 0
DIARRHEA: 1
EYE DISCHARGE: 0
BACK PAIN: 1
FACIAL SWELLING: 0
CHEST TIGHTNESS: 0
SORE THROAT: 0
TROUBLE SWALLOWING: 0
ANAL BLEEDING: 0
RHINORRHEA: 0
WHEEZING: 0
CHOKING: 0
STRIDOR: 0
SHORTNESS OF BREATH: 0
ABDOMINAL DISTENTION: 0
COLOR CHANGE: 0
PHOTOPHOBIA: 0
BLOOD IN STOOL: 0
RECTAL PAIN: 0
EYE REDNESS: 0
NAUSEA: 0
EYE PAIN: 0
CONSTIPATION: 0
APNEA: 0
VOMITING: 0
SINUS PAIN: 0
VOICE CHANGE: 0
BACK PAIN: 0

## 2020-08-25 ASSESSMENT — PAIN DESCRIPTION - LOCATION: LOCATION: HEAD

## 2020-08-25 ASSESSMENT — PAIN SCALES - GENERAL
PAINLEVEL_OUTOF10: 8
PAINLEVEL_OUTOF10: 8
PAINLEVEL_OUTOF10: 0

## 2020-08-25 NOTE — PROGRESS NOTES
[facility]  Nurse  Consult Note       NAME:  Saji Elizabeth  MEDICAL RECORD NUMBER:  [id number]  AGE: 65 y.o. GENDER: female  : 1950  TODAY'S DATE:  2020    Subjective   Reason for [id number] Nurse Evaluation and Assessment:   Right leg wound and multiple moisture related wounds. Asked by primary RN to postpone assessment at this time due to patient non-responsiveness. Will return at a later time. [place] bed in use. Dream Air mattress was plugged into outlet and turned on to provide Low ait loss and Alternating pressure surface.

## 2020-08-25 NOTE — PROGRESS NOTES
[address] [facility] Hospitalist Progress Note-Internal Medicine. STVZ 4B Stepdown       Patient: Eve Pace    Unit/Bed: 2924/4067-56    YOB: 1950    MRN: [id number]    Acct: [de-identified]     Admitting Diagnosis:Sepsis ([facility] [address] [A41.9]  Sepsis [facility]) [A41.9]    Admit Date:  8/24/2020    Hospital Day: 1    Subjective:    Not a good historian but able to answer yes most questions. Patient admitted yesterday in a disheveled condition. Complains of a one-week history of diarrhea. Has difficulty ambulation at baseline. Hypotensive this morning with blood pressure of 65/35. A 12-lead EKG which I reviewed shows normal sinus rhythm at 76 bpm.    No ST segment elevation or depression. Patient Seen, Chart, Labs, Radiology studies, and Consults reviewed. Objective:   /61   Pulse 71   Temp 97.1 °F (36.2 °C) (Temporal)   Resp 19   Ht 5' 4\" (1.626 m)   Wt (!) 375 lb (170.1 kg)   SpO2 98%   BMI 64.37 kg/m²   No intake or output data in the 24 hours ending 08/25/20 2032    Review of Systems   Constitutional: Positive for activity change and fatigue. Negative for appetite change, chills, diaphoresis, fever and unexpected weight change. HENT: Negative for congestion, drooling, ear discharge, facial swelling, hearing loss, mouth sores, nosebleeds, postnasal drip, rhinorrhea, sinus pressure, sinus pain, sore throat, tinnitus, trouble swallowing and voice change. Eyes: Negative for photophobia, pain, discharge, redness, itching and visual disturbance. Respiratory: Negative for apnea, choking, chest tightness, shortness of breath, wheezing and stridor. Cardiovascular: Negative for chest pain, palpitations and leg swelling. Gastrointestinal: Positive for diarrhea. Negative for abdominal distention, abdominal pain, anal bleeding, blood in stool, constipation, nausea, rectal pain and vomiting.    Endocrine: Negative Vascular: No carotid bruit. Comments: Reduced neck range of motion. Cardiovascular:      Rate and Rhythm: Normal rate and regular rhythm. Pulses: Normal pulses. Heart sounds: Normal heart sounds. No murmur. No friction rub. No gallop. Pulmonary:      Effort: Pulmonary effort is normal. No respiratory distress. Breath sounds: Normal breath sounds. No stridor. No wheezing, rhonchi or rales. Chest:      Chest wall: No tenderness. Abdominal:      General: Bowel sounds are normal. There is no distension. Palpations: Abdomen is soft. There is no mass. Tenderness: There is abdominal tenderness. There is no guarding or rebound. Hernia: No hernia is present. Comments: Right upper quadrant tenderness   Musculoskeletal: Normal range of motion. General: No swelling, tenderness, deformity or signs of injury. Right lower leg: Edema present. Left lower leg: Edema present. Lymphadenopathy:      Cervical: No cervical adenopathy. Skin:     General: Skin is warm and dry. Coloration: Skin is not jaundiced or pale. Findings: No bruising, erythema, lesion or rash. Comments: Stage II gluteal , back and posterior thigh decubitus ulcers. Neurological:      General: No focal deficit present. Mental Status: She is alert and oriented to person, place, and time. Mental status is at baseline. Cranial Nerves: No cranial nerve deficit. Sensory: No sensory deficit. Motor: Weakness present. Psychiatric:         Mood and Affect: Mood normal.         Behavior: Behavior normal.         Thought Content:  Thought content normal.         Judgment: Judgment normal.     Medications:    rivaroxaban  20 mg Oral Daily    [Held by provider] cloNIDine  1 patch Transdermal Weekly    [Held by provider] hydrALAZINE  25 mg Oral 3 times per day    [Held by provider] carvedilol  25 mg Oral BID WC    [Held by provider] amLODIPine  10 mg Oral Daily    [Held by provider] furosemide  20 mg Oral Daily    atorvastatin  20 mg Oral Daily    insulin lispro  0-12 Units Subcutaneous TID WC    insulin lispro  0-6 Units Subcutaneous Nightly    sodium chloride flush  10 mL Intravenous 2 times per day    cefepime  2 g Intravenous Q8H    vancomycin  1,500 mg Intravenous Q8H    vancomycin (VANCOCIN) intermittent dosing (placeholder)   Other RX Placeholder    famotidine  20 mg Oral BID    potassium & sodium phosphates  2 packet Oral BID    metroNIDAZOLE  500 mg Intravenous Q8H       Continuous Infusions:   dextrose      sodium chloride 150 mL/hr at 08/25/20 1903       PRN Meds:sennosides-docusate sodium, traMADol, polyethylene glycol, glucose, dextrose, glucagon (rDNA), dextrose, sodium chloride flush, acetaminophen **OR** acetaminophen, potassium chloride    Data:    CBC:   Recent Labs     08/24/20 2002 08/25/20  0357   WBC 9.4 11.2   RBC 4.90 4.07   HGB 14.3 11.7*   HCT 48.8* 38.3   MCV 99.6 94.1   RDW 13.5 13.6    301       BMP:   Recent Labs     08/24/20 1959 08/24/20 2002 08/25/20  0357   NA  --  140 143   K  --  4.4 3.2*   CL  --  102 106   CO2  --  21 24   PHOS  --   --  2.1*   BUN  --  14 14   CREATININE 0.67 0.52 0.55     Results for Jade Marky (MRN [id number]) as of 8/25/2020 19:39   Ref. Range 8/25/2020 09:44   TSH Latest Ref Range: 0.30 - 5.00 mIU/L 3.46     Results for Jade Tilden (MRN [id number]) as of 8/25/2020 19:39   Ref. Range 8/25/2020 15:45   Folate Latest Ref Range: >4.8 ng/mL 5.9   Vitamin B-12 Latest Ref Range: 232 - 1245 pg/mL 690     Results for Jade Tilden (MRN [id number]) as of 8/25/2020 14:13   Ref. Range 8/24/2020 20:02 8/25/2020 02:39   Lactic Acid, Sepsis, Whole Blood Latest Ref Range: 0.5 - 1.9 mmol/L 3.7 (H) 2.7 (H)     Results for Jade Tilden (MRN [id number]) as of 8/25/2020 14:13   Ref.  Range 8/25/2020 03:57   Phosphorus Latest Ref Range: 2.6 - 4.5 mg/dL 2.1 (L)     PT/INR:  Recent Labs     08/24/20 2002 08/25/20  7891 PROTIME 10.3 10.3   INR 1.0 1.0       APTT:   Recent Labs     08/24/20 2002   APTT 24.2     Results for Lyell Cooks (MRN [id number]) as of 8/25/2020 10:50   Ref. Range 8/24/2020 20:02 8/25/2020 03:57 8/25/2020 09:44   Troponin, High Sensitivity Latest Ref Range: 0 - 14 ng/L 50 (H) 59 (HH) 53 (HH)     VBG. Results for Lyell Cooks (MRN [id number]) as of 8/25/2020 14:13   Ref. Range 8/24/2020 19:59   pH, [place] Latest Ref Range: 7.320 - 7.430  7.363   pCO2, [place] Latest Ref Range: 41.0 - 51.0 mm Hg 49.2   pO2, [place] Latest Ref Range: 30.0 - 50.0 mm Hg 41.1   HCO3, Venous Latest Ref Range: 22.0 - 29.0 mmol/L 27.9   Positive Base Excess, [place] Latest Ref Range: 0.0 - 3.0  2   Negative Base Excess, [place] Latest Ref Range: 0.0 - 2.0  NOT REPORTED   Total CO2, Venous Latest Ref Range: 23.0 - 30.0 mmol/L 30   O2 Sat, [place] Latest Ref Range: 60.0 - 85.0 % 74       URINALYSIS. Results for Lyell Cooks (MRN [id number]) as of 8/25/2020 19:39   Ref.  Range 11/19/2017 04:37   DILAN Latest Ref Range: NEG  NEGATIVE   Alpha 1 % Latest Ref Range: 3 - 6 % 7 (H)   Alpha 2 % Latest Ref Range: 6 - 13 % 19 (H)   Alpha-1-Globulin Latest Ref Range: 0.1 - 0.4 g/dL 0.4   Alpha-2-Globulin Latest Ref Range: 0.5 - 0.9 g/dL 1.0 (H)   Beta Globulin Latest Ref Range: 0.5 - 1.1 g/dL 0.9   Beta Percent Latest Ref Range: 11 - 19 % 17   Gamma Globulin Latest Ref Range: 0.5 - 1.5 g/dL 1.0   Gamma Globulin % Latest Ref Range: 9 - 20 % 20   [place] Free Light Chains QNT Latest Ref Range: 0.37 - 1.94 mg/dL 7.51 (H)   Lambda Free Light Chains QNT Latest Ref Range: 0.57 - 2.63 mg/dL 3.96 (H)   Free Kappa/Lambda Ratio Latest Ref Range: 0.26 - 1.65  1.90 (H)   Serum IFX Interp Unknown IMMUNOFIXATION IS NEGATIVE FOR MONOCLONAL IMMUNOGLOBULIN.   KAPPA/LAMBDA QUANT FREE LIGHT CHAINS SERUM Unknown Rpt (A)   Complement C3 Latest Ref Range: 90 - 180 mg/dL 103   Complement C4 Latest Ref Range: 10 - 40 mg/dL 26   IMMUNOFIXATION SERUM PROFILE Unknown Rpt SEROLOGY  None. TUMOR MARKER. None. MICROBIOLOGY   Blood cultures on 04/24/2020. POSITIVE Blood Culture  Coagulase negative Staphylococcus species detected by PCR. HISTOPATHOLOGY. None. TOXICOLOGY. None. ENDOSCOPE STUDIES. None. PROCEDURES. None. RADIOLOGY. Chest x-ray-08/24/2020. FINDINGS:    HEART/MEDIASTINUM:   The cardiomediastinal silhouette is within normal limits.     PLEURA/LUNGS:   There are no focal consolidations or pleural effusions. There is no appreciable pneumothorax.     BONES/SOFT TISSUE:   No acute abnormality.     IMPRESSION:  No radiographic evidence of acute pulmonary disease. Echocardiogram-12/6/2017. The study was performed by the attending, the fellow and the sonographer in  the Cath Lab. The study was performed under conscious sedation. Left ventricle is normal in sized with increased wall thickness, normal  systolic function, estimated EF 55%. Left atrial appendage showed no evidence of clot. Negative bubble study, no shunt noted. Mild tricuspid regurgitation. Echocardiogram-11/20/2017. Technically difficult study due to open chest wounds; Unable to obtain apical views. Left ventricle is normal in size. Global left ventricular systolic function is normal.     Estimated ejection fraction is 55 % . Mild left ventricular hypertrophy. Trivial tricuspid regurgitation. Estimated right ventricular systolic pressure is 44 mmHg suggestive of mild  Pulmonary HTN. Trivial pulmonic insufficiency. MRI brain w/ and w/o contrast-12/05/2017. IMPRESSION:  1. Numerous acute ischemic infarcts in the cerebral white matter, right      thalamus, and left alex with the distribution in multiple vascular territories       suggesting a central embolic etiology. 2. No intracranial hemorrhage or mass effect. 3. Multifocal remote lacunar infarcts and moderate chronic white matter microvascular       ischemic changes.     4. Trace bilateral mastoid effusions. MRI C-Spine w/o contrast -12/05/2017. FINDINGS:  BONES/ALIGNMENT:   There is normal alignment of the spine. The vertebral body heights are maintained. The marrow signal in the C4, C5 and C6 vertebral bodies is dark on T1 and T2 weighted   images which likely represents sclerosis of the vertebral bodies. There is degenerative disc disease with disc space narrowing and osteophytes at C3-4,   C4-5, C5-6, C6-7, and C7-T1. The bony spinal canal overall is congenitally small. There are large anterior osteophytes which are better visualized on the prior CT.     SPINAL CORD:    No abnormal signal is identified within the spinal cord.     SOFT TISSUES:   No paraspinal mass identified.     C2-C3:    The thecal sac and neural foramina are intact.     C3-C4:   There is disc protrusion or osteophyte measuring 3-4 mm toward the left narrowing   the left neural foramen greater than the right. Thecal sac is moderately stenotic measuring 8.2 mm.     C4-C5:   There is a disc bulge and osteophyte measuring 2-3 mm. The thecal sac is mildly stenotic measuring 9 mm. Disc and/or osteophytes result in narrowing of the neural foramina bilaterally.     C5-C6:   There is disc protrusion or osteophyte measuring 4 mm. The thecal sac is mildly stenotic measuring 9.4 mm. Disc and/or osteophytes result in narrowing of the neural foramina bilaterally.     C6-C7:   There is disc protrusion or osteophyte measuring 2-3 mm. The thecal sac is mildly stenotic measuring 9.7 mm. Disc and/or osteophytes result in narrowing of the neural foramina bilaterally.     C7-T1:   Disc and osteophyte narrows the left neural foramen. The thecal sac is not stenotic. The right neural foramen is intact. IMPRESSION:  Disc and osteophytes result in stenosis of the thecal sac and narrowing of  the neural foramina throughout the cervical region as discussed above.     MRI Folic acid 5.9, A58 896.    12.NUTRITION. Protein energy malnutrition-dietitian consult. Obesity with a BMI of 64.37-needs regular exercise diet control. 13. SKIN. Multiple stage II decubitus ulcers on buttock, back and thigh. 14DISPO. Planned d/c to home vs rehab soon.         Electronically signed Sandhya Lofton MD on 8/25/2020 at 8:32 PM    Rounding Hospitalist

## 2020-08-25 NOTE — ED NOTES
Patient refuses to have any further blood draws at this time      Samanta Williamson, [facility]  08/24/20 3080

## 2020-08-25 NOTE — ED PROVIDER NOTES
[facility]     Emergency Department     Faculty Attestation    I performed a history and physical examination of the patient and discussed management with the resident. I reviewed the residents note and agree with the documented findings and plan of care. Any areas of disagreement are noted on the chart. I was personally present for the key portions of any procedures. I have documented in the chart those procedures where I was not present during the key portions. I have reviewed the emergency nurses triage note. I agree with the chief complaint, past medical history, past surgical history, allergies, medications, social and family history as documented unless otherwise noted below. For Physician Assistant/ Nurse Practitioner cases/documentation I have personally evaluated this patient and have completed at least one if not all key elements of the E/M (history, physical exam, and MDM). Additional findings are as noted. I have personally seen and evaluated the patient. I find the patient's history and physical exam are consistent with the NP/PA documentation. I agree with the care provided, treatment rendered, disposition and follow-up plan. 64-year-old female presenting from home with concerns of stress. EMS reports patient sitting in chair, covered in stool. Patient states that her skin hurts. Lives with her daughter. Exam:  General: Laying on the bed, awake, alert and in no acute distress  CV: normal rate and regular rhythm  Lungs: Breathing comfortably on room air with no tachypnea, hypoxia, or increased work of breathing  Skin: Severe skin breakdown and groin, back, skin folds with overlying cellulitis    Plan:  Tachycardic and tachypneic, with evidence cellulitis, concern for underlying sepsis. Started on broad-spectrum antibiotics, cultures obtained, patient to be admitted for further management. Social work was involved.     Patient signed out to Dr. Bee Little pending bed assignment      Chicho Cheek MD   Attending Emergency  Physician             Chicho Cheek MD  08/25/20 8995
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
## 2020-08-25 NOTE — ED NOTES
Report from CLIFFORD Silva Pt resting comfortably NAD noted call light in reach     Sahil Cain RN  08/24/20 8384

## 2020-08-25 NOTE — PROGRESS NOTES
Pharmacy Note  Vancomycin Consult    Fredi Ulrich is a 65 y.o. female started on Vancomycin for possible sepsis; consult received from Dr. Jose Kiser to manage therapy. Also receiving the following antibiotics: cefepime. Patient Active Problem List   Diagnosis    HTN (hypertension)    Heart murmur    Depression    Encephalopathy    Altered mental status    Morbid obesity ([facility] [address]    Acute metabolic encephalopathy    Abrasions of multiple sites    Infected open wound    Recurrent major depressive disorder (HCC)    Type 2 diabetes mellitus ([facility] [address]    Cerebrovascular accident (CVA) due to embolism of precerebral artery ([facility] [address]    Hypercoagulable state ([facility] [address]    Myelomalacia ([facility] [address]    Thoracic myelopathy    Hypercoagulable state ([facility] [address]    Open wound of right hip    MSSA (methicillin susceptible Staphylococcus aureus) infection    E coli infection    Proteus infection    Allergy to penicillin    Sepsis ([facility] [address]       Allergies:  Pcn [penicillins]     Temp max: 98.9    Recent Labs     08/24/20 2002   BUN 14       Recent Labs     08/24/20 1959 08/24/20 2002   CREATININE 0.67 0.52       Recent Labs     08/24/20 2002   WBC 9.4       No intake or output data in the 24 hours ending 08/25/20 0205    Culture Date      Source                       Results  pending    Ht Readings from Last 1 Encounters:   08/24/20 5' 4\" (1.626 m)        Wt Readings from Last 1 Encounters:   08/24/20 (!) 375 lb (170.1 kg)         Body mass index is 64.37 kg/m². Estimated Creatinine Clearance: 156 mL/min (based on SCr of 0.52 mg/dL). Goal Trough Level: 15-20 mcg/mL    Assessment/Plan:  Will initiate Vancomycin  1500 mg IV every 8 hours. Timing of trough level will be determined based on culture results, renal function, and clinical response. Thank you for the consult. Will continue to follow.     Manan Nazario [facility]  8/25/2020 2:06 AM
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
## 2020-08-26 ENCOUNTER — APPOINTMENT (OUTPATIENT)
Dept: ULTRASOUND IMAGING | Age: 70
DRG: 065 | End: 2020-08-26
Payer: MEDICARE

## 2020-08-26 PROBLEM — K76.0 HEPATIC STEATOSIS: Status: ACTIVE | Noted: 2020-08-26

## 2020-08-26 LAB
BUN BLDV-MCNC: 10 MG/DL (ref 8–23)
CREAT SERPL-MCNC: 0.57 MG/DL (ref 0.5–0.9)
CULTURE: ABNORMAL
EKG ATRIAL RATE: 76 BPM
EKG P AXIS: 60 DEGREES
EKG P-R INTERVAL: 156 MS
EKG Q-T INTERVAL: 434 MS
EKG QRS DURATION: 92 MS
EKG QTC CALCULATION (BAZETT): 488 MS
EKG R AXIS: 34 DEGREES
EKG T AXIS: 19 DEGREES
EKG VENTRICULAR RATE: 76 BPM
GFR AFRICAN AMERICAN: >60 ML/MIN
GFR NON-AFRICAN AMERICAN: >60 ML/MIN
GFR SERPL CREATININE-BSD FRML MDRD: NORMAL ML/MIN/{1.73_M2}
GFR SERPL CREATININE-BSD FRML MDRD: NORMAL ML/MIN/{1.73_M2}
GLUCOSE BLD-MCNC: 107 MG/DL (ref 65–105)
GLUCOSE BLD-MCNC: 134 MG/DL (ref 65–105)
GLUCOSE BLD-MCNC: 152 MG/DL (ref 65–105)
GLUCOSE BLD-MCNC: 94 MG/DL (ref 65–105)
HCT VFR BLD CALC: 35.3 % (ref 36.3–47.1)
HEMOGLOBIN: 10.9 G/DL (ref 11.9–15.1)
LACTIC ACID, WHOLE BLOOD: 1 MMOL/L (ref 0.7–2.1)
LACTIC ACID: NORMAL MMOL/L
Lab: ABNORMAL
MCH RBC QN AUTO: 28.6 PG (ref 25.2–33.5)
MCHC RBC AUTO-ENTMCNC: 30.9 G/DL (ref 28.4–34.8)
MCV RBC AUTO: 92.7 FL (ref 82.6–102.9)
NRBC AUTOMATED: 0 PER 100 WBC
PDW BLD-RTO: 13.7 % (ref 11.8–14.4)
PHOSPHORUS: 3.3 MG/DL (ref 2.6–4.5)
PLATELET # BLD: 267 K/UL (ref 138–453)
PMV BLD AUTO: 9.9 FL (ref 8.1–13.5)
RBC # BLD: 3.81 M/UL (ref 3.95–5.11)
SPECIMEN DESCRIPTION: ABNORMAL
VANCOMYCIN TROUGH DATE LAST DOSE: ABNORMAL
VANCOMYCIN TROUGH DOSE AMOUNT: ABNORMAL
VANCOMYCIN TROUGH TIME LAST DOSE: ABNORMAL
VANCOMYCIN TROUGH: 22.1 UG/ML (ref 10–20)
VITAMIN D 25-HYDROXY: 8.5 NG/ML (ref 30–100)
WBC # BLD: 8 K/UL (ref 3.5–11.3)

## 2020-08-26 PROCEDURE — 83605 ASSAY OF LACTIC ACID: CPT

## 2020-08-26 PROCEDURE — 76705 ECHO EXAM OF ABDOMEN: CPT

## 2020-08-26 PROCEDURE — 6370000000 HC RX 637 (ALT 250 FOR IP): Performed by: NURSE PRACTITIONER

## 2020-08-26 PROCEDURE — 6360000002 HC RX W HCPCS: Performed by: NURSE PRACTITIONER

## 2020-08-26 PROCEDURE — 80202 ASSAY OF VANCOMYCIN: CPT

## 2020-08-26 PROCEDURE — 85027 COMPLETE CBC AUTOMATED: CPT

## 2020-08-26 PROCEDURE — 2060000000 HC ICU INTERMEDIATE R&B

## 2020-08-26 PROCEDURE — 99213 OFFICE O/P EST LOW 20 MIN: CPT

## 2020-08-26 PROCEDURE — 84520 ASSAY OF UREA NITROGEN: CPT

## 2020-08-26 PROCEDURE — 97535 SELF CARE MNGMENT TRAINING: CPT

## 2020-08-26 PROCEDURE — 2580000003 HC RX 258: Performed by: NURSE PRACTITIONER

## 2020-08-26 PROCEDURE — 36415 COLL VENOUS BLD VENIPUNCTURE: CPT

## 2020-08-26 PROCEDURE — 82565 ASSAY OF CREATININE: CPT

## 2020-08-26 PROCEDURE — 6370000000 HC RX 637 (ALT 250 FOR IP): Performed by: INTERNAL MEDICINE

## 2020-08-26 PROCEDURE — 2500000003 HC RX 250 WO HCPCS: Performed by: INTERNAL MEDICINE

## 2020-08-26 PROCEDURE — 97530 THERAPEUTIC ACTIVITIES: CPT

## 2020-08-26 PROCEDURE — 99232 SBSQ HOSP IP/OBS MODERATE 35: CPT | Performed by: INTERNAL MEDICINE

## 2020-08-26 PROCEDURE — 93010 ELECTROCARDIOGRAM REPORT: CPT | Performed by: INTERNAL MEDICINE

## 2020-08-26 PROCEDURE — 82947 ASSAY GLUCOSE BLOOD QUANT: CPT

## 2020-08-26 PROCEDURE — 84100 ASSAY OF PHOSPHORUS: CPT

## 2020-08-26 RX ORDER — ERGOCALCIFEROL 1.25 MG/1
50000 CAPSULE ORAL WEEKLY
Status: DISCONTINUED | OUTPATIENT
Start: 2020-08-26 | End: 2020-08-30 | Stop reason: HOSPADM

## 2020-08-26 RX ORDER — SODIUM CHLORIDE 0.9 % (FLUSH) 0.9 %
10 SYRINGE (ML) INJECTION PRN
Status: DISCONTINUED | OUTPATIENT
Start: 2020-08-26 | End: 2020-08-30 | Stop reason: HOSPADM

## 2020-08-26 RX ORDER — SODIUM CHLORIDE 0.9 % (FLUSH) 0.9 %
10 SYRINGE (ML) INJECTION EVERY 12 HOURS SCHEDULED
Status: DISCONTINUED | OUTPATIENT
Start: 2020-08-26 | End: 2020-08-30 | Stop reason: HOSPADM

## 2020-08-26 RX ORDER — LIDOCAINE HYDROCHLORIDE 10 MG/ML
5 INJECTION, SOLUTION EPIDURAL; INFILTRATION; INTRACAUDAL; PERINEURAL ONCE
Status: DISCONTINUED | OUTPATIENT
Start: 2020-08-26 | End: 2020-08-30 | Stop reason: HOSPADM

## 2020-08-26 RX ADMIN — METRONIDAZOLE 500 MG: 500 INJECTION, SOLUTION INTRAVENOUS at 05:24

## 2020-08-26 RX ADMIN — RIVAROXABAN 20 MG: 20 TABLET, FILM COATED ORAL at 11:46

## 2020-08-26 RX ADMIN — FAMOTIDINE 20 MG: 20 TABLET, FILM COATED ORAL at 21:20

## 2020-08-26 RX ADMIN — ERGOCALCIFEROL 50000 UNITS: 1.25 CAPSULE ORAL at 17:53

## 2020-08-26 RX ADMIN — SODIUM CHLORIDE: 9 INJECTION, SOLUTION INTRAVENOUS at 08:35

## 2020-08-26 RX ADMIN — INSULIN LISPRO 1 UNITS: 100 INJECTION, SOLUTION INTRAVENOUS; SUBCUTANEOUS at 21:26

## 2020-08-26 RX ADMIN — CEFEPIME 2 G: 2 INJECTION, POWDER, FOR SOLUTION INTRAVENOUS at 01:19

## 2020-08-26 RX ADMIN — POTASSIUM & SODIUM PHOSPHATES POWDER PACK 280-160-250 MG 500 MG: 280-160-250 PACK at 21:19

## 2020-08-26 RX ADMIN — POTASSIUM & SODIUM PHOSPHATES POWDER PACK 280-160-250 MG 500 MG: 280-160-250 PACK at 11:46

## 2020-08-26 RX ADMIN — ATORVASTATIN CALCIUM 20 MG: 20 TABLET, FILM COATED ORAL at 11:46

## 2020-08-26 RX ADMIN — FAMOTIDINE 20 MG: 20 TABLET, FILM COATED ORAL at 11:46

## 2020-08-26 RX ADMIN — HYDRALAZINE HYDROCHLORIDE 25 MG: 25 TABLET ORAL at 21:20

## 2020-08-26 ASSESSMENT — ENCOUNTER SYMPTOMS
DIARRHEA: 0
PHOTOPHOBIA: 0
STRIDOR: 0
TROUBLE SWALLOWING: 0
ANAL BLEEDING: 0
BACK PAIN: 0
FACIAL SWELLING: 0
EYE PAIN: 0
COLOR CHANGE: 0
EYE DISCHARGE: 0
EYE ITCHING: 0
ABDOMINAL PAIN: 0
CHEST TIGHTNESS: 0
SINUS PRESSURE: 0
RHINORRHEA: 0
NAUSEA: 0
BLOOD IN STOOL: 0
EYE REDNESS: 0
SHORTNESS OF BREATH: 0
APNEA: 0
CHOKING: 0
CONSTIPATION: 0
ABDOMINAL DISTENTION: 0
SINUS PAIN: 0
SORE THROAT: 0
VOMITING: 0
RECTAL PAIN: 0
WHEEZING: 0
VOICE CHANGE: 0

## 2020-08-26 ASSESSMENT — PAIN SCALES - GENERAL: PAINLEVEL_OUTOF10: 0

## 2020-08-26 NOTE — PROGRESS NOTES
Comprehensive Nutrition Assessment    Type and Reason for Visit:  Initial, Positive Nutrition Screen(Wounds)    Nutrition Recommendations/Plan:   - Continue current General, Carb Control (4 carbs per meal) diet. Encourage/monitor intakes as tolerated. - Will provide Ensure Enlive ONS x 2 per day. Nutrition Assessment:  Pt admitted d/t fatigue and concerns from home. Pt reports having diarrhea x 1 week. Pt with multiple stage 2 pressure ulcers present to her buttocks, back, and thighs. Pt reports having a big appetite but that she is just doing okay with her meals. Observed lunch tray which pt had consumed about 50%. Malnutrition Assessment:  Malnutrition Status: At risk for malnutrition (Comment)    Context:  Social/Environmental Circumstances     Findings of the 6 clinical characteristics of malnutrition:  Energy Intake:  Mild decrease in energy intake (Comment)(Predicted based on concerns from home)  Weight Loss:  Unable to assess     Body Fat Loss:  No significant body fat loss     Muscle Mass Loss:  No significant muscle mass loss    Fluid Accumulation:  (Moderate) Extremities   Strength:  Not Performed    Estimated Daily Nutrient Needs:  Energy (kcal):  10-11 kcal/kg = 8620-4195 kcals/day; Weight Used for Energy Requirements:  Admission   Protein (g):  1.5-2.0 gm/kg =  gm pro/day; Weight Used for Protein Requirements:  Ideal          Nutrition Related Findings:  Labs/Meds reviewed.       Wounds:  Multiple, Stage II, Pressure Ulcer(to buttocks, back, thighs)       Current Nutrition Therapies:    DIET GENERAL; Carb Control: 4 carb choices (60 gms)/meal    Anthropometric Measures:  · Height: 5' 4\" (162.6 cm)  · Current Body Weight: 375 lb (170.1 kg)   · Admission Body Weight: 375 lb (170.1 kg)    · Ideal Body Weight: 120 lbs; % Ideal Body Weight 312.5 %   · BMI: 64.3  · BMI Categories: Obese Class 3 (BMI 40.0 or greater)       Nutrition Diagnosis:   · Increased nutrient needs related to (healing) as evidenced by wounds    Nutrition Interventions:   Food and/or Nutrient Delivery:  Continue Current Diet, Start Oral Nutrition Supplement(Provide Ensure Enlive ONS x 2 per day.)  Nutrition Education/Counseling:  No recommendation at this time   Coordination of Nutrition Care:  Continued Inpatient Monitoring    Goals: Goal Set  Oral intakes to meet % of estimated nutrition needs. Nutrition Monitoring and Evaluation:   Behavioral-Environmental Outcomes:  (N/A)   Food/Nutrient Intake Outcomes:  Food and Nutrient Intake, Supplement Intake  Physical Signs/Symptoms Outcomes:  Biochemical Data, Hemodynamic Status, Nutrition Focused Physical Findings, Skin, Weight     Discharge Planning:     Too soon to determine     Electronically signed by Woody Malone RD, LD on 8/26/20 at 2:09 PM EDT    Contact: [phone]

## 2020-08-26 NOTE — PLAN OF CARE
Nutrition Problem #1: Increased nutrient needs  Intervention: Food and/or Nutrient Delivery: Continue Current Diet, Start Oral Nutrition Supplement(Provide Ensure Enlive ONS x 2 per day.)  Nutritional Goals: Oral intakes to meet % of estimated nutrition needs.
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
## 2020-08-26 NOTE — PROGRESS NOTES
Pharmacy Vancomycin Consult     Vancomycin Day: 3  Current Dosinmg IV q8h- however doses were being given approximately q12h x 3 doses due to loss of IV access yesterday    Temp max:  afebrile    Recent Labs     20  0357 20  0600   BUN 14 10       Recent Labs     20  0357 20  0600   CREATININE 0.55 0.57       Recent Labs     20  0357 20  0600   WBC 11.2 8.0         Intake/Output Summary (Last 24 hours) at 2020 0853  Last data filed at 2020 6749  Gross per 24 hour   Intake --   Output 350 ml   Net -350 ml       Culture Date      Source                       Results  20                 blood                      coag neg staph x 1    Ht Readings from Last 1 Encounters:   20 5' 4\" (1.626 m)        Wt Readings from Last 1 Encounters:   20 (!) 375 lb (170.1 kg)         Body mass index is 64.37 kg/m². Estimated Creatinine Clearance: 142 mL/min (based on SCr of 0.57 mg/dL). Trough: 22.1 mcg/ml   drawn 6 hours post dose    Assessment/Plan:  Level is not an accurate trough level. Doses have been given at approximately 12 hour intervals for 3 doses. Level was drawn 6 hours post previous dose. Will change dosing from 1500mg IV q8h to 1500mg IV q12h and reorder a trough to assess. [id number] JEF HARTMAN, BCPS  2020  8:56 AM

## 2020-08-26 NOTE — PROGRESS NOTES
Spoke with Dr. Rosy Chen regarding patient's loss of IV access. Dr. Rosy Chen stated he was d/c'ing all of the abx anyway.

## 2020-08-26 NOTE — PLAN OF CARE
Problem: Skin Integrity:  Goal: Will show no infection signs and symptoms  Outcome: Ongoing  Goal: Absence of new skin breakdown  Outcome: Ongoing     Problem: Falls - Risk of:  Goal: Will remain free from falls  Outcome: Ongoing  Goal: Absence of physical injury  Outcome: Ongoing     Problem: Cardiovascular  Goal: No DVT, peripheral vascular complications  Outcome: Ongoing     Problem: IP TRANSFERS  Goal: STG - Patient will roll  Outcome: Ongoing     Problem: PAIN  Goal: STG - Patient will increase activity level  Outcome: Ongoing     Problem: Pain:  Description: Pain management should include both nonpharmacologic and pharmacologic interventions. Goal: Pain level will decrease  Outcome: Ongoing  Goal: Control of acute pain  Outcome: Ongoing  Goal: Control of chronic pain  Outcome: Ongoing     Problem: Nutrition  Goal: Optimal nutrition therapy  8/26/2020 1656 by Bear Acosta RN  Outcome: Ongoing  8/26/2020 1410 by Gloria Ramirez RD, LD  Outcome: Ongoing  Note: Nutrition Problem #1: Increased nutrient needs  Intervention: Food and/or Nutrient Delivery: Continue Current Diet, Start Oral Nutrition Supplement(Provide Ensure Enlive ONS x 2 per day.)  Nutritional Goals: Oral intakes to meet % of estimated nutrition needs.
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
## 2020-08-26 NOTE — PROGRESS NOTES
[facility]       Patient: Eve Pace    Unit/Bed: 4157/7225-90    YOB: 1950    MRN: [id number]    Acct: [de-identified]     Admitting Diagnosis:Sepsis ([facility] [address] [A41.9]  Sepsis [facility]) [A41.9]    Admit Date:  8/24/2020    Hospital Day: 2    Subjective:    Patient is stable she has no new complaints and is back to baseline. Patient admitted yesterday in a disheveled condition. Complains of a one-week history of diarrhea. Has difficulty ambulation at baseline. Patient Seen, Chart, Labs, Radiology studies, and Consults reviewed. Objective:   BP (!) 152/86   Pulse 86   Temp 98.8 °F (37.1 °C) (Oral)   Resp 18   Ht 5' 4\" (1.626 m)   Wt (!) 375 lb (170.1 kg)   SpO2 99%   BMI 64.37 kg/m²       Intake/Output Summary (Last 24 hours) at 8/26/2020 1439  Last data filed at 8/26/2020 1525  Gross per 24 hour   Intake --   Output 350 ml   Net -350 ml       Review of Systems   Constitutional: Negative for activity change, appetite change, chills, diaphoresis, fatigue, fever and unexpected weight change. HENT: Negative for congestion, drooling, ear discharge, facial swelling, hearing loss, mouth sores, nosebleeds, postnasal drip, rhinorrhea, sinus pressure, sinus pain, sore throat, tinnitus, trouble swallowing and voice change. Eyes: Negative for photophobia, pain, discharge, redness, itching and visual disturbance. Respiratory: Negative for apnea, choking, chest tightness, shortness of breath, wheezing and stridor. Cardiovascular: Negative for chest pain, palpitations and leg swelling. Gastrointestinal: Negative for abdominal distention, abdominal pain, anal bleeding, blood in stool, constipation, diarrhea, nausea, rectal pain and vomiting. Endocrine: Negative for cold intolerance, heat intolerance, polydipsia, polyphagia and polyuria.    Genitourinary: Negative for decreased urine volume, difficulty urinating, dyspareunia, dysuria, enuresis, flank pain, frequency, genital sores, hematuria, menstrual problem, pelvic pain, urgency, vaginal bleeding, vaginal discharge and vaginal pain. Musculoskeletal: Positive for gait problem. Negative for arthralgias, back pain, joint swelling, myalgias, neck pain and neck stiffness. Skin: Positive for wound. Negative for color change, pallor and rash. Allergic/Immunologic: Negative for food allergies and immunocompromised state. Neurological: Negative for dizziness, tremors, seizures, syncope, facial asymmetry, speech difficulty, weakness, light-headedness, numbness and headaches. Hematological: Negative for adenopathy. Does not bruise/bleed easily. Psychiatric/Behavioral: Negative for agitation, behavioral problems, confusion, decreased concentration, dysphoric mood, hallucinations, self-injury, sleep disturbance and suicidal ideas. The patient is not nervous/anxious and is not hyperactive. Physical Exam  Vitals signs and nursing note reviewed. Constitutional:       General: She is not in acute distress. Appearance: Normal appearance. She is obese. She is not ill-appearing, toxic-appearing or diaphoretic. HENT:      Head: Normocephalic and atraumatic. Nose: Nose normal. No congestion or rhinorrhea. Mouth/Throat:      Mouth: Mucous membranes are moist.      Pharynx: Oropharynx is clear. No oropharyngeal exudate or posterior oropharyngeal erythema. Eyes:      General: No scleral icterus. Right eye: No discharge. Left eye: No discharge. Extraocular Movements: Extraocular movements intact. Conjunctiva/sclera: Conjunctivae normal.      Pupils: Pupils are equal, round, and reactive to light. Neck:      Musculoskeletal: No neck rigidity or muscular tenderness. Vascular: No carotid bruit. Comments: Reduced neck range of motion.   Cardiovascular:      Rate and Rhythm: Normal rate and regular rhythm. Pulses: Normal pulses. Heart sounds: Normal heart sounds. No murmur. No friction rub. No gallop. Pulmonary:      Effort: Pulmonary effort is normal. No respiratory distress. Breath sounds: Normal breath sounds. No stridor. No wheezing, rhonchi or rales. Chest:      Chest wall: No tenderness. Abdominal:      General: Bowel sounds are normal. There is no distension. Palpations: Abdomen is soft. There is no mass. Tenderness: There is abdominal tenderness. There is no guarding or rebound. Hernia: No hernia is present. Comments: Right upper quadrant tenderness   Musculoskeletal: Normal range of motion. General: No swelling, tenderness, deformity or signs of injury. Right lower leg: Edema present. Left lower leg: Edema present. Lymphadenopathy:      Cervical: No cervical adenopathy. Skin:     General: Skin is warm and dry. Coloration: Skin is not jaundiced or pale. Findings: No bruising, erythema, lesion or rash. Comments: Stage II gluteal , back and posterior thigh decubitus ulcers. Neurological:      General: No focal deficit present. Mental Status: She is alert and oriented to person, place, and time. Mental status is at baseline. Cranial Nerves: No cranial nerve deficit. Sensory: No sensory deficit. Motor: Weakness present. Psychiatric:         Mood and Affect: Mood normal.         Behavior: Behavior normal.         Thought Content:  Thought content normal.         Judgment: Judgment normal.     Medications:    lidocaine 1 % injection  5 mL Intradermal Once    sodium chloride flush  10 mL Intravenous 2 times per day    vancomycin  1,500 mg Intravenous Q12H    rivaroxaban  20 mg Oral Daily    [Held by provider] cloNIDine  1 patch Transdermal Weekly    [Held by provider] hydrALAZINE  25 mg Oral 3 times per day    [Held by provider] carvedilol  25 mg Oral BID WC    [Held by provider] amLODIPine  10 mg Oral Daily    [Held by provider] furosemide  20 mg Oral Daily    atorvastatin  20 mg Oral Daily    insulin lispro  0-12 Units Subcutaneous TID WC    insulin lispro  0-6 Units Subcutaneous Nightly    sodium chloride flush  10 mL Intravenous 2 times per day    cefepime  2 g Intravenous Q8H    vancomycin (VANCOCIN) intermittent dosing (placeholder)   Other RX Placeholder    famotidine  20 mg Oral BID    potassium & sodium phosphates  2 packet Oral BID    metroNIDAZOLE  500 mg Intravenous Q8H       Continuous Infusions:   dextrose      sodium chloride 150 mL/hr at 08/26/20 0835       PRN Meds:sodium chloride flush, sennosides-docusate sodium, traMADol, polyethylene glycol, glucose, dextrose, glucagon (rDNA), dextrose, sodium chloride flush, acetaminophen **OR** acetaminophen, potassium chloride    Data:    CBC:   Recent Labs     08/24/20 2002 08/25/20 0357 08/26/20  0600   WBC 9.4 11.2 8.0   RBC 4.90 4.07 3.81*   HGB 14.3 11.7* 10.9*   HCT 48.8* 38.3 35.3*   MCV 99.6 94.1 92.7   RDW 13.5 13.6 13.7    301 267       BMP:   Recent Labs     08/24/20 2002 08/25/20 0357 08/26/20  0600    143  --    K 4.4 3.2*  --     106  --    CO2 21 24  --    PHOS  --  2.1* 3.3   BUN 14 14 10   CREATININE 0.52 0.55 0.57     Results for Veneda Cobia (MRN [id number]) as of 8/26/2020 14:38   Ref. Range 8/25/2020 22:01   Vit D, 25-Hydroxy Latest Ref Range: 30.0 - 100.0 ng/mL 8.5 (L)       Results for Veneda Cobia (MRN [id number]) as of 8/25/2020 19:39   Ref. Range 8/25/2020 09:44   TSH Latest Ref Range: 0.30 - 5.00 mIU/L 3.46     Results for Veneda Cobia (MRN [id number]) as of 8/25/2020 19:39   Ref. Range 8/25/2020 15:45   Folate Latest Ref Range: >4.8 ng/mL 5.9   Vitamin B-12 Latest Ref Range: 232 - 1245 pg/mL 690     Results for Veneda Cobia (MRN [id number]) as of 8/25/2020 14:13   Ref.  Range 8/24/2020 20:02 8/25/2020 02:39   Lactic Acid, Sepsis, Whole Blood Latest Ref Range: 0.5 - 1.9 mmol/L 3.7 (H) 2.7 (H)     Results for Rajan Nipper (MRN [id number]) as of 8/25/2020 14:13   Ref. Range 8/25/2020 03:57   Phosphorus Latest Ref Range: 2.6 - 4.5 mg/dL 2.1 (L)     PT/INR:  Recent Labs     08/24/20 2002 08/25/20  0357   PROTIME 10.3 10.3   INR 1.0 1.0       APTT:   Recent Labs     08/24/20 2002   APTT 24.2     Results for Rajan Nipper (MRN [id number]) as of 8/26/2020 14:38   Ref. Range 8/25/2020 15:45   Folate Latest Ref Range: >4.8 ng/mL 5.9   Vitamin B-12 Latest Ref Range: 232 - 1245 pg/mL 690     Results for Rajan Nipper (MRN [id number]) as of 8/26/2020 14:38   Ref. Range 8/25/2020 15:45   Procalcitonin Latest Ref Range: <0.09 ng/mL 0.05     Results for Carringtonial Nipper (MRN [id number]) as of 8/26/2020 14:38   Ref. Range 8/25/2020 15:45   CRP Latest Ref Range: 0.0 - 5.0 mg/L 73.7 (H)     Results for Rajan Nipper (MRN [id number]) as of 8/25/2020 10:50   Ref. Range 8/24/2020 20:02 8/25/2020 03:57 8/25/2020 09:44   Troponin, High Sensitivity Latest Ref Range: 0 - 14 ng/L 50 (H) 59 (HH) 53 (HH)     VBG. Results for Carringtonial Nipper (MRN [id number]) as of 8/25/2020 14:13   Ref. Range 8/24/2020 19:59   pH, [place] Latest Ref Range: 7.320 - 7.430  7.363   pCO2, [place] Latest Ref Range: 41.0 - 51.0 mm Hg 49.2   pO2, [place] Latest Ref Range: 30.0 - 50.0 mm Hg 41.1   HCO3, Venous Latest Ref Range: 22.0 - 29.0 mmol/L 27.9   Positive Base Excess, [place] Latest Ref Range: 0.0 - 3.0  2   Negative Base Excess, [place] Latest Ref Range: 0.0 - 2.0  NOT REPORTED   Total CO2, Venous Latest Ref Range: 23.0 - 30.0 mmol/L 30   O2 Sat, [place] Latest Ref Range: 60.0 - 85.0 % 74       URINALYSIS. Results for Rajan Santana (MRN [id number]) as of 8/25/2020 19:39   Ref.  Range 11/19/2017 04:37   DILAN Latest Ref Range: NEG  NEGATIVE   Alpha 1 % Latest Ref Range: 3 - 6 % 7 (H)   Alpha 2 % Latest Ref Range: 6 - 13 % 19 (H)   Alpha-1-Globulin Latest Ref Range: 0.1 - 0.4 g/dL 0.4   Alpha-2-Globulin Latest Ref Range: 0.5 - 0.9 g/dL 1.0 (H)   Beta Globulin Latest Ref Range: 0.5 - 1.1 g/dL 0.9   Beta Percent Latest Ref Range: 11 - 19 % 17   Gamma Globulin Latest Ref Range: 0.5 - 1.5 g/dL 1.0   Gamma Globulin % Latest Ref Range: 9 - 20 % 20   [place] Free Light Chains QNT Latest Ref Range: 0.37 - 1.94 mg/dL 7.51 (H)   Lambda Free Light Chains QNT Latest Ref Range: 0.57 - 2.63 mg/dL 3.96 (H)   Free Kappa/Lambda Ratio Latest Ref Range: 0.26 - 1.65  1.90 (H)   Serum IFX Interp Unknown IMMUNOFIXATION IS NEGATIVE FOR MONOCLONAL IMMUNOGLOBULIN.   KAPPA/LAMBDA QUANT FREE LIGHT CHAINS SERUM Unknown Rpt (A)   Complement C3 Latest Ref Range: 90 - 180 mg/dL 103   Complement C4 Latest Ref Range: 10 - 40 mg/dL 26   IMMUNOFIXATION SERUM PROFILE Unknown Rpt       SEROLOGY  None. TUMOR MARKER. None. MICROBIOLOGY   Blood cultures on 04/24/2020. POSITIVE Blood Culture  Coagulase negative Staphylococcus species detected by PCR. HISTOPATHOLOGY. None. TOXICOLOGY. None. ENDOSCOPE STUDIES. None. PROCEDURES. None. RADIOLOGY. Right upper quadrant ultrasound 08/26/2020. FINDINGS:  Suboptimal/limited exam due to patient body habitus and gas.     LIVER:    Relative hepatic parenchymal echogenicity most commonly related to  hepatic steatosis. No intrahepatic biliary ductal dilatation. No convincing liver lesion. The portal vein demonstrates normal direction of flow.     BILIARY SYSTEM:    Gallbladder is unremarkable without evidence of pericholecystic fluid, wall thickening   or stones. Negative sonographic Nix's sign.     Common bile duct is within normal limits measuring 3 mm.     RIGHT KIDNEY:   The right kidney is grossly unremarkable without evidence of hydronephrosis.     PANCREAS:    Visualized portions of the pancreas are unremarkable.     OTHER:   No evidence of right upper quadrant ascites.     IMPRESSION:  Hepatic steatosis is suspected.     Unremarkable gallbladder.     Chest x-ray-08/24/2020.   FINDINGS:    HEART/MEDIASTINUM:   The cardiomediastinal silhouette is within normal limits.     PLEURA/LUNGS:   There are no focal consolidations or pleural effusions. There is no appreciable pneumothorax.     BONES/SOFT TISSUE:   No acute abnormality.     IMPRESSION:  No radiographic evidence of acute pulmonary disease. Echocardiogram-12/6/2017. The study was performed by the attending, the fellow and the sonographer in  the Cath Lab. The study was performed under conscious sedation. Left ventricle is normal in sized with increased wall thickness, normal  systolic function, estimated EF 55%. Left atrial appendage showed no evidence of clot. Negative bubble study, no shunt noted. Mild tricuspid regurgitation. Echocardiogram-11/20/2017. Technically difficult study due to open chest wounds; Unable to obtain apical views. Left ventricle is normal in size. Global left ventricular systolic function is normal.     Estimated ejection fraction is 55 % . Mild left ventricular hypertrophy. Trivial tricuspid regurgitation. Estimated right ventricular systolic pressure is 44 mmHg suggestive of mild  Pulmonary HTN. Trivial pulmonic insufficiency. MRI brain w/ and w/o contrast-12/05/2017. IMPRESSION:  1. Numerous acute ischemic infarcts in the cerebral white matter, right      thalamus, and left alex with the distribution in multiple vascular territories       suggesting a central embolic etiology. 2. No intracranial hemorrhage or mass effect. 3. Multifocal remote lacunar infarcts and moderate chronic white matter microvascular       ischemic changes. 4. Trace bilateral mastoid effusions. MRI C-Spine w/o contrast -12/05/2017. FINDINGS:  BONES/ALIGNMENT:   There is normal alignment of the spine. The vertebral body heights are maintained. The marrow signal in the C4, C5 and C6 vertebral bodies is dark on T1 and T2 weighted   images which likely represents sclerosis of the vertebral bodies. the spinal canal contents.     There is questionable signal abnormality in the thoracic spinal cord at the  T6-7 level, which may represent myelomalacia secondary to stenosis. Again, motion artifact limits the exam.    ASSESSMENT AND  PLAN. 1.NEUROVASCULAR. H/o CVA with multiple ischemic infarcts-2017. Paraplegic unclear etiology     MRI brain, C-spine, thoracic and lumbar spine. 2.PULMONARY. Resolved acute hypoxic respiratory failure-O2 supplement, wean as tolerated. Pulmonary hypertension. 3.CARDIOVASCULAR. Resolved hypovolemic hypotension -discontinue IVF. HTN-    4.GI.     RUQ tenderness- negative ultrasound for cholecystitis. Acute gastroenteritis-stool studies pending. 5.RENAL AND ELECTROLYTES. Resolved acute lactic acidosis-serum lactate 1.0 from 2.8 on admission-discontinue IVF. Electrolyte imbalance with hypokalemia and hypophosphatemia-replete. 6.ID. Sepsis clinically and determined -Gram-positive cocci bacteremia is a contaminant. Discontinue IV vancomycin and cefepime. 7. ENDO. T2 DM-sliding scale insulin. TSH 3.46 and A1c check. 8.MUSCULOSKELETAL. Chronic debilitating condition. Chronic neurologic ambulatory dysfunction-PT OT evaluation. 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 MRI cervical/thoracic/lumbar spine . 9. PSYCH. Clinical depression-Celexa 10 mg daily. 10. LIFE STYLE.       H/o tobacco use disorder-patient quit. 11.HEM-ONC. Long-term anticoagulation with Xarelto. Normocytic anemia-Hb 11.7 from 14.3 on admission. MCV 94.1. Folic acid 5.9, T38 717.    12.NUTRITION. Protein energy malnutrition-dietitian consult. Obesity with a BMI of 64.37-needs regular exercise diet control. 13. SKIN. Multiple stage II decubitus ulcers on buttock, back and thigh. 14DISPO.        Planned d/c to home vs rehab soon.      Electronically signed Shannen Mills MD on 8/26/2020 at 2:39 PM    [address] [place] [address]

## 2020-08-27 ENCOUNTER — APPOINTMENT (OUTPATIENT)
Dept: MRI IMAGING | Age: 70
DRG: 065 | End: 2020-08-27
Payer: MEDICARE

## 2020-08-27 PROBLEM — S24.152A: Status: ACTIVE | Noted: 2020-08-27

## 2020-08-27 LAB
BUN BLDV-MCNC: 9 MG/DL (ref 8–23)
CREAT SERPL-MCNC: 0.52 MG/DL (ref 0.5–0.9)
GFR AFRICAN AMERICAN: >60 ML/MIN
GFR NON-AFRICAN AMERICAN: >60 ML/MIN
GFR SERPL CREATININE-BSD FRML MDRD: NORMAL ML/MIN/{1.73_M2}
GFR SERPL CREATININE-BSD FRML MDRD: NORMAL ML/MIN/{1.73_M2}
GLUCOSE BLD-MCNC: 133 MG/DL (ref 65–105)
GLUCOSE BLD-MCNC: 139 MG/DL (ref 65–105)
GLUCOSE BLD-MCNC: 139 MG/DL (ref 65–105)
GLUCOSE BLD-MCNC: 148 MG/DL (ref 65–105)
HCT VFR BLD CALC: 35.1 % (ref 36.3–47.1)
HEMOGLOBIN: 10.8 G/DL (ref 11.9–15.1)
LACTIC ACID, WHOLE BLOOD: 1.3 MMOL/L (ref 0.7–2.1)
LACTIC ACID: NORMAL MMOL/L
MCH RBC QN AUTO: 30.1 PG (ref 25.2–33.5)
MCHC RBC AUTO-ENTMCNC: 30.8 G/DL (ref 28.4–34.8)
MCV RBC AUTO: 97.8 FL (ref 82.6–102.9)
NRBC AUTOMATED: 0 PER 100 WBC
PDW BLD-RTO: 13.7 % (ref 11.8–14.4)
PHOSPHORUS: 2.5 MG/DL (ref 2.6–4.5)
PLATELET # BLD: 251 K/UL (ref 138–453)
PMV BLD AUTO: 9.8 FL (ref 8.1–13.5)
RBC # BLD: 3.59 M/UL (ref 3.95–5.11)
WBC # BLD: 8 K/UL (ref 3.5–11.3)

## 2020-08-27 PROCEDURE — 83605 ASSAY OF LACTIC ACID: CPT

## 2020-08-27 PROCEDURE — 6370000000 HC RX 637 (ALT 250 FOR IP): Performed by: NURSE PRACTITIONER

## 2020-08-27 PROCEDURE — APPSS30 APP SPLIT SHARED TIME 16-30 MINUTES: Performed by: REGISTERED NURSE

## 2020-08-27 PROCEDURE — 99233 SBSQ HOSP IP/OBS HIGH 50: CPT | Performed by: INTERNAL MEDICINE

## 2020-08-27 PROCEDURE — 99222 1ST HOSP IP/OBS MODERATE 55: CPT | Performed by: NEUROLOGICAL SURGERY

## 2020-08-27 PROCEDURE — 85027 COMPLETE CBC AUTOMATED: CPT

## 2020-08-27 PROCEDURE — 2580000003 HC RX 258: Performed by: INTERNAL MEDICINE

## 2020-08-27 PROCEDURE — 82565 ASSAY OF CREATININE: CPT

## 2020-08-27 PROCEDURE — 72146 MRI CHEST SPINE W/O DYE: CPT

## 2020-08-27 PROCEDURE — 36415 COLL VENOUS BLD VENIPUNCTURE: CPT

## 2020-08-27 PROCEDURE — 84100 ASSAY OF PHOSPHORUS: CPT

## 2020-08-27 PROCEDURE — 82947 ASSAY GLUCOSE BLOOD QUANT: CPT

## 2020-08-27 PROCEDURE — 6370000000 HC RX 637 (ALT 250 FOR IP): Performed by: INTERNAL MEDICINE

## 2020-08-27 PROCEDURE — 84520 ASSAY OF UREA NITROGEN: CPT

## 2020-08-27 PROCEDURE — 99222 1ST HOSP IP/OBS MODERATE 55: CPT | Performed by: PSYCHIATRY & NEUROLOGY

## 2020-08-27 PROCEDURE — 2060000000 HC ICU INTERMEDIATE R&B

## 2020-08-27 PROCEDURE — 72148 MRI LUMBAR SPINE W/O DYE: CPT

## 2020-08-27 PROCEDURE — 70551 MRI BRAIN STEM W/O DYE: CPT

## 2020-08-27 PROCEDURE — 76937 US GUIDE VASCULAR ACCESS: CPT

## 2020-08-27 PROCEDURE — 72141 MRI NECK SPINE W/O DYE: CPT

## 2020-08-27 RX ORDER — LACTULOSE 10 G/15ML
30 SOLUTION ORAL DAILY
Status: DISCONTINUED | OUTPATIENT
Start: 2020-08-27 | End: 2020-08-30 | Stop reason: HOSPADM

## 2020-08-27 RX ORDER — WARFARIN SODIUM 5 MG/1
5 TABLET ORAL EVERY EVENING
Status: DISCONTINUED | OUTPATIENT
Start: 2020-08-27 | End: 2020-08-28

## 2020-08-27 RX ORDER — POLYETHYLENE GLYCOL 3350 17 G/17G
17 POWDER, FOR SOLUTION ORAL DAILY
Status: DISCONTINUED | OUTPATIENT
Start: 2020-08-27 | End: 2020-08-30 | Stop reason: HOSPADM

## 2020-08-27 RX ORDER — SENNA AND DOCUSATE SODIUM 50; 8.6 MG/1; MG/1
2 TABLET, FILM COATED ORAL DAILY
Status: DISCONTINUED | OUTPATIENT
Start: 2020-08-27 | End: 2020-08-30 | Stop reason: HOSPADM

## 2020-08-27 RX ADMIN — Medication 10 ML: at 20:03

## 2020-08-27 RX ADMIN — POLYETHYLENE GLYCOL 3350 17 G: 17 POWDER, FOR SOLUTION ORAL at 18:22

## 2020-08-27 RX ADMIN — FAMOTIDINE 20 MG: 20 TABLET, FILM COATED ORAL at 20:25

## 2020-08-27 RX ADMIN — HYDRALAZINE HYDROCHLORIDE 25 MG: 25 TABLET ORAL at 16:13

## 2020-08-27 RX ADMIN — STANDARDIZED SENNA CONCENTRATE AND DOCUSATE SODIUM 1 TABLET: 8.6; 5 TABLET ORAL at 08:52

## 2020-08-27 RX ADMIN — FAMOTIDINE 20 MG: 20 TABLET, FILM COATED ORAL at 08:53

## 2020-08-27 RX ADMIN — ATORVASTATIN CALCIUM 20 MG: 20 TABLET, FILM COATED ORAL at 08:53

## 2020-08-27 RX ADMIN — WARFARIN SODIUM 5 MG: 5 TABLET ORAL at 20:25

## 2020-08-27 RX ADMIN — POTASSIUM & SODIUM PHOSPHATES POWDER PACK 280-160-250 MG 500 MG: 280-160-250 PACK at 20:25

## 2020-08-27 RX ADMIN — RIVAROXABAN 20 MG: 20 TABLET, FILM COATED ORAL at 08:53

## 2020-08-27 RX ADMIN — STANDARDIZED SENNA CONCENTRATE AND DOCUSATE SODIUM 2 TABLET: 8.6; 5 TABLET ORAL at 18:22

## 2020-08-27 RX ADMIN — AMLODIPINE BESYLATE 10 MG: 10 TABLET ORAL at 08:53

## 2020-08-27 RX ADMIN — INSULIN LISPRO 2 UNITS: 100 INJECTION, SOLUTION INTRAVENOUS; SUBCUTANEOUS at 08:46

## 2020-08-27 RX ADMIN — HYDRALAZINE HYDROCHLORIDE 25 MG: 25 TABLET ORAL at 05:37

## 2020-08-27 RX ADMIN — HYDRALAZINE HYDROCHLORIDE 25 MG: 25 TABLET ORAL at 22:25

## 2020-08-27 RX ADMIN — POTASSIUM & SODIUM PHOSPHATES POWDER PACK 280-160-250 MG 500 MG: 280-160-250 PACK at 08:53

## 2020-08-27 RX ADMIN — LACTULOSE 30 G: 20 SOLUTION ORAL at 18:22

## 2020-08-27 ASSESSMENT — ENCOUNTER SYMPTOMS
FACIAL SWELLING: 0
VOMITING: 0
TROUBLE SWALLOWING: 0
CHEST TIGHTNESS: 0
EYE DISCHARGE: 0
BLOOD IN STOOL: 0
SINUS PAIN: 0
CONSTIPATION: 0
ABDOMINAL PAIN: 0
ANAL BLEEDING: 0
EYE ITCHING: 0
SHORTNESS OF BREATH: 0
VOICE CHANGE: 0
WHEEZING: 0
EYE PAIN: 0
APNEA: 0
SINUS PRESSURE: 0
RECTAL PAIN: 0
BACK PAIN: 0
COLOR CHANGE: 0
STRIDOR: 0
ABDOMINAL DISTENTION: 0
SORE THROAT: 0
RHINORRHEA: 0
EYE REDNESS: 0
NAUSEA: 0
DIARRHEA: 0
PHOTOPHOBIA: 0
CHOKING: 0

## 2020-08-27 ASSESSMENT — PAIN SCALES - GENERAL: PAINLEVEL_OUTOF10: 0

## 2020-08-27 NOTE — PROGRESS NOTES
[facility]       Patient: Virginie Hernandez    Unit/Bed: 1905/8664-98    YOB: 1950    MRN: [id number]    Acct: [de-identified]     Admitting Diagnosis:Sepsis ([address] [A41.9]  Sepsis [facility]) [A41.9]    Admit Date:  8/24/2020    Hospital Day: 3    Subjective:    Patient is stable she has no new complaints and is back to baseline. Patient admitted yesterday in a disheveled condition. Complains of a one-week history of diarrhea. Has difficulty ambulation at baseline. Patient Seen, Chart, Labs, Radiology studies, and Consults reviewed. Objective:   BP (!) 146/77   Pulse 90   Temp 98.5 °F (36.9 °C) (Oral)   Resp 19   Ht 5' 4\" (1.626 m)   Wt (!) 375 lb (170.1 kg)   SpO2 99%   BMI 64.37 kg/m²       Intake/Output Summary (Last 24 hours) at 8/27/2020 1556  Last data filed at 8/27/2020 0653  Gross per 24 hour   Intake 440 ml   Output 750 ml   Net -310 ml       Review of Systems   Constitutional: Negative for activity change, appetite change, chills, diaphoresis, fatigue, fever and unexpected weight change. HENT: Negative for congestion, drooling, ear discharge, facial swelling, hearing loss, mouth sores, nosebleeds, postnasal drip, rhinorrhea, sinus pressure, sinus pain, sore throat, tinnitus, trouble swallowing and voice change. Eyes: Negative for photophobia, pain, discharge, redness, itching and visual disturbance. Respiratory: Negative for apnea, choking, chest tightness, shortness of breath, wheezing and stridor. Cardiovascular: Negative for chest pain, palpitations and leg swelling. Gastrointestinal: Negative for abdominal distention, abdominal pain, anal bleeding, blood in stool, constipation, diarrhea, nausea, rectal pain and vomiting. Endocrine: Negative for cold intolerance, heat intolerance, polydipsia, polyphagia and polyuria. Genitourinary: Negative for decreased urine volume, difficulty urinating, dyspareunia, dysuria, enuresis, flank pain, frequency, genital sores, hematuria, menstrual problem, pelvic pain, urgency, vaginal bleeding, vaginal discharge and vaginal pain. Musculoskeletal: Positive for gait problem. Negative for arthralgias, back pain, joint swelling, myalgias, neck pain and neck stiffness. Skin: Positive for wound. Negative for color change, pallor and rash. Allergic/Immunologic: Negative for food allergies and immunocompromised state. Neurological: Negative for dizziness, tremors, seizures, syncope, facial asymmetry, speech difficulty, weakness, light-headedness, numbness and headaches. Hematological: Negative for adenopathy. Does not bruise/bleed easily. Psychiatric/Behavioral: Negative for agitation, behavioral problems, confusion, decreased concentration, dysphoric mood, hallucinations, self-injury, sleep disturbance and suicidal ideas. The patient is not nervous/anxious and is not hyperactive. Physical Exam  Vitals signs and nursing note reviewed. Constitutional:       General: She is not in acute distress. Appearance: Normal appearance. She is obese. She is not ill-appearing, toxic-appearing or diaphoretic. HENT:      Head: Normocephalic and atraumatic. Nose: Nose normal. No congestion or rhinorrhea. Mouth/Throat:      Mouth: Mucous membranes are moist.      Pharynx: Oropharynx is clear. No oropharyngeal exudate or posterior oropharyngeal erythema. Eyes:      General: No scleral icterus. Right eye: No discharge. Left eye: No discharge. Extraocular Movements: Extraocular movements intact. Conjunctiva/sclera: Conjunctivae normal.      Pupils: Pupils are equal, round, and reactive to light. Neck:      Musculoskeletal: No neck rigidity or muscular tenderness. Vascular: No carotid bruit. Comments: Reduced neck range of motion.   Cardiovascular: Rate and Rhythm: Normal rate and regular rhythm. Pulses: Normal pulses. Heart sounds: Normal heart sounds. No murmur. No friction rub. No gallop. Pulmonary:      Effort: Pulmonary effort is normal. No respiratory distress. Breath sounds: Normal breath sounds. No stridor. No wheezing, rhonchi or rales. Chest:      Chest wall: No tenderness. Abdominal:      General: Bowel sounds are normal. There is no distension. Palpations: Abdomen is soft. There is no mass. Tenderness: There is abdominal tenderness. There is no guarding or rebound. Hernia: No hernia is present. Comments: Right upper quadrant tenderness   Musculoskeletal: Normal range of motion. General: No swelling, tenderness, deformity or signs of injury. Right lower leg: Edema present. Left lower leg: Edema present. Lymphadenopathy:      Cervical: No cervical adenopathy. Skin:     General: Skin is warm and dry. Coloration: Skin is not jaundiced or pale. Findings: No bruising, erythema, lesion or rash. Comments: Stage II gluteal , back and posterior thigh decubitus ulcers. Neurological:      General: No focal deficit present. Mental Status: She is alert and oriented to person, place, and time. Mental status is at baseline. Cranial Nerves: No cranial nerve deficit. Sensory: No sensory deficit. Motor: Weakness present. Psychiatric:         Mood and Affect: Mood normal.         Behavior: Behavior normal.         Thought Content:  Thought content normal.         Judgment: Judgment normal.     Medications:    lidocaine 1 % injection  5 mL Intradermal Once    sodium chloride flush  10 mL Intravenous 2 times per day    vitamin D  50,000 Units Oral Weekly    rivaroxaban  20 mg Oral Daily    [Held by provider] cloNIDine  1 patch Transdermal Weekly    hydrALAZINE  25 mg Oral 3 times per day    [Held by provider] carvedilol  25 mg Oral BID WC    amLODIPine PT/INR:  Recent Labs     08/24/20 2002 08/25/20  0357   PROTIME 10.3 10.3   INR 1.0 1.0       APTT:   Recent Labs     08/24/20 2002   APTT 24.2     Results for Irish Marquez (MRN [id number]) as of 8/26/2020 14:38   Ref. Range 8/25/2020 15:45   Folate Latest Ref Range: >4.8 ng/mL 5.9   Vitamin B-12 Latest Ref Range: 232 - 1245 pg/mL 690     Results for Irish Marquez (MRN [id number]) as of 8/26/2020 14:38   Ref. Range 8/25/2020 15:45   Procalcitonin Latest Ref Range: <0.09 ng/mL 0.05     Results for Irish Marquez (MRN [id number]) as of 8/26/2020 14:38   Ref. Range 8/25/2020 15:45   CRP Latest Ref Range: 0.0 - 5.0 mg/L 73.7 (H)     Results for Irish Marquez (MRN [id number]) as of 8/25/2020 10:50   Ref. Range 8/24/2020 20:02 8/25/2020 03:57 8/25/2020 09:44   Troponin, High Sensitivity Latest Ref Range: 0 - 14 ng/L 50 (H) 59 (HH) 53 (HH)     VBG. Results for Irish Marquez (MRN [id number]) as of 8/25/2020 14:13   Ref. Range 8/24/2020 19:59   pH, [place] Latest Ref Range: 7.320 - 7.430  7.363   pCO2, [place] Latest Ref Range: 41.0 - 51.0 mm Hg 49.2   pO2, [place] Latest Ref Range: 30.0 - 50.0 mm Hg 41.1   HCO3, Venous Latest Ref Range: 22.0 - 29.0 mmol/L 27.9   Positive Base Excess, [place] Latest Ref Range: 0.0 - 3.0  2   Negative Base Excess, [place] Latest Ref Range: 0.0 - 2.0  NOT REPORTED   Total CO2, Venous Latest Ref Range: 23.0 - 30.0 mmol/L 30   O2 Sat, [place] Latest Ref Range: 60.0 - 85.0 % 74       URINALYSIS. Results for Irish Marquez (MRN [id number]) as of 8/25/2020 19:39   Ref.  Range 11/19/2017 04:37   DILAN Latest Ref Range: NEG  NEGATIVE   Alpha 1 % Latest Ref Range: 3 - 6 % 7 (H)   Alpha 2 % Latest Ref Range: 6 - 13 % 19 (H)   Alpha-1-Globulin Latest Ref Range: 0.1 - 0.4 g/dL 0.4   Alpha-2-Globulin Latest Ref Range: 0.5 - 0.9 g/dL 1.0 (H)   Beta Globulin Latest Ref Range: 0.5 - 1.1 g/dL 0.9   Beta Percent Latest Ref Range: 11 - 19 % 17   Gamma Globulin Latest Ref Range: 0.5 - 1.5 g/dL 1.0   Gamma Globulin % Latest Ref Range: 9 - 20 % 20   Kappa Free Light Chains QNT Latest Ref Range: 0.37 - 1.94 mg/dL 7.51 (H)   Lambda Free Light Chains QNT Latest Ref Range: 0.57 - 2.63 mg/dL 3.96 (H)   Free Kappa/Lambda Ratio Latest Ref Range: 0.26 - 1.65  1.90 (H)   Serum IFX Interp Unknown IMMUNOFIXATION IS NEGATIVE FOR MONOCLONAL IMMUNOGLOBULIN.   KAPPA/LAMBDA QUANT FREE LIGHT CHAINS SERUM Unknown Rpt (A)   Complement C3 Latest Ref Range: 90 - 180 mg/dL 103   Complement C4 Latest Ref Range: 10 - 40 mg/dL 26   IMMUNOFIXATION SERUM PROFILE Unknown Rpt       SEROLOGY  None. TUMOR MARKER. None. MICROBIOLOGY   Blood cultures on 04/24/2020. POSITIVE Blood Culture  Coagulase negative Staphylococcus species detected by PCR. HISTOPATHOLOGY. None. TOXICOLOGY. None. ENDOSCOPE STUDIES. None. PROCEDURES. None. RADIOLOGY. Brain MRI without contrast 08/27/2020. FINDINGS:  INTRACRANIAL STRUCTURES/VENTRICLES:   Small acute infarct identified within the left cerebellar hemisphere and the anterior aspect   of the right occipital lobe.     No mass effect or midline shift. No evidence of an acute intracranial hemorrhage. The ventricles and sulci are normal in size and configuration. The sellar/suprasellar regions appear unremarkable.       The normal signal voids within the major intracranial vessels appear maintained.     Moderate-to-severe chronic microvascular disease is identified within the  periventricular white matter of both cerebral hemispheres.     Old lacunes are appreciated within the alex and right basal ganglia.     Mild involutional changes are identified within brain.     ORBITS:   The visualized portion of the orbits demonstrate no acute abnormality.     SINUSES:   The visualized paranasal sinuses and mastoid air cells are well aerated.     BONES/SOFT TISSUES:   The bone marrow signal intensity appears normal.     The soft tissues demonstrate no acute abnormality.     IMPRESSION:  Small acute left cerebellar infarct.     Small acute infarct within the anterior aspect of the right occipital lobe.     Moderate-to-severe chronic microvascular disease within the periventricular  white matter.     Old lacunes within the alex and right basal ganglia.     Mild atrophy. MRI cervical spine without contrast 08/27/2020. FINDINGS:  BONES/ALIGNMENT:   There is normal alignment of the spine. The vertebral body heights are maintained. The bone marrow signal appears unremarkable. No acute fracture is identified. There is high T2 signal within the disc space at C4-5 and C5-6. No significant associated marrow edema is detected. This most likely represents degeneration of the discs rather than  discitis/osteomyelitis. No paraspinal inflammation or abscess is appreciated     SPINAL CORD:   No abnormal cord signal is seen.     SOFT TISSUES:   No paraspinal mass identified.     C2-C3:   There is no significant disc protrusion, spinal canal stenosis or neural   foraminal narrowing.     C3-C4:   Broad disc osteophyte complex results in moderate central canal stenosis. Severe left foraminal stenosis and mild right foraminal stenosis are identified   due to uncovertebral joint hypertrophy and facet disease.     C4-C5:   Severe left foraminal stenosis and moderate right foraminal stenosis  are identified due to uncovertebral joint hypertrophy and facet arthropathy.     C5-C6:   Severe left foraminal stenosis and moderate right foraminal stenosis are   identified due to uncovertebral joint hypertrophy and facet disease. Shallow broad disc osteophyte complex flattens the thecal sac.     C6-C7:   Moderate left foraminal stenosis and mild right foraminal stenosis are identified.     C7-T1:   Mild left foraminal stenosis is identified.     IMPRESSION:  New high T2 signal abnormality within the disc space at C4-5 and C5-6.       These changes likely represent degeneration of the disc rather than discitis/osteomyelitis. No marrow edema or inflammation/abscess is identified. Correlation with C-reactive protein may be considered if there is clinical   concern for infection.     Moderate central canal stenosis at C3-4. Associated severe left foraminal stenosis and mild right foraminal stenosis.     Severe left foraminal stenosis and moderate right foraminal stenosis at C4-5  and C5-6.     Moderate left foraminal stenosis and mild right foraminal stenosis at C6-7. Mild left foraminal stenosis at C7-T1. MRI thoracic spine without contrast- 08/27/2020. FINDINGS:  BONES/ALIGNMENT:   There is normal alignment of the spine. The vertebral body heights are maintained. The bone marrow signal appears unremarkable.     SPINAL CORD:   There is focal T2 hyperintense signal abnormality associated with cord   compression at T5.     SOFT TISSUES:   No paraspinal mass identified.     DEGENERATIVE CHANGES:   There is posterior arch hypertrophy from T3 to T6, most severe at T3 and T5. At T3 there is severe stenosis of the thecal sac without definite cord compression. At T5 there is severe stenosis of the thecal sac, cord compression, and chronic   myelomalacia.     IMPRESSION:  Chronic T5 spinal cord compression and myelomalacia due to posterior arch  Hypertrophy. MRI lumbar spine without contrast 08/27/2020. FINDINGS:  BONES/ALIGNMENT:   Vertebral heights and alignment are normal.     Edema at the L3-4 endplates is likely degenerative.     There is transitional spinal anatomy. The 12th ribs are hypoplastic.       There is a rudimentary disc space at L5-S1.     SPINAL CORD:   The conus terminates normally.     SOFT TISSUES:   Localizer image demonstrates possible massive dilation of the rectum up   to 12 cm in diameter.     L1-L2:   Disc bulge causes mild right foraminal stenosis.     L2-L3:   Disc bulge, facet and ligament flavum hypertrophy cause mild bilateral  foraminal stenosis.     L3-L4:   Disc bulge, facet and ligament flavum hypertrophy cause mild thecal  sac and moderate bilateral foraminal stenosis.     L4-L5:   Facet hypertrophy without stenosis.     L5-S1:   Disc bulge causes mild right foraminal stenosis. Right paracentral disc protrusion abuts the descending right S1 nerve roots.     IMPRESSION:  Rectal dilation indicating possible severe fecal impaction versus large bowel  obstruction, incompletely evaluated.     No severe lumbar spinal canal stenosis. RUQ Ultrasound 08/26/2020. FINDINGS:  Suboptimal/limited exam due to patient body habitus and gas.     LIVER:    Relative hepatic parenchymal echogenicity most commonly related to hepatic   steatosis. No intrahepatic biliary ductal dilatation. No convincing liver lesion. The portal vein demonstrates normal direction of flow.     BILIARY SYSTEM:    Gallbladder is unremarkable without evidence of pericholecystic fluid, wall thickening   or stones. Negative sonographic Nix's sign.     Common bile duct is within normal limits measuring 3 mm.     RIGHT KIDNEY:   The right kidney is grossly unremarkable without evidence of hydronephrosis.     PANCREAS:    Visualized portions of the pancreas are unremarkable.     OTHER:   No evidence of right upper quadrant ascites.     IMPRESSION:  Hepatic steatosis is suspected.     Unremarkable gallbladder.     Chest x-ray-08/24/2020. FINDINGS:    HEART/MEDIASTINUM:   The cardiomediastinal silhouette is within normal limits.     PLEURA/LUNGS:   There are no focal consolidations or pleural effusions. There is no appreciable pneumothorax.     BONES/SOFT TISSUE:   No acute abnormality.     IMPRESSION:  No radiographic evidence of acute pulmonary disease. Echocardiogram-12/6/2017. The study was performed by the attending, the fellow and the sonographer in  the Cath Lab. The study was performed under conscious sedation.     Left ventricle is normal in sized with increased wall thickness, normal  systolic function, estimated EF 55%. Left atrial appendage showed no evidence of clot. Negative bubble study, no shunt noted. Mild tricuspid regurgitation. Echocardiogram-11/20/2017. Technically difficult study due to open chest wounds; Unable to obtain apical views. Left ventricle is normal in size. Global left ventricular systolic function is normal.     Estimated ejection fraction is 55 % . Mild left ventricular hypertrophy. Trivial tricuspid regurgitation. Estimated right ventricular systolic pressure is 44 mmHg suggestive of mild  Pulmonary HTN. Trivial pulmonic insufficiency. MRI brain w/ and w/o contrast-12/05/2017. IMPRESSION:  1. Numerous acute ischemic infarcts in the cerebral white matter, right      thalamus, and left alex with the distribution in multiple vascular territories       suggesting a central embolic etiology. 2. No intracranial hemorrhage or mass effect. 3. Multifocal remote lacunar infarcts and moderate chronic white matter microvascular       ischemic changes. 4. Trace bilateral mastoid effusions. MRI C-Spine w/o contrast -12/05/2017. FINDINGS:  BONES/ALIGNMENT:   There is normal alignment of the spine. The vertebral body heights are maintained. The marrow signal in the C4, C5 and C6 vertebral bodies is dark on T1 and T2 weighted   images which likely represents sclerosis of the vertebral bodies. There is degenerative disc disease with disc space narrowing and osteophytes at C3-4,   C4-5, C5-6, C6-7, and C7-T1. The bony spinal canal overall is congenitally small.       There are large anterior osteophytes which are better visualized on the prior CT.     SPINAL CORD:    No abnormal signal is identified within the spinal cord.     SOFT TISSUES:   No paraspinal mass identified.     C2-C3:    The thecal sac and neural foramina are intact.     C3-C4:   There is disc protrusion or osteophyte measuring 3-4 mm toward the left narrowing   the left neural foramen greater than the right. Thecal sac is moderately stenotic measuring 8.2 mm.     C4-C5:   There is a disc bulge and osteophyte measuring 2-3 mm. The thecal sac is mildly stenotic measuring 9 mm. Disc and/or osteophytes result in narrowing of the neural foramina bilaterally.     C5-C6:   There is disc protrusion or osteophyte measuring 4 mm. The thecal sac is mildly stenotic measuring 9.4 mm. Disc and/or osteophytes result in narrowing of the neural foramina bilaterally.     C6-C7:   There is disc protrusion or osteophyte measuring 2-3 mm. The thecal sac is mildly stenotic measuring 9.7 mm. Disc and/or osteophytes result in narrowing of the neural foramina bilaterally.     C7-T1:   Disc and osteophyte narrows the left neural foramen. The thecal sac is not stenotic. The right neural foramen is intact. IMPRESSION:  Disc and osteophytes result in stenosis of the thecal sac and narrowing of  the neural foramina throughout the cervical region as discussed above. MRI Thoracic w/o spine -12/05/2017. IMPRESSION:  The study is limited by motion artifact. The bony spinal canal overall is congenitally small. There is facet and ligamentum flavum hypertrophy in the mid thoracic region,    which appears to cause stenosis of the thecal sac as discussed above. A repeat study may be useful to better evaluate the spinal canal contents.     There is questionable signal abnormality in the thoracic spinal cord at the  T6-7 level, which may represent myelomalacia secondary to stenosis. Again, motion artifact limits the exam.    FINDINGS:  BONES/ALIGNMENT:   Vertebral heights and alignment are normal.     Edema at the L3-4 endplates is likely degenerative.     There is transitional spinal anatomy. The 12th ribs are hypoplastic.       There is a rudimentary disc space at L5-S1.     SPINAL CORD:   The conus terminates normally.     SOFT TISSUES:   Localizer image demonstrates possible massive dilation of the rectum up to 12 cm in diameter.     L1-L2:   Disc bulge causes mild right foraminal stenosis.     L2-L3:   Disc bulge, facet and ligament flavum hypertrophy cause mild bilateral  foraminal stenosis.     L3-L4:   Disc bulge, facet and ligament flavum hypertrophy cause mild thecal  sac and moderate bilateral foraminal stenosis.     L4-L5:   Facet hypertrophy without stenosis.     L5-S1:   Disc bulge causes mild right foraminal stenosis. Right paracentral  disc protrusion abuts the descending right S1 nerve roots.     IMPRESSION:  Rectal dilation indicating possible severe fecal impaction versus large bowel  obstruction, incompletely evaluated.     No severe lumbar spinal canal stenosis. ASSESSMENT AND  PLAN. 1.NEUROVASCULAR. Acute CVA left cerebellar and right occipital.     H/o CVA with multiple ischemic infarcts-2017. Multilevel C-spine disc disease and T5 cord compression w/ myelopathy. Neurosurgery consult. Neurology consult. 2.PULMONARY. Resolved acute hypoxic respiratory failure-O2 supplement, wean as tolerated. Pulmonary hypertension. 3.CARDIOVASCULAR. Resolved hypovolemic hypotension -discontinue IVF. HTN-    4.GI.     RUQ tenderness- negative ultrasound for cholecystitis. Acute gastroenteritis-resolved. Bowel obstruction vs. rectal fecal impaction -CT abdomen pelvis , bowel regimen. 5.RENAL AND ELECTROLYTES. Resolved acute lactic acidosis-serum lactate 1.0 from 2.8 on admission-discontinue IVF. Electrolyte imbalance w/ hypokalemia and hypophosphatemia-replete. 6.ID. Sepsis clinically and determined -Gram-positive cocci bacteremia is a contaminant. Discontinue IV vancomycin and cefepime. 7. ENDO. T2 DM-sliding scale insulin. TSH 3.46 and A1c check. 8.MUSCULOSKELETAL.      Chronic debilitating condition. Chronic neurologic ambulatory dysfunction-PT OT evaluation. 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 MRI cervical/thoracic/lumbar spine . 9. PSYCH. Clinical depression-Celexa 10 mg daily. 10. LIFE STYLE.       H/o tobacco use disorder-patient quit. 11.HEM-ONC. Long-term anticoagulation with Xarelto. Normocytic anemia-Hb 11.7 from 14.3 on admission. MCV 94.1. Folic acid 5.9, Y05 430.    12.NUTRITION. Protein energy malnutrition-dietitian consult. Obesity with a BMI of 64.37-needs regular exercise diet control. 13. SKIN. Multiple stage II decubitus ulcers on buttock, back and thigh. 14DISPO. Planned d/c to home vs rehab soon.       Electronically signed Demetria Pearson MD on 8/27/2020 at 3:56 PM    Rounding Hospitalist

## 2020-08-27 NOTE — PROGRESS NOTES
Pharmacy Note  Warfarin Consult    Fredi Ulrich is a 65 y.o. female for whom pharmacy has been consulted to manage warfarin therapy. Consulting Physician: Azeb Boothe  Reason for Admission: fatigue, diarrhea    Warfarin dose prior to admission: new start  Warfarin indication: history of stroke, hypercoagulable state  Target INR range: 2-3    Past Medical History:   Diagnosis Date    Acute ischemic stroke ([facility] [address] 37/56/1675    cardioembolic    Depression     DM (diabetes mellitus) ([facility] [address]     Heart murmur     HTN (hypertension)                 Recent Labs     08/25/20  0357   INR 1.0     Recent Labs     08/25/20  0357 08/26/20  0600 08/27/20  0735   HGB 11.7* 10.9* 10.8*   HCT 38.3 35.3* 35.1*    267 251       Current warfarin drug-drug interactions: acetaminophen, tramadol      Date             INR        Dose   8/27/2020       1.0 (8/25)   5mg    Will give warfarin 5mg daily and  follow daily PT/INR while inpatient. PT/INR ordered to start 8/28. INR may be elevated due to Rivaroxaban use. Last dose 8/28 at 9am.     Thank you for the consult. Will continue to follow. [id number] JEF HARTMAN, BCPS  8/27/2020  7:23 PM

## 2020-08-27 NOTE — FLOWSHEET NOTE
DATE: 2020    NAME: Tavo Hall  MRN: [id number]   : 1950    Patient not seen this date for Physical Therapy due to:  [] Blood transfusion in progress  [] Hemodialysis  []  Patient Declined  [] Spine Precautions   [] Strict Bedrest  [] Surgery/ Procedure  [x] Testing: MRI      [] Other        [] PT being discontinued at this time. Patient independent. No further needs. [] PT being discontinued at this time as the patient has been transferred to palliative care. No further needs.     Yin Cox, PTA
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
## 2020-08-27 NOTE — CONSULTS
Department of Neurosurgery                                            Nurse Practitioner Consult Note      Reason for Consult:  Thoracic stenosis  Requesting Physician:  Dr Kaleb Levine  Neurosurgeon:   [] Dr. Lorenzana See  [x] Dr. Damaris Siddiquir  [] Dr. Lexa Sellers  [] Dr. Lyiah Kamara      History Obtained From:  patient, electronic medical record    CHIEF COMPLAINT:         Chief Complaint   Patient presents with    Fatigue     Patient covered in stool. Concern for neglect per ems due to living conditions       HISTORY OF PRESENT ILLNESS:       The patient is a 65 y.o. female who initially presented on 8/24 with fatigue, diarrhea and possible sepsis. Patient was found by EMS covered in stool. Per patient she has been nonambulatory since her stroke in 2017. She resides with her daughter and grandchildren. She has multiple open areas to buttocks and legs. She was last seen in 2017 by neurosurgery for cervical spine clearance at that time she was also found to have T5 stenosis with cord signal changes. No surgery was recommended at that time because she was found to have an acute stroke and was to follow-up with Dr. Nathen Shah. Patient did not follow-up with Dr. Nathen Shah. Patient states she has not ambulated since her stroke in 2017. She is supposed to take Xarelto for hypercoagulable state. However she said that she has not been taking it at home. MRI brain shows acute infarcts during this admission. MRI cervical and thoracic spine were done during this admission as well. MRI thoracic shows T5 stenosis with some myelomalacia.     PAST MEDICAL HISTORY :       Past Medical History:        Diagnosis Date    Acute ischemic stroke ([facility] 32/69/4580    cardioembolic    Depression     DM (diabetes mellitus) ([facility] [address] [facility]     Heart murmur     HTN (hypertension)        Past Surgical History:        Procedure Laterality Date    OTHER SURGICAL HISTORY      bump under skin on buttocks    DC EGD PERCUTANEOUS PLACEMENT GASTROSTOMY TUBE N/A 12/11/2017    EGD ESOPHAGOGASTRODUODENOSCOPY PEG TUBE INSERTION performed by Lamont Novoa MD at [address] [place] [address] Road [address]         Social History:   Social History     Socioeconomic History    Marital status:       Spouse name: Not on file    Number of children: Not on file    Years of education: Not on file    Highest education level: Not on file   Occupational History    Not on file   Social Needs    Financial resource strain: Not on file    Food insecurity     Worry: Not on file     Inability: Not on file    Transportation needs     Medical: Not on file     Non-medical: Not on file   Tobacco Use    Smoking status: Former Smoker    Smokeless tobacco: Never Used   Substance and Sexual Activity    Alcohol use: No    Drug use: No    Sexual activity: Never   Lifestyle    Physical activity     Days per week: Not on file     Minutes per session: Not on file    Stress: Not on file   Relationships    Social connections     Talks on phone: Not on file     Gets together: Not on file     Attends Muslim service: Not on file     Active member of club or organization: Not on file     Attends meetings of clubs or organizations: Not on file     Relationship status: Not on file    Intimate partner violence     Fear of current or ex partner: Not on file     Emotionally abused: Not on file     Physically abused: Not on file     Forced sexual activity: Not on file   Other Topics Concern    Not on file   Social History Narrative    Not on file       Family History:       Problem Relation Age of Onset    Heart Disease Paternal Grandmother     Diabetes Father     Hypertension Father     Stroke Father     Diabetes Mother     Hypertension Mother     Breast Cancer Mother     Asthma Brother     Cancer Sister         lung    Cancer Maternal Uncle         bone    Cancer Maternal Aunt        Allergies:  Pcn [penicillins]    Home Medications:  Prior to Admission medications    Medication Sig Start Date End Date Taking? Authorizing Provider   ondansetron (ZOFRAN) 4 MG tablet Take 4 mg by mouth every 8 hours as needed for Nausea or Vomiting    Historical Provider, MD   lidocaine (XYLOCAINE) 4 % external solution Apply topically 2 times daily Apply topically as needed. Historical Provider, MD   lidocaine (LIDAMANTLE) 3 % CREA Apply topically 2 times daily    Historical Provider, MD   insulin lispro (HUMALOG) 100 UNIT/ML injection vial Inject into the skin 3 times daily (before meals)    Historical Provider, MD   traMADol (ULTRAM) 50 MG tablet Take 50 mg by mouth every 8 hours as needed for Pain. Janny Whyte     Historical Provider, MD   polyethylene glycol (GLYCOLAX) packet Take 17 g by mouth daily as needed for Constipation    Historical Provider, MD   MULTIPLE VITAMIN PO Take by mouth daily    Historical Provider, MD   sennosides-docusate sodium (SENOKOT-S) 8.6-50 MG tablet Take 1 tablet by mouth every 12 hours as needed for Constipation    Historical Provider, MD   atorvastatin (LIPITOR) 20 MG tablet Take 1 tablet by mouth daily 1/23/18   ADOLFO Small - CNP   rivaroxaban (XARELTO) 20 MG TABS tablet Take 1 tablet by mouth daily 12/13/17   Laila Reilly MD   cloNIDine (CATAPRES) 0.1 MG/24HR Place 1 patch onto the skin once a week 12/18/17   Laila Reilly MD   hydrALAZINE (APRESOLINE) 25 MG tablet Take 1 tablet by mouth every 8 hours 12/13/17   Laila Reilly MD   carvedilol (COREG) 25 MG tablet Take 1 tablet by mouth 2 times daily (with meals) 12/13/17   Laila Reilly MD   amLODIPine (NORVASC) 10 MG tablet Take 1 tablet by mouth daily 12/14/17   Laila Reilly MD   pantoprazole (PROTONIX) 40 MG tablet Take 1 tablet by mouth 2 times daily Take protonix BID for 8 weeks and then daily 12/13/17   Laila Reilly MD   LASIX 20 MG tablet Take 1 tablet by mouth daily 12/13/17   Laila Reilly MD       Current Medications:   Current Facility-Administered Medications: lidocaine PF 1 % injection 5 mL, 5 mL, Intradermal, Once  sodium chloride flush 0.9 % injection 10 mL, 10 mL, Intravenous, 2 times per day  sodium chloride flush 0.9 % injection 10 mL, 10 mL, Intravenous, PRN  vitamin D (ERGOCALCIFEROL) capsule 50,000 Units, 50,000 Units, Oral, Weekly  rivaroxaban (XARELTO) tablet 20 mg, 20 mg, Oral, Daily  [Held by provider] cloNIDine (CATAPRES) 0.1 MG/24HR 1 patch, 1 patch, Transdermal, Weekly  hydrALAZINE (APRESOLINE) tablet 25 mg, 25 mg, Oral, 3 times per day  [Held by provider] carvedilol (COREG) tablet 25 mg, 25 mg, Oral, BID WC  amLODIPine (NORVASC) tablet 10 mg, 10 mg, Oral, Daily  [Held by provider] furosemide (LASIX) tablet 20 mg, 20 mg, Oral, Daily  sennosides-docusate sodium (SENOKOT-S) 8.6-50 MG tablet 1 tablet, 1 tablet, Oral, Q12H PRN  atorvastatin (LIPITOR) tablet 20 mg, 20 mg, Oral, Daily  traMADol (ULTRAM) tablet 50 mg, 50 mg, Oral, Q8H PRN  polyethylene glycol (GLYCOLAX) packet 17 g, 17 g, Oral, Daily PRN  insulin lispro (HUMALOG) injection vial 0-12 Units, 0-12 Units, Subcutaneous, TID WC  insulin lispro (HUMALOG) injection vial 0-6 Units, 0-6 Units, Subcutaneous, Nightly  glucose (GLUTOSE) 40 % oral gel 15 g, 15 g, Oral, PRN  dextrose 50 % IV solution, 12.5 g, Intravenous, PRN  glucagon (rDNA) injection 1 mg, 1 mg, Intramuscular, PRN  dextrose 5 % solution, 100 mL/hr, Intravenous, PRN  sodium chloride flush 0.9 % injection 10 mL, 10 mL, Intravenous, 2 times per day  sodium chloride flush 0.9 % injection 10 mL, 10 mL, Intravenous, PRN  acetaminophen (TYLENOL) tablet 650 mg, 650 mg, Oral, Q6H PRN **OR** acetaminophen (TYLENOL) suppository 650 mg, 650 mg, Rectal, Q6H PRN  famotidine (PEPCID) tablet 20 mg, 20 mg, Oral, BID  potassium chloride 10 mEq/100 mL IVPB (Peripheral Line), 10 mEq, Intravenous, PRN  potassium & sodium phosphates (PHOS-NAK) 280-160-250 MG packet 500 mg, 2 packet, Oral, BID    REVIEW OF SYSTEMS:       CONSTITUTIONAL: negative for fatigue and malaise   HEENT: negative for tinnitus and 08/25/2020       Radiology Review:    Mri Cervical Spine Wo Contrast    Result Date: 8/27/2020  EXAMINATION: MRI OF THE CERVICAL SPINE WITHOUT CONTRAST 8/27/2020 1:44 pm TECHNIQUE: Multiplanar multisequence MRI of the cervical spine was performed without the administration of intravenous contrast. COMPARISON: 12/5/2017 HISTORY: ORDERING SYSTEM PROVIDED HISTORY: Bilateral upper extremity weakness with reduced neck range of motion TECHNOLOGIST PROVIDED HISTORY: Bilateral upper extremity weakness with reduced neck range of motion FINDINGS: BONES/ALIGNMENT: There is normal alignment of the spine. The vertebral body heights are maintained. The bone marrow signal appears unremarkable. No acute fracture is identified. There is high T2 signal within the disc space at C4-5 and C5-6. No significant associated marrow edema is detected. This most likely represents degeneration of the discs rather than discitis/osteomyelitis. No paraspinal inflammation or abscess is appreciated SPINAL CORD: No abnormal cord signal is seen. SOFT TISSUES: No paraspinal mass identified. C2-C3: There is no significant disc protrusion, spinal canal stenosis or neural foraminal narrowing. C3-C4: Broad disc osteophyte complex results in moderate central canal stenosis. Severe left foraminal stenosis and mild right foraminal stenosis are identified due to uncovertebral joint hypertrophy and facet disease. C4-C5: Severe left foraminal stenosis and moderate right foraminal stenosis are identified due to uncovertebral joint hypertrophy and facet arthropathy. C5-C6: Severe left foraminal stenosis and moderate right foraminal stenosis are identified due to uncovertebral joint hypertrophy and facet disease. Shallow broad disc osteophyte complex flattens the thecal sac. C6-C7: Moderate left foraminal stenosis and mild right foraminal stenosis are identified. C7-T1: Mild left foraminal stenosis is identified.      New high T2 signal abnormality within the disc space at C4-5 and C5-6. These changes likely represent degeneration of the disc rather than discitis/osteomyelitis. No marrow edema or inflammation/abscess is identified. Correlation with C-reactive protein may be considered if there is clinical concern for infection. Moderate central canal stenosis at C3-4. Associated severe left foraminal stenosis and mild right foraminal stenosis. Severe left foraminal stenosis and moderate right foraminal stenosis at C4-5 and C5-6. Moderate left foraminal stenosis and mild right foraminal stenosis at C6-7. Mild left foraminal stenosis at C7-T1. Mri Thoracic Spine Wo Contrast    Result Date: 8/27/2020  EXAMINATION: MRI OF THE THORACIC SPINE WITHOUT CONTRAST  8/27/2020 1:44 pm TECHNIQUE: Multiplanar multisequence MRI of the thoracic spine was performed without the administration of intravenous contrast. COMPARISON: None. HISTORY: ORDERING SYSTEM PROVIDED HISTORY: Bilateral lower extremity weakness TECHNOLOGIST PROVIDED HISTORY: Bilateral lower extremity weakness FINDINGS: BONES/ALIGNMENT: There is normal alignment of the spine. The vertebral body heights are maintained. The bone marrow signal appears unremarkable. SPINAL CORD: There is focal T2 hyperintense signal abnormality associated with cord compression at T5. SOFT TISSUES: No paraspinal mass identified. DEGENERATIVE CHANGES: There is posterior arch hypertrophy from T3 to T6, most severe at T3 and T5. At T3 there is severe stenosis of the thecal sac without definite cord compression. At T5 there is severe stenosis of the thecal sac, cord compression, and chronic myelomalacia. Chronic T5 spinal cord compression and myelomalacia due to posterior arch hypertrophy.      Mri Lumbar Spine Wo Contrast    Result Date: 8/27/2020  EXAMINATION: MRI OF THE LUMBAR SPINE WITHOUT CONTRAST, 8/27/2020 1:44 pm TECHNIQUE: Multiplanar multisequence MRI of the lumbar spine was performed without the administration of intravenous contrast. COMPARISON: None HISTORY: ORDERING SYSTEM PROVIDED HISTORY: Bilateral lower extremity strength weakness with paraplegia TECHNOLOGIST PROVIDED HISTORY: Bilateral lower extremity strength weakness with paraplegia FINDINGS: BONES/ALIGNMENT: Vertebral heights and alignment are normal. Edema at the L3-4 endplates is likely degenerative. There is transitional spinal anatomy. The 12th ribs are hypoplastic. There is a rudimentary disc space at L5-S1. SPINAL CORD: The conus terminates normally. SOFT TISSUES: Localizer image demonstrates possible massive dilation of the rectum up to 12 cm in diameter. L1-L2: Disc bulge causes mild right foraminal stenosis. L2-L3: Disc bulge, facet and ligament flavum hypertrophy cause mild bilateral foraminal stenosis. L3-L4: Disc bulge, facet and ligament flavum hypertrophy cause mild thecal sac and moderate bilateral foraminal stenosis. L4-L5: Facet hypertrophy without stenosis. L5-S1: Disc bulge causes mild right foraminal stenosis. Right paracentral disc protrusion abuts the descending right S1 nerve roots. Rectal dilation indicating possible severe fecal impaction versus large bowel obstruction, incompletely evaluated. No severe lumbar spinal canal stenosis. Xr Chest Portable    Result Date: 8/24/2020  EXAMINATION: ONE XRAY VIEW OF THE CHEST 8/24/2020 6:56 pm COMPARISON: Chest x-ray dated 11/22/2017 HISTORY: ORDERING SYSTEM PROVIDED HISTORY:  TECHNOLOGIST PROVIDED HISTORY: cp Reason for Exam: port Upright FINDINGS: HEART/MEDIASTINUM: The cardiomediastinal silhouette is within normal limits. PLEURA/LUNGS: There are no focal consolidations or pleural effusions. There is no appreciable pneumothorax. BONES/SOFT TISSUE: No acute abnormality. No radiographic evidence of acute pulmonary disease. Us Abdomen Limited Specify Organ?  Liver, Gallbladder    Result Date: 8/26/2020  EXAMINATION: RIGHT UPPER QUADRANT ULTRASOUND 8/26/2020 9:09 am COMPARISON: CT abdomen and pelvis December 8, 2017 HISTORY: ORDERING SYSTEM PROVIDED HISTORY: Right upper quadrant tenderness. TECHNOLOGIST PROVIDED HISTORY: Right upper quadrant tenderness. Specify organ?->LIVER Specify organ?->GALLBLADDER FINDINGS: Suboptimal/limited exam due to patient body habitus and gas. LIVER:  Relative hepatic parenchymal echogenicity most commonly related to hepatic steatosis. No intrahepatic biliary ductal dilatation. No convincing liver lesion. The portal vein demonstrates normal direction of flow. BILIARY SYSTEM:  Gallbladder is unremarkable without evidence of pericholecystic fluid, wall thickening or stones. Negative sonographic Nix's sign. Common bile duct is within normal limits measuring 3 mm. RIGHT KIDNEY: The right kidney is grossly unremarkable without evidence of hydronephrosis. PANCREAS:  Visualized portions of the pancreas are unremarkable. OTHER: No evidence of right upper quadrant ascites. Hepatic steatosis is suspected. Unremarkable gallbladder. Mri Brain Wo Contrast    Result Date: 8/27/2020  EXAMINATION: MRI OF THE BRAIN WITHOUT CONTRAST  8/27/2020 1:44 pm TECHNIQUE: Multiplanar multisequence MRI of the brain was performed without the administration of intravenous contrast. COMPARISON: None. HISTORY: ORDERING SYSTEM PROVIDED HISTORY: Altered mental status with slow mentation TECHNOLOGIST PROVIDED HISTORY: Altered mental status with slow mentation FINDINGS: INTRACRANIAL STRUCTURES/VENTRICLES: Small acute infarct identified within the left cerebellar hemisphere and the anterior aspect of the right occipital lobe. No mass effect or midline shift. No evidence of an acute intracranial hemorrhage. The ventricles and sulci are normal in size and configuration. The sellar/suprasellar regions appear unremarkable. The normal signal voids within the major intracranial vessels appear maintained.  Moderate-to-severe chronic microvascular disease is identified within the periventricular white matter of both cerebral hemispheres. Old lacunes are appreciated within the alex and right basal ganglia. Mild involutional changes are identified within brain. ORBITS: The visualized portion of the orbits demonstrate no acute abnormality. SINUSES: The visualized paranasal sinuses and mastoid air cells are well aerated. BONES/SOFT TISSUES: The bone marrow signal intensity appears normal. The soft tissues demonstrate no acute abnormality. Small acute left cerebellar infarct. Small acute infarct within the anterior aspect of the right occipital lobe. Moderate-to-severe chronic microvascular disease within the periventricular white matter. Old lacunes within the alex and right basal ganglia. Mild atrophy. ASSESSMENT AND PLAN:       Patient Active Problem List   Diagnosis    Essential hypertension    Heart murmur    Depression    Encephalopathy    Morbid obesity ([address]    Abrasions of multiple sites    Infected open wound    Recurrent major depressive disorder ([address]    Type 2 diabetes mellitus ([address]    Cerebrovascular accident (CVA) due to embolism of precerebral artery ([address]    Hypercoagulable state ([address]    Myelomalacia ([address]    Thoracic myelopathy    Hypercoagulable state ([address]    Open wound of right hip    MSSA (methicillin susceptible Staphylococcus aureus) infection    E coli infection    Proteus infection    Allergy to penicillin    Sepsis ([address]    Elevated troponin    Diarrhea of presumed infectious origin    Lactic acidosis    Dehydration    Dermatitis associated with moisture    Cellulitis of right lower extremity    Pressure injury of contiguous region involving back, buttock, and hip, stage 2 (HCC)    Multilevel degenerative disc disease    Right upper quadrant abdominal tenderness without rebound tenderness    Hypotension due to hypovolemia    Hepatic steatosis         A/P:  This is a 65 y.o. female with thoracic stenosis with myelomalacia.      Patient care will be

## 2020-08-27 NOTE — ADT AUTH CERT
Sepsis and Other Febrile Illness, without Focal Infection - Care Day 3 (8/26/2020) by Kenyon Huerta RN         Review Status  Review Entered    Completed  8/27/2020 11:27        Criteria Review       Care Day: 3 Care Date: 8/26/2020 Level of Care:    Guideline Day 2    Level Of Care    (X) ICU or floor    8/27/2020 11:27 AM EDT by Graham Lane      Intermediate care    Clinical Status    (X) * Hemodynamic stability    8/27/2020 11:27 AM EDT by Graham Lane      98.8  18-90 121/59    (X) * Hypoxemia absent    (X) * Tachypnea absent    Activity    (X) Activity as tolerated    Routes    (X) Possible IV fluids    8/27/2020 11:27 AM EDT by Graham Lane       ml/hr    (X) Parenteral or oral medications    8/27/2020 11:27 AM EDT by Graham Lane      see attached    Interventions    (X) WBC    8/27/2020 11:27 AM EDT by Graham Lane      8.0    Medications    (X) Possible antimicrobial treatment    8/27/2020 11:27 AM EDT by Graham Lane      Maxipime 2 g q 8 hour iv    (X) Possible DVT prophylaxis    * Milestone    Additional Notes    8/26/2020       Internal Medicine       Hospital Day: 2         Subjective:      Patient is stable she has no new complaints and is back to baseline.         Patient admitted yesterday in a disheveled condition.         Complains of a one-week history of diarrhea.           Has difficulty ambulation at baseline.         Patient Seen, Chart, Labs, Radiology studies, and Consults reviewed.         Objective:     BP (!) 152/86   Pulse 86   Temp 98.8 °F (37.1 °C) (Oral)   Resp 18   Ht 5' 4\" (1.626 m)   Wt (!) 375 lb (170.1 kg)   SpO2 99%   BMI 64.37 kg/m²          Intake/Output Summary (Last 24 hours) at 8/26/2020 1439    Last data filed at 8/26/2020 9427       Gross per 24 hour    Intake -    Output 350 ml    Net -350 ml          · lidocaine 1 % injection  5 mL Intradermal Once    · sodium chloride flush  10 mL Intravenous 2 times per day    · vancomycin  1,500 mg Intravenous Q12H    · rivaroxaban  20 mg Oral Daily    · [Held by provider] cloNIDine  1 patch Transdermal Weekly    · [Held by provider] hydrALAZINE  25 mg Oral 3 times per day    · [Held by provider] carvedilol  25 mg Oral BID WC    · [Held by provider] amLODIPine  10 mg Oral Daily    · [Held by provider] furosemide  20 mg Oral Daily    · atorvastatin  20 mg Oral Daily    · insulin lispro  0-12 Units Subcutaneous TID WC    · insulin lispro  0-6 Units Subcutaneous Nightly    · sodium chloride flush  10 mL Intravenous 2 times per day    · cefepime  2 g Intravenous Q8H    · vancomycin (VANCOCIN) intermittent dosing (placeholder)   Other RX Placeholder    · famotidine  20 mg Oral BID    · potassium & sodium phosphates  2 packet Oral BID    · metroNIDAZOLE  500 mg Intravenous Q8H               Continuous Infusions:       sodium chloride    150 mL/hr       ASSESSMENT AND  PLAN.         1.NEUROVASCULAR.       H/o CVA with multiple ischemic infarcts-2017.       Paraplegic unclear etiology       MRI brain, C-spine, thoracic and lumbar spine.           2. PULMONARY.    Resolved acute hypoxic respiratory failure-O2 supplement, wean as tolerated.       Pulmonary hypertension.           3. CARDIOVASCULAR.       Resolved hypovolemic hypotension -discontinue IVF.       HTN-         4. GI.       WKD tenderness- negative ultrasound for cholecystitis.         Acute gastroenteritis-stool studies pending.         5.RENAL AND ELECTROLYTES.    Resolved acute lactic acidosis-serum lactate 1.0 from 2.8 on admission-discontinue IVF.         Electrolyte imbalance with hypokalemia and hypophosphatemia-replete.           6. ID.       Sepsis clinically and determined -Gram-positive cocci bacteremia is a contaminant.         Discontinue IV vancomycin and cefepime.         7. ENDO.       T2 DM-sliding scale insulin.       TSH 3.46 and A1c check.         8.MUSCULOSKELETAL.        Chronic debilitating condition.       Chronic neurologic ambulatory dysfunction-PT OT evaluation.       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       MRI cervical/thoracic/lumbar spine .           9. PSYCH.       Clinical depression-Celexa 10 mg daily.         10. LIFE STYLE.         H/o tobacco use disorder-patient quit.         11. HEM-ONC.      Long-term anticoagulation with Xarelto.         Normocytic anemia-Hb 11.7 from 14.3 on admission.  MCV 94. 1.      Folic acid 5.9, Y47 915.         12. NUTRITION.      Protein energy malnutrition-dietitian consult.         Obesity with a BMI of 64.37-needs regular exercise diet control.         13. SKIN.      Multiple stage II decubitus ulcers on buttock, back and thigh.              14DISPO.      Planned d/c to home vs rehab soon. Physical Therapy       Assessment: The pt is dependent -MAX A [id number] for all mobility, unable to move her hips or knees without assistance,Tolerated ~3mins at EOB with MAX A [id number] to maintain sitting balance. pt reports have been bed riddent for ~1yr. Pt would benefit from more Pt to address deficts       8/26/2020 06:00    BUN: 10    Creatinine: 0.57    GFR Non-: >60    GFR African American: >60    Lactic Acid: NOT REPORTED    Lactic Acid, Whole Blood: 1.0    Phosphorus: 3.3    GFR Comment: Pend    GFR Staging: NOT REPORTED    Vancomycin Tr: 22.1 ()    Vancomycin Trough Date last dose: NOT REPORTED    Vancomycin Trough Dose amount: NOT REPORTED    Vancomycin Trough Time last dose: NOT REPORTED    WBC: 8.0    RBC: 3.81 (L)    Hemoglobin Quant: 10.9 (L)    Hematocrit: 35.3 (L)    MCV: 92.7    MCH: 28.6    MCHC: 30.9    MPV: 9.9    RDW: 13.7    Platelet Count: 392    NRBC Automated: 0.0       8/26/2020 06:45    POC Glucose: 94       8/26/2020 09:34    US ABDOMEN LIMITED: Rpt       8/26/2020 12:37    POC Glucose: 134 (H)       8/26/2020 17:50    POC Glucose: 107 (H)       8/26/2020 21:09    POC Glucose: 152 (H)

## 2020-08-28 ENCOUNTER — APPOINTMENT (OUTPATIENT)
Dept: GENERAL RADIOLOGY | Age: 70
DRG: 065 | End: 2020-08-28
Payer: MEDICARE

## 2020-08-28 PROBLEM — E87.8 ELECTROLYTE IMBALANCE: Status: ACTIVE | Noted: 2020-08-28

## 2020-08-28 LAB
BUN BLDV-MCNC: 7 MG/DL (ref 8–23)
CHOLESTEROL/HDL RATIO: 3
CHOLESTEROL: 149 MG/DL
CREAT SERPL-MCNC: 0.42 MG/DL (ref 0.5–0.9)
GFR AFRICAN AMERICAN: >60 ML/MIN
GFR NON-AFRICAN AMERICAN: >60 ML/MIN
GFR SERPL CREATININE-BSD FRML MDRD: ABNORMAL ML/MIN/{1.73_M2}
GFR SERPL CREATININE-BSD FRML MDRD: ABNORMAL ML/MIN/{1.73_M2}
GLUCOSE BLD-MCNC: 130 MG/DL (ref 65–105)
GLUCOSE BLD-MCNC: 130 MG/DL (ref 65–105)
GLUCOSE BLD-MCNC: 168 MG/DL (ref 65–105)
GLUCOSE BLD-MCNC: 99 MG/DL (ref 65–105)
HCT VFR BLD CALC: 36.3 % (ref 36.3–47.1)
HCT VFR BLD CALC: 38.4 % (ref 36.3–47.1)
HDLC SERPL-MCNC: 49 MG/DL
HEMOGLOBIN: 11.5 G/DL (ref 11.9–15.1)
HEMOGLOBIN: 12.3 G/DL (ref 11.9–15.1)
HOMOCYSTEINE: 12.5 UMOL/L
INR BLD: 1
LACTIC ACID, WHOLE BLOOD: 1 MMOL/L (ref 0.7–2.1)
LACTIC ACID: NORMAL MMOL/L
LDL CHOLESTEROL: 88 MG/DL (ref 0–130)
LV EF: 65 %
LVEF MODALITY: NORMAL
MCH RBC QN AUTO: 28.8 PG (ref 25.2–33.5)
MCH RBC QN AUTO: 29.3 PG (ref 25.2–33.5)
MCHC RBC AUTO-ENTMCNC: 31.7 G/DL (ref 28.4–34.8)
MCHC RBC AUTO-ENTMCNC: 32 G/DL (ref 28.4–34.8)
MCV RBC AUTO: 89.9 FL (ref 82.6–102.9)
MCV RBC AUTO: 92.4 FL (ref 82.6–102.9)
NRBC AUTOMATED: 0 PER 100 WBC
NRBC AUTOMATED: 0 PER 100 WBC
PARTIAL THROMBOPLASTIN TIME: 29.1 SEC (ref 20.5–30.5)
PARTIAL THROMBOPLASTIN TIME: 42.5 SEC (ref 20.5–30.5)
PDW BLD-RTO: 13.7 % (ref 11.8–14.4)
PDW BLD-RTO: 13.8 % (ref 11.8–14.4)
PHOSPHORUS: 2.3 MG/DL (ref 2.6–4.5)
PLATELET # BLD: 331 K/UL (ref 138–453)
PLATELET # BLD: 332 K/UL (ref 138–453)
PMV BLD AUTO: 9.5 FL (ref 8.1–13.5)
PMV BLD AUTO: 9.8 FL (ref 8.1–13.5)
POTASSIUM SERPL-SCNC: 3.2 MMOL/L (ref 3.7–5.3)
POTASSIUM SERPL-SCNC: 4.1 MMOL/L (ref 3.7–5.3)
PROTHROMBIN TIME: 10.3 SEC (ref 9–12)
RBC # BLD: 3.93 M/UL (ref 3.95–5.11)
RBC # BLD: 4.27 M/UL (ref 3.95–5.11)
TRIGL SERPL-MCNC: 59 MG/DL
VLDLC SERPL CALC-MCNC: NORMAL MG/DL (ref 1–30)
WBC # BLD: 7.5 K/UL (ref 3.5–11.3)
WBC # BLD: 7.7 K/UL (ref 3.5–11.3)

## 2020-08-28 PROCEDURE — 74018 RADEX ABDOMEN 1 VIEW: CPT

## 2020-08-28 PROCEDURE — 82947 ASSAY GLUCOSE BLOOD QUANT: CPT

## 2020-08-28 PROCEDURE — 93306 TTE W/DOPPLER COMPLETE: CPT

## 2020-08-28 PROCEDURE — APPSS15 APP SPLIT SHARED TIME 0-15 MINUTES: Performed by: NURSE PRACTITIONER

## 2020-08-28 PROCEDURE — 6370000000 HC RX 637 (ALT 250 FOR IP): Performed by: INTERNAL MEDICINE

## 2020-08-28 PROCEDURE — 82565 ASSAY OF CREATININE: CPT

## 2020-08-28 PROCEDURE — 6360000002 HC RX W HCPCS: Performed by: STUDENT IN AN ORGANIZED HEALTH CARE EDUCATION/TRAINING PROGRAM

## 2020-08-28 PROCEDURE — 84520 ASSAY OF UREA NITROGEN: CPT

## 2020-08-28 PROCEDURE — 2580000003 HC RX 258: Performed by: INTERNAL MEDICINE

## 2020-08-28 PROCEDURE — 85027 COMPLETE CBC AUTOMATED: CPT

## 2020-08-28 PROCEDURE — 36415 COLL VENOUS BLD VENIPUNCTURE: CPT

## 2020-08-28 PROCEDURE — 84132 ASSAY OF SERUM POTASSIUM: CPT

## 2020-08-28 PROCEDURE — 83090 ASSAY OF HOMOCYSTEINE: CPT

## 2020-08-28 PROCEDURE — 85730 THROMBOPLASTIN TIME PARTIAL: CPT

## 2020-08-28 PROCEDURE — 99232 SBSQ HOSP IP/OBS MODERATE 35: CPT | Performed by: PSYCHIATRY & NEUROLOGY

## 2020-08-28 PROCEDURE — 6360000002 HC RX W HCPCS: Performed by: NURSE PRACTITIONER

## 2020-08-28 PROCEDURE — 6370000000 HC RX 637 (ALT 250 FOR IP): Performed by: NURSE PRACTITIONER

## 2020-08-28 PROCEDURE — 85610 PROTHROMBIN TIME: CPT

## 2020-08-28 PROCEDURE — 83605 ASSAY OF LACTIC ACID: CPT

## 2020-08-28 PROCEDURE — 99232 SBSQ HOSP IP/OBS MODERATE 35: CPT | Performed by: INTERNAL MEDICINE

## 2020-08-28 PROCEDURE — 84100 ASSAY OF PHOSPHORUS: CPT

## 2020-08-28 PROCEDURE — 80061 LIPID PANEL: CPT

## 2020-08-28 PROCEDURE — 2060000000 HC ICU INTERMEDIATE R&B

## 2020-08-28 PROCEDURE — 99223 1ST HOSP IP/OBS HIGH 75: CPT | Performed by: INTERNAL MEDICINE

## 2020-08-28 RX ORDER — ONDANSETRON 2 MG/ML
4 INJECTION INTRAMUSCULAR; INTRAVENOUS EVERY 6 HOURS PRN
Status: DISCONTINUED | OUTPATIENT
Start: 2020-08-28 | End: 2020-08-30 | Stop reason: HOSPADM

## 2020-08-28 RX ORDER — HEPARIN SODIUM 1000 [USP'U]/ML
40 INJECTION, SOLUTION INTRAVENOUS; SUBCUTANEOUS PRN
Status: DISCONTINUED | OUTPATIENT
Start: 2020-08-28 | End: 2020-08-28 | Stop reason: SDUPTHER

## 2020-08-28 RX ORDER — HEPARIN SODIUM 10000 [USP'U]/100ML
9.07 INJECTION, SOLUTION INTRAVENOUS CONTINUOUS
Status: DISCONTINUED | OUTPATIENT
Start: 2020-08-28 | End: 2020-08-30

## 2020-08-28 RX ORDER — HEPARIN SODIUM 1000 [USP'U]/ML
80 INJECTION, SOLUTION INTRAVENOUS; SUBCUTANEOUS PRN
Status: DISCONTINUED | OUTPATIENT
Start: 2020-08-28 | End: 2020-08-28 | Stop reason: SDUPTHER

## 2020-08-28 RX ORDER — HEPARIN SODIUM 1000 [USP'U]/ML
80 INJECTION, SOLUTION INTRAVENOUS; SUBCUTANEOUS ONCE
Status: DISCONTINUED | OUTPATIENT
Start: 2020-08-28 | End: 2020-08-28 | Stop reason: SDUPTHER

## 2020-08-28 RX ORDER — HEPARIN SODIUM 1000 [USP'U]/ML
18.1 INJECTION, SOLUTION INTRAVENOUS; SUBCUTANEOUS PRN
Status: DISCONTINUED | OUTPATIENT
Start: 2020-08-28 | End: 2020-08-30

## 2020-08-28 RX ORDER — HEPARIN SODIUM 1000 [USP'U]/ML
36.3 INJECTION, SOLUTION INTRAVENOUS; SUBCUTANEOUS ONCE
Status: COMPLETED | OUTPATIENT
Start: 2020-08-28 | End: 2020-08-28

## 2020-08-28 RX ORDER — HEPARIN SODIUM 10000 [USP'U]/100ML
18 INJECTION, SOLUTION INTRAVENOUS CONTINUOUS
Status: DISCONTINUED | OUTPATIENT
Start: 2020-08-28 | End: 2020-08-28 | Stop reason: SDUPTHER

## 2020-08-28 RX ORDER — WARFARIN SODIUM 5 MG/1
5 TABLET ORAL
Status: COMPLETED | OUTPATIENT
Start: 2020-08-28 | End: 2020-08-28

## 2020-08-28 RX ORDER — HEPARIN SODIUM 1000 [USP'U]/ML
36.3 INJECTION, SOLUTION INTRAVENOUS; SUBCUTANEOUS PRN
Status: DISCONTINUED | OUTPATIENT
Start: 2020-08-28 | End: 2020-08-30

## 2020-08-28 RX ADMIN — WARFARIN SODIUM 5 MG: 5 TABLET ORAL at 18:02

## 2020-08-28 RX ADMIN — HEPARIN SODIUM 2000 UNITS: 1000 INJECTION INTRAVENOUS; SUBCUTANEOUS at 21:18

## 2020-08-28 RX ADMIN — HYDRALAZINE HYDROCHLORIDE 25 MG: 25 TABLET ORAL at 14:35

## 2020-08-28 RX ADMIN — LACTULOSE 30 G: 20 SOLUTION ORAL at 11:09

## 2020-08-28 RX ADMIN — FAMOTIDINE 20 MG: 20 TABLET, FILM COATED ORAL at 11:25

## 2020-08-28 RX ADMIN — POTASSIUM CHLORIDE 10 MEQ: 10 INJECTION, SOLUTION INTRAVENOUS at 18:03

## 2020-08-28 RX ADMIN — ATORVASTATIN CALCIUM 20 MG: 20 TABLET, FILM COATED ORAL at 11:11

## 2020-08-28 RX ADMIN — HEPARIN SODIUM AND DEXTROSE 9.07 UNITS/KG/HR: 10000; 5 INJECTION INTRAVENOUS at 13:01

## 2020-08-28 RX ADMIN — FAMOTIDINE 20 MG: 20 TABLET, FILM COATED ORAL at 21:37

## 2020-08-28 RX ADMIN — HEPARIN SODIUM 4000 UNITS: 1000 INJECTION INTRAVENOUS; SUBCUTANEOUS at 13:02

## 2020-08-28 RX ADMIN — CARVEDILOL 25 MG: 25 TABLET, FILM COATED ORAL at 18:02

## 2020-08-28 RX ADMIN — POTASSIUM & SODIUM PHOSPHATES POWDER PACK 280-160-250 MG 500 MG: 280-160-250 PACK at 11:09

## 2020-08-28 RX ADMIN — STANDARDIZED SENNA CONCENTRATE AND DOCUSATE SODIUM 2 TABLET: 8.6; 5 TABLET ORAL at 11:10

## 2020-08-28 RX ADMIN — HYDRALAZINE HYDROCHLORIDE 25 MG: 25 TABLET ORAL at 06:27

## 2020-08-28 RX ADMIN — POTASSIUM CHLORIDE 10 MEQ: 10 INJECTION, SOLUTION INTRAVENOUS at 15:35

## 2020-08-28 RX ADMIN — POTASSIUM CHLORIDE 10 MEQ: 10 INJECTION, SOLUTION INTRAVENOUS at 17:01

## 2020-08-28 RX ADMIN — INSULIN LISPRO 2 UNITS: 100 INJECTION, SOLUTION INTRAVENOUS; SUBCUTANEOUS at 12:37

## 2020-08-28 RX ADMIN — POTASSIUM & SODIUM PHOSPHATES POWDER PACK 280-160-250 MG 500 MG: 280-160-250 PACK at 21:37

## 2020-08-28 RX ADMIN — POLYETHYLENE GLYCOL 3350 17 G: 17 POWDER, FOR SOLUTION ORAL at 11:09

## 2020-08-28 RX ADMIN — POTASSIUM CHLORIDE 10 MEQ: 10 INJECTION, SOLUTION INTRAVENOUS at 19:09

## 2020-08-28 RX ADMIN — AMLODIPINE BESYLATE 10 MG: 10 TABLET ORAL at 11:11

## 2020-08-28 RX ADMIN — HYDRALAZINE HYDROCHLORIDE 25 MG: 25 TABLET ORAL at 21:37

## 2020-08-28 RX ADMIN — Medication 10 ML: at 11:20

## 2020-08-28 ASSESSMENT — ENCOUNTER SYMPTOMS
SINUS PAIN: 0
RHINORRHEA: 0
APNEA: 0
DIARRHEA: 0
CHEST TIGHTNESS: 0
COLOR CHANGE: 0
EYE PAIN: 0
ANAL BLEEDING: 0
ABDOMINAL DISTENTION: 0
SHORTNESS OF BREATH: 0
BACK PAIN: 0
ABDOMINAL PAIN: 0
EYE DISCHARGE: 0
PHOTOPHOBIA: 0
CONSTIPATION: 0
RECTAL PAIN: 0
EYE REDNESS: 0
BLOOD IN STOOL: 0
WHEEZING: 0
TROUBLE SWALLOWING: 0
VOICE CHANGE: 0
FACIAL SWELLING: 0
SORE THROAT: 0
SINUS PRESSURE: 0
NAUSEA: 1
CHOKING: 0
EYE ITCHING: 0
VOMITING: 1
STRIDOR: 0

## 2020-08-28 ASSESSMENT — PAIN DESCRIPTION - PAIN TYPE: TYPE: ACUTE PAIN

## 2020-08-28 ASSESSMENT — PAIN SCALES - GENERAL
PAINLEVEL_OUTOF10: 0
PAINLEVEL_OUTOF10: 5
PAINLEVEL_OUTOF10: 0

## 2020-08-28 ASSESSMENT — PAIN DESCRIPTION - ORIENTATION: ORIENTATION: UPPER

## 2020-08-28 ASSESSMENT — PAIN DESCRIPTION - FREQUENCY: FREQUENCY: INTERMITTENT

## 2020-08-28 ASSESSMENT — PAIN DESCRIPTION - DESCRIPTORS: DESCRIPTORS: DULL

## 2020-08-28 ASSESSMENT — PAIN DESCRIPTION - LOCATION: LOCATION: ABDOMEN

## 2020-08-28 NOTE — PROGRESS NOTES
Occupational Therapy    Occupational Therapy Not Seen Note    DATE: 2020  Name: Virginie Hernandez  : 1950  MRN: [id number]    Patient not available for Occupational Therapy due to:     Other: Pt being transported off floor for ECHO    Next Scheduled Treatment: Attempt back if time allotted    Electronically signed by SYLVESTER Martins on 2020 at 8:33 AM

## 2020-08-28 NOTE — PLAN OF CARE
Problem: Falls - Risk of:  Goal: Will remain free from falls  Description: Will remain free from falls  Outcome: Met This Shift  Note: Pt remains free of falls at this time. Bed locked in lowest position, siderails x2, call light in reach and bed alarm is on. Non-skid footwear applied. Pt ambulates in room with steady gait. Encouraged pt to call for assistance as needed for safety. Falling star posted outside of room. Will continue to monitor. Goal: Absence of physical injury  Description: Absence of physical injury  Outcome: Met This Shift     Problem: Pain:  Goal: Pain level will decrease  Description: Pain level will decrease  Outcome: Met This Shift  Note: Pt pain was assessed on a 0-10 scale. Pt able to communicate pain as needed, uses call light appropriately. Pt has not required pain interventions. Will continue to monitor.      Goal: Control of acute pain  Description: Control of acute pain  Outcome: Met This Shift  Goal: Control of chronic pain  Description: Control of chronic pain  Outcome: Met This Shift     Problem: Skin Integrity:  Goal: Will show no infection signs and symptoms  Description: Will show no infection signs and symptoms  Outcome: Ongoing  Goal: Absence of new skin breakdown  Description: Absence of new skin breakdown  Outcome: Ongoing

## 2020-08-28 NOTE — PROGRESS NOTES
Physical Therapy   DATE: 2020    NAME: Norbert Manzanarse  MRN: [id number]   : 1950    Patient not seen this date for Physical Therapy due to:  [] Blood transfusion in progress  [] Hemodialysis  []  Patient Declined  [] Spine Precautions   [] Strict Bedrest  [] Surgery/ Procedure  [x] Testing At echo     [] Other        [] PT being discontinued at this time. Patient independent. No further needs. [] PT being discontinued at this time as the patient has been transferred to palliative care. No further needs.     Shazia Asencio, PT
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
## 2020-08-28 NOTE — PROGRESS NOTES
Pharmacy Note  Warfarin Consult follow-up      Recent Labs     08/28/20  0720   INR 1.0     Recent Labs     08/26/20  0600 08/27/20  0735 08/28/20  0720   HGB 10.9* 10.8* 11.5*   HCT 35.3* 35.1* 36.3    251 331     Current warfarin drug-drug interactions: acetaminophen, tramadol    Date INR Dose   8/27/2020 1.0 (8/25) 5mg   8/28/2020 1.0 5mg     Notes:   Give warfarin 5mg today on 8/28/2020. Daily PT/INR while inpatient.      Anirudh Perez RPh, CACP  Clinical Pharmacist Medication Management  8/28/2020  8:56 AM
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
## 2020-08-28 NOTE — PROGRESS NOTES
Writer attempted to give patient ordered soap suds enema. Patient refused and said \"you already gave me medication to poop. \" I asked her when she would be willing to have the enema and she said, \"lets put it this way, I'm done answering your questions. \" Writer notified Baron Lesch. Writer educated pt on the potential risk of developing a bowel obstruction if she doesn't have a bowel movement. Pt states that she will take it when she wakes up in the morning.
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
## 2020-08-28 NOTE — PROGRESS NOTES
[facility]B [facility]       Patient: Paty Rosa    Unit/Bed: 6677/5192-49    YOB: 1950    MRN: [id number]    Acct: [de-identified]     Admitting Diagnosis:Sepsis ([address] [A41.9]  Sepsis [facility]) [A41.9]    Admit Date:  8/24/2020    Hospital Day: 4    Subjective:    Patient complaining of nausea and vomiting this morning. Complains of a one-week history of diarrhea. She has difficulty ambulation at baseline. Patient reviewed by neurosurgery for C-spine stenosis and chronic T5 cord compression   with myelopathy. Started on Coumadin and heparin bridge by heme-onc. Xarelto discontinued. Patient Seen, Chart, Labs, Radiology studies, and Consults reviewed. Objective:   BP (!) 165/83   Pulse 88   Temp 98.4 °F (36.9 °C) (Axillary)   Resp 21   Ht 5' 4\" (1.626 m)   Wt 243 lb 2.7 oz (110.3 kg)   SpO2 99%   BMI 41.74 kg/m²       Intake/Output Summary (Last 24 hours) at 8/28/2020 1728  Last data filed at 8/28/2020 1658  Gross per 24 hour   Intake 1312.92 ml   Output 1600 ml   Net -287.08 ml       Review of Systems   Constitutional: Negative for activity change, appetite change, chills, diaphoresis, fatigue, fever and unexpected weight change. HENT: Negative for congestion, drooling, ear discharge, facial swelling, hearing loss, mouth sores, nosebleeds, postnasal drip, rhinorrhea, sinus pressure, sinus pain, sore throat, tinnitus, trouble swallowing and voice change. Eyes: Negative for photophobia, pain, discharge, redness, itching and visual disturbance. Respiratory: Negative for apnea, choking, chest tightness, shortness of breath, wheezing and stridor. Cardiovascular: Negative for chest pain, palpitations and leg swelling. Gastrointestinal: Positive for nausea and vomiting.  Negative for abdominal distention, abdominal pain, anal bleeding, blood in stool, constipation, diarrhea and rectal pain. Endocrine: Negative for cold intolerance, heat intolerance, polydipsia, polyphagia and polyuria. Genitourinary: Negative for decreased urine volume, difficulty urinating, dyspareunia, dysuria, enuresis, flank pain, frequency, genital sores, hematuria, menstrual problem, pelvic pain, urgency, vaginal bleeding, vaginal discharge and vaginal pain. Musculoskeletal: Positive for gait problem. Negative for arthralgias, back pain, joint swelling, myalgias, neck pain and neck stiffness. Skin: Positive for wound. Negative for color change, pallor and rash. Allergic/Immunologic: Negative for food allergies and immunocompromised state. Neurological: Negative for dizziness, tremors, seizures, syncope, facial asymmetry, speech difficulty, weakness, light-headedness, numbness and headaches. Hematological: Negative for adenopathy. Does not bruise/bleed easily. Psychiatric/Behavioral: Negative for agitation, behavioral problems, confusion, decreased concentration, dysphoric mood, hallucinations, self-injury, sleep disturbance and suicidal ideas. The patient is not nervous/anxious and is not hyperactive. Physical Exam  Vitals signs and nursing note reviewed. Constitutional:       General: She is not in acute distress. Appearance: Normal appearance. She is obese. She is not ill-appearing, toxic-appearing or diaphoretic. HENT:      Head: Normocephalic and atraumatic. Nose: Nose normal. No congestion or rhinorrhea. Mouth/Throat:      Mouth: Mucous membranes are moist.      Pharynx: Oropharynx is clear. No oropharyngeal exudate or posterior oropharyngeal erythema. Eyes:      General: No scleral icterus. Right eye: No discharge. Left eye: No discharge. Extraocular Movements: Extraocular movements intact. Conjunctiva/sclera: Conjunctivae normal.      Pupils: Pupils are equal, round, and reactive to light.    Neck: injection  5 mL Intradermal Once    sodium chloride flush  10 mL Intravenous 2 times per day    vitamin D  50,000 Units Oral Weekly    cloNIDine  1 patch Transdermal Weekly    hydrALAZINE  25 mg Oral 3 times per day    carvedilol  25 mg Oral BID WC    amLODIPine  10 mg Oral Daily    [Held by provider] furosemide  20 mg Oral Daily    atorvastatin  20 mg Oral Daily    insulin lispro  0-12 Units Subcutaneous TID WC    insulin lispro  0-6 Units Subcutaneous Nightly    sodium chloride flush  10 mL Intravenous 2 times per day    famotidine  20 mg Oral BID    potassium & sodium phosphates  2 packet Oral BID       Continuous Infusions:   heparin (porcine) 9.07 Units/kg/hr (08/28/20 1301)    dextrose         PRN Meds:iohexol, heparin (porcine), heparin (porcine), ondansetron, sodium chloride flush, traMADol, glucose, dextrose, glucagon (rDNA), dextrose, sodium chloride flush, acetaminophen **OR** acetaminophen, potassium chloride    Data:    CBC:   Recent Labs     08/27/20  0735 08/28/20  0720 08/28/20  1131   WBC 8.0 7.7 7.5   RBC 3.59* 3.93* 4.27   HGB 10.8* 11.5* 12.3   HCT 35.1* 36.3 38.4   MCV 97.8 92.4 89.9   RDW 13.7 13.8 13.7    331 332       BMP:   Recent Labs     08/26/20  0600 08/27/20  0735 08/28/20  0720 08/28/20  1131   K  --   --   --  3.2*   PHOS 3.3 2.5* 2.3*  --    BUN 10 9 7*  --    CREATININE 0.57 0.52 0.42*  --      Results for Celia Gamez (MRN [id number]) as of 8/26/2020 14:38   Ref. Range 8/25/2020 22:01   Vit D, 25-Hydroxy Latest Ref Range: 30.0 - 100.0 ng/mL 8.5 (L)       Results for Celia Gamez (MRN [id number]) as of 8/25/2020 19:39   Ref. Range 8/25/2020 09:44   TSH Latest Ref Range: 0.30 - 5.00 mIU/L 3.46     Results for Celia Gamez (MRN [id number]) as of 8/25/2020 19:39   Ref. Range 8/25/2020 15:45   Folate Latest Ref Range: >4.8 ng/mL 5.9   Vitamin B-12 Latest Ref Range: 232 - 1245 pg/mL 690     Results for Celia Gamez (MRN [id number]) as of 8/25/2020 14:13   Ref.  Range Alpha-2-Globulin Latest Ref Range: 0.5 - 0.9 g/dL 1.0 (H)   Beta Globulin Latest Ref Range: 0.5 - 1.1 g/dL 0.9   Beta Percent Latest Ref Range: 11 - 19 % 17   Gamma Globulin Latest Ref Range: 0.5 - 1.5 g/dL 1.0   Gamma Globulin % Latest Ref Range: 9 - 20 % 20   [place] Free Light Chains QNT Latest Ref Range: 0.37 - 1.94 mg/dL 7.51 (H)   Lambda Free Light Chains QNT Latest Ref Range: 0.57 - 2.63 mg/dL 3.96 (H)   Free Kappa/Lambda Ratio Latest Ref Range: 0.26 - 1.65  1.90 (H)   Serum IFX Interp Unknown IMMUNOFIXATION IS NEGATIVE FOR MONOCLONAL IMMUNOGLOBULIN.   KAPPA/LAMBDA QUANT FREE LIGHT CHAINS SERUM Unknown Rpt (A)   Complement C3 Latest Ref Range: 90 - 180 mg/dL 103   Complement C4 Latest Ref Range: 10 - 40 mg/dL 26   IMMUNOFIXATION SERUM PROFILE Unknown Rpt       SEROLOGY  None. TUMOR MARKER. None. MICROBIOLOGY   Blood cultures on 04/24/2020. POSITIVE Blood Culture  Coagulase negative Staphylococcus species detected by PCR. HISTOPATHOLOGY. None. TOXICOLOGY. None. ENDOSCOPE STUDIES. None. PROCEDURES. None. RADIOLOGY. Echo-8/28/2020. CONCLUSION:  Small left ventricular cavity size with hyperdynamic function. Estimated ejection fraction is 65% . Moderate to severe concentric left ventricular hypertrophy. Aortic sclerosis without stenosis. Mild aortic insufficiency. Mild mitral regurgitation. Mild tricuspid regurgitation. Estimated right ventricular systolic pressure is 36 mmHg. Brain MRI without contrast 08/27/2020. FINDINGS:  INTRACRANIAL STRUCTURES/VENTRICLES:   Small acute infarct identified within the left cerebellar hemisphere and the anterior aspect   of the right occipital lobe.     No mass effect or midline shift. No evidence of an acute intracranial hemorrhage. The ventricles and sulci are normal in size and configuration. The sellar/suprasellar regions appear unremarkable.       The normal signal voids within the major intracranial vessels appear maintained.     Moderate-to-severe chronic microvascular disease is identified within the  periventricular white matter of both cerebral hemispheres.     Old lacunes are appreciated within the alex and right basal ganglia.     Mild involutional changes are identified within brain.     ORBITS:   The visualized portion of the orbits demonstrate no acute abnormality.     SINUSES:   The visualized paranasal sinuses and mastoid air cells are well aerated.     BONES/SOFT TISSUES:   The bone marrow signal intensity appears normal.     The soft tissues demonstrate no acute abnormality.     IMPRESSION:  Small acute left cerebellar infarct.     Small acute infarct within the anterior aspect of the right occipital lobe.     Moderate-to-severe chronic microvascular disease within the periventricular  white matter.     Old lacunes within the alex and right basal ganglia.     Mild atrophy. MRI cervical spine without contrast 08/27/2020. FINDINGS:  BONES/ALIGNMENT:   There is normal alignment of the spine. The vertebral body heights are maintained. The bone marrow signal appears unremarkable. No acute fracture is identified. There is high T2 signal within the disc space at C4-5 and C5-6. No significant associated marrow edema is detected. This most likely represents degeneration of the discs rather than  discitis/osteomyelitis. No paraspinal inflammation or abscess is appreciated     SPINAL CORD:   No abnormal cord signal is seen.     SOFT TISSUES:   No paraspinal mass identified.     C2-C3:   There is no significant disc protrusion, spinal canal stenosis or neural   foraminal narrowing.     C3-C4:   Broad disc osteophyte complex results in moderate central canal stenosis.       Severe left foraminal stenosis and mild right foraminal stenosis are identified   due to uncovertebral joint hypertrophy and facet disease.     C4-C5:   Severe left foraminal stenosis and moderate right foraminal stenosis  are identified due to uncovertebral joint hypertrophy and facet arthropathy.     C5-C6:   Severe left foraminal stenosis and moderate right foraminal stenosis are   identified due to uncovertebral joint hypertrophy and facet disease. Shallow broad disc osteophyte complex flattens the thecal sac.     C6-C7:   Moderate left foraminal stenosis and mild right foraminal stenosis are identified.     C7-T1:   Mild left foraminal stenosis is identified.     IMPRESSION:  New high T2 signal abnormality within the disc space at C4-5 and C5-6. These changes likely represent degeneration of the disc rather than   discitis/osteomyelitis. No marrow edema or inflammation/abscess is identified. Correlation with C-reactive protein may be considered if there is clinical   concern for infection.     Moderate central canal stenosis at C3-4. Associated severe left foraminal stenosis and mild right foraminal stenosis.     Severe left foraminal stenosis and moderate right foraminal stenosis at C4-5  and C5-6.     Moderate left foraminal stenosis and mild right foraminal stenosis at C6-7. Mild left foraminal stenosis at C7-T1. MRI thoracic spine without contrast- 08/27/2020. FINDINGS:  BONES/ALIGNMENT:   There is normal alignment of the spine. The vertebral body heights are maintained. The bone marrow signal appears unremarkable.     SPINAL CORD:   There is focal T2 hyperintense signal abnormality associated with cord   compression at T5.     SOFT TISSUES:   No paraspinal mass identified.     DEGENERATIVE CHANGES:   There is posterior arch hypertrophy from T3 to T6, most severe at T3 and T5. At T3 there is severe stenosis of the thecal sac without definite cord compression.       At T5 there is severe stenosis of the thecal sac, cord compression, and chronic   myelomalacia.     IMPRESSION:  Chronic T5 spinal cord compression and myelomalacia due to posterior arch  Hypertrophy. MRI lumbar spine without contrast 08/27/2020. FINDINGS:  BONES/ALIGNMENT:   Vertebral heights and alignment are normal.     Edema at the L3-4 endplates is likely degenerative.     There is transitional spinal anatomy. The 12th ribs are hypoplastic. There is a rudimentary disc space at L5-S1.     SPINAL CORD:   The conus terminates normally.     SOFT TISSUES:   Localizer image demonstrates possible massive dilation of the rectum up   to 12 cm in diameter.     L1-L2:   Disc bulge causes mild right foraminal stenosis.     L2-L3:   Disc bulge, facet and ligament flavum hypertrophy cause mild bilateral  foraminal stenosis.     L3-L4:   Disc bulge, facet and ligament flavum hypertrophy cause mild thecal  sac and moderate bilateral foraminal stenosis.     L4-L5:   Facet hypertrophy without stenosis.     L5-S1:   Disc bulge causes mild right foraminal stenosis. Right paracentral disc protrusion abuts the descending right S1 nerve roots.     IMPRESSION:  Rectal dilation indicating possible severe fecal impaction versus large bowel  obstruction, incompletely evaluated.     No severe lumbar spinal canal stenosis. RUQ Ultrasound 08/26/2020. FINDINGS:  Suboptimal/limited exam due to patient body habitus and gas.     LIVER:    Relative hepatic parenchymal echogenicity most commonly related to hepatic   steatosis. No intrahepatic biliary ductal dilatation. No convincing liver lesion. The portal vein demonstrates normal direction of flow.     BILIARY SYSTEM:    Gallbladder is unremarkable without evidence of pericholecystic fluid, wall thickening   or stones.       Negative sonographic Nix's sign.     Common bile duct is within normal limits measuring 3 mm.     RIGHT KIDNEY:   The right kidney is grossly unremarkable without evidence of hydronephrosis.     PANCREAS:    Visualized portions of the pancreas are unremarkable.     OTHER:   No evidence of right upper quadrant ascites.     IMPRESSION:  Hepatic steatosis is suspected.     Unremarkable gallbladder.     Chest x-ray-08/24/2020. FINDINGS:    HEART/MEDIASTINUM:   The cardiomediastinal silhouette is within normal limits.     PLEURA/LUNGS:   There are no focal consolidations or pleural effusions. There is no appreciable pneumothorax.     BONES/SOFT TISSUE:   No acute abnormality.     IMPRESSION:  No radiographic evidence of acute pulmonary disease. Echocardiogram-12/6/2017. The study was performed by the attending, the fellow and the sonographer in  the Cath Lab. The study was performed under conscious sedation. Left ventricle is normal in sized with increased wall thickness, normal  systolic function, estimated EF 55%. Left atrial appendage showed no evidence of clot. Negative bubble study, no shunt noted. Mild tricuspid regurgitation. Echocardiogram-11/20/2017. Technically difficult study due to open chest wounds; Unable to obtain apical views. Left ventricle is normal in size. Global left ventricular systolic function is normal.     Estimated ejection fraction is 55 % . Mild left ventricular hypertrophy. Trivial tricuspid regurgitation. Estimated right ventricular systolic pressure is 44 mmHg suggestive of mild  Pulmonary HTN. Trivial pulmonic insufficiency. MRI brain w/ and w/o contrast-12/05/2017. IMPRESSION:  1. Numerous acute ischemic infarcts in the cerebral white matter, right      thalamus, and left alex with the distribution in multiple vascular territories       suggesting a central embolic etiology. 2. No intracranial hemorrhage or mass effect. 3. Multifocal remote lacunar infarcts and moderate chronic white matter microvascular       ischemic changes. 4. Trace bilateral mastoid effusions. MRI C-Spine w/o contrast -12/05/2017. FINDINGS:  BONES/ALIGNMENT:   There is normal alignment of the spine. The vertebral body heights are maintained.       The marrow signal in the C4, C5 and C6 vertebral bodies is dark on T1 and T2 weighted   images which likely represents sclerosis of the vertebral bodies. There is degenerative disc disease with disc space narrowing and osteophytes at C3-4,   C4-5, C5-6, C6-7, and C7-T1. The bony spinal canal overall is congenitally small. There are large anterior osteophytes which are better visualized on the prior CT.     SPINAL CORD:    No abnormal signal is identified within the spinal cord.     SOFT TISSUES:   No paraspinal mass identified.     C2-C3:    The thecal sac and neural foramina are intact.     C3-C4:   There is disc protrusion or osteophyte measuring 3-4 mm toward the left narrowing   the left neural foramen greater than the right. Thecal sac is moderately stenotic measuring 8.2 mm.     C4-C5:   There is a disc bulge and osteophyte measuring 2-3 mm. The thecal sac is mildly stenotic measuring 9 mm. Disc and/or osteophytes result in narrowing of the neural foramina bilaterally.     C5-C6:   There is disc protrusion or osteophyte measuring 4 mm. The thecal sac is mildly stenotic measuring 9.4 mm. Disc and/or osteophytes result in narrowing of the neural foramina bilaterally.     C6-C7:   There is disc protrusion or osteophyte measuring 2-3 mm. The thecal sac is mildly stenotic measuring 9.7 mm. Disc and/or osteophytes result in narrowing of the neural foramina bilaterally.     C7-T1:   Disc and osteophyte narrows the left neural foramen. The thecal sac is not stenotic. The right neural foramen is intact. IMPRESSION:  Disc and osteophytes result in stenosis of the thecal sac and narrowing of  the neural foramina throughout the cervical region as discussed above. MRI Thoracic w/o spine -12/05/2017. IMPRESSION:  The study is limited by motion artifact. The bony spinal canal overall is congenitally small.       There is facet and ligamentum flavum hypertrophy in the mid thoracic region,    which appears to cause stenosis of the thecal sac as discussed above. A repeat study may be useful to better evaluate the spinal canal contents.     There is questionable signal abnormality in the thoracic spinal cord at the  T6-7 level, which may represent myelomalacia secondary to stenosis. Again, motion artifact limits the exam.    FINDINGS:  BONES/ALIGNMENT:   Vertebral heights and alignment are normal.     Edema at the L3-4 endplates is likely degenerative.     There is transitional spinal anatomy. The 12th ribs are hypoplastic. There is a rudimentary disc space at L5-S1.     SPINAL CORD:   The conus terminates normally.     SOFT TISSUES:   Localizer image demonstrates possible massive dilation of the rectum up to 12 cm in diameter.     L1-L2:   Disc bulge causes mild right foraminal stenosis.     L2-L3:   Disc bulge, facet and ligament flavum hypertrophy cause mild bilateral  foraminal stenosis.     L3-L4:   Disc bulge, facet and ligament flavum hypertrophy cause mild thecal  sac and moderate bilateral foraminal stenosis.     L4-L5:   Facet hypertrophy without stenosis.     L5-S1:   Disc bulge causes mild right foraminal stenosis. Right paracentral  disc protrusion abuts the descending right S1 nerve roots.     IMPRESSION:  Rectal dilation indicating possible severe fecal impaction versus large bowel  obstruction, incompletely evaluated.     No severe lumbar spinal canal stenosis. ASSESSMENT AND  PLAN. 1.NEUROVASCULAR. Acute CVA left cerebellar and right occipital.     H/o CVA with multiple ischemic infarcts-2017. Multilevel C-spine disc disease and T5 cord compression w/ myelopathy. Neurosurgery and neurology following. 2.PULMONARY. Resolved acute hypoxic respiratory failure-O2 supplement, wean as tolerated. Pulmonary hypertension. 3.CARDIOVASCULAR. Resolved hypovolemic hypotension -IVF discontinued. HTN-    4.GI.     RUQ tenderness- negative ultrasound for cholecystitis. Acute gastroenteritis-resolved. Bowel obstruction vs. rectal fecal impaction -positive BM today,KUB        5. RENAL AND ELECTROLYTES. Resolved acute lactic acidosis-serum lactate 1.0 from 2.8 on admission-D/c IVF. Electrolyte imbalance w/ hypokalemia and [id number]-nyzxtti-D-Phos       6. ID. Sepsis clinically and determined -Gram-positive cocci bacteremia is a contaminant. Discontinue IV vancomycin and cefepime. 7. ENDO. T2 DM-sliding scale insulin. TSH 3.46 and A1c check. 8.MUSCULOSKELETAL. Chronic debilitating condition. Chronic neurologic ambulatory dysfunction-PT OT evaluation. 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 MRI cervical/thoracic/lumbar spine . 9. PSYCH. Clinical depression-Celexa 10 mg daily. 10. LIFE STYLE.       H/o tobacco use disorder-patient quit. 11.HEM-ONC. Starting Coumadin and heparin bridge. Xarelto discontinued. Normocytic anemia-Hb 12.3 from 14.3 on admission. Folic acid 5.9, G59 902. Heme-onc following. 12.NUTRITION. Protein energy malnutrition-dietitian consult. Obesity with a BMI of 64.37-needs regular exercise diet control. 13. SKIN. Multiple stage II decubitus ulcers on buttock, back and thigh. 14DISPO. Planned d/c to home vs rehab soon.       Electronically signed Sandhya Lofton MD on 8/28/2020 at 5:28 PM    Rounding Hospitalist

## 2020-08-28 NOTE — PLAN OF CARE
Problem: Skin Integrity:  Goal: Absence of new skin breakdown  Description: Absence of new skin breakdown  Outcome: Met This Shift  Pt turned every two hours as tolerated. Pt kept clean and dry (purewick) during shift. Will continue to monitor. Problem: Falls - Risk of:  Goal: Absence of physical injury  Description: Absence of physical injury  Outcome: Met This Shift   Pt shown how to use call light, and advised to use when needing rn assistance. Will continue to monitor.

## 2020-08-29 LAB
BUN BLDV-MCNC: 8 MG/DL (ref 8–23)
CREAT SERPL-MCNC: 0.51 MG/DL (ref 0.5–0.9)
GFR AFRICAN AMERICAN: >60 ML/MIN
GFR NON-AFRICAN AMERICAN: >60 ML/MIN
GFR SERPL CREATININE-BSD FRML MDRD: NORMAL ML/MIN/{1.73_M2}
GFR SERPL CREATININE-BSD FRML MDRD: NORMAL ML/MIN/{1.73_M2}
GLUCOSE BLD-MCNC: 114 MG/DL (ref 65–105)
GLUCOSE BLD-MCNC: 133 MG/DL (ref 65–105)
GLUCOSE BLD-MCNC: 139 MG/DL (ref 65–105)
GLUCOSE BLD-MCNC: 143 MG/DL (ref 65–105)
HCT VFR BLD CALC: 35.9 % (ref 36.3–47.1)
HEMOGLOBIN: 11.2 G/DL (ref 11.9–15.1)
INR BLD: 1.1
LACTIC ACID, WHOLE BLOOD: 1.9 MMOL/L (ref 0.7–2.1)
LACTIC ACID: NORMAL MMOL/L
MCH RBC QN AUTO: 28.8 PG (ref 25.2–33.5)
MCHC RBC AUTO-ENTMCNC: 31.2 G/DL (ref 28.4–34.8)
MCV RBC AUTO: 92.3 FL (ref 82.6–102.9)
NRBC AUTOMATED: 0 PER 100 WBC
PARTIAL THROMBOPLASTIN TIME: 66 SEC (ref 20.5–30.5)
PARTIAL THROMBOPLASTIN TIME: 67.2 SEC (ref 20.5–30.5)
PDW BLD-RTO: 13.9 % (ref 11.8–14.4)
PHOSPHORUS: 2.5 MG/DL (ref 2.6–4.5)
PLATELET # BLD: 301 K/UL (ref 138–453)
PMV BLD AUTO: 9.5 FL (ref 8.1–13.5)
PROTHROMBIN TIME: 11.2 SEC (ref 9–12)
RBC # BLD: 3.89 M/UL (ref 3.95–5.11)
WBC # BLD: 8.9 K/UL (ref 3.5–11.3)

## 2020-08-29 PROCEDURE — 82565 ASSAY OF CREATININE: CPT

## 2020-08-29 PROCEDURE — 85610 PROTHROMBIN TIME: CPT

## 2020-08-29 PROCEDURE — 2580000003 HC RX 258: Performed by: NURSE PRACTITIONER

## 2020-08-29 PROCEDURE — APPSS15 APP SPLIT SHARED TIME 0-15 MINUTES: Performed by: NURSE PRACTITIONER

## 2020-08-29 PROCEDURE — 6370000000 HC RX 637 (ALT 250 FOR IP): Performed by: NURSE PRACTITIONER

## 2020-08-29 PROCEDURE — 82947 ASSAY GLUCOSE BLOOD QUANT: CPT

## 2020-08-29 PROCEDURE — 6370000000 HC RX 637 (ALT 250 FOR IP): Performed by: INTERNAL MEDICINE

## 2020-08-29 PROCEDURE — 84520 ASSAY OF UREA NITROGEN: CPT

## 2020-08-29 PROCEDURE — 99232 SBSQ HOSP IP/OBS MODERATE 35: CPT | Performed by: INTERNAL MEDICINE

## 2020-08-29 PROCEDURE — 2060000000 HC ICU INTERMEDIATE R&B

## 2020-08-29 PROCEDURE — 83605 ASSAY OF LACTIC ACID: CPT

## 2020-08-29 PROCEDURE — 36415 COLL VENOUS BLD VENIPUNCTURE: CPT

## 2020-08-29 PROCEDURE — 99232 SBSQ HOSP IP/OBS MODERATE 35: CPT | Performed by: NEUROLOGICAL SURGERY

## 2020-08-29 PROCEDURE — 6360000002 HC RX W HCPCS: Performed by: STUDENT IN AN ORGANIZED HEALTH CARE EDUCATION/TRAINING PROGRAM

## 2020-08-29 PROCEDURE — 85730 THROMBOPLASTIN TIME PARTIAL: CPT

## 2020-08-29 PROCEDURE — 99232 SBSQ HOSP IP/OBS MODERATE 35: CPT | Performed by: PSYCHIATRY & NEUROLOGY

## 2020-08-29 PROCEDURE — 99222 1ST HOSP IP/OBS MODERATE 55: CPT | Performed by: INTERNAL MEDICINE

## 2020-08-29 PROCEDURE — 2580000003 HC RX 258: Performed by: INTERNAL MEDICINE

## 2020-08-29 PROCEDURE — 85027 COMPLETE CBC AUTOMATED: CPT

## 2020-08-29 PROCEDURE — 2500000003 HC RX 250 WO HCPCS: Performed by: NURSE PRACTITIONER

## 2020-08-29 PROCEDURE — 86147 CARDIOLIPIN ANTIBODY EA IG: CPT

## 2020-08-29 PROCEDURE — 84100 ASSAY OF PHOSPHORUS: CPT

## 2020-08-29 PROCEDURE — 86146 BETA-2 GLYCOPROTEIN ANTIBODY: CPT

## 2020-08-29 RX ORDER — WARFARIN SODIUM 5 MG/1
5 TABLET ORAL
Status: COMPLETED | OUTPATIENT
Start: 2020-08-29 | End: 2020-08-29

## 2020-08-29 RX ADMIN — ATORVASTATIN CALCIUM 20 MG: 20 TABLET, FILM COATED ORAL at 08:24

## 2020-08-29 RX ADMIN — LACTULOSE 30 G: 20 SOLUTION ORAL at 08:24

## 2020-08-29 RX ADMIN — INSULIN LISPRO 2 UNITS: 100 INJECTION, SOLUTION INTRAVENOUS; SUBCUTANEOUS at 12:23

## 2020-08-29 RX ADMIN — CARVEDILOL 25 MG: 25 TABLET, FILM COATED ORAL at 17:17

## 2020-08-29 RX ADMIN — Medication 10 ML: at 21:26

## 2020-08-29 RX ADMIN — HYDRALAZINE HYDROCHLORIDE 25 MG: 25 TABLET ORAL at 06:38

## 2020-08-29 RX ADMIN — FAMOTIDINE 20 MG: 20 TABLET, FILM COATED ORAL at 21:18

## 2020-08-29 RX ADMIN — POLYETHYLENE GLYCOL 3350 17 G: 17 POWDER, FOR SOLUTION ORAL at 08:24

## 2020-08-29 RX ADMIN — CARVEDILOL 25 MG: 25 TABLET, FILM COATED ORAL at 08:24

## 2020-08-29 RX ADMIN — STANDARDIZED SENNA CONCENTRATE AND DOCUSATE SODIUM 2 TABLET: 8.6; 5 TABLET ORAL at 08:24

## 2020-08-29 RX ADMIN — HEPARIN SODIUM AND DEXTROSE 11.07 UNITS/KG/HR: 10000; 5 INJECTION INTRAVENOUS at 08:25

## 2020-08-29 RX ADMIN — HYDRALAZINE HYDROCHLORIDE 25 MG: 25 TABLET ORAL at 13:42

## 2020-08-29 RX ADMIN — SODIUM PHOSPHATE, MONOBASIC, MONOHYDRATE 10 MMOL: 276; 142 INJECTION, SOLUTION INTRAVENOUS at 08:31

## 2020-08-29 RX ADMIN — POTASSIUM & SODIUM PHOSPHATES POWDER PACK 280-160-250 MG 500 MG: 280-160-250 PACK at 08:24

## 2020-08-29 RX ADMIN — HYDRALAZINE HYDROCHLORIDE 25 MG: 25 TABLET ORAL at 21:27

## 2020-08-29 RX ADMIN — POTASSIUM & SODIUM PHOSPHATES POWDER PACK 280-160-250 MG 500 MG: 280-160-250 PACK at 21:19

## 2020-08-29 RX ADMIN — AMLODIPINE BESYLATE 10 MG: 10 TABLET ORAL at 08:24

## 2020-08-29 RX ADMIN — Medication 10 ML: at 09:01

## 2020-08-29 RX ADMIN — WARFARIN SODIUM 5 MG: 5 TABLET ORAL at 18:51

## 2020-08-29 RX ADMIN — FAMOTIDINE 20 MG: 20 TABLET, FILM COATED ORAL at 08:24

## 2020-08-29 ASSESSMENT — ENCOUNTER SYMPTOMS
CONSTIPATION: 0
EYE PAIN: 0
WHEEZING: 0
SINUS PRESSURE: 0
STRIDOR: 0
SHORTNESS OF BREATH: 0
NAUSEA: 0
EYE REDNESS: 0
FACIAL SWELLING: 0
DIARRHEA: 0
ABDOMINAL DISTENTION: 0
BACK PAIN: 0
RHINORRHEA: 0
RECTAL PAIN: 0
CHEST TIGHTNESS: 0
SINUS PAIN: 0
ANAL BLEEDING: 0
VOICE CHANGE: 0
SORE THROAT: 0
APNEA: 0
CHOKING: 0
EYE ITCHING: 0
ABDOMINAL PAIN: 0
PHOTOPHOBIA: 0
BLOOD IN STOOL: 0
VOMITING: 0
TROUBLE SWALLOWING: 0
EYE DISCHARGE: 0
COLOR CHANGE: 0

## 2020-08-29 ASSESSMENT — PAIN SCALES - GENERAL
PAINLEVEL_OUTOF10: 0
PAINLEVEL_OUTOF10: 0

## 2020-08-29 NOTE — PROGRESS NOTES
[facility] ([facility]) Neurology Specialist  [address]  [place], [address] [facility]:  [phone] or [phone]  FAX:  [phone]            Brief history: Berna Colbert is a 65 y.o. old female admitted on 8/24/2020 with punctate embolic strokes     Subjective: No new neurological events overnight. Patient denies any new weakness, numbness. The patient reports mild to moderate headache.      Objective: BP (!) 102/56   Pulse 70   Temp 98 °F (36.7 °C) (Oral)   Resp 18   Ht 5' 4\" (1.626 m)   Wt 246 lb 11.1 oz (111.9 kg)   SpO2 99%   BMI 42.35 kg/m²       Medications:    warfarin  5 mg Oral Once    lactulose  30 g Oral Daily    polyethylene glycol  17 g Oral Daily    sennosides-docusate sodium  2 tablet Oral Daily    warfarin (COUMADIN) daily dosing (placeholder)   Other RX Placeholder    lidocaine 1 % injection  5 mL Intradermal Once    sodium chloride flush  10 mL Intravenous 2 times per day    vitamin D  50,000 Units Oral Weekly    cloNIDine  1 patch Transdermal Weekly    hydrALAZINE  25 mg Oral 3 times per day    carvedilol  25 mg Oral BID WC    amLODIPine  10 mg Oral Daily    [Held by provider] furosemide  20 mg Oral Daily    atorvastatin  20 mg Oral Daily    insulin lispro  0-12 Units Subcutaneous TID WC    insulin lispro  0-6 Units Subcutaneous Nightly    sodium chloride flush  10 mL Intravenous 2 times per day    famotidine  20 mg Oral BID    potassium & sodium phosphates  2 packet Oral BID      General examination:    Head: Normocephalic, atraumatic  Eyes: Extraocular movements intact  Lungs: Respirations unlabored, chest wall no deformity  ENT: Normal external ear canals, no sinus tenderness  Heart: Regular rate rhythm  Abdomen: No masses, tenderness  Extremities: No cyanosis or edema, 2+ pulses  Skin: Intact, normal skin color    Neurological examination:    Mental status   Alert and oriented; intact memory with no confusion, speech or language problems; no hallucinations or delusions     Cranial nerves   II - visual fields intact to confrontation                                                III, IV, VI - extra-ocular muscles full: no pupillary defect; no ORIN, no nystagmus, no ptosis   V - normal facial sensation                                                               VII - normal facial symmetry                                                             VIII - intact hearing                                                                             IX, X - symmetrical palate                                                                  XI - symmetrical shoulder shrug                                                       XII - midline tongue without atrophy or fasciculation     Motor function  Normal muscle bulk and tone; normal power 5/5     Sensory function Intact to touch, pin, vibration, proprioception     Cerebellar Intact fine motor movement. No involuntary movements or tremors     Reflex function Intact 2+ DTR and symmetric.  Negative Babinski     Gait                  Not tested          Lab Results   Component Value Date    LDLCHOLESTEROL 88 08/28/2020     No components found for: CHLPL  Lab Results   Component Value Date    TRIG 59 08/28/2020    TRIG 206 (H) 11/18/2017     Lab Results   Component Value Date    HDL 49 08/28/2020    HDL 9 (L) 11/18/2017     No results found for: LDLCALC  No results found for: LABVLDL  Lab Results   Component Value Date    LABA1C 5.8 08/25/2020     Lab Results   Component Value Date     08/25/2020     Lab Results   Component Value Date    [id number] 241 08/25/2020      Neurological work up:  CT head  CTA head and neck  MRI brain     2 D echo       Assessment Recommendations:  Multiple embolic strokes, involving left cerebellum and right medial occipital.  Cardioembolic secondary to hypercoagulopathy with anticardiolipin antibody syndrome and MTHFR heterozygous mutation  Patient with chronic T5 myelopathy    The patient is currently on a heparin drip for bridging and is on Coumadin for anticoagulation. PTT 66.0. INR is 1.1. Appreciate hematology oncology consult  Neurosurgery discussed with patient's family about surgical decompression of thoracic spine  Continued PT OT evaluation. Will follow. This note is created with the assistance of a speech-recognition program. While intending to generate a document that actually reflects the content of the visit, the document can still have some errors including those of syntax and sound a- like substitutions which may escape proofreading. In such instances, actual meaning can be extrapolated by contextual derivation.
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
## 2020-08-29 NOTE — PLAN OF CARE
Problem: Skin Integrity:  Goal: Will show no infection signs and symptoms  Description: Will show no infection signs and symptoms  Outcome: Ongoing     Problem: Falls - Risk of:  Goal: Will remain free from falls  Description: Will remain free from falls  Outcome: Met This Shift

## 2020-08-29 NOTE — PROGRESS NOTES
Pharmacy Note  Warfarin Consult follow-up      Recent Labs     08/29/20  0421   INR 1.1     Recent Labs     08/28/20  0720 08/28/20  1131 08/29/20  0421   HGB 11.5* 12.3 11.2*   HCT 36.3 38.4 35.9*    332 301       Significant Drug-Drug Interactions:  New warfarin drug-drug interactions: none  Discontinued drug-drug interactions: none    Current warfarin drug-drug interactions: acetaminophen, tramadol    Date INR Dose   8/27/2020 1.0 (8/25) 5mg   8/28/2020 1.0 5mg   8/29/2020 1.1 5mg     Notes: Give warfarin 5mg today. Daily PT/INR while inpatient.       Mera Aguilar, PharmD

## 2020-08-29 NOTE — PLAN OF CARE
Problem: Falls - Risk of:  Goal: Will remain free from falls  Description: Will remain free from falls  Outcome: Met This Shift  Note: Pt remains free of falls at this time. Bed locked in lowest position, siderails x2, call light in reach, and bed alarm is on. Non-skid footwear applied. Encouraged pt to call for assistance as needed for safety. Falling star posted outside of room. Will continue to monitor. Goal: Absence of physical injury  Description: Absence of physical injury  8/29/2020 0401 by Eddi Cano RN  Outcome: Met This Shift  8/28/2020 1859 by Gunjan Joaquin RN  Outcome: Met This Shift     Problem: Pain:  Goal: Pain level will decrease  Description: Pain level will decrease  Outcome: Met This Shift  Note: Pt pain was assessed on a 0-10 scale. Pt able to communicate pain as needed, uses call light appropriately. Pt satisfied with pain interventions.        Problem: Nutrition  Goal: Optimal nutrition therapy  Outcome: Met This Shift     Problem: Skin Integrity:  Goal: Will show no infection signs and symptoms  Description: Will show no infection signs and symptoms  Outcome: Ongoing  Goal: Absence of new skin breakdown  Description: Absence of new skin breakdown  8/29/2020 0401 by Eddi Cano RN  Outcome: Ongoing  8/28/2020 1859 by Gunjan Joaquin RN  Outcome: Met This Shift     Problem: Pain:  Goal: Control of chronic pain  Description: Control of chronic pain  Outcome: Ongoing

## 2020-08-29 NOTE — PROGRESS NOTES
Today's Date: 8/29/2020  Patient Name: Jose G Hall  Date of admission: 8/24/2020  5:30 PM  Patient's age: 65 y.o., 1950  Admission Dx: Sepsis ([facility] [A41.9]  Sepsis ([facility] [A41.9]    Reason for Consult: Patient had stroke on Peggy. Need recommendations from Hem/Onc  Requesting Physician: Amy Mcclendon MD    CHIEF COMPLAINT:  Episode of unresponsiveness    SUBJECTIVE:    Patient seen and examined  She is tolerating IV heparin well  No symptoms of bleeding  No fever chills    HISTORY OF PRESENT ILLNESS:    The patient is a 65 y.o.  female who is admitted to the hospital for acute stroke. The patient is a poor historian and compliance of questionable. 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 The patient also is chronic T5 spinal cord myelopathy. Degenerative disc disease as well. The past medical history includes history of hypertension and obesity. The patient came to the hospital with episodes of unresponsiveness and diarrhea. He was found to have right cerebellar and right medial occipital acute ischemic strokes. The patient was on Xarelto and had strokes. Hematology oncology has been consulted for recommendations regarding using Coumadin considering acute strokes on Xarelto. Past Medical History:   has a past medical history of Acute ischemic stroke ([facility] Depression, DM (diabetes mellitus) ([facility] Heart murmur, and HTN (hypertension). Past Surgical History:   has a past surgical history that includes Tubal ligation; other surgical history; and pr egd percutaneous placement gastrostomy tube (N/A, 12/11/2017). Medications:    Reviewed in Epic     Allergies:  Pcn [penicillins]    Social History:   reports that she has quit smoking. She has never used smokeless tobacco. She reports that she does not drink alcohol or use drugs.      Family History: family history includes Asthma in [E57.01, E86.1] 08/25/2020    Cerebrovascular accident (CVA) due to embolism of precerebral artery (HCC) [I63.10]     [facility] [E11.9] 12/05/2017    Recurrent major depressive disorder ([facility] [F33.9]     Morbid obesity ([facility] [E66.01] 11/19/2017    Essential hypertension [I10]      IMPRESSION:   1. Acute right cerebellar and right medial occipital ischemic stroke  2. History of anticardiolipin antibody syndrome and MTHFR gene mutation  3. Chronic T5 spinal cord myelopathy  4. Degenerative disc disease  5. Hypertension  6. Obesity    RECOMMENDATIONS:  1. She ismon Coumadin. Pharmacy to dose Coumadin. 2. The patient will need heparin for bridging until Coumadin is therapeutic. 3. The patient will need follow-up arrangements at Coumadin clinic. 4. Repeat APS labs  5. We will continue to follow. Discussed with patient and Nurse. Thank you for asking us to see this patient. Joseph Stephens MD  Hematologist/Medical Oncologist    Cell: [phone]      This note is created with the assistance of a speech recognition program.  While intending to generate a document that actually reflects the content of the visit, the document can still have some errors including those of syntax and sound a like substitutions which may escape proof reading. It such instances, actual meaning can be extrapolated by contextual diversion.

## 2020-08-30 VITALS
RESPIRATION RATE: 20 BRPM | WEIGHT: 240.3 LBS | OXYGEN SATURATION: 98 % | DIASTOLIC BLOOD PRESSURE: 69 MMHG | TEMPERATURE: 98.4 F | HEIGHT: 64 IN | BODY MASS INDEX: 41.03 KG/M2 | HEART RATE: 82 BPM | SYSTOLIC BLOOD PRESSURE: 144 MMHG

## 2020-08-30 LAB
BUN BLDV-MCNC: 9 MG/DL (ref 8–23)
CREAT SERPL-MCNC: 0.48 MG/DL (ref 0.5–0.9)
CULTURE: NORMAL
GFR AFRICAN AMERICAN: >60 ML/MIN
GFR NON-AFRICAN AMERICAN: >60 ML/MIN
GFR SERPL CREATININE-BSD FRML MDRD: ABNORMAL ML/MIN/{1.73_M2}
GFR SERPL CREATININE-BSD FRML MDRD: ABNORMAL ML/MIN/{1.73_M2}
GLUCOSE BLD-MCNC: 105 MG/DL (ref 65–105)
HCT VFR BLD CALC: 35 % (ref 36.3–47.1)
HEMOGLOBIN: 10.7 G/DL (ref 11.9–15.1)
INR BLD: 1.2
LACTIC ACID, WHOLE BLOOD: 1.7 MMOL/L (ref 0.7–2.1)
LACTIC ACID: NORMAL MMOL/L
Lab: NORMAL
MCH RBC QN AUTO: 28.1 PG (ref 25.2–33.5)
MCHC RBC AUTO-ENTMCNC: 30.6 G/DL (ref 28.4–34.8)
MCV RBC AUTO: 91.9 FL (ref 82.6–102.9)
NRBC AUTOMATED: 0 PER 100 WBC
PARTIAL THROMBOPLASTIN TIME: 77.2 SEC (ref 20.5–30.5)
PDW BLD-RTO: 14.2 % (ref 11.8–14.4)
PHOSPHORUS: 3.1 MG/DL (ref 2.6–4.5)
PLATELET # BLD: 302 K/UL (ref 138–453)
PMV BLD AUTO: 9.4 FL (ref 8.1–13.5)
PROTHROMBIN TIME: 12.1 SEC (ref 9–12)
RBC # BLD: 3.81 M/UL (ref 3.95–5.11)
SPECIMEN DESCRIPTION: NORMAL
WBC # BLD: 8.9 K/UL (ref 3.5–11.3)

## 2020-08-30 PROCEDURE — 6370000000 HC RX 637 (ALT 250 FOR IP): Performed by: INTERNAL MEDICINE

## 2020-08-30 PROCEDURE — 6360000002 HC RX W HCPCS: Performed by: STUDENT IN AN ORGANIZED HEALTH CARE EDUCATION/TRAINING PROGRAM

## 2020-08-30 PROCEDURE — 6370000000 HC RX 637 (ALT 250 FOR IP): Performed by: NURSE PRACTITIONER

## 2020-08-30 PROCEDURE — 99232 SBSQ HOSP IP/OBS MODERATE 35: CPT | Performed by: PSYCHIATRY & NEUROLOGY

## 2020-08-30 PROCEDURE — 85027 COMPLETE CBC AUTOMATED: CPT

## 2020-08-30 PROCEDURE — 82565 ASSAY OF CREATININE: CPT

## 2020-08-30 PROCEDURE — 83605 ASSAY OF LACTIC ACID: CPT

## 2020-08-30 PROCEDURE — 2580000003 HC RX 258: Performed by: INTERNAL MEDICINE

## 2020-08-30 PROCEDURE — 36415 COLL VENOUS BLD VENIPUNCTURE: CPT

## 2020-08-30 PROCEDURE — 99239 HOSP IP/OBS DSCHRG MGMT >30: CPT | Performed by: INTERNAL MEDICINE

## 2020-08-30 PROCEDURE — 6360000002 HC RX W HCPCS: Performed by: INTERNAL MEDICINE

## 2020-08-30 PROCEDURE — 85610 PROTHROMBIN TIME: CPT

## 2020-08-30 PROCEDURE — 84100 ASSAY OF PHOSPHORUS: CPT

## 2020-08-30 PROCEDURE — 82947 ASSAY GLUCOSE BLOOD QUANT: CPT

## 2020-08-30 PROCEDURE — 84520 ASSAY OF UREA NITROGEN: CPT

## 2020-08-30 PROCEDURE — 85730 THROMBOPLASTIN TIME PARTIAL: CPT

## 2020-08-30 RX ORDER — WARFARIN SODIUM 5 MG/1
5 TABLET ORAL DAILY
Qty: 10 TABLET | Refills: 0 | Status: SHIPPED | OUTPATIENT
Start: 2020-08-30 | End: 2020-09-09

## 2020-08-30 RX ORDER — TRAMADOL HYDROCHLORIDE 50 MG/1
50 TABLET ORAL EVERY 6 HOURS PRN
Qty: 20 TABLET | Refills: 0 | Status: SHIPPED | OUTPATIENT
Start: 2020-08-30 | End: 2020-09-04

## 2020-08-30 RX ORDER — ERGOCALCIFEROL 1.25 MG/1
50000 CAPSULE ORAL WEEKLY
Qty: 8 CAPSULE | Refills: 1 | Status: SHIPPED | OUTPATIENT
Start: 2020-09-02 | End: 2020-10-22

## 2020-08-30 RX ORDER — WARFARIN SODIUM 5 MG/1
5 TABLET ORAL
Status: DISCONTINUED | OUTPATIENT
Start: 2020-08-30 | End: 2020-08-30 | Stop reason: HOSPADM

## 2020-08-30 RX ORDER — SENNA AND DOCUSATE SODIUM 50; 8.6 MG/1; MG/1
1 TABLET, FILM COATED ORAL DAILY
Qty: 30 TABLET | Refills: 3 | Status: SHIPPED | OUTPATIENT
Start: 2020-08-30 | End: 2020-09-29

## 2020-08-30 RX ADMIN — ENOXAPARIN SODIUM 105 MG: 120 INJECTION SUBCUTANEOUS at 10:09

## 2020-08-30 RX ADMIN — HYDRALAZINE HYDROCHLORIDE 25 MG: 25 TABLET ORAL at 06:42

## 2020-08-30 RX ADMIN — LACTULOSE 30 G: 20 SOLUTION ORAL at 08:15

## 2020-08-30 RX ADMIN — Medication 10 ML: at 08:20

## 2020-08-30 RX ADMIN — ATORVASTATIN CALCIUM 20 MG: 20 TABLET, FILM COATED ORAL at 08:16

## 2020-08-30 RX ADMIN — HEPARIN SODIUM AND DEXTROSE 11.07 UNITS/KG/HR: 10000; 5 INJECTION INTRAVENOUS at 04:42

## 2020-08-30 RX ADMIN — CARVEDILOL 25 MG: 25 TABLET, FILM COATED ORAL at 08:16

## 2020-08-30 RX ADMIN — STANDARDIZED SENNA CONCENTRATE AND DOCUSATE SODIUM 2 TABLET: 8.6; 5 TABLET ORAL at 08:16

## 2020-08-30 RX ADMIN — AMLODIPINE BESYLATE 10 MG: 10 TABLET ORAL at 08:16

## 2020-08-30 RX ADMIN — POTASSIUM & SODIUM PHOSPHATES POWDER PACK 280-160-250 MG 500 MG: 280-160-250 PACK at 08:06

## 2020-08-30 RX ADMIN — POLYETHYLENE GLYCOL 3350 17 G: 17 POWDER, FOR SOLUTION ORAL at 08:04

## 2020-08-30 RX ADMIN — FAMOTIDINE 20 MG: 20 TABLET, FILM COATED ORAL at 08:16

## 2020-08-30 NOTE — PROGRESS NOTES
3 % CREA Apply topically 2 times daily      insulin lispro (HUMALOG) 100 UNIT/ML injection vial Inject into the skin 3 times daily (before meals)      traMADol (ULTRAM) 50 MG tablet Take 50 mg by mouth every 8 hours as needed for Pain. Saman Eric polyethylene glycol (GLYCOLAX) packet Take 17 g by mouth daily as needed for Constipation      MULTIPLE VITAMIN PO Take by mouth daily      sennosides-docusate sodium (SENOKOT-S) 8.6-50 MG tablet Take 1 tablet by mouth every 12 hours as needed for Constipation      atorvastatin (LIPITOR) 20 MG tablet Take 1 tablet by mouth daily 30 tablet 5    rivaroxaban (XARELTO) 20 MG TABS tablet Take 1 tablet by mouth daily 30 tablet 0    cloNIDine (CATAPRES) 0.1 MG/24HR Place 1 patch onto the skin once a week 4 patch 3    hydrALAZINE (APRESOLINE) 25 MG tablet Take 1 tablet by mouth every 8 hours 90 tablet 3    carvedilol (COREG) 25 MG tablet Take 1 tablet by mouth 2 times daily (with meals) 60 tablet 3    amLODIPine (NORVASC) 10 MG tablet Take 1 tablet by mouth daily 30 tablet 3    pantoprazole (PROTONIX) 40 MG tablet Take 1 tablet by mouth 2 times daily Take protonix BID for 8 weeks and then daily 30 tablet 3    LASIX 20 MG tablet Take 1 tablet by mouth daily 30 tablet 0       Allergies: Berna Colbert is allergic to pcn [penicillins].     Past Medical History:   Diagnosis Date    Acute ischemic stroke ([facility] 50/44/5178    cardioembolic    Depression     [facility]     Heart murmur     HTN (hypertension)        Past Surgical History:   Procedure Laterality Date    OTHER SURGICAL HISTORY      bump under skin on buttocks    TX EGD PERCUTANEOUS PLACEMENT GASTROSTOMY TUBE N/A 12/11/2017    EGD ESOPHAGOGASTRODUODENOSCOPY PEG TUBE INSERTION performed by Ibeth Estrada MD at [facility]    TUBAL LIGATION         Medications:     lactulose  30 g Oral Daily    polyethylene glycol  17 g Oral Daily    sennosides-docusate sodium  2 tablet Oral Daily    warfarin (COUMADIN) daily dosing (placeholder)   Other RX Placeholder    lidocaine 1 % injection  5 mL Intradermal Once    sodium chloride flush  10 mL Intravenous 2 times per day    vitamin D  50,000 Units Oral Weekly    cloNIDine  1 patch Transdermal Weekly    hydrALAZINE  25 mg Oral 3 times per day    carvedilol  25 mg Oral BID WC    amLODIPine  10 mg Oral Daily    [Held by provider] furosemide  20 mg Oral Daily    atorvastatin  20 mg Oral Daily    insulin lispro  0-12 Units Subcutaneous TID WC    insulin lispro  0-6 Units Subcutaneous Nightly    sodium chloride flush  10 mL Intravenous 2 times per day    famotidine  20 mg Oral BID    potassium & sodium phosphates  2 packet Oral BID     PRN Meds include: iohexol, heparin (porcine), heparin (porcine), ondansetron, sodium chloride flush, traMADol, glucose, dextrose, glucagon (rDNA), dextrose, sodium chloride flush, acetaminophen **OR** acetaminophen, potassium chloride    Objective:   BP (!) 167/64   Pulse 78   Temp 98.6 °F (37 °C) (Oral)   Resp 19   Ht 5' 4\" (1.626 m)   Wt 240 lb 4.8 oz (109 kg)   SpO2 98%   BMI 41.25 kg/m²     Blood pressure range: Systolic (99GMU), USD:049 , Min:102 , UCV:871   ; Diastolic (30AFC), FXJ:08, Min:55, Max:68      ROS:  CONSTITUTIONAL: negative for fatigue and malaise   EYES: negative for double vision and photophobia    HEENT: negative for tinnitus and sore throat   RESPIRATORY: negative for cough, shortness of breath   CARDIOVASCULAR: negative for chest pain, palpitations, or syncope   GASTROINTESTINAL: negative for abdominal pain, nausea, vomiting, diarrhea, or constipation    GENITOURINARY: negative for incontinence or retention    MUSCULOSKELETAL: negative for neck or back pain, negative for extremity pain   NEUROLOGICAL: Negative for seizures, headaches, weakness, numbness, confusion, aphasia, dysarthria   PSYCHIATRIC: negative for agitation, hallucination, SI/HI   SKIN Negative for spontaneous contusions, rashes, or lesions        NEUROLOGIC EXAMINATION    GENERAL  Appears comfortable and in no distress   HEENT  NC/ AT   HEART  S1 and S2 heard; palpation of pulses: radial pulse    NECK  Supple and no bruits heard   MENTAL STATUS:  Alert, oriented, intact memory, no confusion, normal speech, normal language, no hallucination or delusion   CRANIAL NERVES: II     -      Visual fields intact to confrontation  III,IV,VI -  PERR, EOMs full, no ptosis  V     -     Normal facial sensation   VII    -     Normal facial symmetry  VIII   -     Intact hearing   IX,X -     Symmetrical palate  XI    -     Symmetrical shoulder shrug  XII   -     Midline tongue, no atrophy    MOTOR FUNCTION: RUE: Significant for good strength of grade 5/5 in proximal and distal muscle groups   LUE: Significant for good strength of grade 5/5 in proximal and distal muscle groups   RLE: Significant for good strength of grade 2/5 in proximal and distal muscle groups   LLE: Significant for good strength of grade 2/5 in proximal and distal muscle groups     Wheelchair/bedbound     Normal bulk, normal tone and no involuntary movements, no tremor   SENSORY FUNCTION:  Normal touch, normal pinprick, normal vibration, normal proprioception   CEREBELLAR FUNCTION:  Intact fine motor control over upper limbs and lower limbs   REFLEX FUNCTION:  Symmetric in upper and lower extremities, no Babinski sign   STATION and GAIT  Normal gait and tandem station, normal tip toes and heel walking       Data:    Lab Results:   CBC:   Recent Labs     08/28/20  1131 08/29/20  0421 08/30/20  0635   WBC 7.5 8.9 8.9   HGB 12.3 11.2* 10.7*   HCT 38.4 35.9* 35.0*    301 302     BMP:    Recent Labs     08/28/20  0720 08/28/20  1131 08/28/20  2234 08/29/20  0421 08/30/20  0635   K  --  3.2* 4.1  --   --    BUN 7*  --   --  8 9   CREATININE 0.42*  --   --  0.51 0.48*         Lab Results   Component Value Date    CHOL 149 08/28/2020    LDLCHOLESTEROL 88 08/28/2020    HDL 49 08/28/2020    TRIG 59 08/28/2020    ALT 6 08/25/2020    AST 9 08/25/2020    TSH 3.46 08/25/2020    INR 1.2 08/30/2020    LABA1C 5.8 08/25/2020    [id number] 690 08/25/2020     IMAGING    1. Brain MRI WO  Impression    Small acute left cerebellar infarct.         Small acute infarct within the anterior aspect of the right occipital lobe.         Moderate-to-severe chronic microvascular disease within the periventricular    white matter.         Old lacunes within the alex and right basal ganglia.         Mild atrophy.            2. Cervical Spine MRI WO  Impression    New high T2 signal abnormality within the disc space at C4-5 and C5-6.  These    changes likely represent degeneration of the disc rather than    discitis/osteomyelitis.  No marrow edema or inflammation/abscess is    identified.  Correlation with C-reactive protein may be considered if there    is clinical concern for infection.         Moderate central canal stenosis at C3-4.  Associated severe left foraminal    stenosis and mild right foraminal stenosis.         Severe left foraminal stenosis and moderate right foraminal stenosis at C4-5    and C5-6.         Moderate left foraminal stenosis and mild right foraminal stenosis at C6-7. Mild left foraminal stenosis at C7-T1.            3. Thoracic Spine MRI WO  Impression    Chronic T5 spinal cord compression and myelomalacia due to posterior arch    hypertrophy.            4. Lumbar Spine MRI WO  Impression    Rectal dilation indicating possible severe fecal impaction versus large bowel    obstruction, incompletely evaluated.         No severe lumbar spinal canal stenosis.         IMPRESSION                 Case of a 64 y/o female patient admitted due to diarrhea, AMS.     // Acute stroke//              Episode of unresponsives with new infarcts in different areas as per MRI. Cardioemblolic secondary to hypercoagulability with anticardiolipin antibody syndrome and MTHFR heterozygous mutation. Patient has been compliant as per nurse with Xarelto. Switched to warfarin due to failure. Tolerating.      // Spine stenosis //             Neurosurgery following and will evaluated outpatient. Chronic T5 myelopathy/      RECOMMENDATIONS:   1. Continue Warfarin as per Primary  2. Heme Onc following  3. Neurosurgery following  4. Neurochecks  5. Patient will be discharge today. 6. Will follow with general neurology in 4-6 weeks.  Neurologically stable for discharge       Mino Del Castillo MD [id number] [facility]  Adult General Neurology Resident PGY-4  8/30/2020 at 8:03 AM

## 2020-08-30 NOTE — DISCHARGE SUMMARY
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 MD on 8/30/2020 at 8:16 PM

## 2020-08-30 NOTE — PROGRESS NOTES
[facility] Progress Note-Internal Medicine. [facility] 4B Stepdown       Patient: Korina De León    Unit/Bed: 3667/3523-88    YOB: 1950    MRN: [id number]    Acct: [de-identified]     Admitting Diagnosis:Sepsis ([facility] [address] [A41.9]  Sepsis [facility]) [A41.9]    Admit Date:  8/24/2020    Hospital Day: 5    Subjective:    Patient is stable and no new complaints. Patient declined neurosurgery. Complains of a one-week history of diarrhea. She has difficulty ambulation at baseline. Patient reviewed by neurosurgery for C-spine stenosis and chronic T5 cord compression   with myelopathy. Started on Coumadin and heparin bridge by heme-onc. Patient Seen, Chart, Labs, Radiology studies, and Consults reviewed. Objective:   BP (!) 137/56   Pulse 64   Temp 97.9 °F (36.6 °C) (Temporal)   Resp 14   Ht 5' 4\" (1.626 m)   Wt 246 lb 11.1 oz (111.9 kg)   SpO2 97%   BMI 42.35 kg/m²       Intake/Output Summary (Last 24 hours) at 8/29/2020 2121  Last data filed at 8/29/2020 1646  Gross per 24 hour   Intake 1510.41 ml   Output 800 ml   Net 710.41 ml     Review of Systems   Constitutional: Negative for activity change, appetite change, chills, diaphoresis, fatigue, fever and unexpected weight change. HENT: Negative for congestion, drooling, ear discharge, facial swelling, hearing loss, mouth sores, nosebleeds, postnasal drip, rhinorrhea, sinus pressure, sinus pain, sore throat, tinnitus, trouble swallowing and voice change. Eyes: Negative for photophobia, pain, discharge, redness, itching and visual disturbance. Respiratory: Negative for apnea, choking, chest tightness, shortness of breath, wheezing and stridor. Cardiovascular: Negative for chest pain, palpitations and leg swelling.    Gastrointestinal: Negative for abdominal distention, abdominal pain, anal bleeding, blood in stool, constipation, diarrhea, nausea, rigidity or muscular tenderness. Vascular: No carotid bruit. Comments: Reduced neck range of motion. Cardiovascular:      Rate and Rhythm: Normal rate and regular rhythm. Pulses: Normal pulses. Heart sounds: Normal heart sounds. No murmur. No friction rub. No gallop. Pulmonary:      Effort: Pulmonary effort is normal. No respiratory distress. Breath sounds: Normal breath sounds. No stridor. No wheezing, rhonchi or rales. Chest:      Chest wall: No tenderness. Abdominal:      General: Bowel sounds are normal. There is no distension. Palpations: Abdomen is soft. There is no mass. Tenderness: There is no abdominal tenderness. There is no guarding or rebound. Hernia: No hernia is present. Musculoskeletal: Normal range of motion. General: No swelling, tenderness, deformity or signs of injury. Right lower leg: Edema present. Left lower leg: Edema present. Lymphadenopathy:      Cervical: No cervical adenopathy. Skin:     General: Skin is warm and dry. Coloration: Skin is not jaundiced or pale. Findings: No bruising, erythema, lesion or rash. Comments: Stage II gluteal , back and posterior thigh decubitus ulcers. Neurological:      General: No focal deficit present. Mental Status: She is alert and oriented to person, place, and time. Mental status is at baseline. Cranial Nerves: No cranial nerve deficit. Sensory: No sensory deficit. Motor: Weakness present. Psychiatric:         Mood and Affect: Mood normal.         Behavior: Behavior normal.         Thought Content:  Thought content normal.         Judgment: Judgment normal.     Medications:    lactulose  30 g Oral Daily    polyethylene glycol  17 g Oral Daily    sennosides-docusate sodium  2 tablet Oral Daily    warfarin (COUMADIN) daily dosing (placeholder)   Other RX Placeholder    lidocaine 1 % injection  5 mL Intradermal Once    sodium chloride flush 10 mL Intravenous 2 times per day    vitamin D  50,000 Units Oral Weekly    cloNIDine  1 patch Transdermal Weekly    hydrALAZINE  25 mg Oral 3 times per day    carvedilol  25 mg Oral BID     amLODIPine  10 mg Oral Daily    [Held by provider] furosemide  20 mg Oral Daily    atorvastatin  20 mg Oral Daily    insulin lispro  0-12 Units Subcutaneous TID     insulin lispro  0-6 Units Subcutaneous Nightly    sodium chloride flush  10 mL Intravenous 2 times per day    famotidine  20 mg Oral BID    potassium & sodium phosphates  2 packet Oral BID       Continuous Infusions:   heparin (porcine) 11.07 Units/kg/hr (08/29/20 1220)    dextrose         PRN Meds:iohexol, heparin (porcine), heparin (porcine), ondansetron, sodium chloride flush, traMADol, glucose, dextrose, glucagon (rDNA), dextrose, sodium chloride flush, acetaminophen **OR** acetaminophen, potassium chloride    Data:    CBC:   Recent Labs     08/28/20  0720 08/28/20  1131 08/29/20  0421   WBC 7.7 7.5 8.9   RBC 3.93* 4.27 3.89*   HGB 11.5* 12.3 11.2*   HCT 36.3 38.4 35.9*   MCV 92.4 89.9 92.3   RDW 13.8 13.7 13.9    332 301       BMP:   Recent Labs     08/27/20  0735 08/28/20  0720 08/28/20  1131 08/28/20  2234 08/29/20  0421   K  --   --  3.2* 4.1  --    PHOS 2.5* 2.3*  --   --  2.5*   BUN 9 7*  --   --  8   CREATININE 0.52 0.42*  --   --  0.51     Results for Irish Marquez (MRN [id number]) as of 8/26/2020 14:38   Ref. Range 8/25/2020 22:01   Vit D, 25-Hydroxy Latest Ref Range: 30.0 - 100.0 ng/mL 8.5 (L)       Results for Irish Marquez (MRN [id number]) as of 8/25/2020 19:39   Ref. Range 8/25/2020 09:44   TSH Latest Ref Range: 0.30 - 5.00 mIU/L 3.46     Results for Irish Marquez (MRN [id number]) as of 8/25/2020 19:39   Ref. Range 8/25/2020 15:45   Folate Latest Ref Range: >4.8 ng/mL 5.9   Vitamin B-12 Latest Ref Range: 232 - 1245 pg/mL 690     Results for Irish Marquez (MRN [id number]) as of 8/25/2020 14:13   Ref.  Range 8/24/2020 20:02 8/25/2020 02:39   Lactic Acid, Sepsis, Whole Blood Latest Ref Range: 0.5 - 1.9 mmol/L 3.7 (H) 2.7 (H)     Results for Ana Armstrong (MRN [id number]) as of 8/25/2020 14:13   Ref. Range 8/25/2020 03:57   Phosphorus Latest Ref Range: 2.6 - 4.5 mg/dL 2.1 (L)     PT/INR:  Recent Labs     08/28/20  0720 08/29/20  0421   PROTIME 10.3 11.2   INR 1.0 1.1       APTT:   Recent Labs     08/28/20  1947 08/29/20  0421 08/29/20  1143   APTT 42.5* 67.2* 66.0*     Results for Ana Armstrong (MRN [id number]) as of 8/26/2020 14:38   Ref. Range 8/25/2020 15:45   Folate Latest Ref Range: >4.8 ng/mL 5.9   Vitamin B-12 Latest Ref Range: 232 - 1245 pg/mL 690     Results for Ana Armstrong (MRN [id number]) as of 8/26/2020 14:38   Ref. Range 8/25/2020 15:45   Procalcitonin Latest Ref Range: <0.09 ng/mL 0.05     Results for Ana Armstrong (MRN [id number]) as of 8/26/2020 14:38   Ref. Range 8/25/2020 15:45   CRP Latest Ref Range: 0.0 - 5.0 mg/L 73.7 (H)     Results for Ana Armstrong (MRN [id number]) as of 8/25/2020 10:50   Ref. Range 8/24/2020 20:02 8/25/2020 03:57 8/25/2020 09:44   Troponin, High Sensitivity Latest Ref Range: 0 - 14 ng/L 50 (H) 59 (HH) 53 (HH)     VBG. Results for Ana Armstrong (MRN [id number]) as of 8/25/2020 14:13   Ref. Range 8/24/2020 19:59   pH, [place] Latest Ref Range: 7.320 - 7.430  7.363   pCO2, [place] Latest Ref Range: 41.0 - 51.0 mm Hg 49.2   pO2, [place] Latest Ref Range: 30.0 - 50.0 mm Hg 41.1   HCO3, Venous Latest Ref Range: 22.0 - 29.0 mmol/L 27.9   Positive Base Excess, [place] Latest Ref Range: 0.0 - 3.0  2   Negative Base Excess, [place] Latest Ref Range: 0.0 - 2.0  NOT REPORTED   Total CO2, Venous Latest Ref Range: 23.0 - 30.0 mmol/L 30   O2 Sat, [place] Latest Ref Range: 60.0 - 85.0 % 74       URINALYSIS. Results for Ana Armstrong (MRN [id number]) as of 8/25/2020 19:39   Ref.  Range 11/19/2017 04:37   DILAN Latest Ref Range: NEG  NEGATIVE   Alpha 1 % Latest Ref Range: 3 - 6 % 7 (H)   Alpha 2 % Latest Ref Range: 6 - 13 % 19 (H)   Alpha-1-Globulin Latest Ref Range: 0.1 - 0.4 g/dL 0.4   Alpha-2-Globulin Latest Ref Range: 0.5 - 0.9 g/dL 1.0 (H)   Beta Globulin Latest Ref Range: 0.5 - 1.1 g/dL 0.9   Beta Percent Latest Ref Range: 11 - 19 % 17   Gamma Globulin Latest Ref Range: 0.5 - 1.5 g/dL 1.0   Gamma Globulin % Latest Ref Range: 9 - 20 % 20   [place] Free Light Chains QNT Latest Ref Range: 0.37 - 1.94 mg/dL 7.51 (H)   Lambda Free Light Chains QNT Latest Ref Range: 0.57 - 2.63 mg/dL 3.96 (H)   Free Kappa/Lambda Ratio Latest Ref Range: 0.26 - 1.65  1.90 (H)   Serum IFX Interp Unknown IMMUNOFIXATION IS NEGATIVE FOR MONOCLONAL IMMUNOGLOBULIN.   KAPPA/LAMBDA QUANT FREE LIGHT CHAINS SERUM Unknown Rpt (A)   Complement C3 Latest Ref Range: 90 - 180 mg/dL 103   Complement C4 Latest Ref Range: 10 - 40 mg/dL 26   IMMUNOFIXATION SERUM PROFILE Unknown Rpt       SEROLOGY  None. TUMOR MARKER. None. MICROBIOLOGY   Blood cultures on 04/24/2020. POSITIVE Blood Culture  Coagulase negative Staphylococcus species detected by PCR. HISTOPATHOLOGY. None. TOXICOLOGY. None. ENDOSCOPE STUDIES. None. PROCEDURES. None. RADIOLOGY. Echo-8/28/2020. CONCLUSION:  Small left ventricular cavity size with hyperdynamic function. Estimated ejection fraction is 65% . Moderate to severe concentric left ventricular hypertrophy. Aortic sclerosis without stenosis. Mild aortic insufficiency. Mild mitral regurgitation. Mild tricuspid regurgitation. Estimated right ventricular systolic pressure is 36 mmHg. Brain MRI without contrast 08/27/2020. FINDINGS:  INTRACRANIAL STRUCTURES/VENTRICLES:   Small acute infarct identified within the left cerebellar hemisphere and the anterior aspect   of the right occipital lobe.     No mass effect or midline shift. No evidence of an acute intracranial hemorrhage. The ventricles and sulci are normal in size and configuration. The sellar/suprasellar regions appear unremarkable.       The normal signal voids within the major intracranial vessels appear maintained.     Moderate-to-severe chronic microvascular disease is identified within the  periventricular white matter of both cerebral hemispheres.     Old lacunes are appreciated within the alex and right basal ganglia.     Mild involutional changes are identified within brain.     ORBITS:   The visualized portion of the orbits demonstrate no acute abnormality.     SINUSES:   The visualized paranasal sinuses and mastoid air cells are well aerated.     BONES/SOFT TISSUES:   The bone marrow signal intensity appears normal.     The soft tissues demonstrate no acute abnormality.     IMPRESSION:  Small acute left cerebellar infarct.     Small acute infarct within the anterior aspect of the right occipital lobe.     Moderate-to-severe chronic microvascular disease within the periventricular  white matter.     Old lacunes within the alex and right basal ganglia.     Mild atrophy. MRI cervical spine without contrast 08/27/2020. FINDINGS:  BONES/ALIGNMENT:   There is normal alignment of the spine. The vertebral body heights are maintained. The bone marrow signal appears unremarkable. No acute fracture is identified. There is high T2 signal within the disc space at C4-5 and C5-6. No significant associated marrow edema is detected. This most likely represents degeneration of the discs rather than  discitis/osteomyelitis. No paraspinal inflammation or abscess is appreciated     SPINAL CORD:   No abnormal cord signal is seen.     SOFT TISSUES:   No paraspinal mass identified.     C2-C3:   There is no significant disc protrusion, spinal canal stenosis or neural   foraminal narrowing.     C3-C4:   Broad disc osteophyte complex results in moderate central canal stenosis.       Severe left foraminal stenosis and mild right foraminal stenosis are identified   due to uncovertebral joint hypertrophy and facet disease.     C4-C5:   Severe left foraminal stenosis arch  Hypertrophy. MRI lumbar spine without contrast 08/27/2020. FINDINGS:  BONES/ALIGNMENT:   Vertebral heights and alignment are normal.     Edema at the L3-4 endplates is likely degenerative.     There is transitional spinal anatomy. The 12th ribs are hypoplastic. There is a rudimentary disc space at L5-S1.     SPINAL CORD:   The conus terminates normally.     SOFT TISSUES:   Localizer image demonstrates possible massive dilation of the rectum up   to 12 cm in diameter.     L1-L2:   Disc bulge causes mild right foraminal stenosis.     L2-L3:   Disc bulge, facet and ligament flavum hypertrophy cause mild bilateral  foraminal stenosis.     L3-L4:   Disc bulge, facet and ligament flavum hypertrophy cause mild thecal  sac and moderate bilateral foraminal stenosis.     L4-L5:   Facet hypertrophy without stenosis.     L5-S1:   Disc bulge causes mild right foraminal stenosis. Right paracentral disc protrusion abuts the descending right S1 nerve roots.     IMPRESSION:  Rectal dilation indicating possible severe fecal impaction versus large bowel  obstruction, incompletely evaluated.     No severe lumbar spinal canal stenosis. RUQ Ultrasound 08/26/2020. FINDINGS:  Suboptimal/limited exam due to patient body habitus and gas.     LIVER:    Relative hepatic parenchymal echogenicity most commonly related to hepatic   steatosis. No intrahepatic biliary ductal dilatation. No convincing liver lesion. The portal vein demonstrates normal direction of flow.     BILIARY SYSTEM:    Gallbladder is unremarkable without evidence of pericholecystic fluid, wall thickening   or stones.       Negative sonographic Nix's sign.     Common bile duct is within normal limits measuring 3 mm.     RIGHT KIDNEY:   The right kidney is grossly unremarkable without evidence of hydronephrosis.     PANCREAS:    Visualized portions of the pancreas are unremarkable.     OTHER:   No evidence of right upper quadrant ascites.     IMPRESSION:  Hepatic steatosis is suspected.     Unremarkable gallbladder.     Chest x-ray-08/24/2020. FINDINGS:    HEART/MEDIASTINUM:   The cardiomediastinal silhouette is within normal limits.     PLEURA/LUNGS:   There are no focal consolidations or pleural effusions. There is no appreciable pneumothorax.     BONES/SOFT TISSUE:   No acute abnormality.     IMPRESSION:  No radiographic evidence of acute pulmonary disease. Echocardiogram-12/6/2017. The study was performed by the attending, the fellow and the sonographer in  the Cath Lab. The study was performed under conscious sedation. Left ventricle is normal in sized with increased wall thickness, normal  systolic function, estimated EF 55%. Left atrial appendage showed no evidence of clot. Negative bubble study, no shunt noted. Mild tricuspid regurgitation. Echocardiogram-11/20/2017. Technically difficult study due to open chest wounds; Unable to obtain apical views. Left ventricle is normal in size. Global left ventricular systolic function is normal.     Estimated ejection fraction is 55 % . Mild left ventricular hypertrophy. Trivial tricuspid regurgitation. Estimated right ventricular systolic pressure is 44 mmHg suggestive of mild  Pulmonary HTN. Trivial pulmonic insufficiency. MRI brain w/ and w/o contrast-12/05/2017. IMPRESSION:  1. Numerous acute ischemic infarcts in the cerebral white matter, right      thalamus, and left alex with the distribution in multiple vascular territories       suggesting a central embolic etiology. 2. No intracranial hemorrhage or mass effect. 3. Multifocal remote lacunar infarcts and moderate chronic white matter microvascular       ischemic changes. 4. Trace bilateral mastoid effusions. MRI C-Spine w/o contrast -12/05/2017. FINDINGS:  BONES/ALIGNMENT:   There is normal alignment of the spine. The vertebral body heights are maintained.       The marrow signal in the C4, C5 and C6 vertebral bodies is dark on T1 and T2 weighted   images which likely represents sclerosis of the vertebral bodies. There is degenerative disc disease with disc space narrowing and osteophytes at C3-4,   C4-5, C5-6, C6-7, and C7-T1. The bony spinal canal overall is congenitally small. There are large anterior osteophytes which are better visualized on the prior CT.     SPINAL CORD:    No abnormal signal is identified within the spinal cord.     SOFT TISSUES:   No paraspinal mass identified.     C2-C3:    The thecal sac and neural foramina are intact.     C3-C4:   There is disc protrusion or osteophyte measuring 3-4 mm toward the left narrowing   the left neural foramen greater than the right. Thecal sac is moderately stenotic measuring 8.2 mm.     C4-C5:   There is a disc bulge and osteophyte measuring 2-3 mm. The thecal sac is mildly stenotic measuring 9 mm. Disc and/or osteophytes result in narrowing of the neural foramina bilaterally.     C5-C6:   There is disc protrusion or osteophyte measuring 4 mm. The thecal sac is mildly stenotic measuring 9.4 mm. Disc and/or osteophytes result in narrowing of the neural foramina bilaterally.     C6-C7:   There is disc protrusion or osteophyte measuring 2-3 mm. The thecal sac is mildly stenotic measuring 9.7 mm. Disc and/or osteophytes result in narrowing of the neural foramina bilaterally.     C7-T1:   Disc and osteophyte narrows the left neural foramen. The thecal sac is not stenotic. The right neural foramen is intact. IMPRESSION:  Disc and osteophytes result in stenosis of the thecal sac and narrowing of  the neural foramina throughout the cervical region as discussed above. MRI Thoracic w/o spine -12/05/2017. IMPRESSION:  The study is limited by motion artifact. The bony spinal canal overall is congenitally small.       There is facet and ligamentum flavum hypertrophy in the mid thoracic region,    which appears to cause stenosis of the thecal sac as discussed above. A repeat study may be useful to better evaluate the spinal canal contents.     There is questionable signal abnormality in the thoracic spinal cord at the  T6-7 level, which may represent myelomalacia secondary to stenosis. Again, motion artifact limits the exam.    FINDINGS:  BONES/ALIGNMENT:   Vertebral heights and alignment are normal.     Edema at the L3-4 endplates is likely degenerative.     There is transitional spinal anatomy. The 12th ribs are hypoplastic. There is a rudimentary disc space at L5-S1.     SPINAL CORD:   The conus terminates normally.     SOFT TISSUES:   Localizer image demonstrates possible massive dilation of the rectum up to 12 cm in diameter.     L1-L2:   Disc bulge causes mild right foraminal stenosis.     L2-L3:   Disc bulge, facet and ligament flavum hypertrophy cause mild bilateral  foraminal stenosis.     L3-L4:   Disc bulge, facet and ligament flavum hypertrophy cause mild thecal  sac and moderate bilateral foraminal stenosis.     L4-L5:   Facet hypertrophy without stenosis.     L5-S1:   Disc bulge causes mild right foraminal stenosis. Right paracentral  disc protrusion abuts the descending right S1 nerve roots.     IMPRESSION:  Rectal dilation indicating possible severe fecal impaction versus large bowel  obstruction, incompletely evaluated.     No severe lumbar spinal canal stenosis. ASSESSMENT AND  PLAN. 1.NEUROVASCULAR. Acute CVA left cerebellar and right occipital.     H/o CVA with multiple ischemic infarcts-2017. Multilevel C-spine disc disease and T5 cord compression w/ myelopathy-patient declined surgery. Neurosurgery and neurology following. 2.PULMONARY. Resolved acute hypoxic respiratory failure-O2 supplement, wean as tolerated. Pulmonary hypertension. 3.CARDIOVASCULAR.      Resolved hypovolemic hypotension -IVF discontinued. HTN-    4.GI.     RUQ tenderness- negative ultrasound for cholecystitis. Acute gastroenteritis-resolved. Bowel obstruction vs. rectal fecal impaction -positive BM today,KUB        5. RENAL AND ELECTROLYTES. Resolved acute lactic acidosis-serum lactate 1.0 from 2.8 on admission. Electrolyte imbalance w/ hypokalemia and ylkbtqwzbqothelc-xuxrldq-Y-Phos. 6.ID. Sepsis clinically and determined -Gram-positive cocci bacteremia is a contaminant. Discontinue IV vancomycin and cefepime. 7. ENDO. T2 DM-sliding scale insulin. TSH 3.46 and A1c check. 8.MUSCULOSKELETAL. Chronic debilitating condition. Chronic neurologic ambulatory dysfunction-PT OT evaluation. 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 MRI cervical/thoracic/lumbar spine . 9. PSYCH. Clinical depression-Celexa 10 mg daily. 10. LIFE STYLE.       H/o tobacco use disorder-patient quit. 11.HEM-ONC. Starting Coumadin and heparin bridge. Normocytic anemia-Hb 12.3 from 14.3 on admission. Folic acid 5.9, P55 454. Heme-onc following. 12.NUTRITION. Protein energy malnutrition-dietitian consult. Obesity with a BMI of 64.37-needs regular exercise diet control. 13. SKIN. Multiple stage II decubitus ulcers on buttock, back and thigh. 14DISPO. Planned d/c to home vs rehab soon.       Electronically signed Cathyfredo Espinosa MD on 8/29/2020 at 9:21 PM    Rounding Hospitalist

## 2020-08-30 NOTE — PROGRESS NOTES
Pharmacy Note  Warfarin Consult follow-up      Recent Labs     08/30/20  0635   INR 1.2     Recent Labs     08/28/20  1131 08/29/20  0421 08/30/20  0635   HGB 12.3 11.2* 10.7*   HCT 38.4 35.9* 35.0*    301 302       Significant Drug-Drug Interactions:  New warfarin drug-drug interactions: enoxaparin  Discontinued drug-drug interactions: heparin    Current drug-drug interactions: acetaminophen, tramadol, enoxaparin    Notes: Give warfarin 5mg today. Daily PT/INR while inpatient.       Hortensia Crawford, PharmD

## 2020-09-01 LAB
ANTI B2-GLYCOPROTEIN IGG: 0 SGU (ref 0–20)
ANTI B2-GLYCOPROTEIN IGM: 4 SMU (ref 0–20)
ANTICARDIOLIPIN IGA ANTIBODY: 2.2 APU
ANTICARDIOLIPIN IGG ANTIBODY: 3.3 GPU
CARDIOLIPIN AB IGM: 22.2 MPU

## 2020-09-01 NOTE — CARE COORDINATION
Call into Dr Lord Blum patient will be discharging today, HENs complete, paperwork faxed to [phone] [facility] [address], H&P, face sheet and MAR, along with RICARDA included in discharge pack for the [facility]    Patients georgette Yee notified of mothers discharge to [address] [facility]. CLIFFORD Lilly notified of discharge and transport time.      [id number] RICARDA faxed to [phone] [facility]
ED SW note reviewed  APS ref made last night    Called [facility] and spoke with Naomie Turcios  She stated she does not see the ref  Another ref made  Will keep APS updated on discharge plans            1540 - Addendum  SBIRT completed    Pt with negative screens            Alcohol Screening and Brief Intervention        No results for input(s): ALC in the last 72 hours. Alcohol Pre-screening     (WOMEN ONLY) How many times in the past year have you had 4 or more drinks in a day?: None    Alcohol Screening Audit       Drug Pre-Screening   How many times in the past year have you used a recreational drug or used a prescription medication for nonmedical reasons?: None    Drug Screening DAST       Mood Pre-Screening (PHQ-2)  During the past two weeks, have you been bothered by little interest or pleasure in doing things?: No  During the past two weeks, have you been bothered by feeling down, depressed, or hopeless?: No    Mood Pre-Screening (PHQ-9)         I have interviewed Cinthia Shoemaker, [phone] regarding  Her alcohol consumption/drug use and risk for excessive use. Screenings were negative. Patient  N/A intervention at this time.      Deferred []    Completed on: 8/25/2020   Lisa Mock, MSW, LSW
Entry From 9/1/20    Timothy GAFFNEY , to inform pt was discharged on 8/30 to [address] [facility]
Transitional Planning  Call to [address] [place] [address], spoke to [place], confirmed pre-cert has been submitted and awaiting insurance response. Will notify when pre-cert received. 1653 - Received call from [place], they have received insurance authorization for admission. Pre-cert good until Sunday at 4:30 pm. For any questions over the weekend, contact Lemuel kenyon @ [phone]. PS to attending to update.
Transitional Planning  Received call from [place] at [facility], confirmed receipt of referral. They are reviewing and will return call. 1051 - Call to [facility] ([phone]), left VM requesting return call. 1115 - Return call from Luis M Mcginnis at [facility], confirmed receipt of referral and they are reviewing. 12 - Return call from [place], had questions about patient's goals. They can accept and will start pre-cert.
Transitional Planning  Spoke with patient at bedside regarding SNF placement. She is agreeable at this time with understanding for short term stay. Given choice, referrals sent to [facility] and [facility].
pt.          Electronically signed by Christin Tobar RN on 8/25/20 at 6:32 PM EDT

## 2020-09-10 ENCOUNTER — TELEPHONE (OUTPATIENT)
Dept: NEUROLOGY | Age: 70
End: 2020-09-10

## 2020-09-10 NOTE — TELEPHONE ENCOUNTER
Christina Earl RN               Patient is mediblue.  [place]    Previous Messages     ----- Message -----   From: Roc Archer RN   Sent: 9/1/2020  10:54 AM EDT   To: Donovan Orozco   Subject: FW: Inpatient Notes                                 ----- Message -----   From: Brittni Boss MD   Sent: 8/30/2020  11:51 AM EDT   To: Roc Archer RN   Subject: Inpatient Notes                                   Patient used to follow with Anastacia Grimes     Can follow with her in 4-6 weeks

## 2020-09-24 PROBLEM — R77.8 ELEVATED TROPONIN: Status: RESOLVED | Noted: 2020-08-25 | Resolved: 2020-09-24

## 2020-09-24 PROBLEM — E86.0 DEHYDRATION: Status: RESOLVED | Noted: 2020-08-25 | Resolved: 2020-09-24

## 2021-05-04 ENCOUNTER — APPOINTMENT (OUTPATIENT)
Dept: GENERAL RADIOLOGY | Age: 71
DRG: 922 | End: 2021-05-04
Payer: MEDICARE

## 2021-05-04 ENCOUNTER — APPOINTMENT (OUTPATIENT)
Dept: CT IMAGING | Age: 71
DRG: 922 | End: 2021-05-04
Payer: MEDICARE

## 2021-05-04 ENCOUNTER — HOSPITAL ENCOUNTER (INPATIENT)
Age: 71
LOS: 9 days | Discharge: HOME | DRG: 922 | End: 2021-05-13
Attending: EMERGENCY MEDICINE | Admitting: INTERNAL MEDICINE
Payer: MEDICARE

## 2021-05-04 DIAGNOSIS — R77.8 ELEVATED TROPONIN: ICD-10-CM

## 2021-05-04 DIAGNOSIS — R53.1 GENERALIZED WEAKNESS: ICD-10-CM

## 2021-05-04 DIAGNOSIS — D68.59 HYPERCOAGULABLE STATE (HCC): Primary | ICD-10-CM

## 2021-05-04 PROBLEM — K76.0 HEPATIC STEATOSIS: Status: RESOLVED | Noted: 2020-08-26 | Resolved: 2021-05-04

## 2021-05-04 PROBLEM — A49.8 E COLI INFECTION: Status: RESOLVED | Noted: 2018-11-15 | Resolved: 2021-05-04

## 2021-05-04 PROBLEM — E87.20 LACTIC ACIDOSIS: Status: RESOLVED | Noted: 2020-08-25 | Resolved: 2021-05-04

## 2021-05-04 PROBLEM — L30.8 DERMATITIS ASSOCIATED WITH MOISTURE: Status: RESOLVED | Noted: 2020-08-25 | Resolved: 2021-05-04

## 2021-05-04 PROBLEM — A49.8 PROTEUS INFECTION: Status: RESOLVED | Noted: 2018-11-15 | Resolved: 2021-05-04

## 2021-05-04 PROBLEM — R10.811 RIGHT UPPER QUADRANT ABDOMINAL TENDERNESS WITHOUT REBOUND TENDERNESS: Status: RESOLVED | Noted: 2020-08-25 | Resolved: 2021-05-04

## 2021-05-04 PROBLEM — D68.61 ANTIPHOSPHOLIPID SYNDROME (HCC): Status: ACTIVE | Noted: 2021-05-04

## 2021-05-04 PROBLEM — A41.9 SEPSIS (HCC): Status: RESOLVED | Noted: 2020-08-24 | Resolved: 2021-05-04

## 2021-05-04 PROBLEM — R19.7 DIARRHEA OF PRESUMED INFECTIOUS ORIGIN: Status: RESOLVED | Noted: 2020-08-25 | Resolved: 2021-05-04

## 2021-05-04 PROBLEM — I95.89 HYPOTENSION DUE TO HYPOVOLEMIA: Status: RESOLVED | Noted: 2020-08-25 | Resolved: 2021-05-04

## 2021-05-04 PROBLEM — E87.8 ELECTROLYTE IMBALANCE: Status: RESOLVED | Noted: 2020-08-28 | Resolved: 2021-05-04

## 2021-05-04 PROBLEM — E86.1 HYPOTENSION DUE TO HYPOVOLEMIA: Status: RESOLVED | Noted: 2020-08-25 | Resolved: 2021-05-04

## 2021-05-04 LAB
ABSOLUTE EOS #: 0.12 K/UL (ref 0–0.44)
ABSOLUTE IMMATURE GRANULOCYTE: 0.07 K/UL (ref 0–0.3)
ABSOLUTE LYMPH #: 1.69 K/UL (ref 1.1–3.7)
ABSOLUTE MONO #: 0.52 K/UL (ref 0.1–1.2)
ALBUMIN SERPL-MCNC: 3.3 G/DL (ref 3.5–5.2)
ALBUMIN/GLOBULIN RATIO: 0.7 (ref 1–2.5)
ALP BLD-CCNC: 77 U/L (ref 35–104)
ALT SERPL-CCNC: 9 U/L (ref 5–33)
ANION GAP SERPL CALCULATED.3IONS-SCNC: 10 MMOL/L (ref 9–17)
AST SERPL-CCNC: 12 U/L
BASOPHILS # BLD: 0 % (ref 0–2)
BASOPHILS ABSOLUTE: 0.03 K/UL (ref 0–0.2)
BILIRUB SERPL-MCNC: 0.35 MG/DL (ref 0.3–1.2)
BUN BLDV-MCNC: 19 MG/DL (ref 8–23)
BUN/CREAT BLD: ABNORMAL (ref 9–20)
CALCIUM SERPL-MCNC: 8.8 MG/DL (ref 8.6–10.4)
CHLORIDE BLD-SCNC: 106 MMOL/L (ref 98–107)
CO2: 23 MMOL/L (ref 20–31)
CREAT SERPL-MCNC: 0.6 MG/DL (ref 0.5–0.9)
DIFFERENTIAL TYPE: ABNORMAL
EOSINOPHILS RELATIVE PERCENT: 1 % (ref 1–4)
GFR AFRICAN AMERICAN: >60 ML/MIN
GFR NON-AFRICAN AMERICAN: >60 ML/MIN
GFR SERPL CREATININE-BSD FRML MDRD: ABNORMAL ML/MIN/{1.73_M2}
GFR SERPL CREATININE-BSD FRML MDRD: ABNORMAL ML/MIN/{1.73_M2}
GLUCOSE BLD-MCNC: 157 MG/DL (ref 70–99)
HCT VFR BLD CALC: 38 % (ref 36.3–47.1)
HCT VFR BLD CALC: 41.6 % (ref 36.3–47.1)
HEMOGLOBIN: 11.6 G/DL (ref 11.9–15.1)
HEMOGLOBIN: 12.5 G/DL (ref 11.9–15.1)
IMMATURE GRANULOCYTES: 1 %
INR BLD: 1
LYMPHOCYTES # BLD: 17 % (ref 24–43)
MAGNESIUM: 2.2 MG/DL (ref 1.6–2.6)
MCH RBC QN AUTO: 28.3 PG (ref 25.2–33.5)
MCH RBC QN AUTO: 28.6 PG (ref 25.2–33.5)
MCHC RBC AUTO-ENTMCNC: 30 G/DL (ref 28.4–34.8)
MCHC RBC AUTO-ENTMCNC: 30.5 G/DL (ref 28.4–34.8)
MCV RBC AUTO: 93.8 FL (ref 82.6–102.9)
MCV RBC AUTO: 94.3 FL (ref 82.6–102.9)
MONOCYTES # BLD: 5 % (ref 3–12)
MYOGLOBIN: 201 NG/ML (ref 25–58)
NRBC AUTOMATED: 0 PER 100 WBC
NRBC AUTOMATED: 0 PER 100 WBC
PARTIAL THROMBOPLASTIN TIME: 19.9 SEC (ref 20.5–30.5)
PARTIAL THROMBOPLASTIN TIME: 25.1 SEC (ref 20.5–30.5)
PARTIAL THROMBOPLASTIN TIME: 55.2 SEC (ref 20.5–30.5)
PARTIAL THROMBOPLASTIN TIME: 57.4 SEC (ref 20.5–30.5)
PDW BLD-RTO: 13.2 % (ref 11.8–14.4)
PDW BLD-RTO: 13.2 % (ref 11.8–14.4)
PHOSPHORUS: 2.2 MG/DL (ref 2.6–4.5)
PLATELET # BLD: 376 K/UL (ref 138–453)
PLATELET # BLD: 407 K/UL (ref 138–453)
PLATELET ESTIMATE: ABNORMAL
PMV BLD AUTO: 9.2 FL (ref 8.1–13.5)
PMV BLD AUTO: 9.2 FL (ref 8.1–13.5)
POTASSIUM SERPL-SCNC: 4.6 MMOL/L (ref 3.7–5.3)
PROTHROMBIN TIME: 10.6 SEC (ref 9.1–12.3)
RBC # BLD: 4.05 M/UL (ref 3.95–5.11)
RBC # BLD: 4.41 M/UL (ref 3.95–5.11)
RBC # BLD: ABNORMAL 10*6/UL
SARS-COV-2, RAPID: NOT DETECTED
SEG NEUTROPHILS: 76 % (ref 36–65)
SEGMENTED NEUTROPHILS ABSOLUTE COUNT: 7.66 K/UL (ref 1.5–8.1)
SODIUM BLD-SCNC: 139 MMOL/L (ref 135–144)
SPECIMEN DESCRIPTION: NORMAL
THYROXINE, FREE: 1.44 NG/DL (ref 0.93–1.7)
TOTAL CK: 128 U/L (ref 26–192)
TOTAL PROTEIN: 8 G/DL (ref 6.4–8.3)
TROPONIN INTERP: ABNORMAL
TROPONIN T: ABNORMAL NG/ML
TROPONIN, HIGH SENSITIVITY: 86 NG/L (ref 0–14)
TROPONIN, HIGH SENSITIVITY: 86 NG/L (ref 0–14)
TROPONIN, HIGH SENSITIVITY: 97 NG/L (ref 0–14)
WBC # BLD: 10.1 K/UL (ref 3.5–11.3)
WBC # BLD: 13.1 K/UL (ref 3.5–11.3)
WBC # BLD: ABNORMAL 10*3/UL

## 2021-05-04 PROCEDURE — 99285 EMERGENCY DEPT VISIT HI MDM: CPT

## 2021-05-04 PROCEDURE — 85025 COMPLETE CBC W/AUTO DIFF WBC: CPT

## 2021-05-04 PROCEDURE — 87635 SARS-COV-2 COVID-19 AMP PRB: CPT

## 2021-05-04 PROCEDURE — 83874 ASSAY OF MYOGLOBIN: CPT

## 2021-05-04 PROCEDURE — 96372 THER/PROPH/DIAG INJ SC/IM: CPT

## 2021-05-04 PROCEDURE — 80053 COMPREHEN METABOLIC PANEL: CPT

## 2021-05-04 PROCEDURE — 85610 PROTHROMBIN TIME: CPT

## 2021-05-04 PROCEDURE — 84439 ASSAY OF FREE THYROXINE: CPT

## 2021-05-04 PROCEDURE — 1200000000 HC SEMI PRIVATE

## 2021-05-04 PROCEDURE — 6360000002 HC RX W HCPCS: Performed by: STUDENT IN AN ORGANIZED HEALTH CARE EDUCATION/TRAINING PROGRAM

## 2021-05-04 PROCEDURE — 2580000003 HC RX 258: Performed by: STUDENT IN AN ORGANIZED HEALTH CARE EDUCATION/TRAINING PROGRAM

## 2021-05-04 PROCEDURE — 71045 X-RAY EXAM CHEST 1 VIEW: CPT

## 2021-05-04 PROCEDURE — 73502 X-RAY EXAM HIP UNI 2-3 VIEWS: CPT

## 2021-05-04 PROCEDURE — 83735 ASSAY OF MAGNESIUM: CPT

## 2021-05-04 PROCEDURE — 99223 1ST HOSP IP/OBS HIGH 75: CPT | Performed by: INTERNAL MEDICINE

## 2021-05-04 PROCEDURE — 70450 CT HEAD/BRAIN W/O DYE: CPT

## 2021-05-04 PROCEDURE — 6370000000 HC RX 637 (ALT 250 FOR IP): Performed by: STUDENT IN AN ORGANIZED HEALTH CARE EDUCATION/TRAINING PROGRAM

## 2021-05-04 PROCEDURE — 93005 ELECTROCARDIOGRAM TRACING: CPT | Performed by: STUDENT IN AN ORGANIZED HEALTH CARE EDUCATION/TRAINING PROGRAM

## 2021-05-04 PROCEDURE — 84484 ASSAY OF TROPONIN QUANT: CPT

## 2021-05-04 PROCEDURE — 2500000003 HC RX 250 WO HCPCS: Performed by: STUDENT IN AN ORGANIZED HEALTH CARE EDUCATION/TRAINING PROGRAM

## 2021-05-04 PROCEDURE — 85730 THROMBOPLASTIN TIME PARTIAL: CPT

## 2021-05-04 PROCEDURE — 6370000000 HC RX 637 (ALT 250 FOR IP): Performed by: INTERNAL MEDICINE

## 2021-05-04 PROCEDURE — 84100 ASSAY OF PHOSPHORUS: CPT

## 2021-05-04 PROCEDURE — 82550 ASSAY OF CK (CPK): CPT

## 2021-05-04 PROCEDURE — 85027 COMPLETE CBC AUTOMATED: CPT

## 2021-05-04 RX ORDER — PANTOPRAZOLE SODIUM 40 MG/1
40 TABLET, DELAYED RELEASE ORAL 2 TIMES DAILY
Status: DISCONTINUED | OUTPATIENT
Start: 2021-05-04 | End: 2021-05-13 | Stop reason: HOSPADM

## 2021-05-04 RX ORDER — ATORVASTATIN CALCIUM 20 MG/1
20 TABLET, FILM COATED ORAL DAILY
Status: DISCONTINUED | OUTPATIENT
Start: 2021-05-04 | End: 2021-05-13 | Stop reason: HOSPADM

## 2021-05-04 RX ORDER — MORPHINE SULFATE 4 MG/ML
4 INJECTION, SOLUTION INTRAMUSCULAR; INTRAVENOUS ONCE
Status: COMPLETED | OUTPATIENT
Start: 2021-05-04 | End: 2021-05-04

## 2021-05-04 RX ORDER — POLYETHYLENE GLYCOL 3350 17 G/17G
17 POWDER, FOR SOLUTION ORAL DAILY PRN
Status: DISCONTINUED | OUTPATIENT
Start: 2021-05-04 | End: 2021-05-13 | Stop reason: HOSPADM

## 2021-05-04 RX ORDER — SODIUM CHLORIDE 0.9 % (FLUSH) 0.9 %
5-40 SYRINGE (ML) INJECTION PRN
Status: DISCONTINUED | OUTPATIENT
Start: 2021-05-04 | End: 2021-05-13 | Stop reason: HOSPADM

## 2021-05-04 RX ORDER — WARFARIN SODIUM 5 MG/1
5 TABLET ORAL DAILY
Status: DISCONTINUED | OUTPATIENT
Start: 2021-05-04 | End: 2021-05-04 | Stop reason: DRUGHIGH

## 2021-05-04 RX ORDER — HYDRALAZINE HYDROCHLORIDE 50 MG/1
25 TABLET, FILM COATED ORAL EVERY 8 HOURS SCHEDULED
Status: DISCONTINUED | OUTPATIENT
Start: 2021-05-04 | End: 2021-05-13 | Stop reason: HOSPADM

## 2021-05-04 RX ORDER — WARFARIN SODIUM 7.5 MG/1
7.5 TABLET ORAL
Status: COMPLETED | OUTPATIENT
Start: 2021-05-04 | End: 2021-05-04

## 2021-05-04 RX ORDER — HEPARIN SODIUM 1000 [USP'U]/ML
2000 INJECTION, SOLUTION INTRAVENOUS; SUBCUTANEOUS PRN
Status: DISCONTINUED | OUTPATIENT
Start: 2021-05-04 | End: 2021-05-08

## 2021-05-04 RX ORDER — SODIUM CHLORIDE 0.9 % (FLUSH) 0.9 %
5-40 SYRINGE (ML) INJECTION EVERY 12 HOURS SCHEDULED
Status: DISCONTINUED | OUTPATIENT
Start: 2021-05-04 | End: 2021-05-13 | Stop reason: HOSPADM

## 2021-05-04 RX ORDER — AMLODIPINE BESYLATE 10 MG/1
10 TABLET ORAL DAILY
Status: DISCONTINUED | OUTPATIENT
Start: 2021-05-04 | End: 2021-05-13 | Stop reason: HOSPADM

## 2021-05-04 RX ORDER — SODIUM CHLORIDE 9 MG/ML
25 INJECTION, SOLUTION INTRAVENOUS PRN
Status: DISCONTINUED | OUTPATIENT
Start: 2021-05-04 | End: 2021-05-13 | Stop reason: HOSPADM

## 2021-05-04 RX ORDER — ASPIRIN 81 MG/1
324 TABLET, CHEWABLE ORAL ONCE
Status: COMPLETED | OUTPATIENT
Start: 2021-05-04 | End: 2021-05-04

## 2021-05-04 RX ORDER — CARVEDILOL 12.5 MG/1
25 TABLET ORAL 2 TIMES DAILY WITH MEALS
Status: DISCONTINUED | OUTPATIENT
Start: 2021-05-04 | End: 2021-05-13 | Stop reason: HOSPADM

## 2021-05-04 RX ORDER — HEPARIN SODIUM 10000 [USP'U]/100ML
5-30 INJECTION, SOLUTION INTRAVENOUS CONTINUOUS
Status: DISCONTINUED | OUTPATIENT
Start: 2021-05-04 | End: 2021-05-08

## 2021-05-04 RX ORDER — HEPARIN SODIUM 1000 [USP'U]/ML
4000 INJECTION, SOLUTION INTRAVENOUS; SUBCUTANEOUS ONCE
Status: COMPLETED | OUTPATIENT
Start: 2021-05-04 | End: 2021-05-04

## 2021-05-04 RX ORDER — 0.9 % SODIUM CHLORIDE 0.9 %
1000 INTRAVENOUS SOLUTION INTRAVENOUS ONCE
Status: COMPLETED | OUTPATIENT
Start: 2021-05-04 | End: 2021-05-04

## 2021-05-04 RX ORDER — HEPARIN SODIUM 1000 [USP'U]/ML
4000 INJECTION, SOLUTION INTRAVENOUS; SUBCUTANEOUS PRN
Status: DISCONTINUED | OUTPATIENT
Start: 2021-05-04 | End: 2021-05-08

## 2021-05-04 RX ORDER — LIDOCAINE HYDROCHLORIDE 40 MG/ML
SOLUTION TOPICAL 2 TIMES DAILY
Status: DISCONTINUED | OUTPATIENT
Start: 2021-05-04 | End: 2021-05-06

## 2021-05-04 RX ADMIN — LIDOCAINE HYDROCHLORIDE: 40 SOLUTION TOPICAL at 23:41

## 2021-05-04 RX ADMIN — PANTOPRAZOLE SODIUM 40 MG: 40 TABLET, DELAYED RELEASE ORAL at 15:50

## 2021-05-04 RX ADMIN — HEPARIN SODIUM 4000 UNITS: 1000 INJECTION, SOLUTION INTRAVENOUS; SUBCUTANEOUS at 12:16

## 2021-05-04 RX ADMIN — HYDRALAZINE HYDROCHLORIDE 25 MG: 50 TABLET, FILM COATED ORAL at 23:40

## 2021-05-04 RX ADMIN — MORPHINE SULFATE 4 MG: 4 INJECTION INTRAVENOUS at 05:23

## 2021-05-04 RX ADMIN — WARFARIN SODIUM 7.5 MG: 7.5 TABLET ORAL at 17:35

## 2021-05-04 RX ADMIN — SODIUM PHOSPHATE, MONOBASIC, MONOHYDRATE 20 MMOL: 276; 142 INJECTION, SOLUTION INTRAVENOUS at 12:07

## 2021-05-04 RX ADMIN — PANTOPRAZOLE SODIUM 40 MG: 40 TABLET, DELAYED RELEASE ORAL at 23:40

## 2021-05-04 RX ADMIN — ASPIRIN 324 MG: 81 TABLET, CHEWABLE ORAL at 09:31

## 2021-05-04 RX ADMIN — CARVEDILOL 25 MG: 12.5 TABLET, FILM COATED ORAL at 17:35

## 2021-05-04 RX ADMIN — SODIUM CHLORIDE 1000 ML: 9 INJECTION, SOLUTION INTRAVENOUS at 07:00

## 2021-05-04 RX ADMIN — HEPARIN SODIUM AND DEXTROSE 9.2 UNITS/KG/HR: 10000; 5 INJECTION INTRAVENOUS at 12:15

## 2021-05-04 RX ADMIN — SODIUM CHLORIDE 1000 ML: 9 INJECTION, SOLUTION INTRAVENOUS at 15:51

## 2021-05-04 RX ADMIN — ATORVASTATIN CALCIUM 20 MG: 20 TABLET, FILM COATED ORAL at 15:50

## 2021-05-04 ASSESSMENT — ENCOUNTER SYMPTOMS
SHORTNESS OF BREATH: 0
DIARRHEA: 1
ABDOMINAL PAIN: 0
BACK PAIN: 0
EYE DISCHARGE: 0
BLOOD IN STOOL: 0
COLOR CHANGE: 1
CONSTIPATION: 0
EYE REDNESS: 0
EYES NEGATIVE: 1
COUGH: 0
COLOR CHANGE: 0
NAUSEA: 0

## 2021-05-04 ASSESSMENT — PAIN DESCRIPTION - PAIN TYPE: TYPE: ACUTE PAIN

## 2021-05-04 ASSESSMENT — PAIN DESCRIPTION - LOCATION: LOCATION: HIP

## 2021-05-04 ASSESSMENT — PAIN SCALES - GENERAL: PAINLEVEL_OUTOF10: 8

## 2021-05-04 NOTE — PROGRESS NOTES
[facility]  Documentation Note                Admission Dx: Generalized weakness [R53.1]    Past Medical History:   has a past medical history of Acute ischemic stroke ([facility] Depression, DM (diabetes mellitus) ([facility] Heart murmur, and HTN (hypertension). Previous Testing:     ECHO 8/28/2020: EF 65%, moderate-severe LVH, mild AI/MR/TR, RVSP is 36 mmHg. ARTURO 12/14/17: EF 55%, RAFAEL showed no evidence of clot, negative bubble study, mild TR.      Previous office/hospital visit:   None     [facility]
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
## 2021-05-04 NOTE — PROGRESS NOTES
Pharmacy Note  Warfarin Consult    Marcus Monteiro is a 66 y.o. female for whom pharmacy has been consulted to manage warfarin therapy. Consulting Physician: Dr Lynn Garcia  Reason for Admission: diarrhea    Warfarin dose prior to admission: Unknown (5 mg daily on med list)  Called ED for verification  Warfarin indication: CVA, MTHFR heterozygous mutation  Target INR range: 2-3     Past Medical History:   Diagnosis Date    Acute ischemic stroke ([facility] 47/90/7405    cardioembolic    Depression     DM (diabetes mellitus) ([facility]     Heart murmur     HTN (hypertension)                 Recent Labs     05/04/21  0722   INR 1.0     Recent Labs     05/04/21  0722 05/04/21  1211   HGB 12.5 11.6*   HCT 41.6 38.0    376       Current warfarin drug-drug interactions: heparin drip, aspirin x 1 dose    Date             INR        Dose   5/4/2021          1.3       7.5 mg     Daily PT/INR while inpatient. Thank you for the consult. Will continue to follow. [address] [place] [address]  Ph., CACP, Clinical Pharmacist  Anticoagulation Services, [address] [place] [address] [facility]  5/4/2021  3:22 PM

## 2021-05-04 NOTE — ED NOTES
Bed: 45  Expected date:   Expected time:   Means of arrival:   Comments:     Kristin Campo RN  05/04/21 5675

## 2021-05-04 NOTE — ED NOTES
Bed: 04  Expected date: 5/4/21  Expected time: 4:18 AM  Means of arrival: [facility]  Comments:  Farzana Tate RN  05/04/21 7593

## 2021-05-04 NOTE — ED NOTES
Bilateral dry, flaking, dirty skin to lower extremities  Bilateral Toe nails not trimmed  Bilateral skin peeling Skin peeling on pt bottom of foot, top of toes, in between toes. Right leg- stage 2 pressure ulcer on shin, stage 2 pressure ulcer on outside of ankle, stage 3 pressure ulcer outside of knee, stage 2 pressure ulcer on outside on thigh from knee to hip, darkened skin around the hip, stage 1 pressure ulcer with spots of stage 2 pressure ulcers on right inner hip, type of fungus on inner thigh  Left leg- stage 1 pressure ulcer on inside of shin, stage 2 pressure ulcer above left ankle, stage 2 pressure ulcer outside of shin, stage 3 pressure ulcer outside of knee, darkened skin on inside of of thigh, stage to pressure ulcer on thigh, type of fungus on inner thigh. Stage 2 pressure ulcer on outside of hip  Lower abdomen- moisture in skin folds, stage 2 pressure ulcer above mid pelvic area, darkened skin in the mid pelvic area  Right chest- stage 1 pressure ulcer under breast, miostrure in skin folds  Left chest- stage 1 pressure ulcer under left breast, moisture in skin folds  buttox region- stage 2 pressure ulcer scattered throughout including in the crease between cheeks, ans around the anus area. [facility] [address] [address] [facility] she was covered in feces that appeared to have been in direct contact with the skin for a long while. Pt had a layers of skin pulling of due to some areas being to moist and some areas being to dry. Pt appeared with Medic 9 with maggots and bedbugs in the perineural area and all over the pt bedding and  Pt had feces all down her legs and in between her legs around the perineal area. Pt states that her sone and daughter gave her a wipe down within the last 5 days before seeking medical attention.       Bryn Vsaques RN  05/04/21 8259

## 2021-05-04 NOTE — ED NOTES
EMS stated family, who live with patient, called 911 due to pain in patient's right hip/leg. Family informed EMS patient has been in bed the past 3 days. EMS stated it looks more like patient has been in bed for 3 or more weeks. EMS stated patient's mattress is brown. EMS stated patient's back from her waist to her legs is covered in feces. Per Epic, patient's daughter is Mariia Shirts [phone] & son is Monse Kingston [phone]. Once patient's cleaned & settled in ED room, writer to assess & make APS report.       TARAN Hoyt  05/04/21 5631

## 2021-05-04 NOTE — ED NOTES
Attempt to reposition patient, pt states that it is too painful for her to move. Pt denies any other needs at this time, requesting to be \"left alone for now\". Will continue plan of care.       Harvey Curling, RN  05/04/21 7486

## 2021-05-04 NOTE — ED PROVIDER NOTES
FACULTY SIGN-OUT  ADDENDUM     Care of this patient was assumed from previous attending physician. The patient's initial evaluation and plan have been discussed with the prior provider who initially evaluated the patient. Attestation  I was available and discussed any additional care issues that arose and coordinated the management plans with the resident(s) caring for the patient during my duty period. Any areas of disagreement with resident's documentation of care or procedures are noted on the chart. I was personally present for the key portions of any/all procedures, during my duty period. I have documented in the chart those procedures where I was not present during the key portions. ED COURSE      The patient was given the following medications:  Orders Placed This Encounter   Medications    morphine (PF) injection 4 mg    0.9 % sodium chloride bolus       RECENT VITALS:   Temp: 97.8 °F (36.6 °C), Pulse: 85, Resp: 15, BP: (!) 127/53    MEDICAL DECISION MAKING        Chelsie Mcnally is a 66 y.o. female who presents to the Emergency Department with complaints of found down for several days covered in feces. Seen initially, evaluated and work-up started by the previous attending physician. Currently awaiting results. Anticipate disposition of admission      Elyse Saldana MD, F.A.C.E.P.   Attending Emergency Physician    (Please note that portions of this note were completed with a voice recognition program.  Efforts were made to edit the dictations but occasionally words are mis-transcribed.)         Elyse Saldana MD  05/04/21 1731
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
## 2021-05-04 NOTE — ED NOTES
Pt resting on stretcher, in NAD, RR even and unlabored. Pt updated on plan of care. Will continue plan of care. Call light within reach.        Sonya Fregoso RN  05/04/21 9450

## 2021-05-04 NOTE — ED PROVIDER NOTES
[facility] ED  Emergency Department  Emergency Medicine Resident Sign-out     Care of Kath Barillas was assumed from Dr. Quentin Murguia and is being seen for Generalized Body Aches and Other (has not moved in several days)  . The patient's initial evaluation and plan have been discussed with the prior provider who initially evaluated the patient. EMERGENCY DEPARTMENT COURSE / MEDICAL DECISION MAKING:       MEDICATIONS GIVEN:  Orders Placed This Encounter   Medications    morphine (PF) injection 4 mg    0.9 % sodium chloride bolus    aspirin chewable tablet 324 mg       LABS / RADIOLOGY:     Labs Reviewed   CBC WITH AUTO DIFFERENTIAL - Abnormal; Notable for the following components:       Result Value    Seg Neutrophils 76 (*)     Lymphocytes 17 (*)     Immature Granulocytes 1 (*)     All other components within normal limits   COMPREHENSIVE METABOLIC PANEL - Abnormal; Notable for the following components:    Glucose 157 (*)     Albumin 3.3 (*)     Albumin/Globulin Ratio 0.7 (*)     All other components within normal limits   PHOSPHORUS - Abnormal; Notable for the following components:    Phosphorus 2.2 (*)     All other components within normal limits   MYOGLOBIN, SERUM - Abnormal; Notable for the following components:    Myoglobin 201 (*)     All other components within normal limits   TROPONIN - Abnormal; Notable for the following components:    Troponin, High Sensitivity 86 (*)     All other components within normal limits   TROPONIN - Abnormal; Notable for the following components:    Troponin, High Sensitivity 86 (*)     All other components within normal limits   MAGNESIUM   PROTIME-INR   APTT   CK   T4, FREE   URINE RT REFLEX TO CULTURE       No results found. RECENT VITALS:     Temp: 97.8 °F (36.6 °C),  Pulse: 85, Resp: 15, BP: (!) 127/53, SpO2: 99 %    This patient is a 66 y.o. Female with unable to get out of bed. Family called EMS for evaluation.   They found the pt lying in her own urine and feces. Pt was also found to have numerous bed sores. Pt spent approximately one hour in the decontamination shower, due to being covered in her own feces along with maggots and bedbugs. Patient denies any complaints at this time. Awaiting blood work and imaging. EKG Interpretation    Interpreted by emergency department physician    Rhythm: normal sinus   Rate: normal  Axis: normal  Ectopy: none  Conduction: normal  ST Segments: normal  T Waves: non specific changes and V5 and V6  Q Waves: none    Clinical Impression: non-specific EKG with new biphasic nonspecific T wave changes in V5 V6    Gardenia Linda     ED Course as of May 04 1007   Tue May 04, 2021   Terence 51 CBC is nonconcerning.    [CS]   0801 Arlon Thom is slightly elevated at 201. [CS]   0801 Normal T4.    [CS]   8685 Troponins elevated 86. [CS]   [id number] Phosphorus is decreased at 2.2. Suggesting malnutrition.    [CS]   0811 Total CK: 128 [CS]   5570 CMP is nonconcerning.    [CS]   0815 We will hold off giving aspirin to the patient as patient does take Coumadin but unknown INR levels at this time. Will reevaluate decision to give aspirin once INR levels result. [CS]   0840 Chest x-ray and x-ray of the left hip reveals no fractures but does note moderate degenerative changes in the left hip. CT is negative for any acute intracranial process, notes multiple chronic small right-sided lacunar infarcts. [CS]   7222 Repeat EKG reveals normal sinus rhythm with a ventricular of 81 bpm.  KS interval appears to be under 200 ms with a QRS duration of 74 ms, QT/QTC of 402/466 ms. Normal conduction. Good R wave progression. No ST elevations or depressions. No obvious abnormal T waves noted despite artifact. No Q waves seen. This is a normal sinus EKG improved from prior one earlier today due to artifact. [CS]   1128 As patient has not received any warfarin further INR of 1.0.   Patient will be given now 324 mg of aspirin for elevated troponin of 86. INR: 1.0 [CS]   0784 Talked to Internal Medicine who accepted the admission. Talk to patient was agreeable to mission. Await for transfer to the floor.    [CS]   0943 stable   Troponin, High Sensitivity(!!): 86 [CS]      ED Course User Index  [CS] Reynaldo Fortune DO           OUTSTANDING TASKS / RECOMMENDATIONS:    1. Await blood work imaging  2. Patient always be mid to the observation unit for placement, if no medical problems were found. FINAL IMPRESSION:     1. Generalized weakness    2. Elevated troponin        DISPOSITION:         DISPOSITION:  []  Discharge   []  Transfer -    [x]  Admission - Internal Medicine     []  Against Medical Advice   []  Eloped   FOLLOW-UP: No follow-up provider specified.    DISCHARGE MEDICATIONS: New Prescriptions    No medications on file           Reynaldo Fortune DO  Emergency Medicine Resident  [address]       Reynaldo Fortune, [address] [place] [address]  Resident  05/04/21 1007

## 2021-05-04 NOTE — ED PROVIDER NOTES
[facility] ED  Emergency Department Encounter  Emergency Medicine Resident     Pt Name: Ganga Agee  MRN: [id number]  Juliogfpankaj 1950  Date of evaluation: 5/4/21  PCP:  Sarah Diaz MD    [address] [place] [address]       Chief Complaint   Patient presents with    Generalized Body Aches    Other     has not moved in several days       HISTORY OFPRESENT ILLNESS  (Location/Symptom, Timing/Onset, Context/Setting, Quality, Duration, Modifying Ara Yeager.)      Ganga Agee is a 66 y.o. female who presents with generalized weakness. Patient states she does have a history of stroke in the past and is normally bedbound. States she has had worsening weakness over the past approximately a week and has been unable to move in bed. Patient states her family has been caring for her but has been more absent recently. Denies any nausea, vomiting, chest pain, shortness of breath. Does have left thigh pain. Per EMS report patient was found covered in maggots and bedbugs as well as urine and feces. PAST MEDICAL / SURGICAL / SOCIAL / FAMILY HISTORY      has a past medical history of Acute ischemic stroke ([facility] [address] Depression, DM (diabetes mellitus) ([facility] [address] Heart murmur, and HTN (hypertension). has a past surgical history that includes Tubal ligation; other surgical history; and pr egd percutaneous placement gastrostomy tube (N/A, 12/11/2017). Social History     Socioeconomic History    Marital status:       Spouse name: Not on file    Number of children: Not on file    Years of education: Not on file    Highest education level: Not on file   Occupational History    Not on file   Social Needs    Financial resource strain: Not on file    Food insecurity     Worry: Not on file     Inability: Not on file    Transportation needs     Medical: Not on file     Non-medical: Not on file   Tobacco Use    Smoking status: Former Smoker    Smokeless tobacco: Never Used   Substance and Sexual Activity    Alcohol use: No    Drug use: No    Sexual activity: Never   Lifestyle    Physical activity     Days per week: Not on file     Minutes per session: Not on file    Stress: Not on file   Relationships    Social connections     Talks on phone: Not on file     Gets together: Not on file     Attends Holiness service: Not on file     Active member of club or organization: Not on file     Attends meetings of clubs or organizations: Not on file     Relationship status: Not on file    Intimate partner violence     Fear of current or ex partner: Not on file     Emotionally abused: Not on file     Physically abused: Not on file     Forced sexual activity: Not on file   Other Topics Concern    Not on file   Social History Narrative    Not on file       Family History   Problem Relation Age of Onset    Heart Disease Paternal Grandmother     Diabetes Father     Hypertension Father     Stroke Father     Diabetes Mother     Hypertension Mother     Breast Cancer Mother     Asthma Brother     Cancer Sister         lung    Cancer Maternal Uncle         bone    Cancer Maternal Aunt        Allergies:  Pcn [penicillins]    Home Medications:  Prior to Admission medications    Medication Sig Start Date End Date Taking? Authorizing Provider   vitamin D (ERGOCALCIFEROL) 1.25 MG (32050 UT) CAPS capsule Take 1 capsule by mouth once a week for 8 doses 9/2/20 10/22/20  Krysta Craven MD   warfarin (COUMADIN) 5 MG tablet Take 1 tablet by mouth daily for 10 days 8/30/20 9/9/20  Krysta Craven MD   ondansetron (ZOFRAN) 4 MG tablet Take 4 mg by mouth every 8 hours as needed for Nausea or Vomiting    Historical Provider, MD   lidocaine (XYLOCAINE) 4 % external solution Apply topically 2 times daily Apply topically as needed.     Historical Provider, MD   polyethylene glycol (GLYCOLAX) packet Take 17 g by mouth daily as needed for Constipation    Historical Provider, MD   MULTIPLE VITAMIN PO Take by mouth normal.      Pupils: Pupils are equal, round, and reactive to light. Cardiovascular:      Rate and Rhythm: Normal rate and regular rhythm. Heart sounds: Normal heart sounds. No murmur. No friction rub. No gallop. Pulmonary:      Effort: Pulmonary effort is normal. No respiratory distress. Breath sounds: Normal breath sounds. No wheezing or rales. Musculoskeletal: Normal range of motion. Skin:     Comments: Multiple areas of various pressure ulcers as described by nursing notes   Neurological:      Mental Status: She is alert and oriented to person, place, and time. Comments: No facial asymmetry, no aphasia, no dysarthria, EOMI, PERRLA; 2/5 strength in bilateral lower extremities, 4/5 strength in bilateral upper extremities   Psychiatric:         Behavior: Behavior normal.         DIFFERENTIAL  DIAGNOSIS     PLAN (LABS / IMAGING / EKG):  Orders Placed This Encounter   Procedures    CT HEAD WO CONTRAST    XR CHEST PORTABLE    XR HIP 2-3 VW W PELVIS LEFT    CBC Auto Differential    Comprehensive Metabolic Panel    Magnesium    Phosphorus    Protime-INR    APTT    CK    MYOGLOBIN, SERUM    Troponin    Urinalysis Reflex to Culture    T4, Free    EKG 12 Lead       MEDICATIONS ORDERED:  Orders Placed This Encounter   Medications    morphine (PF) injection 4 mg    0.9 % sodium chloride bolus       DDX: Rhabdomyolysis versus electrolyte abnormality versus hypothyroidism    Initial MDM/Plan: 66 y.o. female who presents with history of past stroke anticoagulated on warfarin worsening weakness. Patient is multiple areas of pressure ulcers and is in a state of neglect. We will get extensive laboratory work-up including imaging of the head and left hip when she has left hip pain. Patient will need admission and placement. Will need wound care. Low suspicion for worsening stroke however will get head CT as patient is anticoagulated. Denies any falls or direct trauma.     DIAGNOSTIC RESULTS / EMERGENCY DEPARTMENT COURSE / MDM     LABS:  Labs Reviewed   CBC WITH AUTO DIFFERENTIAL   COMPREHENSIVE METABOLIC PANEL   MAGNESIUM   PHOSPHORUS   PROTIME-INR   APTT   CK   MYOGLOBIN, SERUM   TROPONIN   URINE RT REFLEX TO CULTURE   T4, FREE         RADIOLOGY:  No results found. EMERGENCY DEPARTMENT COURSE:  Patient required extensive decontamination and IV access and labs as well as imaging in process. Patient signed out to oncoming resident. PROCEDURES:  None    CONSULTS:  None    CRITICAL CARE:  Please see attending note    FINAL IMPRESSION      1. Generalized weakness          DISPOSITION / PLAN     DISPOSITION      Admit    PATIENTREFERRED TO:  No follow-up provider specified.     DISCHARGE MEDICATIONS:  New Prescriptions    No medications on file       David Troy DO  EmergencyMedicine Resident    (Please note that portions of this note were completed with a voice recognition program.  Efforts were made to edit the dictations but occasionally words are mis-transcribed.)       David Troy DO  Resident  05/04/21 1055

## 2021-05-04 NOTE — ED NOTES
PT sleeping in bed, Even and unlabored respirations. Call light within reach. PT updated on plan of care. PT at lowest position in bed. No distress noted. Will continue to monitor PT and continue with the plan of care. Whiteboard Updated.         Joel Lang RN  05/04/21 1950

## 2021-05-04 NOTE — ED NOTES
Met with pt at bedside to discuss concerns. Questioned pt's poor hygiene and why she is not getting out of bed, pt states that she has \"bad days\" and it is too difficult for her daughter to get her out of bed. Pt states that her daughter's three minor children also live in the home. Pt reports that she has no home health aide or nursing staff that come into the home with her and her daughter alone takes care of her at the address listed on the face sheet. Attempted to call pt's daughter and verify that she is supposed to be caring for her, unable to reach anyone on number listed on face sheet, left message requesting call back.      Tomi Cheung, [place]  05/04/21 0822

## 2021-05-05 LAB
ABSOLUTE EOS #: 0.3 K/UL (ref 0–0.44)
ABSOLUTE IMMATURE GRANULOCYTE: 0.03 K/UL (ref 0–0.3)
ABSOLUTE LYMPH #: 3.57 K/UL (ref 1.1–3.7)
ABSOLUTE MONO #: 0.62 K/UL (ref 0.1–1.2)
ANION GAP SERPL CALCULATED.3IONS-SCNC: 10 MMOL/L (ref 9–17)
BASOPHILS # BLD: 1 % (ref 0–2)
BASOPHILS ABSOLUTE: 0.05 K/UL (ref 0–0.2)
BNP INTERPRETATION: NORMAL
BUN BLDV-MCNC: 18 MG/DL (ref 8–23)
BUN/CREAT BLD: ABNORMAL (ref 9–20)
CALCIUM SERPL-MCNC: 8.1 MG/DL (ref 8.6–10.4)
CHLORIDE BLD-SCNC: 106 MMOL/L (ref 98–107)
CO2: 26 MMOL/L (ref 20–31)
CREAT SERPL-MCNC: 0.5 MG/DL (ref 0.5–0.9)
DIFFERENTIAL TYPE: ABNORMAL
EKG ATRIAL RATE: 80 BPM
EKG Q-T INTERVAL: 410 MS
EKG QRS DURATION: 88 MS
EKG QTC CALCULATION (BAZETT): 472 MS
EKG R AXIS: 48 DEGREES
EKG T AXIS: 30 DEGREES
EKG VENTRICULAR RATE: 80 BPM
EOSINOPHILS RELATIVE PERCENT: 3 % (ref 1–4)
GFR AFRICAN AMERICAN: >60 ML/MIN
GFR NON-AFRICAN AMERICAN: >60 ML/MIN
GFR SERPL CREATININE-BSD FRML MDRD: ABNORMAL ML/MIN/{1.73_M2}
GFR SERPL CREATININE-BSD FRML MDRD: ABNORMAL ML/MIN/{1.73_M2}
GLUCOSE BLD-MCNC: 110 MG/DL (ref 70–99)
HCT VFR BLD CALC: 32.6 % (ref 36.3–47.1)
HEMOGLOBIN: 10 G/DL (ref 11.9–15.1)
IMMATURE GRANULOCYTES: 0 %
INR BLD: 1.1
LV EF: 55 %
LVEF MODALITY: NORMAL
LYMPHOCYTES # BLD: 34 % (ref 24–43)
MCH RBC QN AUTO: 28.7 PG (ref 25.2–33.5)
MCHC RBC AUTO-ENTMCNC: 30.7 G/DL (ref 28.4–34.8)
MCV RBC AUTO: 93.7 FL (ref 82.6–102.9)
MONOCYTES # BLD: 6 % (ref 3–12)
NRBC AUTOMATED: 0 PER 100 WBC
PARTIAL THROMBOPLASTIN TIME: 105.9 SEC (ref 20.5–30.5)
PARTIAL THROMBOPLASTIN TIME: 29.9 SEC (ref 20.5–30.5)
PARTIAL THROMBOPLASTIN TIME: 72.2 SEC (ref 20.5–30.5)
PDW BLD-RTO: 13.5 % (ref 11.8–14.4)
PLATELET # BLD: 344 K/UL (ref 138–453)
PLATELET ESTIMATE: ABNORMAL
PMV BLD AUTO: 9.1 FL (ref 8.1–13.5)
POTASSIUM SERPL-SCNC: 3.8 MMOL/L (ref 3.7–5.3)
PRO-BNP: 87 PG/ML
PROTHROMBIN TIME: 11.2 SEC (ref 9.1–12.3)
RBC # BLD: 3.48 M/UL (ref 3.95–5.11)
RBC # BLD: ABNORMAL 10*6/UL
SEG NEUTROPHILS: 56 % (ref 36–65)
SEGMENTED NEUTROPHILS ABSOLUTE COUNT: 5.92 K/UL (ref 1.5–8.1)
SODIUM BLD-SCNC: 142 MMOL/L (ref 135–144)
WBC # BLD: 10.5 K/UL (ref 3.5–11.3)
WBC # BLD: ABNORMAL 10*3/UL

## 2021-05-05 PROCEDURE — 85730 THROMBOPLASTIN TIME PARTIAL: CPT

## 2021-05-05 PROCEDURE — 6370000000 HC RX 637 (ALT 250 FOR IP): Performed by: STUDENT IN AN ORGANIZED HEALTH CARE EDUCATION/TRAINING PROGRAM

## 2021-05-05 PROCEDURE — 80048 BASIC METABOLIC PNL TOTAL CA: CPT

## 2021-05-05 PROCEDURE — 85025 COMPLETE CBC W/AUTO DIFF WBC: CPT

## 2021-05-05 PROCEDURE — 1200000000 HC SEMI PRIVATE

## 2021-05-05 PROCEDURE — 2580000003 HC RX 258: Performed by: INTERNAL MEDICINE

## 2021-05-05 PROCEDURE — 94761 N-INVAS EAR/PLS OXIMETRY MLT: CPT

## 2021-05-05 PROCEDURE — 83880 ASSAY OF NATRIURETIC PEPTIDE: CPT

## 2021-05-05 PROCEDURE — 36415 COLL VENOUS BLD VENIPUNCTURE: CPT

## 2021-05-05 PROCEDURE — 93010 ELECTROCARDIOGRAM REPORT: CPT | Performed by: INTERNAL MEDICINE

## 2021-05-05 PROCEDURE — 85610 PROTHROMBIN TIME: CPT

## 2021-05-05 PROCEDURE — 6370000000 HC RX 637 (ALT 250 FOR IP): Performed by: INTERNAL MEDICINE

## 2021-05-05 PROCEDURE — 99232 SBSQ HOSP IP/OBS MODERATE 35: CPT | Performed by: INTERNAL MEDICINE

## 2021-05-05 PROCEDURE — 93306 TTE W/DOPPLER COMPLETE: CPT

## 2021-05-05 PROCEDURE — 6360000002 HC RX W HCPCS: Performed by: STUDENT IN AN ORGANIZED HEALTH CARE EDUCATION/TRAINING PROGRAM

## 2021-05-05 RX ORDER — ACETAMINOPHEN 325 MG/1
650 TABLET ORAL EVERY 4 HOURS PRN
Status: DISCONTINUED | OUTPATIENT
Start: 2021-05-05 | End: 2021-05-06

## 2021-05-05 RX ORDER — FOLIC ACID 1 MG/1
1 TABLET ORAL DAILY
Status: DISCONTINUED | OUTPATIENT
Start: 2021-05-05 | End: 2021-05-13 | Stop reason: HOSPADM

## 2021-05-05 RX ORDER — WARFARIN SODIUM 5 MG/1
5 TABLET ORAL
Status: COMPLETED | OUTPATIENT
Start: 2021-05-05 | End: 2021-05-05

## 2021-05-05 RX ORDER — OXYCODONE HYDROCHLORIDE 5 MG/1
5 TABLET ORAL EVERY 6 HOURS PRN
Status: DISCONTINUED | OUTPATIENT
Start: 2021-05-05 | End: 2021-05-06

## 2021-05-05 RX ADMIN — AMLODIPINE BESYLATE 10 MG: 10 TABLET ORAL at 11:22

## 2021-05-05 RX ADMIN — HYDRALAZINE HYDROCHLORIDE 25 MG: 50 TABLET, FILM COATED ORAL at 16:35

## 2021-05-05 RX ADMIN — HEPARIN SODIUM AND DEXTROSE 3.2 UNITS/KG/HR: 10000; 5 INJECTION INTRAVENOUS at 16:20

## 2021-05-05 RX ADMIN — OXYCODONE HYDROCHLORIDE 5 MG: 5 TABLET ORAL at 09:17

## 2021-05-05 RX ADMIN — PANTOPRAZOLE SODIUM 40 MG: 40 TABLET, DELAYED RELEASE ORAL at 22:30

## 2021-05-05 RX ADMIN — PANTOPRAZOLE SODIUM 40 MG: 40 TABLET, DELAYED RELEASE ORAL at 11:23

## 2021-05-05 RX ADMIN — ATORVASTATIN CALCIUM 20 MG: 20 TABLET, FILM COATED ORAL at 11:24

## 2021-05-05 RX ADMIN — WARFARIN SODIUM 5 MG: 5 TABLET ORAL at 19:27

## 2021-05-05 RX ADMIN — SODIUM CHLORIDE, PRESERVATIVE FREE 10 ML: 5 INJECTION INTRAVENOUS at 22:30

## 2021-05-05 RX ADMIN — CARVEDILOL 25 MG: 12.5 TABLET, FILM COATED ORAL at 16:35

## 2021-05-05 ASSESSMENT — PAIN SCALES - GENERAL: PAINLEVEL_OUTOF10: 0

## 2021-05-05 NOTE — CONSULTS
[facility]    Consult / H&P               Today's Date: 5/5/2021  Patient Name: Morgan Plaza  Date of admission: 5/4/2021  4:35 AM  Patient's age: 66 y.o., 1950  Admission Dx: Generalized weakness [R53.1]    Reason for Consult:  Cardiac evaluation    Requesting Physician: Bruna Medina MD    CHIEF COMPLAINT: Generalized weakness    History Obtained From:  patient, electronic medical record    HISTORY OF PRESENT ILLNESS:      The patient is a 66 y.o.  female with past medical history of CVA with residual weakness, hypercoagulability with MTHFR heterozygous mutation on warfarin, hypertension who was brought into the ED on 5/4/2021 for generalized weakness, fatigue and diarrhea. Reason cardiology was consulted because of elevated troponins. Patient initial troponin were 86 which up trended to 97. Patient previous troponin from August 2020 has been in 46s. EKG was done shows normal sinus rhythm with artifact. CBC BMP unremarkable. Patient denied any cardiac history. Denied any chest pain, shortness of breath, PND, orthopnea, palpitation. Last echo from August 2020 EF 65%, moderate to severe LVH, aortic sclerosis without stenosis. RVSP 36 mmHg. No other cardiac history on file. Past Medical History:   has a past medical history of Acute ischemic stroke ([facility] [address] Depression, DM (diabetes mellitus) ([facility] [address] Heart murmur, and HTN (hypertension). Past Surgical History:   has a past surgical history that includes Tubal ligation; other surgical history; and pr egd percutaneous placement gastrostomy tube (N/A, 12/11/2017). Home Medications:    Prior to Admission medications    Medication Sig Start Date End Date Taking?  Authorizing Provider   vitamin D (ERGOCALCIFEROL) 1.25 MG (74589 UT) CAPS capsule Take 1 capsule by mouth once a week for 8 doses 9/2/20 10/22/20  Martha Meehan MD   warfarin (COUMADIN) 5 MG tablet Take 1 tablet by mouth daily for 10 days 8/30/20 9/9/20  Maxim Zeng MD   ondansetron (ZOFRAN) 4 MG tablet Take 4 mg by mouth every 8 hours as needed for Nausea or Vomiting    Historical Provider, MD   lidocaine (XYLOCAINE) 4 % external solution Apply topically 2 times daily Apply topically as needed. Historical Provider, MD   polyethylene glycol (GLYCOLAX) packet Take 17 g by mouth daily as needed for Constipation    Historical Provider, MD   MULTIPLE VITAMIN PO Take by mouth daily    Historical Provider, MD   atorvastatin (LIPITOR) 20 MG tablet Take 1 tablet by mouth daily 1/23/18   ADOLFO Ross CNP   cloNIDine (CATAPRES) 0.1 MG/24HR Place 1 patch onto the skin once a week 12/18/17   Neal Sample, MD   hydrALAZINE (APRESOLINE) 25 MG tablet Take 1 tablet by mouth every 8 hours 12/13/17   Neal Sample, MD   carvedilol (COREG) 25 MG tablet Take 1 tablet by mouth 2 times daily (with meals) 12/13/17   Neal Sample, MD   amLODIPine (NORVASC) 10 MG tablet Take 1 tablet by mouth daily 12/14/17   Neal Sample, MD   pantoprazole (PROTONIX) 40 MG tablet Take 1 tablet by mouth 2 times daily Take protonix BID for 8 weeks and then daily 12/13/17   Neal Sample, MD     Allergies:  Pcn [penicillins]    Social History:   reports that she has quit smoking. She has never used smokeless tobacco. She reports that she does not drink alcohol or use drugs. Family History: family history includes Asthma in her brother; Breast Cancer in her mother; Cancer in her maternal aunt, maternal uncle, and sister; Diabetes in her father and mother; Heart Disease in her paternal grandmother; Hypertension in her father and mother; Stroke in her father. REVIEW OF SYSTEMS:    · Constitutional: there has been no unanticipated weight loss. · Eyes: No visual changes or diplopia. No scleral icterus. · ENT: No Headaches  · Cardiovascular: Denied chest pain, shortness of breath, palpitation, orthopnea, PND.   · Respiratory: No previous pulmonary problems, No cough  · Gastrointestinal: No abdominal pain. No change in bowel or bladder habits. · Genitourinary: No dysuria, trouble voiding, or hematuria. · Musculoskeletal:  No gait disturbance, No weakness or joint complaints. · Integumentary: No rash or pruritis. · Neurological: Positive for generalized weakness and fatigue  · Psychiatric: No anxiety, or depression. · Endocrine: No temperature intolerance. No excessive thirst, fluid intake, or urination. No tremor. · Hematologic/Lymphatic: No abnormal bruising or bleeding, blood clots or swollen lymph nodes. · Allergic/Immunologic: No nasal congestion or hives. PHYSICAL EXAM:      /62   Pulse 63   Temp 97.8 °F (36.6 °C) (Oral)   Resp 14   Wt 240 lb (108.9 kg)   SpO2 98%   BMI 41.20 kg/m²    Constitutional and General Appearance: alert, cooperative, no distress and appears stated age  HEENT: PERRL, no cervical lymphadenopathy. No masses palpable. Normal oral mucosa  Respiratory:  · Normal excursion and expansion without use of accessory muscles  · Resp Auscultation: Good respiratory effort. No for increased work of breathing. On auscultation: clear to auscultation bilaterally  Cardiovascular:  · The apical impulse is not displaced  · Heart tones are crisp and normal. regular S1 and S2.  · Jugular venous pulsation Normal  · The carotid upstroke is normal in amplitude and contour without delay or bruit  · Peripheral pulses are symmetrical and full   Abdomen:   · No masses or tenderness  · Bowel sounds present  Extremities:  · Chronic wound and ulcers at different stages of healing. 1+ pitting edema bilateral.  Neurological:  · Alert and oriented. · Generalized weakness appreciated. DATA:      ECHO 8/28/2020: EF 65%, moderate-severe LVH, mild AI/MR/TR, RVSP is 36 mmHg.      ARTURO 12/14/17: EF 55%, RAFAEL showed no evidence of clot, negative bubble study, mild TR.      Labs:     CBC:   Recent Labs     05/04/21  1211 05/05/21  0603   WBC 13.1* 10.5   HGB 11.6* 10.0*   HCT 38.0 32.6*    344     BMP:   Recent Labs     05/04/21  0722 05/05/21  0603    142   K 4.6 3.8   CO2 23 26   BUN 19 18   CREATININE 0.60 0.50   LABGLOM >60 >60   GLUCOSE 157* 110*     PT/INR:   Recent Labs     05/04/21  0722 05/05/21  0603   PROTIME 10.6 11.2   INR 1.0 1.1     APTT:  Recent Labs     05/04/21  2338 05/05/21  0603   APTT 55.2* 72.2*     CARDIAC ENZYMES:  Recent Labs     05/04/21  0722   CKTOTAL 128     FASTING LIPID PANEL:  Lab Results   Component Value Date    HDL 49 08/28/2020    TRIG 59 08/28/2020     LIVER PROFILE:  Recent Labs     05/04/21  0722   AST 12   ALT 9   LABALBU 3.3*       IMPRESSION:    1. NSTEMI likely type II from supply demand mismatch with elevated troponin with a flat trend. LEW score 1. Previous trops from Aug 2020 were in 50's. EKG without ischemic changes. 2. Hypercoagulability with MTHFR heterozygous mutation  3. Hypertension  4. Hyperlipidemia  5. History of CVA with residual weakness. RECOMMENDATIONS:  1. Continue IV heparin x 48 hours, later okay to bridge with home dose warfarin  2. Continue aspirin and Lipitor  3. Follow-up on echo. If low risk then outpatient follow-up  4. Rest of the management per primary. 5. We will follow      Plan to be discussed with rounding attending. Harsh Mccabe MD.  Internal Medicine Resident PGY [address] [place] [address]   5/5/2021, 7:13 AM          Attending Physician Statement:    I have discussed the care of  Kath Barillas , including pertinent history and exam findings, with the Cardiology fellow/resident. I have seen and examined the patient and the key elements of all parts of the encounter have been performed by me. I agree with the assessment, plan and orders as documented by the fellow/resident, after I modified exam findings and plan of treatments, and the final version is my approved version of the assessment. Additional Comments:  The patient was seen and examined, agree with above, presented with weakness, fatigue and diarrhea, no chest pain or SOB. ECG no acute changes. Mildly elevated HS troponin. Possible type 2. Will trend, obtain echo, if normal wall motion, will plan for Cardiolite stress test as outpatient.

## 2021-05-05 NOTE — CARE COORDINATION
Case Management Initial Discharge Plan  Jacklyn Pan,             Met with:patient to discuss discharge plans. Information verified: address, contacts, phone number, , insurance Yes    Emergency Contact/Next of Kin name & number: Joseph Gomez (son) [phone] Caleb Dawn (daughter) [phone]    PCP: Mia Boast, MD  Date of last visit: \"winter\"    Insurance Provider: [facility] Medicare     Discharge Planning    Living Arrangements:  Family Members, Children   Support Systems:  [id number] has 2 stories  5 (with rails) stairs to climb to get into front door, 1 flight stairs to climb to reach second floor  Location of bedroom/bathroom in home 1st floor. Patient able to perform ADL's:Assisted    Current Services (outpatient & in home) none  DME equipment: wheelchair, hospital bed, shower chair, cane  DME provider:     Receiving oral anticoagulation therapy? Yes-can't recall the name    If indicated:   Physician managing anticoagulation treatment:   Where does patient obtain lab work for ATC treatment? Potential Assistance Needed:  N/A    Patient agreeable to home care: discussed  Freedom of choice provided:  yes    Prior SNF/Rehab Placement and Facility: [address] [place] [address] to SNF/Rehab: discussed  [place] of choice provided: yes     Evaluation:     Expected Discharge date:  21    Patient expects to be discharged to:  transition needs to be determined  Follow Up Appointment: Best Day/ Time:      Transportation provider: states she hires transportation. Transportation arrangements needed for discharge: TBD    Readmission Risk              Risk of Unplanned Readmission:        15             Does patient have a readmission risk score greater than 14?: Yes  If yes, follow-up appointment must be made within 7 days of discharge. Goals of Care:       Discharge Plan: transition needs to be determined. Patient states that her daughter , Caleb Dawn is her caregiver.
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
## 2021-05-05 NOTE — ED NOTES
PT was Cleaned up, barrier cream applied beneath breasts, below panus, between legs, and other folds. Inter-dry rucked underneath folds. PT's linen's, gown, and blankets were changed out. PT was propped to one side with blankets (Under right side). PT is resting comfortably in bed. No new pressure ulcers to report at this time.           Chelle Lang RN  05/05/21 8818

## 2021-05-05 NOTE — ED NOTES
Patient given extra linens. All other needs addressed at this time.       Lisa Johnson RN  05/05/21 4150

## 2021-05-06 LAB
ABSOLUTE EOS #: 0.26 K/UL (ref 0–0.44)
ABSOLUTE IMMATURE GRANULOCYTE: 0.05 K/UL (ref 0–0.3)
ABSOLUTE LYMPH #: 2.8 K/UL (ref 1.1–3.7)
ABSOLUTE MONO #: 0.84 K/UL (ref 0.1–1.2)
ANION GAP SERPL CALCULATED.3IONS-SCNC: 10 MMOL/L (ref 9–17)
BASOPHILS # BLD: 0 % (ref 0–2)
BASOPHILS ABSOLUTE: 0.03 K/UL (ref 0–0.2)
BUN BLDV-MCNC: 17 MG/DL (ref 8–23)
BUN/CREAT BLD: ABNORMAL (ref 9–20)
CALCIUM SERPL-MCNC: 8.1 MG/DL (ref 8.6–10.4)
CHLORIDE BLD-SCNC: 108 MMOL/L (ref 98–107)
CO2: 22 MMOL/L (ref 20–31)
CREAT SERPL-MCNC: 0.55 MG/DL (ref 0.5–0.9)
DIFFERENTIAL TYPE: ABNORMAL
EOSINOPHILS RELATIVE PERCENT: 3 % (ref 1–4)
GFR AFRICAN AMERICAN: >60 ML/MIN
GFR NON-AFRICAN AMERICAN: >60 ML/MIN
GFR SERPL CREATININE-BSD FRML MDRD: ABNORMAL ML/MIN/{1.73_M2}
GFR SERPL CREATININE-BSD FRML MDRD: ABNORMAL ML/MIN/{1.73_M2}
GLUCOSE BLD-MCNC: 116 MG/DL (ref 70–99)
HCT VFR BLD CALC: 29.2 % (ref 36.3–47.1)
HEMOGLOBIN: 9.4 G/DL (ref 11.9–15.1)
IMMATURE GRANULOCYTES: 1 %
INR BLD: 1.3
LYMPHOCYTES # BLD: 28 % (ref 24–43)
MCH RBC QN AUTO: 28.7 PG (ref 25.2–33.5)
MCHC RBC AUTO-ENTMCNC: 32.2 G/DL (ref 28.4–34.8)
MCV RBC AUTO: 89.3 FL (ref 82.6–102.9)
MONOCYTES # BLD: 8 % (ref 3–12)
NRBC AUTOMATED: 0 PER 100 WBC
PARTIAL THROMBOPLASTIN TIME: 54.7 SEC (ref 20.5–30.5)
PARTIAL THROMBOPLASTIN TIME: 64 SEC (ref 20.5–30.5)
PDW BLD-RTO: 13.6 % (ref 11.8–14.4)
PLATELET # BLD: 300 K/UL (ref 138–453)
PLATELET ESTIMATE: ABNORMAL
PMV BLD AUTO: 9.5 FL (ref 8.1–13.5)
POTASSIUM SERPL-SCNC: 3.7 MMOL/L (ref 3.7–5.3)
PROTHROMBIN TIME: 13.2 SEC (ref 9.1–12.3)
RBC # BLD: 3.27 M/UL (ref 3.95–5.11)
RBC # BLD: ABNORMAL 10*6/UL
SEG NEUTROPHILS: 60 % (ref 36–65)
SEGMENTED NEUTROPHILS ABSOLUTE COUNT: 6.19 K/UL (ref 1.5–8.1)
SODIUM BLD-SCNC: 140 MMOL/L (ref 135–144)
WBC # BLD: 10.2 K/UL (ref 3.5–11.3)
WBC # BLD: ABNORMAL 10*3/UL

## 2021-05-06 PROCEDURE — 6360000002 HC RX W HCPCS: Performed by: STUDENT IN AN ORGANIZED HEALTH CARE EDUCATION/TRAINING PROGRAM

## 2021-05-06 PROCEDURE — 1200000000 HC SEMI PRIVATE

## 2021-05-06 PROCEDURE — 99232 SBSQ HOSP IP/OBS MODERATE 35: CPT | Performed by: INTERNAL MEDICINE

## 2021-05-06 PROCEDURE — 85730 THROMBOPLASTIN TIME PARTIAL: CPT

## 2021-05-06 PROCEDURE — 80048 BASIC METABOLIC PNL TOTAL CA: CPT

## 2021-05-06 PROCEDURE — 6370000000 HC RX 637 (ALT 250 FOR IP): Performed by: INTERNAL MEDICINE

## 2021-05-06 PROCEDURE — 85025 COMPLETE CBC W/AUTO DIFF WBC: CPT

## 2021-05-06 PROCEDURE — 85610 PROTHROMBIN TIME: CPT

## 2021-05-06 PROCEDURE — 36415 COLL VENOUS BLD VENIPUNCTURE: CPT

## 2021-05-06 PROCEDURE — 6370000000 HC RX 637 (ALT 250 FOR IP): Performed by: STUDENT IN AN ORGANIZED HEALTH CARE EDUCATION/TRAINING PROGRAM

## 2021-05-06 RX ORDER — WARFARIN SODIUM 7.5 MG/1
7.5 TABLET ORAL
Status: COMPLETED | OUTPATIENT
Start: 2021-05-06 | End: 2021-05-06

## 2021-05-06 RX ORDER — OXYCODONE HYDROCHLORIDE AND ACETAMINOPHEN 5; 325 MG/1; MG/1
1 TABLET ORAL EVERY 4 HOURS PRN
Status: DISCONTINUED | OUTPATIENT
Start: 2021-05-06 | End: 2021-05-13 | Stop reason: HOSPADM

## 2021-05-06 RX ORDER — LIDOCAINE 4 G/G
1 PATCH TOPICAL DAILY
Status: DISCONTINUED | OUTPATIENT
Start: 2021-05-06 | End: 2021-05-13 | Stop reason: HOSPADM

## 2021-05-06 RX ADMIN — WARFARIN SODIUM 7.5 MG: 7.5 TABLET ORAL at 18:00

## 2021-05-06 RX ADMIN — HEPARIN SODIUM AND DEXTROSE 7.16 UNITS/KG/HR: 10000; 5 INJECTION INTRAVENOUS at 23:56

## 2021-05-06 RX ADMIN — PANTOPRAZOLE SODIUM 40 MG: 40 TABLET, DELAYED RELEASE ORAL at 21:26

## 2021-05-06 RX ADMIN — OXYCODONE HYDROCHLORIDE AND ACETAMINOPHEN 1 TABLET: 5; 325 TABLET ORAL at 21:26

## 2021-05-06 RX ADMIN — FOLIC ACID 1 MG: 1 TABLET ORAL at 08:47

## 2021-05-06 RX ADMIN — HEPARIN SODIUM AND DEXTROSE 7.2 UNITS/KG/HR: 10000; 5 INJECTION INTRAVENOUS at 01:26

## 2021-05-06 RX ADMIN — CARVEDILOL 25 MG: 12.5 TABLET, FILM COATED ORAL at 08:47

## 2021-05-06 RX ADMIN — HEPARIN SODIUM 4000 UNITS: 1000 INJECTION INTRAVENOUS; SUBCUTANEOUS at 01:26

## 2021-05-06 RX ADMIN — ATORVASTATIN CALCIUM 20 MG: 20 TABLET, FILM COATED ORAL at 08:47

## 2021-05-06 RX ADMIN — PANTOPRAZOLE SODIUM 40 MG: 40 TABLET, DELAYED RELEASE ORAL at 08:47

## 2021-05-06 RX ADMIN — HYDRALAZINE HYDROCHLORIDE 25 MG: 50 TABLET, FILM COATED ORAL at 06:36

## 2021-05-06 RX ADMIN — AMLODIPINE BESYLATE 10 MG: 10 TABLET ORAL at 08:47

## 2021-05-06 RX ADMIN — OXYCODONE HYDROCHLORIDE 5 MG: 5 TABLET ORAL at 08:47

## 2021-05-06 ASSESSMENT — PAIN SCALES - GENERAL
PAINLEVEL_OUTOF10: 9
PAINLEVEL_OUTOF10: 9

## 2021-05-06 NOTE — FLOWSHEET NOTE
Assessment: The patient was calm and approachable. Intervention:  engaged in active listening.  asked if they would like prayer and informed them chaplains are available 24/7. Outcome: They engaged in the conversation. Chaplains will remain available to offer spiritual and emotional support as needed. 05/05/21 2101   Encounter Summary   Services provided to: Patient   Referral/Consult From: [facility]   (05/05/21)   Complexity of Encounter Moderate   Length of Encounter 15 minutes   Spiritual Assessment Completed Yes   Routine   Type Initial   Assessment Calm; Approachable   Intervention Active listening   Outcome Engaged in conversation
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
## 2021-05-06 NOTE — PROGRESS NOTES
Occupational Therapy    Occupational Therapy Not Seen Note    DATE: 2021  Name: Evgeny Yuan  : 1950  MRN: [id number]    Patient not available for Occupational Therapy due to:    Patient Declined: Pt declined OT/PT evaluation this date. Writer and PT spent ~20 minutes educating pt on importance of working with occupational and physical therapy. Educated on role of therapy evaluations in the process of going to a rehab facility post discharge. Pt initially began progressing legs to EOB with assist and then continued to decline.  Pt reports she has not been able to relax and would be willing to attempt in AM.     Next Scheduled Treatment: Check back     Electronically signed by Madison Morin OT on 2021 at 2:44 PM

## 2021-05-06 NOTE — PROGRESS NOTES
Physical Therapy    DATE: 2021    NAME: Mandi Zaman  MRN: [id number]   : 1950      Patient not seen this date for Physical Therapy due to:    Patient Declined: pt refused out of bed mobility this date. Writer and OT spent ~15 minutes at bedside educating pt on purpose of PT eval, POC, and importance of out bed mobility and pre-certification process. Pt continued to decline stating \"this is the first day I've had to relax and watch TV\". RN notified. PT will check back 21.        Electronically signed by Zee Stone PT on 2021 at 2:44 PM

## 2021-05-06 NOTE — CARE COORDINATION
SW consult received during Quality Flow Rounds. Chart reviewed and found that ER social work made an [address] referral.  Called GULSHAN and Diego Gallo [phone] is the APS worker assigned. Left a message for Shaggy Palacio.

## 2021-05-06 NOTE — PROGRESS NOTES
Pharmacy Note  Warfarin Consult follow-up      Recent Labs     05/06/21  0720   INR 1.3     Recent Labs     05/04/21  1211 05/05/21  0603 05/06/21  0720   HGB 11.6* 10.0* 9.4*   HCT 38.0 32.6* 29.2*    344 300       Significant Drug-Drug Interactions:  New warfarin drug-drug interactions: acetaminophen  Discontinued drug-drug interactions: none  Current warfarin drug-drug interactions: heparin drip, aspirin x1 on 5/4, acetaminophen      Date INR Dose   5/4/21 1.3 7.5 mg    5/5/21 1.1 5 mg    5/6/21 1.3 7.5 mg        Notes:       INR starting to move today. Warfarin 7.5 mg ordered for this evening. Daily PT/INR while inpatient. [address] [place] [address]  Ph., CACP, Clinical Pharmacist  Anticoagulation Services, [facility]  5/6/2021  12:10 PM

## 2021-05-06 NOTE — PLAN OF CARE
Problem: Falls - Risk of:  Goal: Will remain free from falls  Description: Will remain free from falls  5/6/2021 0751 by Zia Galvan RN  Outcome: Ongoing  5/6/2021 0749 by Zia Galvan RN  Outcome: Ongoing  Goal: Absence of physical injury  Description: Absence of physical injury  5/6/2021 0751 by Zia Galvan RN  Outcome: Ongoing  5/6/2021 0749 by Zia Galvan RN  Outcome: Ongoing     Problem: Skin Integrity:  Goal: Will show no infection signs and symptoms  Description: Will show no infection signs and symptoms  Outcome: Ongoing  Goal: Absence of new skin breakdown  Description: Absence of new skin breakdown  Outcome: Ongoing
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
## 2021-05-07 LAB
-: NORMAL
ABSOLUTE EOS #: 0.48 K/UL (ref 0–0.44)
ABSOLUTE IMMATURE GRANULOCYTE: 0.2 K/UL (ref 0–0.3)
ABSOLUTE LYMPH #: 3.98 K/UL (ref 1.1–3.7)
ABSOLUTE MONO #: 0.84 K/UL (ref 0.1–1.2)
ANION GAP SERPL CALCULATED.3IONS-SCNC: 11 MMOL/L (ref 9–17)
BASOPHILS # BLD: 0 % (ref 0–2)
BASOPHILS ABSOLUTE: 0.05 K/UL (ref 0–0.2)
BUN BLDV-MCNC: 15 MG/DL (ref 8–23)
BUN/CREAT BLD: ABNORMAL (ref 9–20)
CALCIUM SERPL-MCNC: 7.6 MG/DL (ref 8.6–10.4)
CHLORIDE BLD-SCNC: 107 MMOL/L (ref 98–107)
CO2: 21 MMOL/L (ref 20–31)
CREAT SERPL-MCNC: 0.63 MG/DL (ref 0.5–0.9)
DIFFERENTIAL TYPE: ABNORMAL
EKG ATRIAL RATE: 80 BPM
EKG Q-T INTERVAL: 402 MS
EKG QRS DURATION: 74 MS
EKG QTC CALCULATION (BAZETT): 466 MS
EKG R AXIS: 46 DEGREES
EKG T AXIS: 37 DEGREES
EKG VENTRICULAR RATE: 81 BPM
EOSINOPHILS RELATIVE PERCENT: 4 % (ref 1–4)
GFR AFRICAN AMERICAN: >60 ML/MIN
GFR NON-AFRICAN AMERICAN: >60 ML/MIN
GFR SERPL CREATININE-BSD FRML MDRD: ABNORMAL ML/MIN/{1.73_M2}
GFR SERPL CREATININE-BSD FRML MDRD: ABNORMAL ML/MIN/{1.73_M2}
GLUCOSE BLD-MCNC: 115 MG/DL (ref 70–99)
HCT VFR BLD CALC: 30.1 % (ref 36.3–47.1)
HEMOGLOBIN: 9.1 G/DL (ref 11.9–15.1)
IMMATURE GRANULOCYTES: 2 %
INR BLD: 1.4
LYMPHOCYTES # BLD: 36 % (ref 24–43)
MCH RBC QN AUTO: 28.8 PG (ref 25.2–33.5)
MCHC RBC AUTO-ENTMCNC: 30.2 G/DL (ref 28.4–34.8)
MCV RBC AUTO: 95.3 FL (ref 82.6–102.9)
MONOCYTES # BLD: 8 % (ref 3–12)
NRBC AUTOMATED: 0 PER 100 WBC
PARTIAL THROMBOPLASTIN TIME: 52.3 SEC (ref 20.5–30.5)
PDW BLD-RTO: 13.6 % (ref 11.8–14.4)
PLATELET # BLD: 330 K/UL (ref 138–453)
PLATELET ESTIMATE: ABNORMAL
PMV BLD AUTO: 9 FL (ref 8.1–13.5)
POTASSIUM SERPL-SCNC: 3.6 MMOL/L (ref 3.7–5.3)
PROTHROMBIN TIME: 14.4 SEC (ref 9.1–12.3)
RBC # BLD: 3.16 M/UL (ref 3.95–5.11)
RBC # BLD: ABNORMAL 10*6/UL
REASON FOR REJECTION: NORMAL
SEG NEUTROPHILS: 50 % (ref 36–65)
SEGMENTED NEUTROPHILS ABSOLUTE COUNT: 5.58 K/UL (ref 1.5–8.1)
SODIUM BLD-SCNC: 139 MMOL/L (ref 135–144)
WBC # BLD: 11.1 K/UL (ref 3.5–11.3)
WBC # BLD: ABNORMAL 10*3/UL
ZZ NTE CLEAN UP: ORDERED TEST: NORMAL
ZZ NTE WITH NAME CLEAN UP: SPECIMEN SOURCE: NORMAL

## 2021-05-07 PROCEDURE — 97166 OT EVAL MOD COMPLEX 45 MIN: CPT

## 2021-05-07 PROCEDURE — 6370000000 HC RX 637 (ALT 250 FOR IP): Performed by: STUDENT IN AN ORGANIZED HEALTH CARE EDUCATION/TRAINING PROGRAM

## 2021-05-07 PROCEDURE — 97162 PT EVAL MOD COMPLEX 30 MIN: CPT

## 2021-05-07 PROCEDURE — 6370000000 HC RX 637 (ALT 250 FOR IP): Performed by: INTERNAL MEDICINE

## 2021-05-07 PROCEDURE — 36415 COLL VENOUS BLD VENIPUNCTURE: CPT

## 2021-05-07 PROCEDURE — 97535 SELF CARE MNGMENT TRAINING: CPT

## 2021-05-07 PROCEDURE — 99232 SBSQ HOSP IP/OBS MODERATE 35: CPT | Performed by: INTERNAL MEDICINE

## 2021-05-07 PROCEDURE — 1200000000 HC SEMI PRIVATE

## 2021-05-07 PROCEDURE — 2580000003 HC RX 258: Performed by: INTERNAL MEDICINE

## 2021-05-07 PROCEDURE — 80048 BASIC METABOLIC PNL TOTAL CA: CPT

## 2021-05-07 PROCEDURE — 85025 COMPLETE CBC W/AUTO DIFF WBC: CPT

## 2021-05-07 PROCEDURE — 93010 ELECTROCARDIOGRAM REPORT: CPT | Performed by: INTERNAL MEDICINE

## 2021-05-07 PROCEDURE — 85730 THROMBOPLASTIN TIME PARTIAL: CPT

## 2021-05-07 PROCEDURE — 85610 PROTHROMBIN TIME: CPT

## 2021-05-07 RX ORDER — POTASSIUM CHLORIDE 20 MEQ/1
40 TABLET, EXTENDED RELEASE ORAL ONCE
Status: COMPLETED | OUTPATIENT
Start: 2021-05-07 | End: 2021-05-07

## 2021-05-07 RX ORDER — WARFARIN SODIUM 10 MG/1
10 TABLET ORAL
Status: COMPLETED | OUTPATIENT
Start: 2021-05-07 | End: 2021-05-07

## 2021-05-07 RX ADMIN — OXYCODONE HYDROCHLORIDE AND ACETAMINOPHEN 1 TABLET: 5; 325 TABLET ORAL at 17:59

## 2021-05-07 RX ADMIN — POTASSIUM CHLORIDE 40 MEQ: 1500 TABLET, EXTENDED RELEASE ORAL at 10:21

## 2021-05-07 RX ADMIN — OXYCODONE HYDROCHLORIDE AND ACETAMINOPHEN 1 TABLET: 5; 325 TABLET ORAL at 14:21

## 2021-05-07 RX ADMIN — WARFARIN SODIUM 10 MG: 10 TABLET ORAL at 17:59

## 2021-05-07 RX ADMIN — SODIUM CHLORIDE, PRESERVATIVE FREE 10 ML: 5 INJECTION INTRAVENOUS at 08:17

## 2021-05-07 RX ADMIN — AMLODIPINE BESYLATE 10 MG: 10 TABLET ORAL at 08:16

## 2021-05-07 RX ADMIN — CARVEDILOL 25 MG: 12.5 TABLET, FILM COATED ORAL at 08:16

## 2021-05-07 RX ADMIN — FOLIC ACID 1 MG: 1 TABLET ORAL at 08:16

## 2021-05-07 RX ADMIN — OXYCODONE HYDROCHLORIDE AND ACETAMINOPHEN 1 TABLET: 5; 325 TABLET ORAL at 10:21

## 2021-05-07 RX ADMIN — OXYCODONE HYDROCHLORIDE AND ACETAMINOPHEN 1 TABLET: 5; 325 TABLET ORAL at 05:32

## 2021-05-07 RX ADMIN — HYDRALAZINE HYDROCHLORIDE 25 MG: 50 TABLET, FILM COATED ORAL at 05:32

## 2021-05-07 RX ADMIN — CARVEDILOL 25 MG: 12.5 TABLET, FILM COATED ORAL at 17:59

## 2021-05-07 RX ADMIN — HYDRALAZINE HYDROCHLORIDE 25 MG: 50 TABLET, FILM COATED ORAL at 13:25

## 2021-05-07 RX ADMIN — ATORVASTATIN CALCIUM 20 MG: 20 TABLET, FILM COATED ORAL at 08:17

## 2021-05-07 RX ADMIN — HYDRALAZINE HYDROCHLORIDE 25 MG: 50 TABLET, FILM COATED ORAL at 21:53

## 2021-05-07 RX ADMIN — PANTOPRAZOLE SODIUM 40 MG: 40 TABLET, DELAYED RELEASE ORAL at 08:17

## 2021-05-07 RX ADMIN — OXYCODONE HYDROCHLORIDE AND ACETAMINOPHEN 1 TABLET: 5; 325 TABLET ORAL at 21:53

## 2021-05-07 RX ADMIN — PANTOPRAZOLE SODIUM 40 MG: 40 TABLET, DELAYED RELEASE ORAL at 21:53

## 2021-05-07 ASSESSMENT — PAIN SCALES - GENERAL
PAINLEVEL_OUTOF10: 7
PAINLEVEL_OUTOF10: 8
PAINLEVEL_OUTOF10: 9
PAINLEVEL_OUTOF10: 8
PAINLEVEL_OUTOF10: 8
PAINLEVEL_OUTOF10: 7

## 2021-05-07 ASSESSMENT — PAIN DESCRIPTION - ORIENTATION
ORIENTATION: LEFT

## 2021-05-07 ASSESSMENT — PAIN DESCRIPTION - DESCRIPTORS
DESCRIPTORS: ACHING

## 2021-05-07 ASSESSMENT — PAIN DESCRIPTION - LOCATION
LOCATION: HIP

## 2021-05-07 ASSESSMENT — PAIN - FUNCTIONAL ASSESSMENT: PAIN_FUNCTIONAL_ASSESSMENT: ACTIVITIES ARE NOT PREVENTED

## 2021-05-07 ASSESSMENT — PAIN DESCRIPTION - FREQUENCY
FREQUENCY: CONTINUOUS
FREQUENCY: CONTINUOUS

## 2021-05-07 ASSESSMENT — PAIN DESCRIPTION - PAIN TYPE
TYPE: CHRONIC PAIN
TYPE: ACUTE PAIN
TYPE: CHRONIC PAIN
TYPE: CHRONIC PAIN

## 2021-05-07 NOTE — CARE COORDINATION
Received call from Chantal Dowling at [facility]. Pt was previously there and owes a large amount of money so they are unable to accept her back. Update provided to Yesi Nichole. He chose [facility] but is agreeable to [facility] location. Referral sent. Shannon Anderson notified. Voicemail left for Cori Nix in HELP to determine if pt would qualify for Medicaid    1440 received call from Shannon Anderson at [facility]. They are unable to accept at [facility] location d/t at max capacity of [place] pts. They are OON at [facility] [place] [facility]. Notified Yesi Nichole. He chose [facility]. Referrals sent to [place] and [address] [place] spoke to Zackary Mckeon at [facility]. Debra is OON. They are reviewing clinical information for [place]. She will call back to let me know if they are able to accept    [phone] spoke to Zackary Mckeon. They are able to accept and have started precert.  Son, Yesi Nichole, updated and agreeable

## 2021-05-07 NOTE — PLAN OF CARE
Problem: Falls - Risk of:  Goal: Will remain free from falls  Description: Will remain free from falls  5/7/2021 1852 by Reyes Lesches, RN  Outcome: Ongoing  5/7/2021 1807 by Reyes Lesches, RN  Outcome: Ongoing  Goal: Absence of physical injury  Description: Absence of physical injury  5/7/2021 1852 by Reyes Lesches, RN  Outcome: Ongoing  5/7/2021 1807 by Reyes Lesches, RN  Outcome: Ongoing

## 2021-05-07 NOTE — PROGRESS NOTES
Occupational Therapy   Occupational Therapy Initial Assessment  Date: 2021   Patient Name: Ander Flnyn  MRN: [id number]     : 1950    Date of Service: 2021    Chief Complaint   Patient presents with    Generalized Body Aches    Other     has not moved in several days     Discharge Recommendations:  Patient would benefit from continued therapy after discharge       Assessment   Performance deficits / Impairments: Decreased strength;Decreased safe awareness;Decreased balance;Decreased endurance;Decreased functional mobility ; Decreased ADL status; Decreased ROM; Decreased high-level IADLs;Decreased coordination;Decreased cognition  Comments: Pt currently requiring high levels of physical assistance x2 to engage in all functional tasks. Pt to benefit from continued therapy services while hospitalized and at discharge to maximize pt's safety and independence in performing ADLs, bed mobility, and functional transfers. Pt would be unsafe to return to prior living arrangements at this time based on pt's current functional ability and the level of assistance that pt is currently requiring for daily self cares. Prognosis: Fair  Decision Making: Medium Complexity  OT Education: OT Role;Plan of Care(Activity Promotion, Importance of Repositioning for Pressure Relief/Skin Integrity, Bed Mobility Techniques; fair return)  REQUIRES OT FOLLOW UP: Yes  Activity Tolerance  Activity Tolerance: Patient limited by pain; Patient limited by fatigue  Safety Devices  Safety Devices in place: Yes  Type of devices: Nurse notified;Call light within reach; Bed alarm in place; Left in bed  Restraints  Initially in place: No           Patient Diagnosis(es): The primary encounter diagnosis was Generalized weakness. A diagnosis of Elevated troponin was also pertinent to this visit. has a past medical history of Acute ischemic stroke ([facility] Depression, DM (diabetes mellitus) ([facility] Heart murmur, and HTN (hypertension).    has a past surgical history that includes Tubal ligation; other surgical history; and pr egd percutaneous placement gastrostomy tube (N/A, 12/11/2017). Restrictions  Restrictions/Precautions  Restrictions/Precautions: Up as Tolerated  Position Activity Restriction  Other position/activity restrictions: Pt with IV and external catheter      Subjective   General  Patient assessed for rehabilitation services?: Yes  Family / Caregiver Present: No  General Comment  Comments: RN ok'd for therapy this AM. Pt agreeable to participate in session following encouragement, pt cooperative throughout. Patient Currently in Pain: Yes  Pain Assessment  Pain Assessment: 0-10  Pain Level: 8  Pain Type: Chronic pain  Pain Location: Hip  Pain Orientation: Left  Pain Descriptors: Aching  Functional Pain Assessment: Activities are not prevented  Non-Pharmaceutical Pain Intervention(s): Distraction;Repositioned  Response to Pain Intervention: Patient Satisfied    Social/Functional History  Social/Functional History  Lives With: Family (dtr and 3 grandchildren)  Type of Home: House  Home Layout: Two level, Able to Live on Main level with bedroom/bathroom  Home Access: Stairs to enter with rails  Entrance Stairs - Number of Steps: 5  Bathroom Shower/Tub: Tub/Shower unit (pt reports bed baths only)  Bathroom Toilet: (pt reports use of briefs)  Bathroom Equipment: Shower chair  Home Equipment: Cane, [address] bed  Receives Help From: Family  ADL Assistance: Needs assistance  Homemaking Assistance: Needs assistance  Homemaking Responsibilities: No(dtr/family perform all)  Ambulation Assistance: Needs assistance(nonambulatory)  Transfer Assistance: Needs assistance(dependent, bedridden)  Active : No  Patient's  Info: states she hires someone to transport her  Occupation: Retired  Additional Comments: Pt reports she is bedridden at home, has not been able to ambulate or sit EOB for undetermined amount of time.   Limited by weakness and pain. Objective   Vision: Within Functional Limits  Hearing: Within functional limits    Orientation  Overall Orientation Status: Within Functional Limits         ADL  Feeding: Minimal assistance; Increased time to complete;Verbal cueing  Grooming: Maximum assistance; Increased time to complete;Verbal cueing  UE Bathing: Maximum assistance; Increased time to complete;Verbal cueing  LE Bathing: Maximum assistance; Increased time to complete;Verbal cueing  UE Dressing: Increased time to complete;Verbal cueing  LE Dressing: Maximum assistance; Increased time to complete;Verbal cueing(to don socks while supine in bed)  Toileting: Maximum assistance; Increased time to complete(rolling R<>L while supine in bed during brief mngt and pericare/bottom hygiene)     Tone RUE  RUE Tone: Normotonic  Tone LUE  LUE Tone: Normotonic  Coordination  Movements Are Fluid And Coordinated: Yes        Bed mobility  Rolling to Left: Maximum assistance;2 Person assistance  Rolling to Right: Maximum assistance;2 Person assistance  Supine to Sit: Dependent/Total  Sit to Supine: Dependent/Total  Comment: Pt participated in rolling R<>L while supine in bed with use of bed rails during brief mngt/pericare/bottom hygiene and linen mngt. Cognition  Overall Cognitive Status: Exceptions  Arousal/Alertness: Appropriate responses to stimuli  Following Commands: Follows one step commands with increased time; Follows one step commands with repetition  Attention Span: Attends with cues to redirect  Safety Judgement: Decreased awareness of need for assistance  Problem Solving: Decreased awareness of errors;Assistance required to correct errors made;Assistance required to identify errors made  Insights: Decreased awareness of deficits  Initiation: Requires cues for some  Sequencing: Requires cues for some           Sensation  Overall Sensation Status: WFL        LUE AROM (degrees)  LUE AROM : WFL  RUE AROM (degrees)  RUE AROM :

## 2021-05-07 NOTE — PROGRESS NOTES
Physician Progress Note      Donaldo Pappas  [facility] #:                  [id number]  :                       1950  ADMIT DATE:       2021 4:35 AM  DISCH DATE:  RESPONDING  PROVIDER #:        Anu Booth MD          QUERY TEXT:    Patient admitted with Generalized weakness. Noted elevated Troponins and   conflicting documentation regarding an NSTEMI. If possible, please document in   the progress notes and discharge summary if you are evaluating and/or   treating any of the following: The medical record reflects the following:  Risk Factors: HTN, HLD  Clinical Indicators: Troponins 86, 86, 97. Myoglobin 201. Cardiology   Documents: \"NSTEMI likely type II from supply demand mismatch with elevated   troponin with a flat trend. LEW score 1. \" in consult note. and \"No acute CV   issues/concerns. \" in Progress note. Primary Documents \"Elevated troponin   likely due to NSTEMI type II: 86> 86> 97. \" and \"No convincing evidence of any   myocardial infarction. \" in Last PN on 21. No chest pain, no SOB. Treatment: Cardiology consulted, Echo ordered, Monitor telemetry, labs,   symptoms and vital signs. Thank you for your time, Sunni Javed MSN, RN CDS. If you have any questions,   please call [phone]. Options provided:  -- NSTEMI  -- Type 2 MI  -- Demand Ischemia with NSTEMI  -- Demand Ischemia only, no MI  -- Other - I will add my own diagnosis  -- Disagree - Not applicable / Not valid  -- Disagree - Clinically unable to determine / Unknown  -- Refer to Clinical Documentation Reviewer    PROVIDER RESPONSE TEXT:    This patient has demand ischemia with an MI.     Query created by: Colin Mustafa on 2021 1:40 PM      Electronically signed by:  Anu Booth MD 2021 7:13 AM
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
## 2021-05-07 NOTE — PROGRESS NOTES
Physical Therapy    Facility/Department: [facility] ONC/MED SURG  Initial Assessment    NAME: Ganga Agee  : 1950  MRN: [id number]    Date of Service: 2021    Discharge Recommendations:  Patient would benefit from continued therapy after discharge        Assessment   Body structures, Functions, Activity limitations: Decreased functional mobility ; Decreased ROM; Decreased strength;Decreased balance  Assessment: Pt with limited tolerance for mobiity and ROM. Pt will benefit from continue therapy to improve motion for self care and pain relief, improve function. Pt would be unsafe to return to previous living situation due to limited function, requires skilled assist of 2 people to complete activities  Prognosis: Fair  Decision Making: Medium Complexity  PT Education: Goals;PT Role;Plan of Care  Patient Education: importance of changing positions and mobility, ROM for skin and comfort  REQUIRES PT FOLLOW UP: Yes  Activity Tolerance  Activity Tolerance: Patient limited by endurance; Patient limited by pain  Activity Tolerance: Pt previously limited mobility       Patient Diagnosis(es): The primary encounter diagnosis was Generalized weakness. A diagnosis of Elevated troponin was also pertinent to this visit. has a past medical history of Acute ischemic stroke ([facility] [address] Depression, DM (diabetes mellitus) ([facility] [address] Heart murmur, and HTN (hypertension). has a past surgical history that includes Tubal ligation; other surgical history; and pr egd percutaneous placement gastrostomy tube (N/A, 2017).     Restrictions  Restrictions/Precautions  Restrictions/Precautions: Up as Tolerated  Position Activity Restriction  Other position/activity restrictions: Pt with IV and external catheter  Vision/Hearing        Subjective  General  Chart Reviewed: Yes  Patient assessed for rehabilitation services?: Yes  Family / Caregiver Present: No  Diagnosis: Generalized weakness  Follows Commands: Within Functional Limits  Pain Screening  Patient Currently in Pain: Yes  Pain Assessment  Pain Assessment: 0-10  Pain Level: 8  Pain Type: Chronic pain  Pain Location: Hip  Pain Orientation: Left  Vital Signs  Patient Currently in Pain: Yes  Pre Treatment Pain Screening  Intervention List: Patient able to continue with treatment  Comments / Details: limited tolerance for mobility at baseline    Orientation  Orientation  Overall Orientation Status: Within Functional Limits  Social/Functional History  Social/Functional History  Lives With: Daughter, Family  Type of Home: House  Home Layout: Two level, Able to Live on Main level with bedroom/bathroom  Home Access: Stairs to enter with rails  Entrance Stairs - Number of Steps: 5  Bathroom Shower/Tub: Tub/Shower unit  Bathroom Toilet: (pt reports use of briefs)  Bathroom Equipment: Shower chair  Home Equipment: [employer], [facility] bed  Receives Help From: Family  ADL Assistance: Needs assistance  Homemaking Assistance: Needs assistance  Homemaking Responsibilities: No(dtr/family perform all)  Ambulation Assistance: Needs assistance(nonambulatory)  Transfer Assistance: Needs assistance(dependent, bedridden)  Active : No  Patient's  Info: states she hires someone to transport her  Occupation: Retired  Additional Comments: Pt reports she is bedridden at home, has not been able to ambulate or sit EOB for undetermined amount of time.   Limited by weakness and pain  Cognition        Objective          PROM RLE (degrees)  RLE General PROM: Pt unable to tolerate PROM, slight ankle ROM allowed, all other limited by pain  PROM LLE (degrees)  LLE General PROM: Pt unable to tolerate PROM, slight ankle ROM allowed, all other limited by pain  Strength RLE  Comment: generally 1/5, very limited active mvmt  Strength LLE  Comment: generally 1/5, very limited active mvmt     Sensation  Overall Sensation Status: WFL  Bed mobility  Rolling to Left: Maximum assistance;2 Person assistance  Rolling to Right: Maximum assistance;2 Person assistance  Supine to Sit: Unable to assess  Sit to Supine: Unable to assess  Comment: Pt able to assist with UE's during rolling  Transfers  Sit to Stand: Unable to assess  Stand to sit: Unable to assess  Ambulation  Ambulation?: No  Stairs/Curb  Stairs?: No      Pt completed rolling in bed for self care with staff. Very limited PROM B LE's allowed by pt due to pain. Plan   Plan  Times per week: 2-3x/week  Current Treatment Recommendations: Strengthening, Balance Training, Functional Mobility Training, ROM, Patient/Caregiver Education & Training  Safety Devices  Type of devices: Call light within reach, Left in bed  Restraints  Initially in place: No         AM-PAC Score  AM-PAC Inpatient Mobility Raw Score : 7 (05/07/21 1050)  AM-PAC Inpatient T-Scale Score : 26.42 (05/07/21 1050)  Mobility Inpatient CMS 0-100% Score: 92.36 (05/07/21 1050)  Mobility Inpatient CMS G-Code Modifier : CM (05/07/21 1050)          Goals  Short term goals  Time Frame for Short term goals: 10 visits  Short term goal 1: Pt able to particpate in bed mobility, modA  Short term goal 2: Pt able to tolerate sitting EOB with maxA  Short term goal 3: Pt participate in ther ex to improve LE ROM for self care and pain relief  Patient Goals   Patient goals :  To feel better       Therapy Time   Individual Concurrent Group Co-treatment   Time In [address]         Time Out [address]         Minutes [address]

## 2021-05-07 NOTE — PLAN OF CARE
Problem: Falls - Risk of:  Goal: Will remain free from falls  Description: Will remain free from falls  5/7/2021 1807 by Loretta Mccauley RN  Outcome: Ongoing  5/7/2021 0421 by Roberto Almanza RN  Outcome: Ongoing  Goal: Absence of physical injury  Description: Absence of physical injury  5/7/2021 1807 by Loretta Mccauley RN  Outcome: Ongoing  5/7/2021 0421 by Roberto Almanza RN  Outcome: Ongoing

## 2021-05-08 LAB
ABSOLUTE EOS #: 0.49 K/UL (ref 0–0.44)
ABSOLUTE IMMATURE GRANULOCYTE: 0.18 K/UL (ref 0–0.3)
ABSOLUTE LYMPH #: 3.83 K/UL (ref 1.1–3.7)
ABSOLUTE MONO #: 0.85 K/UL (ref 0.1–1.2)
ANION GAP SERPL CALCULATED.3IONS-SCNC: 8 MMOL/L (ref 9–17)
BASOPHILS # BLD: 0 % (ref 0–2)
BASOPHILS ABSOLUTE: 0.04 K/UL (ref 0–0.2)
BUN BLDV-MCNC: 13 MG/DL (ref 8–23)
BUN/CREAT BLD: ABNORMAL (ref 9–20)
CALCIUM SERPL-MCNC: 7.8 MG/DL (ref 8.6–10.4)
CHLORIDE BLD-SCNC: 105 MMOL/L (ref 98–107)
CO2: 22 MMOL/L (ref 20–31)
CREAT SERPL-MCNC: 0.45 MG/DL (ref 0.5–0.9)
DIFFERENTIAL TYPE: ABNORMAL
EOSINOPHILS RELATIVE PERCENT: 4 % (ref 1–4)
GFR AFRICAN AMERICAN: >60 ML/MIN
GFR NON-AFRICAN AMERICAN: >60 ML/MIN
GFR SERPL CREATININE-BSD FRML MDRD: ABNORMAL ML/MIN/{1.73_M2}
GFR SERPL CREATININE-BSD FRML MDRD: ABNORMAL ML/MIN/{1.73_M2}
GLUCOSE BLD-MCNC: 108 MG/DL (ref 70–99)
HCT VFR BLD CALC: 28.1 % (ref 36.3–47.1)
HEMOGLOBIN: 8.5 G/DL (ref 11.9–15.1)
IMMATURE GRANULOCYTES: 2 %
INR BLD: 2.3
LYMPHOCYTES # BLD: 35 % (ref 24–43)
MCH RBC QN AUTO: 29.3 PG (ref 25.2–33.5)
MCHC RBC AUTO-ENTMCNC: 30.2 G/DL (ref 28.4–34.8)
MCV RBC AUTO: 96.9 FL (ref 82.6–102.9)
MONOCYTES # BLD: 8 % (ref 3–12)
NRBC AUTOMATED: 0 PER 100 WBC
PARTIAL THROMBOPLASTIN TIME: 62.3 SEC (ref 20.5–30.5)
PDW BLD-RTO: 14 % (ref 11.8–14.4)
PLATELET # BLD: 308 K/UL (ref 138–453)
PLATELET ESTIMATE: ABNORMAL
PMV BLD AUTO: 8.7 FL (ref 8.1–13.5)
POTASSIUM SERPL-SCNC: 3.9 MMOL/L (ref 3.7–5.3)
PROTHROMBIN TIME: 22.7 SEC (ref 9.1–12.3)
RBC # BLD: 2.9 M/UL (ref 3.95–5.11)
RBC # BLD: ABNORMAL 10*6/UL
SEG NEUTROPHILS: 51 % (ref 36–65)
SEGMENTED NEUTROPHILS ABSOLUTE COUNT: 5.72 K/UL (ref 1.5–8.1)
SODIUM BLD-SCNC: 135 MMOL/L (ref 135–144)
WBC # BLD: 11.1 K/UL (ref 3.5–11.3)
WBC # BLD: ABNORMAL 10*3/UL

## 2021-05-08 PROCEDURE — 36415 COLL VENOUS BLD VENIPUNCTURE: CPT

## 2021-05-08 PROCEDURE — 85025 COMPLETE CBC W/AUTO DIFF WBC: CPT

## 2021-05-08 PROCEDURE — 80048 BASIC METABOLIC PNL TOTAL CA: CPT

## 2021-05-08 PROCEDURE — 6370000000 HC RX 637 (ALT 250 FOR IP): Performed by: STUDENT IN AN ORGANIZED HEALTH CARE EDUCATION/TRAINING PROGRAM

## 2021-05-08 PROCEDURE — 85610 PROTHROMBIN TIME: CPT

## 2021-05-08 PROCEDURE — 85730 THROMBOPLASTIN TIME PARTIAL: CPT

## 2021-05-08 PROCEDURE — 6360000002 HC RX W HCPCS: Performed by: STUDENT IN AN ORGANIZED HEALTH CARE EDUCATION/TRAINING PROGRAM

## 2021-05-08 PROCEDURE — 99232 SBSQ HOSP IP/OBS MODERATE 35: CPT | Performed by: INTERNAL MEDICINE

## 2021-05-08 PROCEDURE — 51798 US URINE CAPACITY MEASURE: CPT

## 2021-05-08 PROCEDURE — 1200000000 HC SEMI PRIVATE

## 2021-05-08 RX ORDER — WARFARIN SODIUM 2.5 MG/1
2.5 TABLET ORAL
Status: COMPLETED | OUTPATIENT
Start: 2021-05-08 | End: 2021-05-08

## 2021-05-08 RX ORDER — FOLIC ACID 1 MG/1
1 TABLET ORAL DAILY
Qty: 30 TABLET | Refills: 3 | Status: SHIPPED | OUTPATIENT
Start: 2021-05-09

## 2021-05-08 RX ADMIN — HYDRALAZINE HYDROCHLORIDE 25 MG: 50 TABLET, FILM COATED ORAL at 21:05

## 2021-05-08 RX ADMIN — OXYCODONE HYDROCHLORIDE AND ACETAMINOPHEN 1 TABLET: 5; 325 TABLET ORAL at 06:46

## 2021-05-08 RX ADMIN — OXYCODONE HYDROCHLORIDE AND ACETAMINOPHEN 1 TABLET: 5; 325 TABLET ORAL at 21:06

## 2021-05-08 RX ADMIN — WARFARIN SODIUM 2.5 MG: 2.5 TABLET ORAL at 17:34

## 2021-05-08 RX ADMIN — FOLIC ACID 1 MG: 1 TABLET ORAL at 09:51

## 2021-05-08 RX ADMIN — OXYCODONE HYDROCHLORIDE AND ACETAMINOPHEN 1 TABLET: 5; 325 TABLET ORAL at 17:34

## 2021-05-08 RX ADMIN — CARVEDILOL 25 MG: 12.5 TABLET, FILM COATED ORAL at 09:51

## 2021-05-08 RX ADMIN — PANTOPRAZOLE SODIUM 40 MG: 40 TABLET, DELAYED RELEASE ORAL at 09:51

## 2021-05-08 RX ADMIN — CARVEDILOL 25 MG: 12.5 TABLET, FILM COATED ORAL at 17:34

## 2021-05-08 RX ADMIN — PANTOPRAZOLE SODIUM 40 MG: 40 TABLET, DELAYED RELEASE ORAL at 21:05

## 2021-05-08 RX ADMIN — OXYCODONE HYDROCHLORIDE AND ACETAMINOPHEN 1 TABLET: 5; 325 TABLET ORAL at 10:44

## 2021-05-08 RX ADMIN — HEPARIN SODIUM AND DEXTROSE 7.16 UNITS/KG/HR: 10000; 5 INJECTION INTRAVENOUS at 01:32

## 2021-05-08 RX ADMIN — HYDRALAZINE HYDROCHLORIDE 25 MG: 50 TABLET, FILM COATED ORAL at 06:46

## 2021-05-08 RX ADMIN — ATORVASTATIN CALCIUM 20 MG: 20 TABLET, FILM COATED ORAL at 09:51

## 2021-05-08 RX ADMIN — AMLODIPINE BESYLATE 10 MG: 10 TABLET ORAL at 09:51

## 2021-05-08 RX ADMIN — OXYCODONE HYDROCHLORIDE AND ACETAMINOPHEN 1 TABLET: 5; 325 TABLET ORAL at 02:30

## 2021-05-08 ASSESSMENT — PAIN SCALES - GENERAL
PAINLEVEL_OUTOF10: 7
PAINLEVEL_OUTOF10: 8
PAINLEVEL_OUTOF10: 8
PAINLEVEL_OUTOF10: 7
PAINLEVEL_OUTOF10: 7
PAINLEVEL_OUTOF10: 8
PAINLEVEL_OUTOF10: 7
PAINLEVEL_OUTOF10: 5

## 2021-05-08 ASSESSMENT — PAIN DESCRIPTION - LOCATION: LOCATION: HIP

## 2021-05-08 ASSESSMENT — PAIN DESCRIPTION - PAIN TYPE: TYPE: CHRONIC PAIN

## 2021-05-08 ASSESSMENT — PAIN DESCRIPTION - ORIENTATION: ORIENTATION: LEFT

## 2021-05-08 NOTE — PROGRESS NOTES
Pharmacy Note  Warfarin Consult follow-up      Recent Labs     05/08/21  0804   INR 2.3     Recent Labs     05/06/21  0720 05/07/21  0827 05/08/21  0804   HGB 9.4* 9.1* 8.5*   HCT 29.2* 30.1* 28.1*    330 308       Current warfarin drug-drug interactions: acetaminophen, heparin infusion    Date INR Dose   5/4/21 1.3 7.5 mg    5/5/21 1.1 5 mg    5/6/21 1.3 7.5 mg    5/7/21 1.4 10 mg    5/8/21 2.3 2.5mg     Notes: Due the large increase in INR, will give warfarin 2.5mg today 5/8/21                    Daily PT/INR while inpatient.       Kwasi East, PharmD

## 2021-05-08 NOTE — PLAN OF CARE
Problem: Falls - Risk of:  Goal: Will remain free from falls  Description: Will remain free from falls  5/8/2021 1809 by Maria Dolores Dozier RN  Outcome: Ongoing  5/8/2021 0432 by Katia Metz RN  Outcome: Ongoing  Note: No falls to date. Bed in lowest position with call light within reach. Floor free from obstacles and pt verbalizes understanding to call out with needs or assistance with ambulation. Falls risk score evaluated-high risk. Bed alarm activated and falling star posted. Will continue to monitor additional needs. Goal: Absence of physical injury  Description: Absence of physical injury  5/8/2021 1809 by Maria Dolores Dozier RN  Outcome: Ongoing  5/8/2021 0432 by Katia Metz RN  Outcome: Ongoing     Problem: Skin Integrity:  Goal: Will show no infection signs and symptoms  Description: Will show no infection signs and symptoms  5/8/2021 1809 by Maria Dolores Dozier RN  Outcome: Ongoing  5/8/2021 0432 by Katia Metz RN  Outcome: Ongoing  Goal: Absence of new skin breakdown  Description: Absence of new skin breakdown  5/8/2021 1809 by Maria Dolores Dozier RN  Outcome: Ongoing  5/8/2021 0432 by Katia Metz RN  Outcome: Ongoing  Note: Skin assessment completed. Repositioned pt Q2 hrs and prn per protocol. Pt tolerated without S&S of discomfort observed. Jigna care performed at this time. Will continue to monitor. Call light in place. Problem: Pain:  Goal: Pain level will decrease  Description: Pain level will decrease  5/8/2021 1809 by Maria Dolores Dozier RN  Outcome: Ongoing  5/8/2021 0432 by Katia Metz RN  Outcome: Ongoing  Note: Pain level assessment complete. Pt rated pain L hip on 0-10 scale. Pt educated on pain scale and control interventions. PRN pain medication given per pt request. Pt verbalizes understanding to call out with new onset of pain or unrelieved pain. Will continue to monitor.     Goal: Control of acute pain  Description: Control of acute pain  5/8/2021 1809 by Maria Dolores Dozier RN  Outcome: Ongoing 5/8/2021 0432 by Brianna Farmer RN  Outcome: Ongoing  Goal: Control of chronic pain  Description: Control of chronic pain  5/8/2021 1809 by Joie Bardales RN  Outcome: Ongoing  5/8/2021 0432 by Brianna Farmer RN  Outcome: Ongoing

## 2021-05-08 NOTE — PROGRESS NOTES
Patient unable to void, pt bladder scanned which showed 655 ml urine. Order obtained to straight cath. Patient wanted to wait a few more minutes to try and void. Patient was able to void 250ml in external catheter container plus wet her brief. Patient bladder scanned again post void which showed 103ml urine. Will continue to monitor urine output.
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
## 2021-05-09 LAB
ANION GAP SERPL CALCULATED.3IONS-SCNC: 6 MMOL/L (ref 9–17)
BUN BLDV-MCNC: 11 MG/DL (ref 8–23)
BUN/CREAT BLD: ABNORMAL (ref 9–20)
CALCIUM SERPL-MCNC: 8 MG/DL (ref 8.6–10.4)
CHLORIDE BLD-SCNC: 102 MMOL/L (ref 98–107)
CO2: 24 MMOL/L (ref 20–31)
CREAT SERPL-MCNC: 0.49 MG/DL (ref 0.5–0.9)
GFR AFRICAN AMERICAN: >60 ML/MIN
GFR NON-AFRICAN AMERICAN: >60 ML/MIN
GFR SERPL CREATININE-BSD FRML MDRD: ABNORMAL ML/MIN/{1.73_M2}
GFR SERPL CREATININE-BSD FRML MDRD: ABNORMAL ML/MIN/{1.73_M2}
GLUCOSE BLD-MCNC: 104 MG/DL (ref 70–99)
INR BLD: 2.7
POTASSIUM SERPL-SCNC: 4 MMOL/L (ref 3.7–5.3)
PROTHROMBIN TIME: 26.5 SEC (ref 9.1–12.3)
SODIUM BLD-SCNC: 132 MMOL/L (ref 135–144)
VANCOMYCIN TROUGH DATE LAST DOSE: ABNORMAL
VANCOMYCIN TROUGH DOSE AMOUNT: ABNORMAL
VANCOMYCIN TROUGH TIME LAST DOSE: ABNORMAL
VANCOMYCIN TROUGH: <4 UG/ML (ref 10–20)

## 2021-05-09 PROCEDURE — 99232 SBSQ HOSP IP/OBS MODERATE 35: CPT | Performed by: INTERNAL MEDICINE

## 2021-05-09 PROCEDURE — 51798 US URINE CAPACITY MEASURE: CPT

## 2021-05-09 PROCEDURE — 80202 ASSAY OF VANCOMYCIN: CPT

## 2021-05-09 PROCEDURE — 36415 COLL VENOUS BLD VENIPUNCTURE: CPT

## 2021-05-09 PROCEDURE — 85610 PROTHROMBIN TIME: CPT

## 2021-05-09 PROCEDURE — 1200000000 HC SEMI PRIVATE

## 2021-05-09 PROCEDURE — 6370000000 HC RX 637 (ALT 250 FOR IP): Performed by: STUDENT IN AN ORGANIZED HEALTH CARE EDUCATION/TRAINING PROGRAM

## 2021-05-09 PROCEDURE — 80048 BASIC METABOLIC PNL TOTAL CA: CPT

## 2021-05-09 PROCEDURE — 2580000003 HC RX 258: Performed by: INTERNAL MEDICINE

## 2021-05-09 RX ADMIN — PANTOPRAZOLE SODIUM 40 MG: 40 TABLET, DELAYED RELEASE ORAL at 21:51

## 2021-05-09 RX ADMIN — CARVEDILOL 25 MG: 12.5 TABLET, FILM COATED ORAL at 09:07

## 2021-05-09 RX ADMIN — CARVEDILOL 25 MG: 12.5 TABLET, FILM COATED ORAL at 18:09

## 2021-05-09 RX ADMIN — AMLODIPINE BESYLATE 10 MG: 10 TABLET ORAL at 09:07

## 2021-05-09 RX ADMIN — ATORVASTATIN CALCIUM 20 MG: 20 TABLET, FILM COATED ORAL at 09:07

## 2021-05-09 RX ADMIN — PANTOPRAZOLE SODIUM 40 MG: 40 TABLET, DELAYED RELEASE ORAL at 09:07

## 2021-05-09 RX ADMIN — OXYCODONE HYDROCHLORIDE AND ACETAMINOPHEN 1 TABLET: 5; 325 TABLET ORAL at 04:03

## 2021-05-09 RX ADMIN — FOLIC ACID 1 MG: 1 TABLET ORAL at 09:07

## 2021-05-09 RX ADMIN — OXYCODONE HYDROCHLORIDE AND ACETAMINOPHEN 1 TABLET: 5; 325 TABLET ORAL at 11:47

## 2021-05-09 RX ADMIN — SODIUM CHLORIDE, PRESERVATIVE FREE 10 ML: 5 INJECTION INTRAVENOUS at 21:55

## 2021-05-09 RX ADMIN — HYDRALAZINE HYDROCHLORIDE 25 MG: 50 TABLET, FILM COATED ORAL at 04:25

## 2021-05-09 RX ADMIN — SODIUM CHLORIDE, PRESERVATIVE FREE 10 ML: 5 INJECTION INTRAVENOUS at 09:14

## 2021-05-09 ASSESSMENT — PAIN SCALES - GENERAL
PAINLEVEL_OUTOF10: 8
PAINLEVEL_OUTOF10: 7
PAINLEVEL_OUTOF10: 6
PAINLEVEL_OUTOF10: 5

## 2021-05-09 NOTE — PROGRESS NOTES
Pharmacy Note  Warfarin Consult follow-up      Recent Labs     05/09/21  0714   INR 2.7     Recent Labs     05/07/21  0827 05/08/21  0804   HGB 9.1* 8.5*   HCT 30.1* 28.1*    308       Current warfarin drug-drug interactions: acetaminophen    Date INR Dose   5/4/21 1.3 7.5 mg    5/5/21 1.1 5 mg    5/6/21 1.3 7.5 mg    5/7/21 1.4 10 mg    5/8/21 2.3 2.5mg   5/9/21 2.7 HOLD       Notes: INR is therapeutic but still increasing. Due to concern that INR may be supratherapeutic tomorrow in response to previous doses, will hold warfarin today 5/9/2021. Daily PT/INR while inpatient. Jojo Renae.  PharmD
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
## 2021-05-09 NOTE — PLAN OF CARE
Problem: Falls - Risk of:  Goal: Will remain free from falls  Description: Will remain free from falls  5/9/2021 1819 by Freddie West RN  Outcome: Ongoing  5/9/2021 0519 by Lisa Ty RN  Outcome: Ongoing  Goal: Absence of physical injury  Description: Absence of physical injury  5/9/2021 1819 by Freddie West RN  Outcome: Ongoing  5/9/2021 0519 by Lisa Ty RN  Outcome: Ongoing     Problem: Skin Integrity:  Goal: Will show no infection signs and symptoms  Description: Will show no infection signs and symptoms  5/9/2021 1819 by Freddie West RN  Outcome: Ongoing  5/9/2021 0519 by Lisa Ty RN  Outcome: Ongoing  Goal: Absence of new skin breakdown  Description: Absence of new skin breakdown  5/9/2021 1819 by Freddie West RN  Outcome: Ongoing  5/9/2021 0519 by Lisa Ty RN  Outcome: Ongoing     Problem: Pain:  Goal: Pain level will decrease  Description: Pain level will decrease  5/9/2021 1819 by Freddie West RN  Outcome: Ongoing  5/9/2021 0519 by Lisa Ty RN  Outcome: Ongoing  Goal: Control of acute pain  Description: Control of acute pain  5/9/2021 1819 by Freddie West RN  Outcome: Ongoing  5/9/2021 0519 by Lisa Ty RN  Outcome: Ongoing  Goal: Control of chronic pain  Description: Control of chronic pain  5/9/2021 1819 by Freddie West RN  Outcome: Ongoing  5/9/2021 0519 by Lisa Ty RN  Outcome: Ongoing

## 2021-05-09 NOTE — PLAN OF CARE
Problem: Falls - Risk of:  Goal: Will remain free from falls  Description: Will remain free from falls  5/9/2021 0519 by Keshia Henley RN  Outcome: Ongoing  5/8/2021 1809 by Ro Lu RN  Outcome: Ongoing  Goal: Absence of physical injury  Description: Absence of physical injury  5/9/2021 0519 by Keshia Henley RN  Outcome: Ongoing  5/8/2021 1809 by Ro Lu RN  Outcome: Ongoing     Problem: Skin Integrity:  Goal: Will show no infection signs and symptoms  Description: Will show no infection signs and symptoms  5/9/2021 0519 by Keshia Henley RN  Outcome: Ongoing  5/8/2021 1809 by Ro Lu RN  Outcome: Ongoing  Goal: Absence of new skin breakdown  Description: Absence of new skin breakdown  5/9/2021 0519 by Keshia Henley RN  Outcome: Ongoing  5/8/2021 1809 by Ro Lu RN  Outcome: Ongoing     Problem: Pain:  Goal: Pain level will decrease  Description: Pain level will decrease  5/9/2021 0519 by Keshia Henley RN  Outcome: Ongoing  5/8/2021 1809 by Ro Lu RN  Outcome: Ongoing  Goal: Control of acute pain  Description: Control of acute pain  5/9/2021 0519 by Keshia Henley RN  Outcome: Ongoing  5/8/2021 1809 by Ro Lu RN  Outcome: Ongoing  Goal: Control of chronic pain  Description: Control of chronic pain  5/9/2021 0519 by Keshia Henley RN  Outcome: Ongoing  5/8/2021 1809 by Ro Lu RN  Outcome: Ongoing

## 2021-05-09 NOTE — PROGRESS NOTES
1154: Patient bladder scanned at 448. Writer explained and encouraged patient to void. 1238: Patient able to void 200 via external female catheter. 1659: Patient bladder scanned at 768. Talked with patient again about importance of self voiding. Writer explained to patient about possibility of needing to straight cath as it was done the night prior. Patient stated, \"I will not go thru that again. \"   Writer notified primary of patient's urinary retention and refusal of the possibility of needing a straight cath. Awaiting response.
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
## 2021-05-10 ENCOUNTER — APPOINTMENT (OUTPATIENT)
Dept: ULTRASOUND IMAGING | Age: 71
DRG: 922 | End: 2021-05-10
Payer: MEDICARE

## 2021-05-10 ENCOUNTER — APPOINTMENT (OUTPATIENT)
Dept: GENERAL RADIOLOGY | Age: 71
DRG: 922 | End: 2021-05-10
Payer: MEDICARE

## 2021-05-10 PROBLEM — R33.9 URINARY RETENTION: Status: ACTIVE | Noted: 2021-05-10

## 2021-05-10 LAB
-: ABNORMAL
ABSOLUTE EOS #: 0.3 K/UL (ref 0–0.44)
ABSOLUTE IMMATURE GRANULOCYTE: 0.11 K/UL (ref 0–0.3)
ABSOLUTE LYMPH #: 2.84 K/UL (ref 1.1–3.7)
ABSOLUTE MONO #: 0.85 K/UL (ref 0.1–1.2)
AMORPHOUS: ABNORMAL
ANION GAP SERPL CALCULATED.3IONS-SCNC: 10 MMOL/L (ref 9–17)
BACTERIA: ABNORMAL
BASOPHILS # BLD: 0 % (ref 0–2)
BASOPHILS ABSOLUTE: 0.03 K/UL (ref 0–0.2)
BILIRUBIN URINE: NEGATIVE
BUN BLDV-MCNC: 9 MG/DL (ref 8–23)
BUN/CREAT BLD: ABNORMAL (ref 9–20)
CALCIUM SERPL-MCNC: 8.2 MG/DL (ref 8.6–10.4)
CASTS UA: ABNORMAL /LPF (ref 0–8)
CHLORIDE BLD-SCNC: 104 MMOL/L (ref 98–107)
CO2: 23 MMOL/L (ref 20–31)
COLOR: YELLOW
COMMENT UA: ABNORMAL
CREAT SERPL-MCNC: 0.45 MG/DL (ref 0.5–0.9)
CRYSTALS, UA: ABNORMAL /HPF
DIFFERENTIAL TYPE: ABNORMAL
EOSINOPHILS RELATIVE PERCENT: 3 % (ref 1–4)
EPITHELIAL CELLS UA: ABNORMAL /HPF (ref 0–5)
GFR AFRICAN AMERICAN: >60 ML/MIN
GFR NON-AFRICAN AMERICAN: >60 ML/MIN
GFR SERPL CREATININE-BSD FRML MDRD: ABNORMAL ML/MIN/{1.73_M2}
GFR SERPL CREATININE-BSD FRML MDRD: ABNORMAL ML/MIN/{1.73_M2}
GLUCOSE BLD-MCNC: 108 MG/DL (ref 70–99)
GLUCOSE URINE: NEGATIVE
HCT VFR BLD CALC: 28.4 % (ref 36.3–47.1)
HEMOGLOBIN: 8.7 G/DL (ref 11.9–15.1)
IMMATURE GRANULOCYTES: 1 %
INR BLD: 2.5
KETONES, URINE: NEGATIVE
LEUKOCYTE ESTERASE, URINE: ABNORMAL
LYMPHOCYTES # BLD: 23 % (ref 24–43)
MAGNESIUM: 1.8 MG/DL (ref 1.6–2.6)
MCH RBC QN AUTO: 28.5 PG (ref 25.2–33.5)
MCHC RBC AUTO-ENTMCNC: 30.6 G/DL (ref 28.4–34.8)
MCV RBC AUTO: 93.1 FL (ref 82.6–102.9)
MONOCYTES # BLD: 7 % (ref 3–12)
MUCUS: ABNORMAL
NITRITE, URINE: NEGATIVE
NRBC AUTOMATED: 0 PER 100 WBC
OTHER OBSERVATIONS UA: ABNORMAL
PDW BLD-RTO: 14.3 % (ref 11.8–14.4)
PH UA: 5 (ref 5–8)
PLATELET # BLD: 349 K/UL (ref 138–453)
PLATELET ESTIMATE: ABNORMAL
PMV BLD AUTO: 8.6 FL (ref 8.1–13.5)
POTASSIUM SERPL-SCNC: 3.5 MMOL/L (ref 3.7–5.3)
PROTEIN UA: NEGATIVE
PROTHROMBIN TIME: 24.8 SEC (ref 9.1–12.3)
RBC # BLD: 3.05 M/UL (ref 3.95–5.11)
RBC # BLD: ABNORMAL 10*6/UL
RBC UA: ABNORMAL /HPF (ref 0–4)
RENAL EPITHELIAL, UA: ABNORMAL /HPF
SEG NEUTROPHILS: 66 % (ref 36–65)
SEGMENTED NEUTROPHILS ABSOLUTE COUNT: 8.11 K/UL (ref 1.5–8.1)
SODIUM BLD-SCNC: 137 MMOL/L (ref 135–144)
SPECIFIC GRAVITY UA: 1.01 (ref 1–1.03)
TRICHOMONAS: ABNORMAL
TURBIDITY: CLEAR
URINE HGB: NEGATIVE
UROBILINOGEN, URINE: NORMAL
WBC # BLD: 12.2 K/UL (ref 3.5–11.3)
WBC # BLD: ABNORMAL 10*3/UL
WBC UA: ABNORMAL /HPF (ref 0–5)
YEAST: ABNORMAL

## 2021-05-10 PROCEDURE — 85610 PROTHROMBIN TIME: CPT

## 2021-05-10 PROCEDURE — 97535 SELF CARE MNGMENT TRAINING: CPT

## 2021-05-10 PROCEDURE — 99232 SBSQ HOSP IP/OBS MODERATE 35: CPT | Performed by: INTERNAL MEDICINE

## 2021-05-10 PROCEDURE — 1200000000 HC SEMI PRIVATE

## 2021-05-10 PROCEDURE — 6370000000 HC RX 637 (ALT 250 FOR IP): Performed by: STUDENT IN AN ORGANIZED HEALTH CARE EDUCATION/TRAINING PROGRAM

## 2021-05-10 PROCEDURE — 36415 COLL VENOUS BLD VENIPUNCTURE: CPT

## 2021-05-10 PROCEDURE — 51701 INSERT BLADDER CATHETER: CPT

## 2021-05-10 PROCEDURE — 85025 COMPLETE CBC W/AUTO DIFF WBC: CPT

## 2021-05-10 PROCEDURE — 76770 US EXAM ABDO BACK WALL COMP: CPT

## 2021-05-10 PROCEDURE — 2580000003 HC RX 258: Performed by: INTERNAL MEDICINE

## 2021-05-10 PROCEDURE — 81001 URINALYSIS AUTO W/SCOPE: CPT

## 2021-05-10 PROCEDURE — 51702 INSERT TEMP BLADDER CATH: CPT

## 2021-05-10 PROCEDURE — 6360000002 HC RX W HCPCS: Performed by: STUDENT IN AN ORGANIZED HEALTH CARE EDUCATION/TRAINING PROGRAM

## 2021-05-10 PROCEDURE — 71045 X-RAY EXAM CHEST 1 VIEW: CPT

## 2021-05-10 PROCEDURE — 2580000003 HC RX 258: Performed by: STUDENT IN AN ORGANIZED HEALTH CARE EDUCATION/TRAINING PROGRAM

## 2021-05-10 PROCEDURE — 51798 US URINE CAPACITY MEASURE: CPT

## 2021-05-10 PROCEDURE — 6370000000 HC RX 637 (ALT 250 FOR IP): Performed by: INTERNAL MEDICINE

## 2021-05-10 PROCEDURE — 83735 ASSAY OF MAGNESIUM: CPT

## 2021-05-10 PROCEDURE — 80048 BASIC METABOLIC PNL TOTAL CA: CPT

## 2021-05-10 RX ORDER — ONDANSETRON 2 MG/ML
4 INJECTION INTRAMUSCULAR; INTRAVENOUS ONCE
Status: COMPLETED | OUTPATIENT
Start: 2021-05-10 | End: 2021-05-10

## 2021-05-10 RX ORDER — POTASSIUM CHLORIDE 20 MEQ/1
40 TABLET, EXTENDED RELEASE ORAL ONCE
Status: COMPLETED | OUTPATIENT
Start: 2021-05-10 | End: 2021-05-10

## 2021-05-10 RX ORDER — WARFARIN SODIUM 7.5 MG/1
7.5 TABLET ORAL
Status: COMPLETED | OUTPATIENT
Start: 2021-05-10 | End: 2021-05-10

## 2021-05-10 RX ADMIN — SODIUM CHLORIDE, PRESERVATIVE FREE 10 ML: 5 INJECTION INTRAVENOUS at 23:03

## 2021-05-10 RX ADMIN — CEFTRIAXONE SODIUM 1000 MG: 1 INJECTION, POWDER, FOR SOLUTION INTRAMUSCULAR; INTRAVENOUS at 17:40

## 2021-05-10 RX ADMIN — ONDANSETRON 4 MG: 2 INJECTION INTRAMUSCULAR; INTRAVENOUS at 03:13

## 2021-05-10 RX ADMIN — POTASSIUM CHLORIDE 40 MEQ: 1500 TABLET, EXTENDED RELEASE ORAL at 11:31

## 2021-05-10 RX ADMIN — AMLODIPINE BESYLATE 10 MG: 10 TABLET ORAL at 08:27

## 2021-05-10 RX ADMIN — OXYCODONE HYDROCHLORIDE AND ACETAMINOPHEN 1 TABLET: 5; 325 TABLET ORAL at 08:29

## 2021-05-10 RX ADMIN — OXYCODONE HYDROCHLORIDE AND ACETAMINOPHEN 1 TABLET: 5; 325 TABLET ORAL at 13:33

## 2021-05-10 RX ADMIN — PANTOPRAZOLE SODIUM 40 MG: 40 TABLET, DELAYED RELEASE ORAL at 08:27

## 2021-05-10 RX ADMIN — WARFARIN SODIUM 7.5 MG: 7.5 TABLET ORAL at 17:39

## 2021-05-10 RX ADMIN — SODIUM CHLORIDE, PRESERVATIVE FREE 10 ML: 5 INJECTION INTRAVENOUS at 08:28

## 2021-05-10 RX ADMIN — CARVEDILOL 25 MG: 12.5 TABLET, FILM COATED ORAL at 08:27

## 2021-05-10 RX ADMIN — ATORVASTATIN CALCIUM 20 MG: 20 TABLET, FILM COATED ORAL at 08:27

## 2021-05-10 RX ADMIN — FOLIC ACID 1 MG: 1 TABLET ORAL at 08:27

## 2021-05-10 ASSESSMENT — PAIN SCALES - GENERAL
PAINLEVEL_OUTOF10: 7

## 2021-05-10 ASSESSMENT — PAIN DESCRIPTION - PAIN TYPE: TYPE: ACUTE PAIN

## 2021-05-10 ASSESSMENT — PAIN DESCRIPTION - ORIENTATION: ORIENTATION: LEFT

## 2021-05-10 ASSESSMENT — PAIN DESCRIPTION - LOCATION: LOCATION: HIP

## 2021-05-10 NOTE — PLAN OF CARE
Problem: Falls - Risk of:  Goal: Will remain free from falls  Description: Will remain free from falls  5/10/2021 1635 by Jerzy Cordero RN  Outcome: Ongoing  5/10/2021 0436 by Sherrell Coleman RN  Outcome: Met This Shift  Goal: Absence of physical injury  Description: Absence of physical injury  5/10/2021 1635 by Jerzy Cordero RN  Outcome: Ongoing  5/10/2021 0436 by Sherrell Coleman RN  Outcome: Met This Shift     Problem: Skin Integrity:  Goal: Will show no infection signs and symptoms  Description: Will show no infection signs and symptoms  5/10/2021 1635 by Jerzy Cordero RN  Outcome: Ongoing  5/10/2021 0436 by Sherrell Coleman RN  Outcome: Ongoing  Goal: Absence of new skin breakdown  Description: Absence of new skin breakdown  5/10/2021 1635 by Jerzy Cordero RN  Outcome: Ongoing  5/10/2021 0436 by Sherrell Coleman RN  Outcome: Met This Shift     Problem: Pain:  Goal: Pain level will decrease  Description: Pain level will decrease  5/10/2021 1635 by Jerzy Cordero RN  Outcome: Ongoing  5/10/2021 0436 by Sherrell Coleman RN  Outcome: Ongoing  Goal: Control of acute pain  Description: Control of acute pain  5/10/2021 1635 by Jerzy Cordero RN  Outcome: Ongoing  5/10/2021 0436 by Sherrell Coleman RN  Outcome: Met This Shift  Goal: Control of chronic pain  Description: Control of chronic pain  5/10/2021 1635 by Jerzy Cordero RN  Outcome: Ongoing  5/10/2021 0436 by Sherrell Coleman RN  Outcome: Ongoing

## 2021-05-10 NOTE — PROGRESS NOTES
Occupational Therapy  Facility/Department: [facility]/MED SURG  Daily Treatment Note  NAME: Espinoza Rodriguez  : 1950  MRN: [id number]    Chief Complaint   Patient presents with    Generalized Body Aches    Other     has not moved in several days       Date of Service: 5/10/2021    Discharge Recommendations:  Patient would benefit from continued therapy after discharge       Assessment   Performance deficits / Impairments: Decreased strength;Decreased safe awareness;Decreased balance;Decreased endurance;Decreased functional mobility ; Decreased ADL status; Decreased ROM; Decreased high-level IADLs;Decreased coordination;Decreased cognition  Assessment: pt exhibits above deficits and is bedridden at baseline impacting pt ability to participate in ADL's and bed mobility without significant physical assistance. pt would benefit from 24hr skilled care and continued acute OT services. Prognosis: Fair  Decision Making: High Complexity  Patient Education: pt ed on OT role, POC, purpose of tx, hand placement during bed mobility, and the benefits of movement and therapy. Fair return. REQUIRES OT FOLLOW UP: Yes  Activity Tolerance  Activity Tolerance: Patient limited by fatigue;Treatment limited secondary to agitation  Safety Devices  Safety Devices in place: Yes  Type of devices: Left in bed;Bed alarm in place;Call light within reach;Nurse notified  Restraints  Initially in place: No         Patient Diagnosis(es): The primary encounter diagnosis was Generalized weakness. A diagnosis of Elevated troponin was also pertinent to this visit. has a past medical history of Acute ischemic stroke ([facility] Depression, DM (diabetes mellitus) ([facility] [facility] Heart murmur, and HTN (hypertension). has a past surgical history that includes Tubal ligation; other surgical history; and pr egd percutaneous placement gastrostomy tube (N/A, 2017).     Restrictions  Restrictions/Precautions  Restrictions/Precautions: Fall Risk(Up with Assist)  Required Braces or Orthoses?: No  Position Activity Restriction  Other position/activity restrictions: pt is bedridden @ baseline     Subjective   General  Patient assessed for rehabilitation services?: Yes  Response to previous treatment: Patient with no complaints from previous session  Family / Caregiver Present: No  Diagnosis: Generalized weakness  General Comment  Comments: RN ok'd pt for therapy. pt initially agreeable to therapy but later declined participation due to wanting to eat the breakfast that had just arrived. Pain Assessment  Pain Assessment: 0-10  Pain Level: 7  Pain Type: Acute pain  Pain Location: Hip  Pain Orientation: Left  Non-Pharmaceutical Pain Intervention(s): Distraction; Therapeutic presence;Repositioned  Response to Pain Intervention: Patient Satisfied  Vital Signs  Patient Currently in Pain: Yes     Orientation  Orientation  Overall Orientation Status: Within Functional Limits     Objective    ADL  Feeding: Setup; Increased time to complete;Supervision  Grooming: Modified independent ;Setup  Additional Comments: OT facilitated pt feeding while pt was upright in bed. pt required assist to reposition self closer to [facility] and [place]. OT facilitated pt face washing with setup. Balance  Sitting Balance: Unable to assess(comment)  Standing Balance: Unable to assess(comment)  Functional Mobility  Functional Mobility Comments: functional mobility not attempted due to pt bedbound at baseline. Bed mobility  Rolling to Left: Maximum assistance;2 Person assistance  Rolling to Right: Maximum assistance;2 Person assistance  Supine to Sit: Unable to assess  Sit to Supine: Unable to assess  Scooting: Unable to assess  Comment: Pt completed bed mobility with Max x2, using bedrails to assist. OT facilitated repositioning of pt to support pt independence in ADL activity.      Transfers  Sit to stand: Unable to assess  Stand to sit: Unable to assess  Transfer Comments: Transfers not assessed, due to pt bedbound at baseline     Cognition  Overall Cognitive Status: Exceptions  Following Commands: Follows one step commands with increased time; Follows one step commands with repetition  Attention Span: Attends with cues to redirect  Safety Judgement: Decreased awareness of need for assistance  Problem Solving: Decreased awareness of errors  Insights: Decreased awareness of deficits  Initiation: Requires cues for some  Sequencing: Requires cues for some  Cognition Comment: pt presented with poor positioning in bed while feeding. When writer offered to provide assist to reposition, pt stated pt didn't need repositioning, showing decreased awareness of errors and need for assist. pt slow to respond, requiring increased time and repetition to follow commands. pt required min VC for attention. Plan   Plan  Times per week: 2x/wk  Current Treatment Recommendations: Strengthening, Balance Training, Functional Mobility Training, Endurance Training, Self-Care / ADL, Patient/Caregiver Education & Training, Safety Education & Training, Equipment Evaluation, Education, & procurement, Cognitive Reorientation, ROM    Goals  Short term goals  Time Frame for Short term goals: Pt will, by discharge:  Short term goal 1: Perform bed mobility with mod A for increased independence with ADLs  Short term goal 2: Maintain attention to task for ~5 min with <2 VC.  (Goal updated by Peace Wagner S/OT)  Short term goal 3: Perform UB ADL tasks and grooming with min A and use of AE/DME PRN  Short term goal 4: Demo 20+ minutes activity tolerance for increased engagement in functional transfers       Therapy Time   Individual Concurrent Group Co-treatment   Time In 0828         Time Out 0850         Minutes 22         Timed Code Treatment Minutes: [address]/OT

## 2021-05-10 NOTE — PROGRESS NOTES
Pharmacy Note  Warfarin Consult follow-up    Recent Labs     05/10/21  0626   INR 2.5     Recent Labs     05/07/21  0827 05/08/21  0804 05/10/21  0626   HGB 9.1* 8.5* 8.7*   HCT 30.1* 28.1* 28.4*    308 349     Current warfarin drug-drug interactions: acetaminophen    Date INR Dose   5/4/21 1.3 7.5 mg    5/5/21 1.1 5 mg    5/6/21 1.3 7.5 mg    5/7/21 1.4 10 mg    5/8/21 2.3 2.5mg   5/9/21 2.7 HOLD   5/10/2021 2.5 7.5mg     Notes:  Give warfarin 7.5mg today on 5/10/2021. Daily PT/INR while inpatient.      Gary Sow RPh, CACP  Clinical Pharmacist Medication Management  5/10/2021  8:11 AM

## 2021-05-10 NOTE — CARE COORDINATION
Called [facility] to check status of precert, no answer. Message left with call back requested. Kera Mayers from [address] calls back and states still awaiting precert.  She will call if she receives it today
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
## 2021-05-11 LAB
-: NORMAL
ANION GAP SERPL CALCULATED.3IONS-SCNC: 11 MMOL/L (ref 9–17)
BUN BLDV-MCNC: 10 MG/DL (ref 8–23)
BUN/CREAT BLD: ABNORMAL (ref 9–20)
CALCIUM SERPL-MCNC: 8 MG/DL (ref 8.6–10.4)
CHLORIDE BLD-SCNC: 106 MMOL/L (ref 98–107)
CO2: 21 MMOL/L (ref 20–31)
CREAT SERPL-MCNC: 0.46 MG/DL (ref 0.5–0.9)
GFR AFRICAN AMERICAN: >60 ML/MIN
GFR NON-AFRICAN AMERICAN: >60 ML/MIN
GFR SERPL CREATININE-BSD FRML MDRD: ABNORMAL ML/MIN/{1.73_M2}
GFR SERPL CREATININE-BSD FRML MDRD: ABNORMAL ML/MIN/{1.73_M2}
GLUCOSE BLD-MCNC: 103 MG/DL (ref 70–99)
INR BLD: 2.1
POTASSIUM SERPL-SCNC: 4.3 MMOL/L (ref 3.7–5.3)
PROTHROMBIN TIME: 21.1 SEC (ref 9.1–12.3)
REASON FOR REJECTION: NORMAL
SODIUM BLD-SCNC: 138 MMOL/L (ref 135–144)
ZZ NTE CLEAN UP: ORDERED TEST: NORMAL
ZZ NTE WITH NAME CLEAN UP: SPECIMEN SOURCE: NORMAL

## 2021-05-11 PROCEDURE — 80048 BASIC METABOLIC PNL TOTAL CA: CPT

## 2021-05-11 PROCEDURE — 6370000000 HC RX 637 (ALT 250 FOR IP): Performed by: STUDENT IN AN ORGANIZED HEALTH CARE EDUCATION/TRAINING PROGRAM

## 2021-05-11 PROCEDURE — 2580000003 HC RX 258: Performed by: INTERNAL MEDICINE

## 2021-05-11 PROCEDURE — 6360000002 HC RX W HCPCS: Performed by: STUDENT IN AN ORGANIZED HEALTH CARE EDUCATION/TRAINING PROGRAM

## 2021-05-11 PROCEDURE — 1200000000 HC SEMI PRIVATE

## 2021-05-11 PROCEDURE — 97110 THERAPEUTIC EXERCISES: CPT

## 2021-05-11 PROCEDURE — 99232 SBSQ HOSP IP/OBS MODERATE 35: CPT | Performed by: INTERNAL MEDICINE

## 2021-05-11 PROCEDURE — 85610 PROTHROMBIN TIME: CPT

## 2021-05-11 PROCEDURE — 36415 COLL VENOUS BLD VENIPUNCTURE: CPT

## 2021-05-11 PROCEDURE — 2580000003 HC RX 258: Performed by: STUDENT IN AN ORGANIZED HEALTH CARE EDUCATION/TRAINING PROGRAM

## 2021-05-11 PROCEDURE — 97530 THERAPEUTIC ACTIVITIES: CPT

## 2021-05-11 PROCEDURE — 6370000000 HC RX 637 (ALT 250 FOR IP): Performed by: INTERNAL MEDICINE

## 2021-05-11 RX ORDER — WARFARIN SODIUM 7.5 MG/1
7.5 TABLET ORAL
Status: COMPLETED | OUTPATIENT
Start: 2021-05-11 | End: 2021-05-11

## 2021-05-11 RX ADMIN — FOLIC ACID 1 MG: 1 TABLET ORAL at 08:42

## 2021-05-11 RX ADMIN — ATORVASTATIN CALCIUM 20 MG: 20 TABLET, FILM COATED ORAL at 08:42

## 2021-05-11 RX ADMIN — CARVEDILOL 25 MG: 12.5 TABLET, FILM COATED ORAL at 08:42

## 2021-05-11 RX ADMIN — SODIUM CHLORIDE, PRESERVATIVE FREE 10 ML: 5 INJECTION INTRAVENOUS at 20:56

## 2021-05-11 RX ADMIN — HYDRALAZINE HYDROCHLORIDE 25 MG: 50 TABLET, FILM COATED ORAL at 06:05

## 2021-05-11 RX ADMIN — AMLODIPINE BESYLATE 10 MG: 10 TABLET ORAL at 08:42

## 2021-05-11 RX ADMIN — HYDRALAZINE HYDROCHLORIDE 25 MG: 50 TABLET, FILM COATED ORAL at 14:59

## 2021-05-11 RX ADMIN — CARVEDILOL 25 MG: 12.5 TABLET, FILM COATED ORAL at 17:39

## 2021-05-11 RX ADMIN — WARFARIN SODIUM 7.5 MG: 7.5 TABLET ORAL at 17:39

## 2021-05-11 RX ADMIN — CEFTRIAXONE SODIUM 1000 MG: 1 INJECTION, POWDER, FOR SOLUTION INTRAMUSCULAR; INTRAVENOUS at 15:00

## 2021-05-11 RX ADMIN — SODIUM CHLORIDE, PRESERVATIVE FREE 10 ML: 5 INJECTION INTRAVENOUS at 08:42

## 2021-05-11 RX ADMIN — OXYCODONE HYDROCHLORIDE AND ACETAMINOPHEN 1 TABLET: 5; 325 TABLET ORAL at 06:50

## 2021-05-11 RX ADMIN — PANTOPRAZOLE SODIUM 40 MG: 40 TABLET, DELAYED RELEASE ORAL at 08:43

## 2021-05-11 RX ADMIN — HYDRALAZINE HYDROCHLORIDE 25 MG: 50 TABLET, FILM COATED ORAL at 20:53

## 2021-05-11 RX ADMIN — PANTOPRAZOLE SODIUM 40 MG: 40 TABLET, DELAYED RELEASE ORAL at 20:53

## 2021-05-11 ASSESSMENT — PAIN DESCRIPTION - FREQUENCY: FREQUENCY: CONTINUOUS

## 2021-05-11 ASSESSMENT — PAIN DESCRIPTION - ORIENTATION: ORIENTATION: RIGHT;LEFT

## 2021-05-11 ASSESSMENT — PAIN DESCRIPTION - DESCRIPTORS: DESCRIPTORS: ACHING;DISCOMFORT

## 2021-05-11 ASSESSMENT — PAIN SCALES - GENERAL
PAINLEVEL_OUTOF10: 4
PAINLEVEL_OUTOF10: 8
PAINLEVEL_OUTOF10: 7

## 2021-05-11 ASSESSMENT — PAIN DESCRIPTION - LOCATION: LOCATION: LEG

## 2021-05-11 ASSESSMENT — PAIN - FUNCTIONAL ASSESSMENT: PAIN_FUNCTIONAL_ASSESSMENT: PREVENTS OR INTERFERES SOME ACTIVE ACTIVITIES AND ADLS

## 2021-05-11 ASSESSMENT — PAIN DESCRIPTION - PAIN TYPE: TYPE: CHRONIC PAIN

## 2021-05-11 NOTE — PLAN OF CARE
Problem: Nutrition  Goal: Optimal nutrition therapy  Outcome: Ongoing  Note: Nutrition Problem #1: Increased nutrient needs  Intervention: Food and/or Nutrient Delivery: Continue Current Diet, Start Oral Nutrition Supplement  Nutritional Goals: Meet >75% of estimated nutrient needs

## 2021-05-11 NOTE — PLAN OF CARE
Problem: Falls - Risk of:  Goal: Will remain free from falls  Description: Will remain free from falls  5/11/2021 1513 by Zina López RN  Outcome: Ongoing  5/11/2021 0738 by Ebonie Cho RN  Outcome: Ongoing  5/11/2021 0655 by Zina López RN  Outcome: Ongoing  Goal: Absence of physical injury  Description: Absence of physical injury  5/11/2021 1513 by Zina López RN  Outcome: Ongoing  5/11/2021 0738 by Ebonie Cho RN  Outcome: Ongoing  5/11/2021 0655 by Zina López RN  Outcome: Ongoing     Problem: Skin Integrity:  Goal: Will show no infection signs and symptoms  Description: Will show no infection signs and symptoms  5/11/2021 1513 by Zina López RN  Outcome: Ongoing  5/11/2021 0738 by Ebonie Cho RN  Outcome: Ongoing  5/11/2021 0655 by Zian López RN  Outcome: Ongoing  Goal: Absence of new skin breakdown  Description: Absence of new skin breakdown  5/11/2021 1513 by Zina López RN  Outcome: Ongoing  5/11/2021 0738 by Ebonie Cho RN  Outcome: Ongoing  5/11/2021 0655 by Zina López RN  Outcome: Ongoing     Problem: Pain:  Goal: Pain level will decrease  Description: Pain level will decrease  5/11/2021 1513 by Zina López RN  Outcome: Ongoing  5/11/2021 0738 by Ebonie Cho RN  Outcome: Ongoing  5/11/2021 0655 by Zina López RN  Outcome: Ongoing  Goal: Control of acute pain  Description: Control of acute pain  5/11/2021 1513 by Zina López RN  Outcome: Ongoing  5/11/2021 0738 by Ebonie Cho RN  Outcome: Ongoing  5/11/2021 0655 by Zina López RN  Outcome: Ongoing  Goal: Control of chronic pain  Description: Control of chronic pain  5/11/2021 1513 by Zina López RN  Outcome: Ongoing  5/11/2021 0738 by Ebonie Cho RN  Outcome: Ongoing  5/11/2021 0655 by Zina López RN  Outcome: Ongoing

## 2021-05-11 NOTE — CARE COORDINATION
Call from Michelet Posada at [address], relates patient will need a PTP for admission, requests call back, [facility] ASHOK qiu notified
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
## 2021-05-12 LAB
ABSOLUTE EOS #: 0.43 K/UL (ref 0–0.44)
ABSOLUTE IMMATURE GRANULOCYTE: 0.12 K/UL (ref 0–0.3)
ABSOLUTE LYMPH #: 3.21 K/UL (ref 1.1–3.7)
ABSOLUTE MONO #: 0.81 K/UL (ref 0.1–1.2)
ANION GAP SERPL CALCULATED.3IONS-SCNC: 9 MMOL/L (ref 9–17)
BASOPHILS # BLD: 0 % (ref 0–2)
BASOPHILS ABSOLUTE: 0.04 K/UL (ref 0–0.2)
BUN BLDV-MCNC: 12 MG/DL (ref 8–23)
BUN/CREAT BLD: ABNORMAL (ref 9–20)
CALCIUM SERPL-MCNC: 8.1 MG/DL (ref 8.6–10.4)
CHLORIDE BLD-SCNC: 104 MMOL/L (ref 98–107)
CO2: 22 MMOL/L (ref 20–31)
CREAT SERPL-MCNC: 0.47 MG/DL (ref 0.5–0.9)
DIFFERENTIAL TYPE: ABNORMAL
EOSINOPHILS RELATIVE PERCENT: 3 % (ref 1–4)
GFR AFRICAN AMERICAN: >60 ML/MIN
GFR NON-AFRICAN AMERICAN: >60 ML/MIN
GFR SERPL CREATININE-BSD FRML MDRD: ABNORMAL ML/MIN/{1.73_M2}
GFR SERPL CREATININE-BSD FRML MDRD: ABNORMAL ML/MIN/{1.73_M2}
GLUCOSE BLD-MCNC: 158 MG/DL (ref 70–99)
HCT VFR BLD CALC: 24.4 % (ref 36.3–47.1)
HEMOGLOBIN: 7.3 G/DL (ref 11.9–15.1)
IMMATURE GRANULOCYTES: 1 %
INR BLD: 2.2
LYMPHOCYTES # BLD: 25 % (ref 24–43)
MCH RBC QN AUTO: 28.6 PG (ref 25.2–33.5)
MCHC RBC AUTO-ENTMCNC: 29.9 G/DL (ref 28.4–34.8)
MCV RBC AUTO: 95.7 FL (ref 82.6–102.9)
MONOCYTES # BLD: 6 % (ref 3–12)
NRBC AUTOMATED: 0 PER 100 WBC
PDW BLD-RTO: 15.3 % (ref 11.8–14.4)
PLATELET # BLD: 303 K/UL (ref 138–453)
PLATELET ESTIMATE: ABNORMAL
PMV BLD AUTO: 9 FL (ref 8.1–13.5)
POTASSIUM SERPL-SCNC: 3.9 MMOL/L (ref 3.7–5.3)
PROTHROMBIN TIME: 22.4 SEC (ref 9.1–12.3)
RBC # BLD: 2.55 M/UL (ref 3.95–5.11)
RBC # BLD: ABNORMAL 10*6/UL
SEG NEUTROPHILS: 64 % (ref 36–65)
SEGMENTED NEUTROPHILS ABSOLUTE COUNT: 8.25 K/UL (ref 1.5–8.1)
SODIUM BLD-SCNC: 135 MMOL/L (ref 135–144)
WBC # BLD: 12.9 K/UL (ref 3.5–11.3)
WBC # BLD: ABNORMAL 10*3/UL

## 2021-05-12 PROCEDURE — 85610 PROTHROMBIN TIME: CPT

## 2021-05-12 PROCEDURE — 2580000003 HC RX 258: Performed by: INTERNAL MEDICINE

## 2021-05-12 PROCEDURE — 80048 BASIC METABOLIC PNL TOTAL CA: CPT

## 2021-05-12 PROCEDURE — 6370000000 HC RX 637 (ALT 250 FOR IP): Performed by: STUDENT IN AN ORGANIZED HEALTH CARE EDUCATION/TRAINING PROGRAM

## 2021-05-12 PROCEDURE — 1200000000 HC SEMI PRIVATE

## 2021-05-12 PROCEDURE — 6370000000 HC RX 637 (ALT 250 FOR IP): Performed by: INTERNAL MEDICINE

## 2021-05-12 PROCEDURE — 85025 COMPLETE CBC W/AUTO DIFF WBC: CPT

## 2021-05-12 PROCEDURE — 99232 SBSQ HOSP IP/OBS MODERATE 35: CPT | Performed by: INTERNAL MEDICINE

## 2021-05-12 PROCEDURE — 36415 COLL VENOUS BLD VENIPUNCTURE: CPT

## 2021-05-12 RX ORDER — WARFARIN SODIUM 5 MG/1
5 TABLET ORAL
Status: COMPLETED | OUTPATIENT
Start: 2021-05-12 | End: 2021-05-12

## 2021-05-12 RX ORDER — LEVOFLOXACIN 750 MG/1
750 TABLET ORAL DAILY
Status: DISCONTINUED | OUTPATIENT
Start: 2021-05-12 | End: 2021-05-12

## 2021-05-12 RX ORDER — CEPHALEXIN 500 MG/1
500 CAPSULE ORAL EVERY 12 HOURS SCHEDULED
Status: DISCONTINUED | OUTPATIENT
Start: 2021-05-12 | End: 2021-05-13 | Stop reason: HOSPADM

## 2021-05-12 RX ADMIN — PANTOPRAZOLE SODIUM 40 MG: 40 TABLET, DELAYED RELEASE ORAL at 22:04

## 2021-05-12 RX ADMIN — CEPHALEXIN 500 MG: 500 CAPSULE ORAL at 11:37

## 2021-05-12 RX ADMIN — WARFARIN SODIUM 5 MG: 5 TABLET ORAL at 17:02

## 2021-05-12 RX ADMIN — SODIUM CHLORIDE, PRESERVATIVE FREE 10 ML: 5 INJECTION INTRAVENOUS at 22:04

## 2021-05-12 RX ADMIN — OXYCODONE HYDROCHLORIDE AND ACETAMINOPHEN 1 TABLET: 5; 325 TABLET ORAL at 23:54

## 2021-05-12 RX ADMIN — ATORVASTATIN CALCIUM 20 MG: 20 TABLET, FILM COATED ORAL at 08:41

## 2021-05-12 RX ADMIN — HYDRALAZINE HYDROCHLORIDE 25 MG: 50 TABLET, FILM COATED ORAL at 06:41

## 2021-05-12 RX ADMIN — CARVEDILOL 25 MG: 12.5 TABLET, FILM COATED ORAL at 08:39

## 2021-05-12 RX ADMIN — CEPHALEXIN 500 MG: 500 CAPSULE ORAL at 22:04

## 2021-05-12 RX ADMIN — PANTOPRAZOLE SODIUM 40 MG: 40 TABLET, DELAYED RELEASE ORAL at 08:41

## 2021-05-12 RX ADMIN — CARVEDILOL 25 MG: 12.5 TABLET, FILM COATED ORAL at 17:03

## 2021-05-12 RX ADMIN — HYDRALAZINE HYDROCHLORIDE 25 MG: 50 TABLET, FILM COATED ORAL at 14:31

## 2021-05-12 RX ADMIN — OXYCODONE HYDROCHLORIDE AND ACETAMINOPHEN 1 TABLET: 5; 325 TABLET ORAL at 11:26

## 2021-05-12 RX ADMIN — FOLIC ACID 1 MG: 1 TABLET ORAL at 08:41

## 2021-05-12 RX ADMIN — SODIUM CHLORIDE, PRESERVATIVE FREE 10 ML: 5 INJECTION INTRAVENOUS at 08:40

## 2021-05-12 RX ADMIN — AMLODIPINE BESYLATE 10 MG: 10 TABLET ORAL at 08:41

## 2021-05-12 RX ADMIN — LEVOFLOXACIN 750 MG: 750 TABLET, FILM COATED ORAL at 08:38

## 2021-05-12 ASSESSMENT — PAIN SCALES - GENERAL
PAINLEVEL_OUTOF10: 7
PAINLEVEL_OUTOF10: 3
PAINLEVEL_OUTOF10: 7
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
## 2021-05-12 NOTE — PLAN OF CARE
Problem: Falls - Risk of:  Goal: Will remain free from falls  Description: Will remain free from falls  5/12/2021 1553 by Asaf Metz RN  Outcome: Ongoing  5/12/2021 0314 by Delano Cui RN  Outcome: Ongoing  Goal: Absence of physical injury  Description: Absence of physical injury  5/12/2021 1553 by Asaf Metz RN  Outcome: Ongoing  5/12/2021 0314 by Delano Cui RN  Outcome: Ongoing     Problem: Skin Integrity:  Goal: Will show no infection signs and symptoms  Description: Will show no infection signs and symptoms  5/12/2021 1553 by Asaf Metz RN  Outcome: Ongoing  5/12/2021 0314 by Delano Cui RN  Outcome: Ongoing  Goal: Absence of new skin breakdown  Description: Absence of new skin breakdown  5/12/2021 1553 by Asaf Metz RN  Outcome: Ongoing  5/12/2021 1104 by Asaf Metz RN  Outcome: Ongoing  5/12/2021 0314 by Delano Cui RN  Outcome: Ongoing     Problem: Pain:  Goal: Pain level will decrease  Description: Pain level will decrease  5/12/2021 1553 by Asaf Metz RN  Outcome: Ongoing  5/12/2021 1104 by Asaf Metz RN  Outcome: Ongoing  5/12/2021 0314 by Delano Cui RN  Outcome: Ongoing  Goal: Control of acute pain  Description: Control of acute pain  5/12/2021 1553 by Asaf Metz RN  Outcome: Ongoing  5/12/2021 1104 by Asaf Metz RN  Outcome: Ongoing  5/12/2021 0314 by Delano Cui RN  Outcome: Ongoing  Goal: Control of chronic pain  Description: Control of chronic pain  5/12/2021 1553 by Asaf Metz RN  Outcome: Ongoing  5/12/2021 1104 by Asaf Metz RN  Outcome: Ongoing  5/12/2021 0314 by Delano Cui RN  Outcome: Ongoing     Problem: Nutrition  Goal: Optimal nutrition therapy  5/12/2021 1553 by Asaf Metz RN  Outcome: Ongoing  5/12/2021 1104 by Asaf Metz RN  Outcome: Ongoing  5/12/2021 0314 by Delano Cui RN  Outcome: Ongoing

## 2021-05-12 NOTE — PLAN OF CARE
Problem: Falls - Risk of:  Goal: Will remain free from falls  Description: Will remain free from falls  5/12/2021 0314 by Negar Gupta RN  Outcome: Ongoing  5/11/2021 1513 by Rustam Mendosa RN  Outcome: Ongoing  Goal: Absence of physical injury  Description: Absence of physical injury  5/12/2021 0314 by Negar Gupta RN  Outcome: Ongoing  5/11/2021 1513 by Rustam Mendosa RN  Outcome: Ongoing     Problem: Skin Integrity:  Goal: Will show no infection signs and symptoms  Description: Will show no infection signs and symptoms  5/12/2021 0314 by Negar Gupta RN  Outcome: Ongoing  5/11/2021 1513 by Rustam Mendosa RN  Outcome: Ongoing  Goal: Absence of new skin breakdown  Description: Absence of new skin breakdown  5/12/2021 0314 by Negar Gupta RN  Outcome: Ongoing  5/11/2021 1513 by Rustam Mendosa RN  Outcome: Ongoing     Problem: Pain:  Goal: Pain level will decrease  Description: Pain level will decrease  5/12/2021 0314 by Negar Gupta RN  Outcome: Ongoing  5/11/2021 1513 by Rustam Mendosa RN  Outcome: Ongoing  Goal: Control of acute pain  Description: Control of acute pain  5/12/2021 0314 by Negar Gupta RN  Outcome: Ongoing  5/11/2021 1513 by Rustam Mendosa RN  Outcome: Ongoing  Goal: Control of chronic pain  Description: Control of chronic pain  5/12/2021 0314 by Negar Gupta RN  Outcome: Ongoing  5/11/2021 1513 by Rustam Mendosa RN  Outcome: Ongoing     Problem: Nutrition  Goal: Optimal nutrition therapy  5/12/2021 0314 by Negar Gupta RN  Outcome: Ongoing  5/11/2021 1542 by Samuel Cullen, MS, RD, LD  Outcome: Ongoing  Note: Nutrition Problem #1: Increased nutrient needs  Intervention: Food and/or Nutrient Delivery: Continue Current Diet, Start Oral Nutrition Supplement  Nutritional Goals: Meet >75% of estimated nutrient needs

## 2021-05-12 NOTE — PROGRESS NOTES
Pharmacy Note  Warfarin Consult follow-up      Recent Labs     05/12/21  0543   INR 2.2     Recent Labs     05/10/21  0626 05/12/21  0543   HGB 8.7* 7.3*   HCT 28.4* 24.4*    303       Significant Drug-Drug Interactions:  New warfarin drug-drug interactions: Levaquin  Discontinued drug-drug interactions: none  Current warfarin drug-drug interactions: acetaminophen, Levaquin (started 5/12)    Date INR Dose   5/4/21 1.3 7.5 mg    5/5/21 1.1 5 mg    5/6/21 1.3 7.5 mg    5/7/21 1.4 10 mg    5/8/21 2.3 2.5mg   5/9/21 2.7 HOLD   5/10/2021 2.5 7.5mg   5/11/2021 2.1 7.5mg   5/12/2021 2.2 5 mg       Notes:          Warfarin 5 mg ordered for today. Daily PT/INR while inpatient. [address] [place] [address]  Ph., CACP, Clinical Pharmacist  Anticoagulation Services, [facility]  5/12/2021  9:23 AM

## 2021-05-13 VITALS
BODY MASS INDEX: 41.2 KG/M2 | WEIGHT: 240 LBS | DIASTOLIC BLOOD PRESSURE: 51 MMHG | TEMPERATURE: 98.6 F | RESPIRATION RATE: 18 BRPM | SYSTOLIC BLOOD PRESSURE: 113 MMHG | OXYGEN SATURATION: 100 % | HEART RATE: 84 BPM

## 2021-05-13 LAB
ANION GAP SERPL CALCULATED.3IONS-SCNC: 9 MMOL/L (ref 9–17)
BUN BLDV-MCNC: 13 MG/DL (ref 8–23)
BUN/CREAT BLD: ABNORMAL (ref 9–20)
CALCIUM SERPL-MCNC: 8.1 MG/DL (ref 8.6–10.4)
CHLORIDE BLD-SCNC: 104 MMOL/L (ref 98–107)
CO2: 24 MMOL/L (ref 20–31)
CREAT SERPL-MCNC: 0.47 MG/DL (ref 0.5–0.9)
GFR AFRICAN AMERICAN: >60 ML/MIN
GFR NON-AFRICAN AMERICAN: >60 ML/MIN
GFR SERPL CREATININE-BSD FRML MDRD: ABNORMAL ML/MIN/{1.73_M2}
GFR SERPL CREATININE-BSD FRML MDRD: ABNORMAL ML/MIN/{1.73_M2}
GLUCOSE BLD-MCNC: 131 MG/DL (ref 70–99)
INR BLD: 2.6
POTASSIUM SERPL-SCNC: 4 MMOL/L (ref 3.7–5.3)
PROTHROMBIN TIME: 25.9 SEC (ref 9.1–12.3)
SODIUM BLD-SCNC: 137 MMOL/L (ref 135–144)

## 2021-05-13 PROCEDURE — 85610 PROTHROMBIN TIME: CPT

## 2021-05-13 PROCEDURE — 80048 BASIC METABOLIC PNL TOTAL CA: CPT

## 2021-05-13 PROCEDURE — 6370000000 HC RX 637 (ALT 250 FOR IP): Performed by: STUDENT IN AN ORGANIZED HEALTH CARE EDUCATION/TRAINING PROGRAM

## 2021-05-13 PROCEDURE — 36415 COLL VENOUS BLD VENIPUNCTURE: CPT

## 2021-05-13 RX ORDER — CEPHALEXIN 500 MG/1
500 CAPSULE ORAL EVERY 12 HOURS SCHEDULED
Qty: 4 CAPSULE | Refills: 0 | Status: SHIPPED | OUTPATIENT
Start: 2021-05-13 | End: 2021-05-15

## 2021-05-13 RX ORDER — WARFARIN SODIUM 2.5 MG/1
2.5 TABLET ORAL
Status: DISCONTINUED | OUTPATIENT
Start: 2021-05-13 | End: 2021-05-13 | Stop reason: HOSPADM

## 2021-05-13 RX ADMIN — CARVEDILOL 25 MG: 12.5 TABLET, FILM COATED ORAL at 08:25

## 2021-05-13 RX ADMIN — HYDRALAZINE HYDROCHLORIDE 25 MG: 50 TABLET, FILM COATED ORAL at 05:51

## 2021-05-13 RX ADMIN — CEPHALEXIN 500 MG: 500 CAPSULE ORAL at 08:25

## 2021-05-13 RX ADMIN — PANTOPRAZOLE SODIUM 40 MG: 40 TABLET, DELAYED RELEASE ORAL at 08:26

## 2021-05-13 RX ADMIN — AMLODIPINE BESYLATE 10 MG: 10 TABLET ORAL at 08:25

## 2021-05-13 RX ADMIN — FOLIC ACID 1 MG: 1 TABLET ORAL at 08:25

## 2021-05-13 RX ADMIN — ATORVASTATIN CALCIUM 20 MG: 20 TABLET, FILM COATED ORAL at 08:25

## 2021-05-13 NOTE — PROGRESS NOTES
CLINICAL PHARMACY NOTE: MEDS TO [address] Select Patient?: No  Total # of Prescriptions Filled: 1   The following medications were delivered to the patient:  · Cephalexin 500mg  Total # of Interventions Completed: 0  Time Spent (min): 0    Additional Documentation: delivered to patient in room 315 at 9:27am. Co-pay paid with cash ($1.72).

## 2021-05-13 NOTE — PROGRESS NOTES
Pharmacy Note  Warfarin Consult follow-up      Recent Labs     05/13/21  0544   INR 2.6     Recent Labs     05/12/21  0543   HGB 7.3*   HCT 24.4*          Significant Drug-Drug Interactions:  New warfarin drug-drug interactions:cephalexin  Discontinued drug-drug interactions: Levaquin (started 5/12  Current warfarin drug-drug interactions: acetaminophen, cephalexin    Date INR Dose   5/4/21 1.3 7.5 mg    5/5/21 1.1 5 mg    5/6/21 1.3 7.5 mg    5/7/21 1.4 10 mg    5/8/21 2.3 2.5mg   5/9/21 2.7 HOLD   5/10/2021 2.5 7.5mg   5/11/2021 2.1 7.5mg   5/12/2021 2.2 5 mg   5/13/2021 2.6 2.5 mg        Notes:      INR still in range but increasing overnight by 0.4 points. Will reduce warfarin to 2.5 mg for this evening. Daily PT/INR while inpatient. [address] [place] [address]  Ph., CACP, Clinical Pharmacist  Anticoagulation Services, [facility]  5/13/2021  8:21 AM
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
## 2021-05-13 NOTE — PLAN OF CARE
Problem: Falls - Risk of:  Goal: Will remain free from falls  Description: Will remain free from falls  5/13/2021 0314 by Ignacio Thompson RN  Outcome: Ongoing  5/12/2021 1553 by Cindy Bob RN  Outcome: Ongoing  Goal: Absence of physical injury  Description: Absence of physical injury  5/13/2021 0314 by Ignacio Thompson RN  Outcome: Ongoing  5/12/2021 1553 by Cindy Bob RN  Outcome: Ongoing     Problem: Skin Integrity:  Goal: Will show no infection signs and symptoms  Description: Will show no infection signs and symptoms  5/13/2021 0314 by Ignacio Thompson RN  Outcome: Ongoing  5/12/2021 1553 by Cindy Bob RN  Outcome: Ongoing  Goal: Absence of new skin breakdown  Description: Absence of new skin breakdown  5/13/2021 0314 by Ignacio Thompson RN  Outcome: Ongoing  5/12/2021 1553 by Cindy Bob RN  Outcome: Ongoing     Problem: Pain:  Goal: Pain level will decrease  Description: Pain level will decrease  5/13/2021 0314 by Ignacio Thompson RN  Outcome: Ongoing  5/12/2021 1553 by Cindy Bob RN  Outcome: Ongoing  Goal: Control of acute pain  Description: Control of acute pain  5/13/2021 0314 by Ignacio Thompson RN  Outcome: Ongoing  5/12/2021 1553 by Cindy Bob RN  Outcome: Ongoing  Goal: Control of chronic pain  Description: Control of chronic pain  5/13/2021 0314 by Ignacio Thompson RN  Outcome: Ongoing  5/12/2021 1553 by Cindy Bob RN  Outcome: Ongoing     Problem: Nutrition  Goal: Optimal nutrition therapy  5/13/2021 0314 by Ignacio Thompson RN  Outcome: Ongoing  5/12/2021 1553 by Cindy Bob RN  Outcome: Ongoing
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
## 2021-05-20 ENCOUNTER — TELEPHONE (OUTPATIENT)
Dept: PHARMACY | Age: 71
End: 2021-05-20

## 2021-06-03 NOTE — TELEPHONE ENCOUNTER
Called to schedule, no answer, left vm. Spoke with son who provided number for daughter Goldy Martinez with whom I spoke. At this time they want to use HH to manage warfarin and are working on setting that up. She is aware that we do not manage via [place]. If they need our service they will call us back within one week.       Siri Gilman [facility], CACP  Clinical Pharmacist Medication Management  6/3/2021  9:57 AM

## 2021-09-02 ENCOUNTER — APPOINTMENT (OUTPATIENT)
Dept: GENERAL RADIOLOGY | Age: 71
End: 2021-09-02
Payer: MEDICARE

## 2021-09-02 ENCOUNTER — HOSPITAL ENCOUNTER (EMERGENCY)
Age: 71
Discharge: HOME | End: 2021-09-03
Attending: EMERGENCY MEDICINE
Payer: MEDICARE

## 2021-09-02 DIAGNOSIS — R34 OLIGURIA: Primary | ICD-10-CM

## 2021-09-02 LAB
ABSOLUTE EOS #: 0.55 K/UL (ref 0–0.44)
ABSOLUTE IMMATURE GRANULOCYTE: 0.03 K/UL (ref 0–0.3)
ABSOLUTE LYMPH #: 2.23 K/UL (ref 1.1–3.7)
ABSOLUTE MONO #: 0.7 K/UL (ref 0.1–1.2)
ALBUMIN SERPL-MCNC: 3.2 G/DL (ref 3.5–5.2)
ALBUMIN/GLOBULIN RATIO: 0.8 (ref 1–2.5)
ALP BLD-CCNC: 104 U/L (ref 35–104)
ALT SERPL-CCNC: 11 U/L (ref 5–33)
ANION GAP SERPL CALCULATED.3IONS-SCNC: 10 MMOL/L (ref 9–17)
AST SERPL-CCNC: 11 U/L
BASOPHILS # BLD: 0 % (ref 0–2)
BASOPHILS ABSOLUTE: 0.03 K/UL (ref 0–0.2)
BILIRUB SERPL-MCNC: 0.27 MG/DL (ref 0.3–1.2)
BUN BLDV-MCNC: 17 MG/DL (ref 8–23)
BUN/CREAT BLD: ABNORMAL (ref 9–20)
CALCIUM SERPL-MCNC: 8.8 MG/DL (ref 8.6–10.4)
CHLORIDE BLD-SCNC: 104 MMOL/L (ref 98–107)
CO2: 26 MMOL/L (ref 20–31)
CREAT SERPL-MCNC: 0.51 MG/DL (ref 0.5–0.9)
DIFFERENTIAL TYPE: ABNORMAL
EOSINOPHILS RELATIVE PERCENT: 6 % (ref 1–4)
GFR AFRICAN AMERICAN: >60 ML/MIN
GFR NON-AFRICAN AMERICAN: >60 ML/MIN
GFR SERPL CREATININE-BSD FRML MDRD: ABNORMAL ML/MIN/{1.73_M2}
GFR SERPL CREATININE-BSD FRML MDRD: ABNORMAL ML/MIN/{1.73_M2}
GLUCOSE BLD-MCNC: 155 MG/DL (ref 70–99)
HCT VFR BLD CALC: 33.1 % (ref 36.3–47.1)
HEMOGLOBIN: 10.2 G/DL (ref 11.9–15.1)
IMMATURE GRANULOCYTES: 0 %
LACTIC ACID, WHOLE BLOOD: 1.8 MMOL/L (ref 0.7–2.1)
LACTIC ACID: NORMAL MMOL/L
LYMPHOCYTES # BLD: 23 % (ref 24–43)
MCH RBC QN AUTO: 27.1 PG (ref 25.2–33.5)
MCHC RBC AUTO-ENTMCNC: 30.8 G/DL (ref 28.4–34.8)
MCV RBC AUTO: 88 FL (ref 82.6–102.9)
MONOCYTES # BLD: 7 % (ref 3–12)
NRBC AUTOMATED: 0 PER 100 WBC
PDW BLD-RTO: 16.3 % (ref 11.8–14.4)
PLATELET # BLD: 344 K/UL (ref 138–453)
PLATELET ESTIMATE: ABNORMAL
PMV BLD AUTO: 9.1 FL (ref 8.1–13.5)
POTASSIUM SERPL-SCNC: 4.8 MMOL/L (ref 3.7–5.3)
RBC # BLD: 3.76 M/UL (ref 3.95–5.11)
RBC # BLD: ABNORMAL 10*6/UL
SEG NEUTROPHILS: 64 % (ref 36–65)
SEGMENTED NEUTROPHILS ABSOLUTE COUNT: 6.02 K/UL (ref 1.5–8.1)
SODIUM BLD-SCNC: 140 MMOL/L (ref 135–144)
TOTAL PROTEIN: 7.2 G/DL (ref 6.4–8.3)
WBC # BLD: 9.6 K/UL (ref 3.5–11.3)
WBC # BLD: ABNORMAL 10*3/UL

## 2021-09-02 PROCEDURE — 85025 COMPLETE CBC W/AUTO DIFF WBC: CPT

## 2021-09-02 PROCEDURE — 93005 ELECTROCARDIOGRAM TRACING: CPT | Performed by: STUDENT IN AN ORGANIZED HEALTH CARE EDUCATION/TRAINING PROGRAM

## 2021-09-02 PROCEDURE — 73502 X-RAY EXAM HIP UNI 2-3 VIEWS: CPT

## 2021-09-02 PROCEDURE — 83605 ASSAY OF LACTIC ACID: CPT

## 2021-09-02 PROCEDURE — 80053 COMPREHEN METABOLIC PANEL: CPT

## 2021-09-02 PROCEDURE — 99285 EMERGENCY DEPT VISIT HI MDM: CPT

## 2021-09-03 ENCOUNTER — APPOINTMENT (OUTPATIENT)
Dept: CT IMAGING | Age: 71
End: 2021-09-03
Payer: MEDICARE

## 2021-09-03 VITALS
BODY MASS INDEX: 40.46 KG/M2 | OXYGEN SATURATION: 98 % | HEIGHT: 64 IN | HEART RATE: 77 BPM | SYSTOLIC BLOOD PRESSURE: 110 MMHG | DIASTOLIC BLOOD PRESSURE: 61 MMHG | TEMPERATURE: 97.3 F | WEIGHT: 237 LBS | RESPIRATION RATE: 14 BRPM

## 2021-09-03 LAB
EKG ATRIAL RATE: 79 BPM
EKG P AXIS: 54 DEGREES
EKG P-R INTERVAL: 132 MS
EKG Q-T INTERVAL: 376 MS
EKG QRS DURATION: 72 MS
EKG QTC CALCULATION (BAZETT): 431 MS
EKG R AXIS: 32 DEGREES
EKG T AXIS: 31 DEGREES
EKG VENTRICULAR RATE: 79 BPM

## 2021-09-03 PROCEDURE — 74177 CT ABD & PELVIS W/CONTRAST: CPT

## 2021-09-03 PROCEDURE — 6360000004 HC RX CONTRAST MEDICATION: Performed by: STUDENT IN AN ORGANIZED HEALTH CARE EDUCATION/TRAINING PROGRAM

## 2021-09-03 RX ADMIN — IOPAMIDOL 75 ML: 755 INJECTION, SOLUTION INTRAVENOUS at 02:05

## 2021-09-03 ASSESSMENT — ENCOUNTER SYMPTOMS
SHORTNESS OF BREATH: 0
RHINORRHEA: 0
PHOTOPHOBIA: 0
VOMITING: 0
NAUSEA: 0
SORE THROAT: 0
ABDOMINAL PAIN: 0

## 2021-09-03 NOTE — ED PROVIDER NOTES
[address]  ED  eMERGENCY dEPARTMENT eNCOUnter   Attending Attestation     Pt Name: Rhoda Severance  MRN: [id number]  Juliogfpankaj 1950  Date of evaluation: 9/2/21       Rhoda Severance is a 66 y.o. female who presents with Oliguria      History: Patient presents with decreased urination. Patient has had a indwelling Noel catheter which was removed today to see if she would make urine however the patient did not make urine since it was removed. Patient is here for evaluation. Exam: *Heart rate and rhythm are regular. Lungs are clear to auscultation bilaterally. Abdomen is firm. Abdomen seems somewhat distended. Plan for bladder scan, urinalysis, will place catheter if required. Given firm abdomen there is some concern for mass. Will get CT scan. I performed a history and physical examination of the patient and discussed management with the resident. I reviewed the residents note and agree with the documented findings and plan of care. Any areas of disagreement are noted on the chart. I was personally present for the key portions of any procedures. I have documented in the chart those procedures where I was not present during the key portions. I have personally reviewed all images and agree with the resident's interpretation. I have reviewed the emergency nurses triage note. I agree with the chief complaint, past medical history, past surgical history, allergies, medications, social and family history as documented unless otherwise noted below. Documentation of the HPI, Physical Exam and Medical Decision Making performed by medical students or scribes is based on my personal performance of the HPI, PE and MDM. For Phys Assistant/ Nurse Practitioner cases/documentation I have had a face to face evaluation of this patient and have completed at least one if not all key elements of the E/M (history, physical exam, and MDM). Additional findings are as noted.     For APC cases I have personally evaluated and examined the patient in conjunction with the APC and agree with the treatment plan and disposition of the patient as recorded by the APC.     Derek Ty MD  Attending Emergency  Physician       Erik Li MD  09/02/21 3785

## 2021-09-03 NOTE — ED NOTES
Pt resting comfortably in no acute distress. Respirations even and unlabored. Call light remains within reach. No needs at this time.        Raquel Beck RN  09/03/21 8410

## 2021-09-03 NOTE — ED NOTES
Pt resting comfortably in no acute distress. Respirations even and unlabored. Call light remains within reach. No needs at this time.        Ana Jaffe RN  09/03/21 8885

## 2021-09-03 NOTE — ED NOTES
was consulted for transportation needs for patient back to residence. Patient stated her daughter would be present and she has a way in. [address] to provide transport.   HERBIE [address], LSW  09/03/21 6599

## 2021-09-03 NOTE — ED NOTES
Bed: 15  Expected date:   Expected time:   Means of arrival:   Comments:  Jess Dimas RN  09/02/21 7775

## 2021-09-03 NOTE — ED PROVIDER NOTES
12/11/2017). Social History     Socioeconomic History    Marital status:      Spouse name: Not on file    Number of children: Not on file    Years of education: Not on file    Highest education level: Not on file   Occupational History    Not on file   Tobacco Use    Smoking status: Former Smoker    Smokeless tobacco: Never Used   Vaping Use    Vaping Use: Never used   Substance and Sexual Activity    Alcohol use: No    Drug use: No    Sexual activity: Never   Other Topics Concern    Not on file   Social History Narrative    Not on file     Social Determinants of Health     Financial Resource Strain:     Difficulty of Paying Living Expenses:    Food Insecurity:     Worried About Running Out of Food in the Last Year:     [address] in the Last Year:    Transportation Needs:     Lack of Transportation (Medical):  Lack of Transportation (Non-Medical):    Physical Activity:     Days of Exercise per Week:     Minutes of Exercise per Session:    Stress:     Feeling of Stress :    Social Connections:     Frequency of Communication with Friends and Family:     Frequency of Social Gatherings with Friends and Family:     Attends Taoist Services:     Active Member of Clubs or Organizations:     Attends Club or Organization Meetings:     Marital Status:    Intimate Partner Violence:     Fear of Current or Ex-Partner:     Emotionally Abused:     Physically Abused:     Sexually Abused:        Family History   Problem Relation Age of Onset    Heart Disease Paternal Grandmother     Diabetes Father     Hypertension Father     Stroke Father     Diabetes Mother     Hypertension Mother     Breast Cancer Mother     Asthma Brother     Cancer Sister         lung    Cancer Maternal Uncle         bone    Cancer Maternal Aunt        Allergies:  Pcn [penicillins]    Home Medications:  Prior to Admission medications    Medication Sig Start Date End Date Taking?  Authorizing Provider   folic acid (FOLVITE) 1 MG tablet Take 1 tablet by mouth daily 5/9/21   Mike Campa MD   vitamin D (ERGOCALCIFEROL) 1.25 MG (54877 UT) CAPS capsule Take 1 capsule by mouth once a week for 8 doses 9/2/20 10/22/20  Marycarmen Tobias MD   warfarin (COUMADIN) 5 MG tablet Take 1 tablet by mouth daily for 10 days 8/30/20 9/9/20  Marycarmen Tobias MD   ondansetron (ZOFRAN) 4 MG tablet Take 4 mg by mouth every 8 hours as needed for Nausea or Vomiting    Historical Provider, MD   lidocaine (XYLOCAINE) 4 % external solution Apply topically 2 times daily Apply topically as needed. Historical Provider, MD   polyethylene glycol (GLYCOLAX) packet Take 17 g by mouth daily as needed for Constipation    Historical Provider, MD   MULTIPLE VITAMIN PO Take by mouth daily    Historical Provider, MD   atorvastatin (LIPITOR) 20 MG tablet Take 1 tablet by mouth daily 1/23/18   ADOLFO Zelaya CNP   cloNIDine (CATAPRES) 0.1 MG/24HR Place 1 patch onto the skin once a week 12/18/17   Zaid Correa MD   hydrALAZINE (APRESOLINE) 25 MG tablet Take 1 tablet by mouth every 8 hours 12/13/17   Zaid Correa MD   carvedilol (COREG) 25 MG tablet Take 1 tablet by mouth 2 times daily (with meals) 12/13/17   Zaid Correa MD   amLODIPine (NORVASC) 10 MG tablet Take 1 tablet by mouth daily 12/14/17   Zaid Correa MD   pantoprazole (PROTONIX) 40 MG tablet Take 1 tablet by mouth 2 times daily Take protonix BID for 8 weeks and then daily 12/13/17   Zaid Correa MD       REVIEW OF SYSTEMS    (2-9 systems for level 4, 10 or more for level 5)      Review of Systems   Constitutional: Negative for fever. HENT: Negative for congestion, rhinorrhea and sore throat. Eyes: Negative for photophobia. Respiratory: Negative for shortness of breath. Cardiovascular: Negative for chest pain. Gastrointestinal: Negative for abdominal pain, nausea and vomiting. Genitourinary: Positive for decreased urine volume.    Musculoskeletal: Positive for gait problem. Baseline   Skin: Negative for rash. Allergic/Immunologic: Negative for food allergies. Neurological: Negative for headaches. Hematological: Negative for adenopathy. Psychiatric/Behavioral: Negative for agitation and confusion. PHYSICAL EXAM   (up to 7 for level 4, 8 or more for level 5)      INITIAL VITALS:   /73   Pulse 88   Temp 97.3 °F (36.3 °C)   Resp 18   Ht 5' 4\" (1.626 m)   Wt 237 lb (107.5 kg)   SpO2 98%   BMI 40.68 kg/m²     Physical Exam  Vitals and nursing note reviewed. Constitutional:       Appearance: She is obese. HENT:      Head: Normocephalic. Right Ear: External ear normal.      Left Ear: External ear normal.      Nose: Nose normal.      Mouth/Throat:      Mouth: Mucous membranes are moist.   Eyes:      Conjunctiva/sclera: Conjunctivae normal.   Cardiovascular:      Rate and Rhythm: Normal rate. Pulses: Normal pulses. Pulmonary:      Effort: No respiratory distress. Abdominal:      General: There is distension. Palpations: Abdomen is soft. Musculoskeletal:      Cervical back: Normal range of motion. Comments: Limited due to residual deficits from prior cva   Skin:     General: Skin is warm and dry. Neurological:      Mental Status: She is oriented to person, place, and time.    Psychiatric:         Mood and Affect: Mood normal.         DIFFERENTIAL  DIAGNOSIS     PLAN (LABS / IMAGING / EKG):  Orders Placed This Encounter   Procedures    XR HIP 2-3 VW W PELVIS LEFT    CT ABDOMEN PELVIS W IV CONTRAST Additional Contrast? None    CBC WITH AUTO DIFFERENTIAL    Comprehensive Metabolic Panel    Lactic Acid, Plasma    Bladder scan    EKG 12 Lead       MEDICATIONS ORDERED:  Orders Placed This Encounter   Medications    iopamidol (ISOVUE-370) 76 % injection 75 mL       DDX: urinary retention, renal faiure    DIAGNOSTIC RESULTS / EMERGENCY DEPARTMENT COURSE / MDM   LAB RESULTS:  Results for orders placed or performed during the hospital encounter of 09/02/21   CBC WITH AUTO DIFFERENTIAL   Result Value Ref Range    WBC 9.6 3.5 - 11.3 k/uL    RBC 3.76 (L) 3.95 - 5.11 m/uL    Hemoglobin 10.2 (L) 11.9 - 15.1 g/dL    Hematocrit 33.1 (L) 36.3 - 47.1 %    MCV 88.0 82.6 - 102.9 fL    MCH 27.1 25.2 - 33.5 pg    MCHC 30.8 28.4 - 34.8 g/dL    RDW 16.3 (H) 11.8 - 14.4 %    Platelets 097 623 - 042 k/uL    MPV 9.1 8.1 - 13.5 fL    NRBC Automated 0.0 0.0 per 100 WBC    Differential Type NOT REPORTED     Seg Neutrophils 64 36 - 65 %    Lymphocytes 23 (L) 24 - 43 %    Monocytes 7 3 - 12 %    Eosinophils % 6 (H) 1 - 4 %    Basophils 0 0 - 2 %    Immature Granulocytes 0 0 %    Segs Absolute 6.02 1.50 - 8.10 k/uL    Absolute Lymph # 2.23 1.10 - 3.70 k/uL    Absolute Mono # 0.70 0.10 - 1.20 k/uL    Absolute Eos # 0.55 (H) 0.00 - 0.44 k/uL    Basophils Absolute 0.03 0.00 - 0.20 k/uL    Absolute Immature Granulocyte 0.03 0.00 - 0.30 k/uL    WBC Morphology NOT REPORTED     RBC Morphology ANISOCYTOSIS PRESENT     Platelet Estimate NOT REPORTED    Comprehensive Metabolic Panel   Result Value Ref Range    Glucose 155 (H) 70 - 99 mg/dL    BUN 17 8 - 23 mg/dL    CREATININE 0.51 0.50 - 0.90 mg/dL    Bun/Cre Ratio NOT REPORTED 9 - 20    Calcium 8.8 8.6 - 10.4 mg/dL    Sodium 140 135 - 144 mmol/L    Potassium 4.8 3.7 - 5.3 mmol/L    Chloride 104 98 - 107 mmol/L    CO2 26 20 - 31 mmol/L    Anion Gap 10 9 - 17 mmol/L    Alkaline Phosphatase 104 35 - 104 U/L    ALT 11 5 - 33 U/L    AST 11 <32 U/L    Total Bilirubin 0.27 (L) 0.3 - 1.2 mg/dL    Total Protein 7.2 6.4 - 8.3 g/dL    Albumin 3.2 (L) 3.5 - 5.2 g/dL    Albumin/Globulin Ratio 0.8 (L) 1.0 - 2.5    GFR Non-African American >60 >60 mL/min    GFR African American >60 >60 mL/min    GFR Comment          GFR Staging NOT REPORTED        IMPRESSION: Patient is alert oriented morbidly obese chronically ill-appearing bedbound 68-year-old female presenting after her Noel was removed earlier today she had distention. Increased volume of stool seen within the rectosigmoid colon. Scattered mild diverticula seen within the colon. Bowel loops are unremarkable in appearance without evidence of obstruction, distension or mucosal thickening. The appendix is normal. Pelvis: The urinary bladder is mildly distended and grossly unremarkable in appearance. No evidence of pelvic free fluid is seen. Peritoneum/Retroperitoneum: No evidence of retroperitoneal or intraperitoneal lymphadenopathy is identified. No evidence of intraperitoneal free fluid is seen. Bones/Soft Tissues: Mild scattered subcutaneous fat stranding related to edema is noted. The bones, skeletal muscle bundles, fascial planes and subcutaneous soft tissues are unremarkable in appearance. 1. Moderate cardiomegaly. 2. Redemonstration of suspected right adrenal gland medial limb adenoma. 3. Multifocal small hepatic hypodensities, as discussed above, most suggestive of simple cysts versus hemangiomata. 4. Moderate-sized hiatal hernia. 5. Mild diffuse subcutaneous edema. 6. Colonic mild diverticulosis without evidence of diverticulitis. 7. Increased rectosigmoid colon stool volume, possibly associated with constipation, without associated bowel obstruction. XR HIP 2-3 VW W PELVIS LEFT    Result Date: 9/2/2021  EXAMINATION: ONE XRAY VIEW OF THE PELVIS AND TWO XRAY VIEWS LEFT HIP 9/2/2021 9:40 pm COMPARISON: May 4, 2021. HISTORY: ORDERING SYSTEM PROVIDED HISTORY: pain eval occult fx TECHNOLOGIST PROVIDED HISTORY: pain eval occult fx Reason for Exam: pain FINDINGS: A frontal view pelvic radiograph was obtained, along with frontal and lateral view radiographs of the right and left hip. Bone mineralization is normal.  There is no evidence of acute or healing fracture. An area of focal cortical thinning within the lateral aspect of the proximal left femoral diaphysis is unchanged from prior. Joint relationships are maintained.   There is a moderate degree of arthritic change involving both hips in a relatively symmetric, superolateral distribution, noting subchondral sclerosis with minimal productive change and joint space narrowing. No appreciable soft tissue abnormality. Moderate bilateral hip osteoarthritis. No acute fracture or hip dislocation. EKG  Sinus Rhythm with  Premature atrial complexes ventricular rate 79  normal axis MT interval 132 QRS duration 70 ms no acute ST-T wave changes some possible flattening but wandering baseline    All EKG's are interpreted by the Emergency Department Physician who either signs or Co-signs this chart in the absence of a cardiologist.    EMERGENCY DEPARTMENT COURSE:  ED Course as of Sep 03 1759   Thu Sep 02, 2021   2206 Bladder scan shows 90cc after 12 hours    [BG]   2216 Concern for acute renal failure as pt reports eating and drinking normally. Will assess labs obtain ekg to eval hyperk    [BG]   2354 Dispo pending ct a/p    [BG]   Fri Sep 03, 2021   0254 Pt urinated on bed. Voiding on her own.    [BG]      ED Course User Index  [BG] Mely Lees DO         PROCEDURES:  none    CONSULTS:  None    CRITICAL CARE:  none    FINAL IMPRESSION      1.  Oliguria          DISPOSITION / PLAN     DISPOSITION  dc home      PATIENT REFERRED TO:  [facility]  [address] [address]  [phone]  Go to   As needed, If symptoms worsen    [address] [place] [address] Road  [address] [place] [id number] [phone]  Call today  for followup and reevaluation in 1-2 days and to review incidental findings on ct imaging      DISCHARGE MEDICATIONS:  Discharge Medication List as of 9/3/2021  2:56 AM          Mely Lees DO  Emergency Medicine Resident    (Please note that portions of thisnote were completed with a voice recognition program.  Efforts were made to edit the dictations but occasionally words are mis-transcribed.)       Meyl Lees DO  Resident  09/03/21 4444 [facility]

## 2021-09-03 NOTE — ED NOTES
Pt resting comfortably in no acute distress. Respirations even and unlabored. Call light remains within reach. No needs at this time.    Clean linens, brief changed     Edward Mena RN  09/03/21 0866

## 2021-09-03 NOTE — ED NOTES
Pt resting comfortably in no acute distress. Respirations even and unlabored. Call light remains within reach. No needs at this time.        Edward Mena RN  09/03/21 2210

## 2021-09-03 NOTE — ED NOTES
Pt resting comfortably in no acute distress. Respirations even and unlabored. Call light remains within reach. No needs at this time.        Steven Pradhan RN  09/03/21 0010

## 2021-09-03 NOTE — ED PROVIDER NOTES
Faculty Sign-Out Attestation  Handoff taken on the following patient from prior Attending Physician: Ellyn Engel    I was available and discussed any additional care issues that arose and coordinated the management plans with the resident(s) caring for the patient during my duty period. Any areas of disagreement with residents documentation of care or procedures are noted on the chart. I was personally present for the key portions of any/all procedures during my duty period. I have documented in the chart those procedures where I was not present during the key portions.     Oliguria, ct abdomen pending, if negative may discharge    Anant Avila DO  Attending Physician     Anant Avila DO  09/03/21 0034    Ct stable, working to transfer back to [email or web address] [facility],      Anant Avila,   09/03/21 0500

## 2021-09-03 NOTE — ED NOTES
Pt arrived to the ED via EMS. Pt had a gomez placed by home health but did not have any urine return so they took it out and called 911. RR even and unlabored. A&Ox4. EKG obtained. Bladder scanner showed 80ml in bladder. Call light within reach.      Terese Belle RN  09/02/21 7461

## 2022-01-11 NOTE — ANESTHESIA POSTPROCEDURE EVALUATION
Department of Anesthesiology  Postprocedure Note    Patient: Chuck Muhammad  MRN: [id number]  [place]: 1950  Date of evaluation: 12/11/2017  Time:  1:22 PM     Procedure Summary     Date:  12/11/17 Room / Location:  [facility] 03 / [facility]    Anesthesia Start:  1052 Anesthesia Stop:  1233    Procedure:  EGD ESOPHAGOGASTRODUODENOSCOPY PEG TUBE INSERTION (N/A ) Diagnosis:  (DYSPHAGIA )    Surgeon:  Ruthy Antoine MD Responsible Provider:  Zoraida Sorenson MD    Anesthesia Type:  TIVA ASA Status:  4          Anesthesia Type: TIVA    Kee Phase I: Kee Score: 10    Kee Phase II: Kee Score: 9    Last vitals: Reviewed and per EMR flowsheets.        Anesthesia Post Evaluation    Patient location during evaluation: PACU  Patient participation: complete - patient participated  Level of consciousness: awake and alert  Pain score: 0  Airway patency: patent  Nausea & Vomiting: no nausea and no vomiting  Complications: no  Cardiovascular status: hemodynamically stable  Respiratory status: acceptable  Hydration status: euvolemic Cimzia Counseling:  I discussed with the patient the risks of Cimzia including but not limited to immunosuppression, allergic reactions and infections.  The patient understands that monitoring is required including a PPD at baseline and must alert us or the primary physician if symptoms of infection or other concerning signs are noted.

## 2025-05-16 NOTE — ED NOTES
received call back [facility]; all necessary information provided. Chavez Gilmore stated the referral would be put into the system and assigned to a worker and then they would be in contact.          TARAN Luke [facility]  05/04/21 8719 breathing is unlabored without accessory muscle use, normal breath sounds

## (undated) DEVICE — MIC* SAFETY PERCUTANEOUS ENDOSCOPIC GASTROSTOMY PEG KIT - 20 FR - PULL: Brand: MIC PEG TUBE